# Patient Record
Sex: FEMALE | Race: WHITE | URBAN - METROPOLITAN AREA
[De-identification: names, ages, dates, MRNs, and addresses within clinical notes are randomized per-mention and may not be internally consistent; named-entity substitution may affect disease eponyms.]

---

## 2017-08-29 ENCOUNTER — OFFICE VISIT (OUTPATIENT)
Dept: ORTHOPEDIC SURGERY | Age: 67
End: 2017-08-29

## 2017-08-29 VITALS
HEIGHT: 63 IN | BODY MASS INDEX: 49.61 KG/M2 | SYSTOLIC BLOOD PRESSURE: 145 MMHG | DIASTOLIC BLOOD PRESSURE: 84 MMHG | HEART RATE: 59 BPM | WEIGHT: 279.98 LBS

## 2017-08-29 DIAGNOSIS — M25.511 ACUTE PAIN OF RIGHT SHOULDER: ICD-10-CM

## 2017-08-29 DIAGNOSIS — Z96.611 STATUS POST REVERSE TOTAL ARTHROPLASTY OF RIGHT SHOULDER: Primary | ICD-10-CM

## 2017-08-29 PROCEDURE — 1123F ACP DISCUSS/DSCN MKR DOCD: CPT | Performed by: ORTHOPAEDIC SURGERY

## 2017-08-29 PROCEDURE — G8400 PT W/DXA NO RESULTS DOC: HCPCS | Performed by: ORTHOPAEDIC SURGERY

## 2017-08-29 PROCEDURE — 99214 OFFICE O/P EST MOD 30 MIN: CPT | Performed by: ORTHOPAEDIC SURGERY

## 2017-08-29 PROCEDURE — 4040F PNEUMOC VAC/ADMIN/RCVD: CPT | Performed by: ORTHOPAEDIC SURGERY

## 2017-08-29 PROCEDURE — G8417 CALC BMI ABV UP PARAM F/U: HCPCS | Performed by: ORTHOPAEDIC SURGERY

## 2017-08-29 PROCEDURE — 3014F SCREEN MAMMO DOC REV: CPT | Performed by: ORTHOPAEDIC SURGERY

## 2017-08-29 PROCEDURE — G8427 DOCREV CUR MEDS BY ELIG CLIN: HCPCS | Performed by: ORTHOPAEDIC SURGERY

## 2017-08-29 PROCEDURE — 1090F PRES/ABSN URINE INCON ASSESS: CPT | Performed by: ORTHOPAEDIC SURGERY

## 2017-08-29 PROCEDURE — 3017F COLORECTAL CA SCREEN DOC REV: CPT | Performed by: ORTHOPAEDIC SURGERY

## 2017-08-29 PROCEDURE — 1036F TOBACCO NON-USER: CPT | Performed by: ORTHOPAEDIC SURGERY

## 2017-08-29 RX ORDER — DIETHYLPROPION HYDROCHLORIDE 75 MG/1
75 TABLET ORAL
COMMUNITY
Start: 2017-02-13

## 2017-08-29 RX ORDER — ALENDRONATE SODIUM 70 MG/1
TABLET ORAL
COMMUNITY
Start: 2017-06-04 | End: 2020-03-12 | Stop reason: ALTCHOICE

## 2017-08-29 RX ORDER — TRIAMTERENE AND HYDROCHLOROTHIAZIDE 37.5; 25 MG/1; MG/1
TABLET ORAL
COMMUNITY
Start: 2017-07-10 | End: 2020-03-12 | Stop reason: ALTCHOICE

## 2017-08-29 RX ORDER — FUROSEMIDE 40 MG/1
TABLET ORAL
COMMUNITY
Start: 2016-10-18 | End: 2017-08-29

## 2017-08-29 RX ORDER — SUMATRIPTAN 25 MG/1
TABLET, FILM COATED ORAL
COMMUNITY
Start: 2015-04-21 | End: 2017-08-29

## 2017-08-29 RX ORDER — NYSTATIN 100000 [USP'U]/G
POWDER TOPICAL
COMMUNITY
Start: 2017-04-25

## 2020-03-12 ENCOUNTER — OFFICE VISIT (OUTPATIENT)
Dept: ORTHOPEDIC SURGERY | Age: 70
End: 2020-03-12
Payer: MEDICARE

## 2020-03-12 VITALS — WEIGHT: 280 LBS | BODY MASS INDEX: 51.53 KG/M2 | HEIGHT: 62 IN

## 2020-03-12 PROCEDURE — 1123F ACP DISCUSS/DSCN MKR DOCD: CPT | Performed by: ORTHOPAEDIC SURGERY

## 2020-03-12 PROCEDURE — 3017F COLORECTAL CA SCREEN DOC REV: CPT | Performed by: ORTHOPAEDIC SURGERY

## 2020-03-12 PROCEDURE — 99214 OFFICE O/P EST MOD 30 MIN: CPT | Performed by: ORTHOPAEDIC SURGERY

## 2020-03-12 PROCEDURE — G8484 FLU IMMUNIZE NO ADMIN: HCPCS | Performed by: ORTHOPAEDIC SURGERY

## 2020-03-12 PROCEDURE — G8427 DOCREV CUR MEDS BY ELIG CLIN: HCPCS | Performed by: ORTHOPAEDIC SURGERY

## 2020-03-12 PROCEDURE — 4040F PNEUMOC VAC/ADMIN/RCVD: CPT | Performed by: ORTHOPAEDIC SURGERY

## 2020-03-12 PROCEDURE — G8417 CALC BMI ABV UP PARAM F/U: HCPCS | Performed by: ORTHOPAEDIC SURGERY

## 2020-03-12 PROCEDURE — 1090F PRES/ABSN URINE INCON ASSESS: CPT | Performed by: ORTHOPAEDIC SURGERY

## 2020-03-12 PROCEDURE — 20610 DRAIN/INJ JOINT/BURSA W/O US: CPT | Performed by: ORTHOPAEDIC SURGERY

## 2020-03-12 PROCEDURE — G8400 PT W/DXA NO RESULTS DOC: HCPCS | Performed by: ORTHOPAEDIC SURGERY

## 2020-03-12 PROCEDURE — 1036F TOBACCO NON-USER: CPT | Performed by: ORTHOPAEDIC SURGERY

## 2020-03-12 RX ORDER — PREDNISONE 1 MG/1
TABLET ORAL
COMMUNITY
Start: 2020-02-22 | End: 2020-07-23

## 2020-03-12 RX ORDER — LIDOCAINE HYDROCHLORIDE 10 MG/ML
8 INJECTION, SOLUTION INFILTRATION; PERINEURAL ONCE
Status: COMPLETED | OUTPATIENT
Start: 2020-03-12 | End: 2020-03-12

## 2020-03-12 RX ORDER — METHYLPREDNISOLONE ACETATE 40 MG/ML
80 INJECTION, SUSPENSION INTRA-ARTICULAR; INTRALESIONAL; INTRAMUSCULAR; SOFT TISSUE ONCE
Status: COMPLETED | OUTPATIENT
Start: 2020-03-12 | End: 2020-03-12

## 2020-03-12 RX ADMIN — LIDOCAINE HYDROCHLORIDE 8 ML: 10 INJECTION, SOLUTION INFILTRATION; PERINEURAL at 12:26

## 2020-03-12 RX ADMIN — METHYLPREDNISOLONE ACETATE 80 MG: 40 INJECTION, SUSPENSION INTRA-ARTICULAR; INTRALESIONAL; INTRAMUSCULAR; SOFT TISSUE at 12:27

## 2020-03-12 ASSESSMENT — ENCOUNTER SYMPTOMS: BACK PAIN: 1

## 2020-03-12 NOTE — PROGRESS NOTES
Review of Systems   Cardiovascular: Positive for leg swelling. Musculoskeletal: Positive for back pain and neck pain. Joint pain - shoulder     All other systems reviewed and are negative.

## 2020-03-12 NOTE — PROGRESS NOTES
[facility] and [address] [place] [address]  Office Visit  Left Shoulder Pain  Date:  3/12/2020    Name:  Jaci Major  Address:  [address] [place] [address]    :  1950      Age:   78 y.o.    SSN:  [id number]      Medical Record Number:  <[id number]>    Chief Complaint:    Left Shoulder Pain  S/p right reverse shoulder arthroplasty 3/7/2011    HPI:   Jaci Major is a 78 y.o. right hand dominant female who presents today for evaluation of the left shoulder. She has several years of gradually worsening chronic left shoulder pain, with decreased range of motion and significant limitations in her activities of daily living. She denies any specific discrete trauma. She is never had an injection, physical therapy or anti-inflammatories for the left shoulder. She also had a right reverse total shoulder arthroplasty 3/7/2011, which is doing excellent at this time. She has great ran    She denies any new numbness, tingling, fevers, chills, chest pain, shortness of breath, or any other new significant symptoms. Of note, she does have a sister with some significant medical issues at this time who requires her assistance. She would like to put off surgery until this summer and is interested in injection at this time.     Pain Assessment  Location of Pain: Shoulder  Location Modifiers: Left  Severity of Pain: 6  Quality of Pain: Aching  Duration of Pain: Persistent  Frequency of Pain: Intermittent  Aggravating Factors: (general use)  Limiting Behavior: Yes  Relieving Factors: Rest, Nsaids  Work-Related Injury: No  Are there other pain locations you wish to document?: No    Past History:  Past Medical History:   Diagnosis Date    Arthritis     Hepatitis        Past Surgical History:   Procedure Laterality Date    HYSTERECTOMY, TOTAL ABDOMINAL      SHOULDER SURGERY         Social History     Tobacco Use    Smoking status: Never Smoker    Smokeless tobacco: Never Used   Substance Use 4     Skin:  No skin rashes or lesions, warm, well perfused  Inspection: No deformity,  Palpation: tenderness at anterior and posterior joint lines  Stability: No instability  Sensation: Intact in all distributions  Motor: AIN/PIN/U 5/5  Strong radial pulse  Special Tests: positive napoleon, maintains bellypress 4/5      Right Upper Extremity Exam:      FF Abd ER IR   Active  150 20 L1   Passive ROM       Strength (x/5)  4  4 4     Sensation: Intact in all distributions  Motor: AIN/PIN/U 5/5  Strong radial pulse    Radiographic:  X-rays obtained and reviewed in office, reviewed and interpreted by me today:  Views: 3  Location: Right shoulder  Impression: Well positioned right reverse total shoulder arthroplasty without fracture, evidence of loosening or other hardware complications. X-rays obtained and reviewed in office, reviewed and interpreted by me today:  Views: 3  Location: Left shoulders  Impression: Advanced rotator cuff arthropathy with superior migration and acetabularization of the acromion. There is significant posterior wear and glenoid retroversion with mild subluxation      Assessment:  Claire Haley is a 78 y.o. female with   1. Left rotator cuff arthropathy - advanced   2. S/p right reverse total shoulder arthroplasty - doing well at 9 years follow-up    Impression:  Encounter Diagnoses   Name Primary?     Rotator cuff arthropathy, left Yes    Status post replacement of right shoulder joint        Office Procedures:  Orders Placed This Encounter   Procedures    XR SHOULDER LEFT (MIN 2 VIEWS)     Standing Status:   Future     Number of Occurrences:   1     Standing Expiration Date:   3/12/2021     Order Specific Question:   Reason for exam:     Answer:   pain    XR SHOULDER RIGHT (MIN 2 VIEWS)     Standing Status:   Future     Number of Occurrences:   1     Standing Expiration Date:   3/12/2021     Order Specific Question:   Reason for exam:     Answer:   pain       Plan:   Pertinent imaging was reviewed. The etiology, natural history, and treatment options for the disorder were discussed. The roles of activity modifications, medications, injections, physical therapy, and surgical interventions were all described to the patient and questions were answered. She wants to delay surgery until this summer. He was counseled that injection would delay surgery for 3 months. At this time she is interested in an injection which was performed for her left shoulder    Right shoulder is doing very well about 9 years out from surgery. She can continue to follow-up for this on an as-needed basis. For further counseling regarding the left shoulder we will see her back in 6 weeks. Procedure: left glenohumeral joint injection  After informed consent was provided, the skin was cleansed and prepped. Using a 25 gauge needle an injection with 2 mL of 40 mg/ml Depo-Medrol and 8 mL of 0.5% ropivacaine was injected without difficulty. The needle was withdrawn and the puncture site sealed with a Band-Aid. The patient tolerated the procedure well. Malik Tan MD  [address]     03/12/20  12:24 PM    The encounter with Slime Worrell was supervised by Dr Gerhard Navarro who personally examined the patient and reviewed the plan. This dictation was performed with a verbal recognition program (DRAGON) and it was checked for errors. It is possible that there are still dictated errors within this office note. If so, please bring any errors to my attention for an addendum. All efforts were made to ensure that this office note is accurate.   ________________  I was physically present and personally supervised the Orthopaedic Sports Medicine Fellow in the evaluation and development of a treatment plan for this patient. I personally interviewed the patient and performed a physical examination.  In addition, I discussed the patient's condition and treatment options with them. I have also reviewed and agree with the past medical, family and social history unless otherwise noted. All of the patient's questions were answered. Janel Smith MD, PhD  3/12/2020

## 2020-05-01 ENCOUNTER — TELEPHONE (OUTPATIENT)
Dept: ORTHOPEDIC SURGERY | Age: 70
End: 2020-05-01

## 2020-05-04 NOTE — TELEPHONE ENCOUNTER
Returned patient's call to discuss surgery. She does have a few more questions regarding the surgery and needs to schedule an appointment with Dr. Arben Ott to address her concerns. She has an appointment for 5/28. She will call sooner with any questions/concerns. Patient is in agreement with this plan.

## 2020-05-27 ENCOUNTER — TELEPHONE (OUTPATIENT)
Dept: ORTHOPEDIC SURGERY | Age: 70
End: 2020-05-27

## 2020-05-27 NOTE — TELEPHONE ENCOUNTER
Patient is calling reporting that she has a fever. Pcp wants her to get covid test and she would like if she should come in tomorrow.  Ph [phone]

## 2020-06-04 ENCOUNTER — OFFICE VISIT (OUTPATIENT)
Dept: ORTHOPEDIC SURGERY | Age: 70
End: 2020-06-04
Payer: MEDICARE

## 2020-06-04 VITALS — WEIGHT: 279.98 LBS | HEIGHT: 62 IN | BODY MASS INDEX: 51.52 KG/M2

## 2020-06-04 PROCEDURE — MISCCOLD COLD THERAPY UNIT AND PAD: Performed by: ORTHOPAEDIC SURGERY

## 2020-06-04 PROCEDURE — G8400 PT W/DXA NO RESULTS DOC: HCPCS | Performed by: ORTHOPAEDIC SURGERY

## 2020-06-04 PROCEDURE — 1123F ACP DISCUSS/DSCN MKR DOCD: CPT | Performed by: ORTHOPAEDIC SURGERY

## 2020-06-04 PROCEDURE — 1090F PRES/ABSN URINE INCON ASSESS: CPT | Performed by: ORTHOPAEDIC SURGERY

## 2020-06-04 PROCEDURE — 1036F TOBACCO NON-USER: CPT | Performed by: ORTHOPAEDIC SURGERY

## 2020-06-04 PROCEDURE — 3017F COLORECTAL CA SCREEN DOC REV: CPT | Performed by: ORTHOPAEDIC SURGERY

## 2020-06-04 PROCEDURE — 99214 OFFICE O/P EST MOD 30 MIN: CPT | Performed by: ORTHOPAEDIC SURGERY

## 2020-06-04 PROCEDURE — G8417 CALC BMI ABV UP PARAM F/U: HCPCS | Performed by: ORTHOPAEDIC SURGERY

## 2020-06-04 PROCEDURE — G8427 DOCREV CUR MEDS BY ELIG CLIN: HCPCS | Performed by: ORTHOPAEDIC SURGERY

## 2020-06-04 PROCEDURE — L3670 SO ACRO/CLAV CAN WEB PRE OTS: HCPCS | Performed by: ORTHOPAEDIC SURGERY

## 2020-06-04 PROCEDURE — 4040F PNEUMOC VAC/ADMIN/RCVD: CPT | Performed by: ORTHOPAEDIC SURGERY

## 2020-06-04 NOTE — PROGRESS NOTES
12 [facility]  History and Physical  Shoulder Pain    Date:  2020    Name:  Willian Cueva  Address:  [address] [place] [address]    :  1950      Age:   69 y.o.    SSN:  [id number]      Medical Record Number:  <[id number]>    Reason for Visit:    Follow-up (Left Shoulder)      HPI:   Willian Cueva is a 69 y.o. female who presents to our office today for follow up of the left shoulder pain. The patient has been treated conservatively for many years for her left shoulder. She has advance osteoarthritis with rotator cuff dysfunction of her left shoulder. She is no longer responding to conservative management. She had a corticosteroid injection on 3/12/2020 which gave her relief for one month. She reports her having dull, achy pain now. She has limited function of her left shoulder. Since her last visit she has retired from her job. She denies ay numbness or tingling. She previously underwent a right reverse total shoulder arthroplasty. This was nearly 10 years ago. The right shoulder continue to do well for her. Pain Assessment  Location of Pain: Shoulder  Location Modifiers: Left  Severity of Pain: 3  Quality of Pain: Dull, Aching  Duration of Pain: Persistent  Frequency of Pain: Intermittent  Aggravating Factors: (general use, certain movements or activities)  Limiting Behavior: Yes  Relieving Factors: Rest  Work-Related Injury: No  Are there other pain locations you wish to document?: No    Review of Systems    Patient has had no medical changes since last evaluated           Review of Systems:  A 14 point review of systems available in the scanned medical record as documented by the patient on 3/12/2020. The review is negative with the exception of those things mentioned in the History of Present Illness and Past Medical History.       Past Medical History:  Patient's medications, allergies, past medical, surgical, social and family histories time.    Impression:  Encounter Diagnoses   Name Primary?  Left shoulder pain, unspecified chronicity Yes    Rotator cuff arthropathy, left        Office Procedures:  Orders Placed This Encounter   Procedures    Cold Therapy Unit and Pad $150     Scheduling Instructions:      Patient was supplied a Cold Therapy Unit and Pad. This retail item was supplied to provide functional support and assist in protecting the affected area. Verbal and written instructions for the use of and application of this item were provided. The patient was educated and fit by a healthcare professional with expert knowledge and specialization in brace application. They were instructed to contact the office immediately should the equipment result       in increased pain, decreased sensation, increased swelling or worsening of the condition.  CT SHOULDER LEFT WO CONTRAST     Standing Status:   Future     Standing Expiration Date:   6/4/2021     Order Specific Question:   Reason for exam:     Answer:   Gentry palma    MRI SHOULDER LEFT WO CONTRAST     Standing Status:   Future     Standing Expiration Date:   6/4/2021    DJO ultrasling IV Shoulder Sling     Patient was prescribed a DJO Ultrasling IV Shoulder Brace. The left shoulder will require stabilization / immobilization from this orthosis. The orthosis will assist in protecting the affected area, provide functional support and facilitate healing. The device was ordered and fit on 06/04/20. The patient was educated and fit by a healthcare professional with expert knowledge and specialization in brace application while under the direct supervision of the treating physician. Verbal and written instructions for the use of and application of this item were provided. They were instructed to contact the office immediately should the brace result in increased pain, decreased sensation, increased swelling or worsening of the condition.        Plan: AMY  Orthopaedic Sports Medicine Physician Assistant    During this examination, I, Fabio Stovall PA-C, functioned as a scribe for Dr. Jenne Litten. This dictation was performed with a verbal recognition program (DRAGON) and it was checked for errors. It is possible that there are still dictated errors within this office note. If so, please bring any errors to my attention for an addendum. All efforts were made to ensure that this office note is accurate.  _______________  I, Dr. Jenne Litten, personally performed the services described in this documentation as described by Fabio Stovall PA-C in my presence, and it is both accurate and complete. Janel Tuttle MD, PhD  6/4/2020

## 2020-06-05 ENCOUNTER — TELEPHONE (OUTPATIENT)
Dept: ORTHOPEDIC SURGERY | Age: 70
End: 2020-06-05

## 2020-06-09 ENCOUNTER — TELEPHONE (OUTPATIENT)
Dept: ORTHOPEDIC SURGERY | Age: 70
End: 2020-06-09

## 2020-06-17 ENCOUNTER — TELEPHONE (OUTPATIENT)
Dept: ORTHOPEDIC SURGERY | Age: 70
End: 2020-06-17

## 2020-06-19 ENCOUNTER — TELEPHONE (OUTPATIENT)
Dept: ORTHOPEDIC SURGERY | Age: 70
End: 2020-06-19

## 2020-06-25 ENCOUNTER — OFFICE VISIT (OUTPATIENT)
Dept: ORTHOPEDIC SURGERY | Age: 70
End: 2020-06-25
Payer: MEDICARE

## 2020-06-25 VITALS — WEIGHT: 279 LBS | HEIGHT: 62 IN | BODY MASS INDEX: 51.34 KG/M2

## 2020-06-25 PROBLEM — I10 ESSENTIAL HYPERTENSION: Status: ACTIVE | Noted: 2020-05-08

## 2020-06-25 PROBLEM — L30.4 INTERTRIGO: Status: ACTIVE | Noted: 2020-05-08

## 2020-06-25 PROBLEM — E66.01 OBESITY, MORBID, BMI 50 OR HIGHER (HCC): Status: ACTIVE | Noted: 2018-06-20

## 2020-06-25 PROCEDURE — 1090F PRES/ABSN URINE INCON ASSESS: CPT | Performed by: ORTHOPAEDIC SURGERY

## 2020-06-25 PROCEDURE — 1036F TOBACCO NON-USER: CPT | Performed by: ORTHOPAEDIC SURGERY

## 2020-06-25 PROCEDURE — 4040F PNEUMOC VAC/ADMIN/RCVD: CPT | Performed by: ORTHOPAEDIC SURGERY

## 2020-06-25 PROCEDURE — 1123F ACP DISCUSS/DSCN MKR DOCD: CPT | Performed by: ORTHOPAEDIC SURGERY

## 2020-06-25 PROCEDURE — G8427 DOCREV CUR MEDS BY ELIG CLIN: HCPCS | Performed by: ORTHOPAEDIC SURGERY

## 2020-06-25 PROCEDURE — 3017F COLORECTAL CA SCREEN DOC REV: CPT | Performed by: ORTHOPAEDIC SURGERY

## 2020-06-25 PROCEDURE — G8400 PT W/DXA NO RESULTS DOC: HCPCS | Performed by: ORTHOPAEDIC SURGERY

## 2020-06-25 PROCEDURE — 99213 OFFICE O/P EST LOW 20 MIN: CPT | Performed by: ORTHOPAEDIC SURGERY

## 2020-06-25 PROCEDURE — G8417 CALC BMI ABV UP PARAM F/U: HCPCS | Performed by: ORTHOPAEDIC SURGERY

## 2020-06-25 NOTE — PROGRESS NOTES
12 [facility]  History and Physical  Shoulder Pain    Date:  2020    Name:  Mireille Christianson  Address:  [address] [place] [address]    :  1950      Age:   69 y.o.    SSN:  [id number]      Medical Record Number:  <[id number]>    Reason for Visit:    Shoulder Pain (Preop shoulder)      HPI:   Mireille Christianson is a 69 y.o. female who presents to our office today for follow up of the left shoulder pain. Patient has well known advanced glenohumeral osteoarthritis. We did discuss surgical intervention at her last visit as she has failed conservative management including corticosteroid injections and activity modification along with home physical therapy. Patient reported that the last injection only gave her 4 weeks of relief. She continues to have limited function of the shoulder along with increased pain. She denies any new injury since her last visit. At her last visit she was fitted with an UltraSling brace. She was also able to get her CT scan which she presents today review. Pain Assessment  Location of Pain: Shoulder  Location Modifiers: Left  Severity of Pain: 2  Quality of Pain: Aching  Duration of Pain: Persistent  Frequency of Pain: Intermittent  Aggravating Factors: Stretching, Exercise(Lifting)  Limiting Behavior: Yes  Relieving Factors: Rest  Result of Injury: No  Work-Related Injury: No  Are there other pain locations you wish to document?: No    Patient has had no medical changes since last evaluated      Review of Systems:  A 14 point review of systems available in the scanned medical record as documented by the patient on 2020. The review is negative with the exception of those things mentioned in the History of Present Illness and Past Medical History. Past Medical History:  Patient's medications, allergies, past medical, surgical, social and family histories were reviewed and updated as appropriate. Allergies:   Allergies Allergen Reactions    Codeine Nausea And Vomiting    Morphine      Other reaction(s): Other (See Comments)  Migraines     Acetaminophen Other (See Comments)     Was told should not take due to history of hepatitis    Adhesive Tape      blisters    Ciprofloxacin     Sulfa Antibiotics        Physical Exam:  There were no vitals filed for this visit. General: Yaa Farias is a healthy and well appearing 69 y.o. female who is sitting comfortably in our office in acute distress. Skin:  There are no skin lesions, cellulitis, or extreme edema. The patient has warm and well-perfused Bilateral upper extremities with brisk capillary refill. Eyes: Extra-ocular muscles intact  Mouth: Oral mucosa moist. No perioral lesions  Pulm: Respirations unlabored and regular. Neuro: Alert and oriented x3    left Shoulder Exam:  Inspection:  No gross deformities, no signs of infection. Palpation: Patient does have glenohumeral crepitus. She does have tenderness over the joint line. She has tenderness over the rotator cuff. Active Range of Motion: Forward elevation of 50, abduction of 50, external rotation with elbow at the side 5, internal rotation to the back is 4    Passive Range of Motion: deferred. Strength: External rotation with resistance with elbow at the side 2/5    Special Tests:  No Juan Ramon muscle deformity. Neurovascular: Sensation to light touch is intact, no motor deficits, palpable radial pulses 2+    Additional Examinations:    Examination of the contralateral extremity does not show any tenderness, deformity or injury. Range of motion is unremarkable. There is no gross instability. There are no rashes, ulcerations or lesions.  Strength and tone are normal.      Radiographic:  CT left shoulder 6/11/2020      FINDINGS:  Loss of glenohumeral joint space.  High-riding of the humeral head.  Remodeled    glenohumeral articular surfaces with eburnation and extensive subcortical pseudocysts.

## 2020-07-07 ENCOUNTER — OFFICE VISIT (OUTPATIENT)
Dept: ORTHOPEDIC SURGERY | Age: 70
End: 2020-07-07

## 2020-07-07 ENCOUNTER — EVALUATION (OUTPATIENT)
Dept: PHYSICAL THERAPY | Age: 70
End: 2020-07-07
Payer: MEDICARE

## 2020-07-07 ENCOUNTER — TELEPHONE (OUTPATIENT)
Dept: ORTHOPEDIC SURGERY | Age: 70
End: 2020-07-07

## 2020-07-07 VITALS — HEIGHT: 62 IN | BODY MASS INDEX: 51.36 KG/M2 | WEIGHT: 279.1 LBS

## 2020-07-07 PROBLEM — M25.612 DECREASED ROM OF LEFT SHOULDER: Status: ACTIVE | Noted: 2020-07-07

## 2020-07-07 PROBLEM — M19.012 PRIMARY OSTEOARTHRITIS OF LEFT SHOULDER: Status: ACTIVE | Noted: 2020-07-07

## 2020-07-07 PROBLEM — M25.512 LEFT SHOULDER PAIN: Status: ACTIVE | Noted: 2020-07-07

## 2020-07-07 PROBLEM — R29.898 SHOULDER WEAKNESS: Status: ACTIVE | Noted: 2020-07-07

## 2020-07-07 PROCEDURE — G8539 DOC FUNCT AND CARE PLAN: HCPCS | Performed by: PHYSICAL THERAPIST

## 2020-07-07 PROCEDURE — G8730 PAIN DOC POS AND PLAN: HCPCS | Performed by: PHYSICAL THERAPIST

## 2020-07-07 PROCEDURE — G8427 DOCREV CUR MEDS BY ELIG CLIN: HCPCS | Performed by: PHYSICAL THERAPIST

## 2020-07-07 PROCEDURE — 97110 THERAPEUTIC EXERCISES: CPT | Performed by: PHYSICAL THERAPIST

## 2020-07-07 PROCEDURE — 99024 POSTOP FOLLOW-UP VISIT: CPT | Performed by: ORTHOPAEDIC SURGERY

## 2020-07-07 PROCEDURE — 1101F PT FALLS ASSESS-DOCD LE1/YR: CPT | Performed by: PHYSICAL THERAPIST

## 2020-07-07 PROCEDURE — 97161 PT EVAL LOW COMPLEX 20 MIN: CPT | Performed by: PHYSICAL THERAPIST

## 2020-07-07 PROCEDURE — 97530 THERAPEUTIC ACTIVITIES: CPT | Performed by: PHYSICAL THERAPIST

## 2020-07-07 NOTE — PROGRESS NOTES
placement without any signs of loosening, fractures, subluxations or dislocations. Impression: Stable postop x-ray. Assessment :  Ms. Dayo Douglas is a 69 y.o. patient is s/p a left reverse total shoulder arthroplasty on 6/29/2020. Overall, she is doing very well. Impression:  Encounter Diagnosis   Name Primary?  S/P reverse total shoulder arthroplasty, left Yes       Office Procedures:  Orders Placed This Encounter   Procedures    XR SHOULDER LEFT (MIN 2 VIEWS)     Standing Status:   Future     Number of Occurrences:   1     Standing Expiration Date:   7/7/2021     Order Specific Question:   Reason for exam:     Answer:   pain    [facility] PT [facility] Physical Therapy     Referral Priority:   Routine     Referral Type:   Eval and Treat     Referral Reason:   Specialty Services Required     Requested Specialty:   Physical Therapy     Number of Visits Requested:   1       Treatment Plan:    Overall the patient is doing well. The pain is well-controlled. We recommend that she wear the UltraSling brace at all times with the exception of clothing, bathing of physical therapy. The patient was told that she is restricted from driving for at least 3 weeks postop. We will give a print out of their physical therapy order. All of her questions were fully answered today. We would like to see her back in 2 weeks for follow-up visit. 1:43 PM      Rosa Medrano PA-C  Orthopaedic Sports Medicine Physician Assistant    During this examination, Rosa ORANTES PA-C, functioned as a scribe for Dr. Tommy Beltran. This dictation was performed with a verbal recognition program (DRAGON) and it was checked for errors. It is possible that there are still dictated errors within this office note. If so, please bring any errors to my attention for an addendum.   All efforts were made to ensure that this office note is accurate.  ________________  LAMBERTO, Dr. Tommy Beltran, personally performed the services described in this documentation as described by Khoi العلي PA-C in my presence, and it is both accurate and complete. Janel Baxter MD, PhD  7/7/2020

## 2020-07-07 NOTE — PROGRESS NOTES
[facility]awn Marte [facility]   Phone: [phone]    Fax: [phone]      Physical Therapy Treatment Note/ Progress Report:           Date:  2020    Patient Name:  Salomon Barksdale    :  1950  MRN: <[id number]>  Restrictions/Precautions:  S/P Left Reverse TSA  Medical/Treatment Diagnosis Information:  Diagnosis: S/P Left Reverse TSA (Sx: 20) (M19.012)   Treatment Diagnosis: Left Shoulder Pain (M25.512), Decreased left Shoulder ROM (M25.612), Decreased Left Shoulder Strength (P27.100)  Insurance/Certification information:  PT Insurance Information: Medicare, [address] [facility]  Physician Information:  Referring Practitioner: Dr. Fang Maher  Has the plan of care been signed (Y/N):        []  Yes  [x]  No (POC sent 20)       Date of Patient follow up with Physician: Today; 20      Is this a Progress Report:     [x]  Yes  []  No        If Yes:  Date Range for reporting period:  Beginning 20  Ending 20    Progress report will be due (10 Rx or 30 days whichever is less): 60      Recertification will be due (POC Duration  / 90 days whichever is less): 20         Visit # Insurance Allowable Auth Required   1 Medical Necessity []  Yes [x]  No        Functional Scale:    Date assessed:  20  Functional Assessment Tool Used: Carolyn Mattson  Score:  84.1% functional deficit (20)     (20)   Patient Age: 69years old   Patient Height: 5' 2\"  Patient Weight: 280 lbs  Patient BMI: 51.2      Pain (20)  [x]  Pain diagram was completed, pain was present and a follow up plan to address the pain is in the plan of care. ()   []  Pain diagram was completed and there was no pain present and therefore no follow up plan to address pain in the plan of care.  ()   []  Pain diagram was not completed and the reason for not completing the pain diagram is in the medical chart ()  []  Patient refused to participate   []  Severe mental or physical capacity, patient is in urgent emergent situation and to complete the questionnaire would jeopardize the patient's health status        Functional Questionnaire (7/7/20)  [x]  Patient completed the functional outcome questionnaire and deficiencies were addressed in the plan of care. ()   []  Patient completed the outcome questionnaire and there were no functional deficits identified and therefore no plan of care is required. ()   []  Patient did not complete the functional outcome questionnaire and the reason why is documented in the medical chart. ()  []  Patient refused to participate   []  Patient unable to complete the questionnaire   []   A functional outcome assessment has been reported on within the last 30 days        Falls (7/7/20)  [x]  The patient has had no falls or 1 fall without injury in the past year. (1101F)   []  There is no documentation of fall in the medical chart (1101F,8P)     [] The patient has had 2 or more falls or one fall with injury in the past year that is documented in the medical chart. (1100F)  []  A falls risk assessment was completed and documented in the medical chart. (2776N)   []  Falls were addressed in the plan of care. (9537T)   []  A falls risk assessment was not completed and documented in the medical chart (4240G,8D)   []   Falls were not addressed in the plan of care and reason was documented in the plan of care. (1989F 1P)        Medication (7/7/20)     [x] A list of current medications (including prescription, over the counter, herbal supplements, vitamin supplements, mineral supplements, dietary supplements) was reviewed with the patient and documented in the medical chart. ()     Falls Risk Assessment (30 days):   [x] Falls Risk assessed and no intervention required.   [] Falls Risk assessed and Patient requires intervention due to being higher risk   TUG score (>12s at risk):     [] Falls education provided, including       Latex Allergy:  [x]NO      []YES  Preferred Language for Healthcare:   [x]English       []other:      Pain level:  3/10  Medication: Pt has only taken 1 Tramadol since surgery       SUBJECTIVE:  See initial eval      OBJECTIVE: (Measurements taken 7/7/20)    *Pt is right hand dominant    ROM PROM AROM  Comment    L R L R    Flexion   Not     Abduction   Tested     ER   Due to     IR   Surgery     Other        Other             Strength L R Comment   Flexion Not      Abduction Tested     ER Due to      IR Surgery     Supraspinatus      Upper Trap      Lower Trap      Mid Trap      Rhomboids      Biceps      Triceps      Horizontal Abduction      Horizontal Adduction      Lats          Special Tests Results/Comment   Umu Not      Neers Tested   Speeds Due to   OBriens Surgery   Other    Neurologic Signs Pt reports no numbness or tingling in left UE   Functional Reach          Reflexes/Sensation:    []Dermatomes/Myotomes intact    []Reflexes equal and normal bilaterally   [x]Other: Intact to light touch    Joint mobility: NA due to surgery   []Normal    []Hypo   []Hyper    Palpation: General tenderness around incision site. Functional Mobility/Transfers: Assistance needed with ADL's and self-care activities. Pt is unable to push through her left arm to get up from a chair due to surgical restrictions. She is also unable to drive due to surgical restrictions. Posture: Forward head, rounded shoulders. Pt is wearing Ultrasling. Bandages/Dressings/Incisions: Incision is clean and dry, prineo dressing in place. Bruising present lateral chest and upper arm. Gait: (include devices/WB status): WNL, no AD.     Orthopedic Special Tests: See above                         RESTRICTIONS/PRECAUTIONS: Left Reverse TSA (Sx: 6/29/20)  ~Elbow PROM  ~No shoulder ROM  ~UltraSling at all times      Exercises/Interventions:     Therapeutic Ex (35858) Sets/sec Reps Notes/CUES   AD UE BIKE            STRETCHING/ROM      Pulleys      Table Slides      Wand UE [place]      Pendulum      Doorway      Wall Slides      CBA      TP BB      Sleeper       Elbow PROM 3 sets X 10                ISOMETRICS      Gripping  X 3' Blue ball that came with sling   Retraction 3 sets X 10 Seated with left arm supported   Abduction      Flexion      Internal Rotation      External Rotation            STRENGTHENING-PREs      Flexion      Abduction      Internal Rotation      External Rotation      Shrugs 3 sets X 10 Seated with left arm supported   Biceps      Triceps      Retraction      Extension      Horizontal Abduction in ER      Serratus                        THERABANDS/CABLE COLUMN      Rows      Lats      Extension      Internal Rotation      External Rotation      Biceps      Triceps      PNF                                    Manual Intervention (95510)      Scar Massage      STM      Hawkgrips      GH joint mobilizations      Foamroll                  NMR re-education (63979)   CUES NEEDED   Plyoback      Therabar oscillations      Body Blade       Rhythmic Stabilization      Ball on the wall                              Therapeutic Activity (87309)      Core training      Swiss ball activities                  Patient Education: diagnosis, prognosis, pt goals, rehab timeline, and surgical precautions. Also instructed pt and her friend on proper way to melonie/doff sling (7/7/20)  X 10'                          Therapeutic Exercise and NMR EXR  [x] (29214) Provided verbal/tactile cueing for activities related to strengthening, flexibility, endurance, ROM  for improvements in scapular, scapulothoracic and UE control with self care, reaching, carrying, lifting, house/yardwork, driving/computer work.     [x] (42627) Provided verbal/tactile cueing for activities related to improving balance, coordination, kinesthetic sense, posture, motor skill, proprioception  to assist with  scapular, scapulothoracic and UE control with self care, reaching, carrying, lifting, house/yardwork, driving/computer work. Therapeutic Activities:    [x] (03513 or 54238) Provided verbal/tactile cueing for activities related to improving balance, coordination, kinesthetic sense, posture, motor skill, proprioception and motor activation to allow for proper function of scapular, scapulothoracic and UE control with self care, carrying, lifting, driving/computer work. Home Exercise Program:    [x] (12600) Reviewed/Progressed HEP activities related to strengthening, flexibility, endurance, ROM of scapular, scapulothoracic and UE control with self care, reaching, carrying, lifting, house/yardwork, driving/computer work  [x] (37665) Reviewed/Progressed HEP activities related to improving balance, coordination, kinesthetic sense, posture, motor skill, proprioception of scapular, scapulothoracic and UE control with self care, reaching, carrying, lifting, house/yardwork, driving/computer work      Manual Treatments:  PROM / STM / Oscillations-Mobs:  G-I, II, III, IV (PA's, Inf., Post.)  [] (49714) Provided manual therapy to mobilize soft tissue/joints of cervical/CT, scapular GHJ and UE for the purpose of modulating pain, promoting relaxation,  increasing ROM, reducing/eliminating soft tissue swelling/inflammation/restriction, improving soft tissue extensibility and allowing for proper ROM for normal function with self care, reaching, carrying, lifting, house/yardwork, driving/computer work    Modalities:  Pt declined ice; she will ice at home   [] GAME READY (VASO)- for significant edema, swelling, pain control.     Charges:  Timed Code Treatment Minutes: 30'   Total Treatment Minutes: 54'      [x] EVAL (LOW) 94875 (typically 20 minutes face-to-face)  [] EVAL (MOD) 09939 (typically 30 minutes face-to-face)  [] EVAL (HIGH) 16425 (typically 45 minutes face-to-face)  [] RE-EVAL     [x] MC(21415) x 1    [] IONTO  [] NMR (42340) x     [] VASO  [] Manual (62219) x     [] Other:  [x] TA x  1    [] [facility] (91056)  [] ES(attended) (17109)      [] ES (un) (16427):         ASSESSMENT:  Pt presents s/p left reverse TSA on 6/29/20 with decreased left shoulder ROM, decreased strength, and increased pain limiting her ability to perform ADL's and self-care activities, reach, lift, drive, and sleep. Good rehab potential.  Pt would benefit from skilled PT services to address these deficits and increase function. GOALS:  (Goals made 7/7/20)  Patient stated goal:  \"Increase function; be able to use my left arm for toileting; be self-sufficient\"  [] Progressing: [] Met: [] Not Met: [] Adjusted    Therapist goals for Patient:   Short Term Goals: To be achieved in: 2 weeks  1. Independent in HEP and progression per patient tolerance, in order to prevent re-injury. [] Progressing: [] Met: [] Not Met: [] Adjusted  2. Patient will have a decrease in left shoulder pain to 1-2/10 to facilitate improvement in movement, function, and ADLs as indicated by Functional Deficits. [] Progressing: [] Met: [] Not Met: [] Adjusted  3. Patient will be able to melonie/doff Ultrasling independently. [] Progressing: [] Met: [] Not Met: [] Adjusted      Long Term Goals: To be achieved in: 12 weeks  1. Disability index score of 15% or less for the Quick DASH to assist with reaching prior level of function. [] Progressing: [] Met: [] Not Met: [] Adjusted  2. Patient will demonstrate an increase in left shoulder AROM to at least 90° flexion to allow for proper joint functioning as indicated by patients Functional Deficits. [] Progressing: [] Met: [] Not Met: [] Adjusted  3. Patient will demonstrate an increase in left Deltoid strength to 4+/5 to allow for proper functional mobility as indicated by patients Functional Deficits. [] Progressing: [] Met: [] Not Met: [] Adjusted  4. Patient will have a decrease in left shoulder pain to 0-1/10 with daily activities. [] Progressing: [] Met: [] Not Met: [] Adjusted  5.  Pt will be able to perform all ADL's independently and without compensation. (patient specific functional goal)    [] Progressing: [] Met: [] Not Met: [] Adjusted       Overall Progression Towards Functional goals/ Treatment Progress Update:  [] Patient is progressing as expected towards functional goals listed. [] Progression is slowed due to complexities/Impairments listed. [] Progression has been slowed due to co-morbidities. [x] Plan just implemented, too soon to assess goals progression <30days   [] Goals require adjustment due to lack of progress  [] Patient is not progressing as expected and requires additional follow up with physician  [] Other    Prognosis for POC: [x] Good [] Fair  [] Poor      Patient requires continued skilled intervention: [x] Yes  [] No    Treatment/Activity Tolerance:  [x] Patient able to complete treatment  [] Patient limited by fatigue  [] Patient limited by pain    [] Patient limited by other medical complications  [] Other:           PLAN: 1-2x/week for 12 weeks for ROM, strengthening, scap stability exercises, manual therapy, HEP, pt education, and modalities prn (7/7/20)  [] Continue per plan of care [] Alter current plan   [x] Plan of care initiated [] Hold pending MD visit [] Discharge      Electronically signed by:  Konrad Hannah, PT     Physical Therapist [facility] [address] #[id number]  Physical Therapist [place] License #[id number]    Note: If patient does not return for scheduled/ recommended follow up visits, this note will serve as a discharge from care along with most recent update on progress.

## 2020-07-07 NOTE — LETTER
Physical Therapy Rehabilitation Referral    Patient Name:  Woodrow Yadav      YOB: 1950    Diagnosis:    1. S/P reverse total shoulder arthroplasty, left        Precautions:     [x] Evaluate and Treat    Post Op Instructions:  [] Continuous passive motion (CPM)  [x] passive Elbow ROM  [x] Exercise in plane of scapula  [x]  Strengthening     [] Pulley and instruction   [x] Home exercise program (copy to patient)   [x] Sling when arm at risk  [x] Sling or brace at all times   [] Passive ROM: Forward elevation to 90            [] Passive ROM: External rotation  To 20    [] Isometric external rotator strengthening [] AAROM: internal rotation: up the back  [x] Isometric abductor strengthening  [] AAROM: Internal abduction   [] Isometric internal rotator strengthening [] AAROM: cross-body adduction             Stretching:     Strengthening:  [] Four quadrant (FE, ER, IR, CBA)  [] Rotator cuff (ER, IR, Abd)  [] Forward Elevation    [] External Rotators     [] External Rotation    [] Internal Rotators  [] Internal Rotation: up/back   [] Abductors     [] Internal Rotation: supine in abduction  [] Sleeper Stretch    [] Flexors  [] Cross-body abduction    [] Extensors  [x] Pendulum (FE, Abd/Add, cw/ccw)  [x] Scapular Stabilizers   [] Wall-walking (FE, Abd)        [x] Shoulder shrugs     [] Table slides (FE)                [x] Rhomboid pinch  [] Elbow (flex, ext, pron, sup)        [] Lat.  Pull downs     [] Medial epicondylitis program       [] Forward punch   [] Lateral epicondylitis program       [] Internal rotators     [] Progressive resistive exercises  [] Bench Press        [] Bench press plus  Activities:     [] Lateral pull-downs  [] Rowing     [] Progressive two-hand supine press  [] Stepper/Exercise bike   [] Biceps: curls/supination  [] Swimming  [] Water exercises    Modalities:     Return to Sport:  [x] Of Choice      [] Plyometrics  [] Ultrasound     [] Rhythmic stabilization [] Iontophoresis    [] Core strengthening   [] Moist heat     [] Sports specific program:   [] Massage         [x] Cryotherapy      [] Electrical stimulation     [] Paraffin  [] Whirlpool  [] TENS    [x] Home exercise program (copy to patient). Perform exercises for:   15     minutes    3      times/day  [x] Supervised physical therapy  Frequency: []  1x week  [x] 2x week  [] 3x week  [] Other:   Duration: [] 2 weeks   [] 4 weeks  [x] 6 weeks  [] Other:     Additional Instructions:     Janel Peraza MD, PhD

## 2020-07-07 NOTE — PROGRESS NOTES
[facility]   Phone: [phone]    Fax: [phone]     Physical Therapy Certification    Dear Referring Practitioner: Dr. Rosangela Dorantes,    We had the pleasure of evaluating the following patient for physical therapy services at [address] [place] [address]. A summary of our findings can be found in the initial assessment below. This includes our plan of care. If you have any questions or concerns regarding these findings, please do not hesitate to contact me at the office phone number checked above. Thank you for the referral.       Physician Signature:_______________________________Date:__________________  By signing above (or electronic signature), therapists plan is approved by physician      Patient: Kerstin Burkitt   : 1950   MRN: <[id number]>  Referring Physician: Referring Practitioner: Dr. Rosangela Dorantes      Evaluation Date: 2020      Medical Diagnosis Information:  Diagnosis: S/P Left Reverse TSA (Sx: 20)  (M19.012)    Treatment Diagnosis:  Left Shoulder Pain (M25.512), Decreased left Shoulder ROM (M25.612), Decreased Strength (R29.898)                                        Insurance information: PT Insurance Information: Medicare, [place] [employer]    Precautions/ Contra-indications: S/P Left Reverse TSA (Sx: 20)  Latex Allergy:  [x]NO      []YES    C-SSRS Triggered by Intake questionnaire (Past 2 wk assessment):   [x] No, Questionnaire did not trigger screening.   [] Yes, Patient intake triggered further evaluation      [] C-SSRS Screening completed  [] PCP notified via Plan of Care  [] Emergency services notified     Preferred Language for Healthcare:   [x]English       []other:      SUBJECTIVE: Patient stated complaint:  Decreased functional use of left UE secondary to surgery. Pt reports history of left shoulder pain for several years secondary to arthritis that progressively worsened limiting function.   Pt underwent left reverse TSA on 6/29/20. She had some breathing difficulties following the nerve block and spent 1 night in the hospital.  Her pain has been very manageable (rated 3/10) and she has only taken 1 Tramadol since surgery. She is sleeping in a lift chair with a pillow under both arms. She is wearing Ultrasling as instructed but isn't sure how to correctly take off and put her sling on. She is icing her shoulder. Pt reports no numbness or tingling in left UE but states her chest and upper arm are very bruised from surgery. Pt lives with her sister who is helping with ADL's and self-care act. Pt also has a friend/neighbor helping at home. Pt saw Dr. Jarrett Yost today and he was pleased with her progress. Pt goal:  \"Increase function; be able to use my left arm for toileting; be self-sufficient\"    *Pt is right hand dominant    Relevant Medical History:  Right Reverse TSA ~2011, HTN, OA, Osteoporosis, HA/Migraines    Functional Disability Index:   Functional Assessment Tool Used:  Quick Dash   Score:  84.1% functional deficit (7/7/20)      Pain Scale: 3/10  Easing factors: Ice, being in the sling  Provocative factors: Performing self-care activities    Type: []Constant   [x]Intermittent  []Radiating []Localized []other:     Numbness/Tingling: Pt reports no numbness or tingling in left UE    Occupation/School: Retired    Hobbies/Recreational Activities:  None stated    Living Status:  Pt lives with her sister in a house. Her bathroom and shower are handicap accessible. She also has a lift to the second floor. Prior Level of Function: Pt reports history of left shoulder pain for a few years that has progressively worsened limiting function. Pt reports no prior PT for the left shoulder.           OBJECTIVE:     *Pt is right hand dominant    ROM PROM AROM  Comment    L R L R    Flexion   Not     Abduction   Tested     ER   Due to     IR   Surgery     Other        Other             Strength L R Comment   Flexion Not Abduction Tested     ER Due to      IR Surgery     Supraspinatus      Upper Trap      Lower Trap      Mid Trap      Rhomboids      Biceps      Triceps      Horizontal Abduction      Horizontal Adduction      Lats          Special Tests Results/Comment   Umu Not      Neers Tested   Speeds Due to   OBriens Surgery   Other    Neurologic Signs Pt reports no numbness or tingling in left UE   Functional Reach          Reflexes/Sensation:    []Dermatomes/Myotomes intact    []Reflexes equal and normal bilaterally   [x]Other: Intact to light touch    Joint mobility: NA due to surgery   []Normal    []Hypo   []Hyper    Palpation: General tenderness around incision site. Functional Mobility/Transfers: Assistance needed with ADL's and self-care activities. Pt is unable to push through her left arm to get up from a chair due to surgical restrictions. She is also unable to drive due to surgical restrictions. Posture: Forward head, rounded shoulders. Pt is wearing Ultrasling. Bandages/Dressings/Incisions: Incision is clean and dry, prineo dressing in place. Bruising present lateral chest and upper arm. Gait: (include devices/WB status): WNL, no AD. Orthopedic Special Tests: See above                       [x] Patient history, allergies, meds reviewed. Medical chart reviewed. See intake form. Review Of Systems (ROS):  [x]Performed Review of systems (Integumentary, CardioPulmonary, Neurological) by intake and observation. Intake form has been scanned into medical record. Patient has been instructed to contact their primary care physician regarding ROS issues if not already being addressed at this time.       Co-morbidities/Complexities (which will affect course of rehabilitation):   []None           Arthritic conditions   []Rheumatoid arthritis (M05.9)  [x]Osteoarthritis (M19.91)   Cardiovascular conditions   [x]Hypertension (I10)  []Hyperlipidemia (E78.5)  []Angina pectoris (I20)  []Atherosclerosis (I70)   Musculoskeletal conditions   []Disc pathology   []Congenital spine pathologies   []Prior surgical intervention  [x]Osteoporosis (M81.8)  []Osteopenia (M85.8)   Endocrine conditions   []Hypothyroid (E03.9)  []Hyperthyroid Gastrointestinal conditions   []Constipation (S79.05)   Metabolic conditions   []Morbid obesity (E66.01)  []Diabetes type 1(E10.65) or 2 (E11.65)   []Neuropathy (G60.9)     Pulmonary conditions   []Asthma (J45)  []Coughing   []COPD (J44.9)   Psychological Disorders  []Anxiety (F41.9)  []Depression (F32.9)   []Other:   []Other:          Barriers to/and or personal factors that will affect rehab potential:              []Age  []Sex              [x]Motivation/Lack of Motivation - pt is very motivated to increase function                       [x]Co-Morbidities - HTN, OA, HA/Migraines, Osteoporosis              []Cognitive Function, education/learning barriers              []Environmental, home barriers              []profession/work barriers  [x]past PT/medical experience - pt underwent right reverse TSA in 2011 with good results  []other:  Justification:      Falls Risk Assessment (30 days):   [x] Falls Risk assessed and no intervention required. [] Falls Risk assessed and Patient requires intervention due to being higher risk   TUG score (>12s at risk):     [] Falls education provided, including        ASSESSMENT: Pt presents s/p left reverse TSA on 6/29/20 with decreased left shoulder ROM, decreased strength, and increased pain limiting her ability to perform ADL's and self-care activities, reach, lift, drive, and sleep. Good rehab potential.  Pt would benefit from skilled PT services to address these deficits and increase function. Patient Age: 69years old  Patient Height: 5' 2\"  Patient Weight: 280 lbs  Patient BMI: 51.2      Pain  [x]  Pain diagram was completed, pain was present and a follow up plan to address the pain is in the plan of care.  () []  Pain diagram was completed and there was no pain present and therefore no follow up plan to address pain in the plan of care. ()   []  Pain diagram was not completed and the reason for not completing the pain diagram is in the medical chart ()  []  Patient refused to participate   []  Severe mental or physical capacity, patient is in urgent emergent situation and to complete the questionnaire would jeopardize the patient's health status        Functional Questionnaire  [x]  Patient completed the functional outcome questionnaire and deficiencies were addressed in the plan of care. ()   []  Patient completed the outcome questionnaire and there were no functional deficits identified and therefore no plan of care is required. ()   []  Patient did not complete the functional outcome questionnaire and the reason why is documented in the medical chart. ()  []  Patient refused to participate   []  Patient unable to complete the questionnaire   []   A functional outcome assessment has been reported on within the last 30 days        Falls  [x]  The patient has had no falls or 1 fall without injury in the past year. (1101F)   []  There is no documentation of fall in the medical chart (1101F,8P)     [] The patient has had 2 or more falls or one fall with injury in the past year that is documented in the medical chart. (1100F)  []  A falls risk assessment was completed and documented in the medical chart. (6003O)   []  Falls were addressed in the plan of care. (1519G)   []  A falls risk assessment was not completed and documented in the medical chart (2226V,8I)   []   Falls were not addressed in the plan of care and reason was documented in the plan of care.  (6990S 1P)        Medication     [x] A list of current medications (including prescription, over the counter, herbal supplements, vitamin supplements, mineral supplements, dietary supplements) was reviewed with the patient and documented in the medical chart. ()      Functional Impairments   [x]Noted spinal or UE joint hypomobility   []Noted spinal or UE joint hypermobility   [x]Decreased UE functional ROM   [x]Decreased UE functional strength   []Abnormal reflexes/sensation/myotomal/dermatomal deficits   [x]Decreased RC/scapular/core strength and neuromuscular control   []other:      Functional Activity Limitations (from functional questionnaire and intake)   [x]Reduced ability to tolerate prolonged functional positions   [x]Reduced ability or difficulty with changes of positions or transfers between positions   [x]Reduced ability to maintain good posture and demonstrate good body mechanics with sitting, bending, and lifting   [x] Reduced ability or tolerance with driving and/or computer work   [x]Reduced ability to sleep   [x]Reduced ability to perform lifting, reaching, carrying tasks   [x]Reduced ability to tolerate impact through UE   [x]Reduced ability to reach behind back   [x]Reduced ability to  or hold objects   [x]Reduced ability to throw or toss an object   []other:    Participation Restrictions   [x]Reduced participation in self care activities   [x]Reduced participation in home management activities   []Reduced participation in work activities   [x]Reduced participation in social activities. [x]Reduced participation in sport/recreation activities. Classification:   [x]Signs/symptoms consistent with post-surgical status including decreased ROM, strength and function.   []Signs/symptoms consistent with joint sprain/strain   []Signs/symptoms consistent with shoulder impingement   []Signs/symptoms consistent with shoulder/elbow/wrist tendinopathy   []Signs/symptoms consistent with Rotator cuff tear   []Signs/symptoms consistent with labral tear   []Signs/symptoms consistent with postural dysfunction    []Signs/symptoms consistent with Glenohumeral IR Deficit - <45 degrees   []Signs/symptoms consistent with facet dysfunction of cervical/thoracic spine    []Signs/symptoms consistent with pathology which may benefit from Dry needling     []other:     Prognosis/Rehab Potential:      []Excellent   [x]Good    []Fair   []Poor    Tolerance of evaluation/treatment:    []Excellent   [x]Good    []Fair   []Poor    Physical Therapy Evaluation Complexity Justification  [x] A history of present problem with:  [] no personal factors and/or comorbidities that impact the plan of care;  [x]1-2 personal factors and/or comorbidities that impact the plan of care  []3 personal factors and/or comorbidities that impact the plan of care  [x] An examination of body systems using standardized tests and measures addressing any of the following: body structures and functions (impairments), activity limitations, and/or participation restrictions;:  [] a total of 1-2 or more elements   [] a total of 3 or more elements   [x] a total of 4 or more elements   [x] A clinical presentation with:  [x] stable and/or uncomplicated characteristics   [] evolving clinical presentation with changing characteristics  [] unstable and unpredictable characteristics;   [x] Clinical decision making of [x] low, [] moderate, [] high complexity using standardized patient assessment instrument and/or measurable assessment of functional outcome. [x] EVAL (LOW) 93838 (typically 20 minutes face-to-face)  [] EVAL (MOD) 52936 (typically 30 minutes face-to-face)  [] EVAL (HIGH) 75794 (typically 45 minutes face-to-face)  [] RE-EVAL     PLAN:  Frequency/Duration:  1-2 days per week for 12 Weeks:  INTERVENTIONS:  [x] Therapeutic exercise including: strength training, ROM, for Upper extremity and core   [x]  NMR activation and proprioception for UE, scap and Core   [x] Manual therapy as indicated for shoulder, scapula and spine to include: Dry Needling/IASTM, STM, PROM, Gr I-IV mobilizations, manipulation.    [x] Modalities as needed that may include: thermal agents, E-stim, Biofeedback, US, iontophoresis as indicated  [x] Patient education on joint protection, postural re-education, activity modification, progression of HEP. HEP instruction: Instructed patient in a HEP. Patient demonstrated exercises correctly. Handout with pictures and # of reps/sets was given to pt. Exercises are listed on flowsheet. Patient Education:  Educated patient on Physical Therapy process. Also educated on diagnosis, related anatomy, pathology and prognosis. Reviewed patient goals/expectations and answered all questions. Patient displays understanding and in agreement with plan of care. Educated pt on surgical precautions, importance of wearing sling, and rehab timeline. Also discussed importance of performing HEP and icing as instructed. GOALS:  Patient stated goal: \"Increase function; be able to use my left arm for toileting; be self-sufficient\"  [] Progressing: [] Met: [] Not Met: [] Adjusted    Therapist goals for Patient:   Short Term Goals: To be achieved in: 2 weeks  1. Independent in HEP and progression per patient tolerance, in order to prevent re-injury. [] Progressing: [] Met: [] Not Met: [] Adjusted  2. Patient will have a decrease in left shoulder pain to 1-2/10 to facilitate improvement in movement, function, and ADLs as indicated by Functional Deficits. [] Progressing: [] Met: [] Not Met: [] Adjusted  3. Patient will be able to melonie/doff Ultrasling independently. [] Progressing: [] Met: [] Not Met: [] Adjusted      Long Term Goals: To be achieved in: 12 weeks  1. Disability index score of 15% or less for the Quick DASH to assist with reaching prior level of function. [] Progressing: [] Met: [] Not Met: [] Adjusted  2. Patient will demonstrate an increase in left shoulder AROM to at least 90° flexion to allow for proper joint functioning as indicated by patients Functional Deficits. [] Progressing: [] Met: [] Not Met: [] Adjusted  3.  Patient will demonstrate an increase in left Deltoid strength to 4+/5 to allow for proper functional mobility as indicated by patients Functional Deficits. [] Progressing: [] Met: [] Not Met: [] Adjusted  4. Patient will have a decrease in left shoulder pain to 0-1/10 with daily activities. [] Progressing: [] Met: [] Not Met: [] Adjusted  5.  Pt will be able to perform all ADL's independently and without compensation. (patient specific functional goal)    [] Progressing: [] Met: [] Not Met: [] Adjusted           Electronically signed by:  Tommy Boeck, PT     Physical Therapist [address] [address] #[id number]  Physical Therapist [place] License #[id number]

## 2020-07-23 ENCOUNTER — OFFICE VISIT (OUTPATIENT)
Dept: ORTHOPEDIC SURGERY | Age: 70
End: 2020-07-23

## 2020-07-23 ENCOUNTER — TREATMENT (OUTPATIENT)
Dept: PHYSICAL THERAPY | Age: 70
End: 2020-07-23
Payer: MEDICARE

## 2020-07-23 VITALS — HEIGHT: 62 IN | WEIGHT: 279 LBS | BODY MASS INDEX: 51.34 KG/M2

## 2020-07-23 PROCEDURE — 97140 MANUAL THERAPY 1/> REGIONS: CPT | Performed by: PHYSICAL THERAPIST

## 2020-07-23 PROCEDURE — 97110 THERAPEUTIC EXERCISES: CPT | Performed by: PHYSICAL THERAPIST

## 2020-07-23 PROCEDURE — G8427 DOCREV CUR MEDS BY ELIG CLIN: HCPCS | Performed by: PHYSICAL THERAPIST

## 2020-07-23 PROCEDURE — 97530 THERAPEUTIC ACTIVITIES: CPT | Performed by: PHYSICAL THERAPIST

## 2020-07-23 PROCEDURE — 99024 POSTOP FOLLOW-UP VISIT: CPT | Performed by: ORTHOPAEDIC SURGERY

## 2020-07-23 RX ORDER — PREDNISONE 2.5 MG
TABLET ORAL
COMMUNITY
Start: 2020-05-15 | End: 2021-07-13

## 2020-07-23 RX ORDER — CEPHALEXIN 500 MG/1
CAPSULE ORAL
COMMUNITY
Start: 2020-07-17 | End: 2021-07-13

## 2020-07-23 NOTE — LETTER
Shoulder Elbow Rehabilitation Referral    Patient Name: Ernestine Sin      YOB: 1950    Diagnosis: 3 weeks status post left reverse TSA  Precautions: No rotator cuff strengthening or active range of motion without assistance  Date of Prescription: July 23, 2020    Post Op Instructions:  [] Continuous passive motion (CPM)  [x] Elbow range of motion  [] Exercise in plane of scapula   []  Strengthening     [] Pulley and instruction    [] Home exercise program (copy to patient)   [] Sling when arm at risk  [] Sling or brace at all times   [x] AAROM: Forward elevation to 90          [x] AAROM: External rotation to 0  [] Isometric external rotator strengthening [] AAROM: internal rotation: up the back  [] Isometric abductor strengthening  [] AAROM: Internal abduction     [] Isometric internal rotator strengthening [] AAROM: cross-body adduction             Stretching:     Strengthening:  [] Four quadrant (FE, ER, IR, CBA)  [] Sleeper stretch    [] Rotator cuff (ER, IR, Abd)  [] Forward Elevation    [] External Rotators     [] External Rotation    [] Internal Rotators  [] Internal Rotation: up/back   [] Abductors     [] Internal Rotation: supine in abduction  [] Flexors  [] Cross-body abduction    [] Extensors  [x] Pendulum (FE, Abd/Add, cw/ccw)  [x] Scapular Stabilizers   [] Wall-walking (FE, Abd)     [x] Shoulder shrugs (isometric deltoid strengthening exercises. [] Table slides      [x] Rhomboid pinch  [] Elbow (flex, ext, pron, sup)    [] Lat.  Pull downs     [] Medial epicondylitis program    [] Forward punch   [] Lateral epicondylitis program    [] Internal rotators     [] Progressive resistive exercises  [] Bench Press        [] Bench press plus  Activities:     [] Lateral pull-downs  [] Rowing     [] Progressive two-hand supine press  [] Stepper/Exercise bike   [] Biceps: curls/supination  [] Swimming  [] Water exercises    Modalities:     Return to Sport:  [] Ultrasound     [] Plyometrics [] Iontophoresis     [] Rhythmic stabilization  [] Moist heat     [] Core strengthening   [] Massage     [] Sports specific program:     [] Cryotherapy      [] Electrical stimulation     [] Paraffin  [] Whirlpool  [] TENS    [] Home exercise program (copy to patient).    Perform exercises for:  20-30  minutes   2-3  times/day  [] Supervised physical therapy  Frequency: []  1x week  [x] 2x week  [] 3x week  [] Other:   Duration: [] 2 weeks   [] 4 weeks  [x] 6 weeks  [] Other:     Additional Instructions:         Elana Gabriel MD  Fellow of Orthopaedic Surgery  [facility]

## 2020-07-23 NOTE — PROGRESS NOTES
12 [facility]  History and Physical  Shoulder Pain    Date:  2020    Name:  Woodrow Yadav  Address:  [address] [place] [address]    :  1950      Age:   69 y.o.    SSN:  [id number]      Medical Record Number:  <[id number]>    Reason for Visit:    Shoulder Pain (F/u shoulder)      HPI:   Woodrow Yadav is a 69 y.o. female who presents to our office today 3 weeks status post left reverse total shoulder arthroplasty on 2020. She states that she is doing extremely well and has 0 pain without the use of any analgesics. She has been compliant with using her sling at all times. Pain Assessment  Location of Pain: Shoulder  Location Modifiers: Left  Severity of Pain: 0  Limiting Behavior: Yes  Relieving Factors: Rest, Exercise  Result of Injury: No  Work-Related Injury: No  Are there other pain locations you wish to document?: No    Patient has had no medical changes since last evaluated      Review of Systems:  A 14 point review of systems available in the scanned medical record as documented by the patient on 2020. The review is negative with the exception of those things mentioned in the History of Present Illness and Past Medical History. Past Medical History:  Patient's medications, allergies, past medical, surgical, social and family histories were reviewed and updated as appropriate. Allergies: Allergies   Allergen Reactions    Codeine Nausea And Vomiting    Morphine      Other reaction(s): Other (See Comments)  Migraines     Acetaminophen Other (See Comments)     Was told should not take due to history of hepatitis    Adhesive Tape      blisters    Ciprofloxacin     Sulfa Antibiotics        Physical Exam:  There were no vitals filed for this visit. General: Woodrow Yadav is a healthy and well appearing 69 y.o. female who is sitting comfortably in our office in acute distress.      Skin:  There are no skin lesions,

## 2020-07-23 NOTE — PROGRESS NOTES
[facility]   Phone: [phone]    Fax: [phone]      Physical Therapy Treatment Note/ Progress Report:           Date:  2020    Patient Name:  Dhiraj Isaacs    :  1950  MRN: <[id number]>  Restrictions/Precautions:  S/P Left Reverse TSA  Medical/Treatment Diagnosis Information:  Diagnosis: S/P Left Reverse TSA (Sx: 20) (M19.012)   Treatment Diagnosis: Left Shoulder Pain (M25.512), Decreased left Shoulder ROM (M25.612), Decreased Left Shoulder Strength (O99.604)  Insurance/Certification information:  PT Insurance Information: Medicare, [employer]  Physician Information:  Referring Practitioner: Dr. Gerhard Loza  Has the plan of care been signed (Y/N):        [x]  Yes (Signed 7/10/20)  []  No        Date of Patient follow up with Physician: Today; 20      Is this a Progress Report:     []  Yes  [x]  No        If Yes:  Date Range for reporting period:  Beginning 20  Ending 20    Progress report will be due (10 Rx or 30 days whichever is less): 14      Recertification will be due (POC Duration  / 90 days whichever is less): 20         Visit # Insurance Allowable Auth Required   2 Medical Necessity []  Yes [x]  No        Functional Scale:    Date assessed:  20  Functional Assessment Tool Used: Jamila Jha  Score:  84.1% functional deficit (20)       (20)   Patient Age: 69years old   Patient Height: 5' 2\"  Patient Weight: 280 lbs  Patient BMI: 51.2      Pain (20)  [x]  Pain diagram was completed, pain was present and a follow up plan to address the pain is in the plan of care. ()   []  Pain diagram was completed and there was no pain present and therefore no follow up plan to address pain in the plan of care.  ()   []  Pain diagram was not completed and the reason for not completing the pain diagram is in the medical chart ()  []  Patient refused to participate   []  Severe mental or physical capacity, patient is in urgent emergent situation and to complete the questionnaire would jeopardize the patient's health status        Functional Questionnaire (7/7/20)  [x]  Patient completed the functional outcome questionnaire and deficiencies were addressed in the plan of care. ()   []  Patient completed the outcome questionnaire and there were no functional deficits identified and therefore no plan of care is required. ()   []  Patient did not complete the functional outcome questionnaire and the reason why is documented in the medical chart. ()  []  Patient refused to participate   []  Patient unable to complete the questionnaire   []   A functional outcome assessment has been reported on within the last 30 days        Falls (7/7/20)  [x]  The patient has had no falls or 1 fall without injury in the past year. (1101F)   []  There is no documentation of fall in the medical chart (1101F,8P)     [] The patient has had 2 or more falls or one fall with injury in the past year that is documented in the medical chart. (1100F)  []  A falls risk assessment was completed and documented in the medical chart. (2155B)   []  Falls were addressed in the plan of care. (8637Z)   []  A falls risk assessment was not completed and documented in the medical chart (6231V,4C)   []   Falls were not addressed in the plan of care and reason was documented in the plan of care. (9352O 1P)        Medication (7/7/20)     [x] A list of current medications (including prescription, over the counter, herbal supplements, vitamin supplements, mineral supplements, dietary supplements) was reviewed with the patient and documented in the medical chart. ()     Falls Risk Assessment (30 days):   [x] Falls Risk assessed and no intervention required.   [] Falls Risk assessed and Patient requires intervention due to being higher risk   TUG score (>12s at risk):     [] Falls education provided, including       PQRS:   Reviewed medication list with patient. No changes reported by pt since last PT visit.  (7/23/20)      Latex Allergy:  [x]NO      []YES  Preferred Language for Healthcare:   [x]English       []other:      Pain level:  1/10 (7/23/20)  Medication: Nothing for her shoulder       SUBJECTIVE:  Pt saw Dr. Andrea Harris today prior to therapy and he was pleased with her progress. She can take her sling off at home but still needs to wear it outside the home/when at risk and to sleep. Dr. Andrea Harris removed the pillow from the sling which she is thrilled about. Pt states she really isn't having much pain. She reports compliance with HEP and icing. She is still sleeping in the recliner but may try switching to her bed. OBJECTIVE:     *Pt is right hand dominant    (7/23/20)  ROM PROM AROM  Comment    L R L R    Flexion 75°  Not  *Within MD parameters   Abduction   Tested     ER 0°  Due to     IR   Surgery     Other        Other           (7/7/20)  Strength L R Comment   Flexion Not      Abduction Tested     ER Due to      IR Surgery     Supraspinatus      Upper Trap      Lower Trap      Mid Trap      Rhomboids      Biceps      Triceps      Horizontal Abduction      Horizontal Adduction      Lats        (7/7/20)  Special Tests Results/Comment   Anabel-Scot Not      Neers Tested   Speeds Due to   OBriens Surgery   Other    Neurologic Signs Pt reports no numbness or tingling in left UE   Functional Reach          Reflexes/Sensation: (7/7/20)   []Dermatomes/Myotomes intact    []Reflexes equal and normal bilaterally   [x]Other: Intact to light touch    Joint mobility: NA due to surgery (7/7/20)   []Normal    []Hypo   []Hyper    Palpation: General tenderness around incision site. (7/7/20)    Functional Mobility/Transfers: Assistance needed with ADL's and self-care activities. Pt is unable to push through her left arm to get up from a chair due to surgical restrictions. She is also unable to drive due to surgical restrictions.  (7/7/20)    Posture:  Forward head, rounded shoulders. Ultrasling at risk/outside the home. (7/23/20)    Bandages/Dressings/Incisions: Incision is healing well.   No s/s of infection. (7/23/20)    Gait: (include devices/WB status): WNL, no AD. (7/7/20)    Orthopedic Special Tests: See above                         RESTRICTIONS/PRECAUTIONS: Left Reverse TSA (Sx: 6/29/20)  ~Elbow ROM  ~AAROM FE to 90°, ER to 0°  ~Isometric Deltoid Strengthening  ~UltraSling at risk/outside the home (MD removed pillow today)      Exercises/Interventions:     Therapeutic Ex (98415) Sets/sec Reps Notes/CUES   AD UE BIKE            STRETCHING/ROM      Pulleys      Table Slides: Flexion 10\" hold X 10 To 90°   Wand      UE [place]      Pendulum      Doorway      Wall Slides      CBA      TP BB      Sleeper       Elbow AROM 3 sets X 10                ISOMETRICS      Gripping  X 3' Blue ball    Retraction 3 sets X 10    Abduction      Flexion      Internal Rotation      External Rotation      Deltoid 10\" hold X 10 Submax         STRENGTHENING-PREs      Flexion      Abduction      Internal Rotation      External Rotation      Shrugs 3 sets X 10    Biceps      Triceps      Retraction      Extension      Horizontal Abduction in ER      Serratus                        THERABANDS/CABLE COLUMN      Rows      Lats      Extension      Internal Rotation      External Rotation      Biceps      Triceps      PNF                                    Manual Intervention (66679)      Scar Massage      STM      Hawkgrips      GH joint mobilizations      Foamroll      Manual PROM: Left Shoulder Flexion and ER (Within MD Parameters), Elbow Flexion and Extension  X 8' Pt inclined on table         NMR re-education (57948)   CUES NEEDED   Plyoback      Therabar oscillations      Body Blade       Rhythmic Stabilization      Ball on the wall                              Therapeutic Activity (76197)      Core training      Swiss ball activities                  Patient Education: diagnosis, prognosis, pt goals, rehab timeline, and surgical precautions. Also instructed pt and her friend on proper way to melonie/doff sling (7/7/20)  X 10'                          Therapeutic Exercise and NMR EXR  [x] (25458) Provided verbal/tactile cueing for activities related to strengthening, flexibility, endurance, ROM  for improvements in scapular, scapulothoracic and UE control with self care, reaching, carrying, lifting, house/yardwork, driving/computer work. [x] (92611) Provided verbal/tactile cueing for activities related to improving balance, coordination, kinesthetic sense, posture, motor skill, proprioception  to assist with  scapular, scapulothoracic and UE control with self care, reaching, carrying, lifting, house/yardwork, driving/computer work. Therapeutic Activities:    [x] (09122 or 26857) Provided verbal/tactile cueing for activities related to improving balance, coordination, kinesthetic sense, posture, motor skill, proprioception and motor activation to allow for proper function of scapular, scapulothoracic and UE control with self care, carrying, lifting, driving/computer work. Home Exercise Program:    [x] (46322) Reviewed/Progressed HEP activities related to strengthening, flexibility, endurance, ROM of scapular, scapulothoracic and UE control with self care, reaching, carrying, lifting, house/yardwork, driving/computer work - Reviewed and updated HEP with pt.   Pt was issued new handout (copy scanned into media) (7/23/20)  [x] (42996) Reviewed/Progressed HEP activities related to improving balance, coordination, kinesthetic sense, posture, motor skill, proprioception of scapular, scapulothoracic and UE control with self care, reaching, carrying, lifting, house/yardwork, driving/computer work      Manual Treatments:  PROM / STM / Oscillations-Mobs:  G-I, II, III, IV (PA's, Inf., Post.)  [x] (26693) Provided manual therapy to mobilize soft tissue/joints of cervical/CT, scapular GHJ and UE for the purpose of modulating pain, promoting relaxation,  increasing ROM, reducing/eliminating soft tissue swelling/inflammation/restriction, improving soft tissue extensibility and allowing for proper ROM for normal function with self care, reaching, carrying, lifting, house/yardwork, driving/computer work    Modalities:  ICE x 10' for the left shoulder   [] GAME READY (VASO)- for significant edema, swelling, pain control. Charges:  Timed Code Treatment Minutes: 43'   Total Treatment Minutes: 46'      [] EVAL (LOW) 63375 (typically 20 minutes face-to-face)  [] EVAL (MOD) 22120 (typically 30 minutes face-to-face)  [] EVAL (HIGH) 47194 (typically 45 minutes face-to-face)  [] RE-EVAL     [x] XW(42910) x 1 (22')   [] IONTO  [] NMR (06299) x     [] VASO  [x] Manual (21773) x 1  (8')   [] Other:  [x] TA x  1 (12')  [] Mech Traction (74222)  [] ES(attended) (94235)     [] ES (un) (46674):         ASSESSMENT:  Pt is doing well for 3 weeks post-op. Introduced flexion table slide and Deltoid isometric with good tolerance. Also initiated manual PROM for the left shoulder and elbow (staying within MD parameters). Left elbow was very stiff to begin treatment due to being in the sling the last 3 weeks but motion improved with the exercises and manual therapy. Reviewed HEP with pt and provided updated handout. Also reviewed surgical precautions with pt. GOALS:  (Goals made 7/7/20)  Patient stated goal:  \"Increase function; be able to use my left arm for toileting; be self-sufficient\"  [] Progressing: [] Met: [] Not Met: [] Adjusted    Therapist goals for Patient:   Short Term Goals: To be achieved in: 2 weeks  1. Independent in HEP and progression per patient tolerance, in order to prevent re-injury. [] Progressing: [] Met: [] Not Met: [] Adjusted  2. Patient will have a decrease in left shoulder pain to 1-2/10 to facilitate improvement in movement, function, and ADLs as indicated by Functional Deficits.   [] Progressing: [] Met: [] Not Met: [] Adjusted  3. Patient will be able to melonie/doff Ultrasling independently. [] Progressing: [] Met: [] Not Met: [] Adjusted      Long Term Goals: To be achieved in: 12 weeks  1. Disability index score of 15% or less for the Quick DASH to assist with reaching prior level of function. [] Progressing: [] Met: [] Not Met: [] Adjusted  2. Patient will demonstrate an increase in left shoulder AROM to at least 90° flexion to allow for proper joint functioning as indicated by patients Functional Deficits. [] Progressing: [] Met: [] Not Met: [] Adjusted  3. Patient will demonstrate an increase in left Deltoid strength to 4+/5 to allow for proper functional mobility as indicated by patients Functional Deficits. [] Progressing: [] Met: [] Not Met: [] Adjusted  4. Patient will have a decrease in left shoulder pain to 0-1/10 with daily activities. [] Progressing: [] Met: [] Not Met: [] Adjusted  5. Pt will be able to perform all ADL's independently and without compensation. (patient specific functional goal)    [] Progressing: [] Met: [] Not Met: [] Adjusted       Overall Progression Towards Functional goals/ Treatment Progress Update:  [] Patient is progressing as expected towards functional goals listed. [] Progression is slowed due to complexities/Impairments listed. [] Progression has been slowed due to co-morbidities.   [x] Plan just implemented, too soon to assess goals progression <30days   [] Goals require adjustment due to lack of progress  [] Patient is not progressing as expected and requires additional follow up with physician  [] Other    Prognosis for POC: [x] Good [] Fair  [] Poor      Patient requires continued skilled intervention: [x] Yes  [] No    Treatment/Activity Tolerance:  [x] Patient able to complete treatment  [] Patient limited by fatigue  [] Patient limited by pain    [] Patient limited by other medical complications  [] Other:           PLAN: 1-2x/week for 12 weeks for ROM, strengthening, scap stability exercises, manual therapy, HEP, pt education, and modalities prn (7/7/20)  [x] Continue per plan of care [] Alter current plan   [] Plan of care initiated [] Hold pending MD visit [] Discharge      Electronically signed by:  Carlitos Adorno PT     Physical Therapist [address] [address] #[id number]  Physical Therapist [place] License #[id number]    Note: If patient does not return for scheduled/ recommended follow up visits, this note will serve as a discharge from care along with most recent update on progress.

## 2020-07-30 ENCOUNTER — TREATMENT (OUTPATIENT)
Dept: PHYSICAL THERAPY | Age: 70
End: 2020-07-30
Payer: MEDICARE

## 2020-07-30 PROCEDURE — G8427 DOCREV CUR MEDS BY ELIG CLIN: HCPCS | Performed by: PHYSICAL THERAPIST

## 2020-07-30 PROCEDURE — 97530 THERAPEUTIC ACTIVITIES: CPT | Performed by: PHYSICAL THERAPIST

## 2020-07-30 PROCEDURE — 97110 THERAPEUTIC EXERCISES: CPT | Performed by: PHYSICAL THERAPIST

## 2020-07-30 PROCEDURE — 97140 MANUAL THERAPY 1/> REGIONS: CPT | Performed by: PHYSICAL THERAPIST

## 2020-07-30 NOTE — PROGRESS NOTES
[facility]   Phone: [phone]    Fax: [phone]      Physical Therapy Treatment Note/ Progress Report:           Date:  2020    Patient Name:  Leona Eli    :  1950  MRN: <[id number]>  Restrictions/Precautions:  S/P Left Reverse TSA  Medical/Treatment Diagnosis Information:  Diagnosis: S/P Left Reverse TSA (Sx: 20) (M19.012)   Treatment Diagnosis: Left Shoulder Pain (M25.512), Decreased left Shoulder ROM (M25.612), Decreased Left Shoulder Strength (L81.164)  Insurance/Certification information:  PT Insurance Information: Medicare, [address] [facility]  Physician Information:  Referring Practitioner: Dr. Ryan Juan  Has the plan of care been signed (Y/N):        [x]  Yes (Signed 7/10/20)  []  No        Date of Patient follow up with Physician: 20      Is this a Progress Report:     []  Yes  [x]  No        If Yes:  Date Range for reporting period:  Beginning 20  Ending 20    Progress report will be due (10 Rx or 30 days whichever is less):       Recertification will be due (POC Duration  / 90 days whichever is less): 20         Visit # Insurance Allowable Auth Required   3 Medical Necessity []  Yes [x]  No        Functional Scale:    Date assessed:  20  Functional Assessment Tool Used: Leatha Shown  Score:  84.1% functional deficit (20)       (20)   Patient Age: 69years old   Patient Height: 5' 2\"  Patient Weight: 280 lbs  Patient BMI: 51.2      Pain (20)  [x]  Pain diagram was completed, pain was present and a follow up plan to address the pain is in the plan of care. ()   []  Pain diagram was completed and there was no pain present and therefore no follow up plan to address pain in the plan of care.  ()   []  Pain diagram was not completed and the reason for not completing the pain diagram is in the medical chart ()  []  Patient refused to participate   []  Severe mental or physical capacity, patient is in urgent emergent situation and to complete the questionnaire would jeopardize the patient's health status        Functional Questionnaire (7/7/20)  [x]  Patient completed the functional outcome questionnaire and deficiencies were addressed in the plan of care. ()   []  Patient completed the outcome questionnaire and there were no functional deficits identified and therefore no plan of care is required. ()   []  Patient did not complete the functional outcome questionnaire and the reason why is documented in the medical chart. ()  []  Patient refused to participate   []  Patient unable to complete the questionnaire   []   A functional outcome assessment has been reported on within the last 30 days        Falls (7/7/20)  [x]  The patient has had no falls or 1 fall without injury in the past year. (1101F)   []  There is no documentation of fall in the medical chart (1101F,8P)     [] The patient has had 2 or more falls or one fall with injury in the past year that is documented in the medical chart. (1100F)  []  A falls risk assessment was completed and documented in the medical chart. (3700G)   []  Falls were addressed in the plan of care. (0609M)   []  A falls risk assessment was not completed and documented in the medical chart (8215H,1W)   []   Falls were not addressed in the plan of care and reason was documented in the plan of care. (7259J 1P)        Medication (7/7/20)     [x] A list of current medications (including prescription, over the counter, herbal supplements, vitamin supplements, mineral supplements, dietary supplements) was reviewed with the patient and documented in the medical chart. ()     Falls Risk Assessment (30 days):   [x] Falls Risk assessed and no intervention required. [] Falls Risk assessed and Patient requires intervention due to being higher risk   TUG score (>12s at risk):     [] Falls education provided, including       PQRS:   Reviewed medication list with patient.  No changes reported by pt since last PT visit. (7/30/20)      Latex Allergy:  [x]NO      []YES  Preferred Language for Healthcare:   [x]English       []other:      Pain level:  1/10 (7/30/20)  Medication: Nothing for her shoulder       SUBJECTIVE:  Pt states her left shoulder is doing well. She has some soreness after she does her exercises but it goes away after icing. She is still sleeping in the recliner but she is sleeping well. She is taking her sling off inside the home but is still wearing it outside the home and to sleep. Donning/doffing her sling is easier without the pillow/bump. She is performing HEP 1-2x/day on average. (4+ weeks post-op)        OBJECTIVE:     *Pt is right hand dominant    (7/30/20)  ROM PROM AROM  Comment    L R L R    Flexion 90°  Not  *Within MD parameters   Abduction   Tested     ER 0°  Due to     IR   Surgery     Other        Other           (7/7/20)  Strength L R Comment   Flexion Not      Abduction Tested     ER Due to      IR Surgery     Supraspinatus      Upper Trap      Lower Trap      Mid Trap      Rhomboids      Biceps      Triceps      Horizontal Abduction      Horizontal Adduction      Lats        (7/7/20)  Special Tests Results/Comment   Anabel-Scot Not      Neers Tested   Speeds Due to   OBriens Surgery   Other    Neurologic Signs Pt reports no numbness or tingling in left UE   Functional Reach          Reflexes/Sensation: (7/7/20)   []Dermatomes/Myotomes intact    []Reflexes equal and normal bilaterally   [x]Other: Intact to light touch    Joint mobility: NA due to surgery (7/7/20)   []Normal    []Hypo   []Hyper    Palpation: General tenderness around incision site. (7/7/20)    Functional Mobility/Transfers: Assistance needed with ADL's and self-care activities. Pt is unable to push through her left arm to get up from a chair due to surgical restrictions. She is also unable to drive due to surgical restrictions.  (7/7/20)    Posture:  Forward head, rounded shoulders. Ultrasling at risk/outside the home. (7/23/20)    Bandages/Dressings/Incisions: Incision is healing well.   No s/s of infection. (7/23/20)    Gait: (include devices/WB status): WNL, no AD. (7/7/20)    Orthopedic Special Tests: See above                         RESTRICTIONS/PRECAUTIONS: Left Reverse TSA (Sx: 6/29/20)  ~Elbow ROM  ~AAROM FE to 90°, ER to 0°  ~Isometric Deltoid Strengthening  ~UltraSling at risk/outside the home (MD removed pillow today)      Exercises/Interventions:     Therapeutic Ex (82721) Sets/sec Reps Notes/CUES   AD UE BIKE            STRETCHING/ROM      Pulleys      Table Slides: Flexion 10\" hold X 10 To 90°   Wand      UE [place]      Pendulum      Doorway      Wall Slides      CBA      TP BB      Sleeper       Elbow AROM 3 sets X 10    Scapular Clock  Attempted but pt could not lie on her right side due to right hip pain (7/30/20) Sidelying         ISOMETRICS      Gripping  X 3' Blue ball    Retraction 3 sets X 10    Abduction      Flexion      Internal Rotation      External Rotation      Deltoid 10\" hold X 10 Submax         STRENGTHENING-PREs      Flexion      Abduction      Internal Rotation      External Rotation      Shrugs 3 sets X 10    Biceps      Triceps      Retraction      Extension      Horizontal Abduction in ER      Serratus                        THERABANDS/CABLE COLUMN      Rows      Lats      Extension      Internal Rotation      External Rotation      Biceps      Triceps      PNF                                    Manual Intervention (07502)      Scar Massage      STM      Hawkgrips      GH joint mobilizations      Foamroll      Manual PROM: Left Shoulder Flexion and ER (Within MD Parameters), Elbow Flexion and Extension  X 8' Pt inclined on table         NMR re-education (87899)   CUES NEEDED   Plyoback      Therabar oscillations      Body Blade       Rhythmic Stabilization      Ball on the wall                              Therapeutic Activity (32108) Provided manual therapy to mobilize soft tissue/joints of cervical/CT, scapular GHJ and UE for the purpose of modulating pain, promoting relaxation,  increasing ROM, reducing/eliminating soft tissue swelling/inflammation/restriction, improving soft tissue extensibility and allowing for proper ROM for normal function with self care, reaching, carrying, lifting, house/yardwork, driving/computer work    Modalities:  ICE x 15' for the left shoulder   [] GAME READY (VASO)- for significant edema, swelling, pain control. Charges:  Timed Code Treatment Minutes: 37'   Total Treatment Minutes: 62'      [] EVAL (LOW) 71575 (typically 20 minutes face-to-face)  [] EVAL (MOD) 19462 (typically 30 minutes face-to-face)  [] EVAL (HIGH) 70156 (typically 45 minutes face-to-face)  [] RE-EVAL     [x] LT(10726) x 1 (22')   [] IONTO  [] NMR (09163) x     [] VASO  [x] Manual (82188) x 1  (8')   [] Other:  [x] TA x  1 (13')  [] Mech Traction (30724)  [] ES(attended) (87831)     [] ES (un) (30537):         ASSESSMENT:  Pt tolerated the exercises well today. Cuing needed with elbow flexion and extension to go through full range of motion. Attempted scapular clocks in sidelying but pt was unable to lie on her right side due to right hip pain. PROM at the left shoulder improved today but stiffness and guarding are still present. Reviewed HEP and surgical precautions with pt. Pain levels remain low and pt is sleeping well at night. GOALS:  (Goals made 7/7/20)  Patient stated goal:  \"Increase function; be able to use my left arm for toileting; be self-sufficient\"  [] Progressing: [] Met: [] Not Met: [] Adjusted    Therapist goals for Patient:   Short Term Goals: To be achieved in: 2 weeks  1. Independent in HEP and progression per patient tolerance, in order to prevent re-injury. [] Progressing: [] Met: [] Not Met: [] Adjusted  2.  Patient will have a decrease in left shoulder pain to 1-2/10 to facilitate improvement in movement, function, and ADLs as indicated by Functional Deficits. [] Progressing: [] Met: [] Not Met: [] Adjusted  3. Patient will be able to melonie/doff Ultrasling independently. [] Progressing: [] Met: [] Not Met: [] Adjusted      Long Term Goals: To be achieved in: 12 weeks  1. Disability index score of 15% or less for the Quick DASH to assist with reaching prior level of function. [] Progressing: [] Met: [] Not Met: [] Adjusted  2. Patient will demonstrate an increase in left shoulder AROM to at least 90° flexion to allow for proper joint functioning as indicated by patients Functional Deficits. [] Progressing: [] Met: [] Not Met: [] Adjusted  3. Patient will demonstrate an increase in left Deltoid strength to 4+/5 to allow for proper functional mobility as indicated by patients Functional Deficits. [] Progressing: [] Met: [] Not Met: [] Adjusted  4. Patient will have a decrease in left shoulder pain to 0-1/10 with daily activities. [] Progressing: [] Met: [] Not Met: [] Adjusted  5. Pt will be able to perform all ADL's independently and without compensation. (patient specific functional goal)    [] Progressing: [] Met: [] Not Met: [] Adjusted       Overall Progression Towards Functional goals/ Treatment Progress Update:  [] Patient is progressing as expected towards functional goals listed. [] Progression is slowed due to complexities/Impairments listed. [] Progression has been slowed due to co-morbidities.   [x] Plan just implemented, too soon to assess goals progression <30days   [] Goals require adjustment due to lack of progress  [] Patient is not progressing as expected and requires additional follow up with physician  [] Other    Prognosis for POC: [x] Good [] Fair  [] Poor      Patient requires continued skilled intervention: [x] Yes  [] No    Treatment/Activity Tolerance:  [x] Patient able to complete treatment  [] Patient limited by fatigue  [] Patient limited by pain    [] Patient limited by other medical complications  [] Other:           PLAN: Re-assess next visit. 1-2x/week for 12 weeks for ROM, strengthening, scap stability exercises, manual therapy, HEP, pt education, and modalities prn (7/7/20)  [x] Continue per plan of care [] Alter current plan   [] Plan of care initiated [] Hold pending MD visit [] Discharge      Electronically signed by:  Kel Goodman, PT     Physical Therapist Adore Rangel [id number]  Physical Therapist [place] License #[id number]    Note: If patient does not return for scheduled/ recommended follow up visits, this note will serve as a discharge from care along with most recent update on progress.

## 2020-08-06 ENCOUNTER — TREATMENT (OUTPATIENT)
Dept: PHYSICAL THERAPY | Age: 70
End: 2020-08-06
Payer: MEDICARE

## 2020-08-06 PROCEDURE — G8427 DOCREV CUR MEDS BY ELIG CLIN: HCPCS | Performed by: PHYSICAL THERAPIST

## 2020-08-06 PROCEDURE — G8539 DOC FUNCT AND CARE PLAN: HCPCS | Performed by: PHYSICAL THERAPIST

## 2020-08-06 PROCEDURE — 97110 THERAPEUTIC EXERCISES: CPT | Performed by: PHYSICAL THERAPIST

## 2020-08-06 PROCEDURE — 97530 THERAPEUTIC ACTIVITIES: CPT | Performed by: PHYSICAL THERAPIST

## 2020-08-06 PROCEDURE — 1101F PT FALLS ASSESS-DOCD LE1/YR: CPT | Performed by: PHYSICAL THERAPIST

## 2020-08-06 PROCEDURE — G8730 PAIN DOC POS AND PLAN: HCPCS | Performed by: PHYSICAL THERAPIST

## 2020-08-06 PROCEDURE — 97140 MANUAL THERAPY 1/> REGIONS: CPT | Performed by: PHYSICAL THERAPIST

## 2020-08-06 NOTE — PROGRESS NOTES
[facility]   Phone: [phone]    Fax: [phone]     Physical Therapy Re-Certification Plan of Care    Dear  Dr. Camden Baxter,    We had the pleasure of treating the following patient for physical therapy services at [address] [place] [address]. A summary of our findings can be found in the updated assessment below. This includes our plan of care. If you have any questions or concerns regarding these findings, please do not hesitate to contact me at the office phone number checked above.   Thank you for the referral.     Physician Signature:________________________________Date:__________________  By signing above (or electronic signature), therapists plan is approved by physician      Overall Response to Treatment:   [x]Patient is responding well to treatment and improvement is noted with regards to goals - pt is on delayed rehab program   []Patient should continue to improve in reasonable time if they continue HEP   []Patient has plateaued and is no longer responding to skilled PT intervention    []Patient is getting worse and would benefit from return to referring MD   []Patient unable to adhere to initial POC   []Other:       Physical Therapy Treatment Note/ Progress Report:           Date:  2020    Patient Name:  Shawn Christian    :  1950  MRN: <[id number]>  Restrictions/Precautions:  S/P Left Reverse TSA  Medical/Treatment Diagnosis Information:  Diagnosis: S/P Left Reverse TSA (Sx: 20) (M19.012)   Treatment Diagnosis: Left Shoulder Pain (M25.512), Decreased left Shoulder ROM (M25.612), Decreased Left Shoulder Strength (L41.876)  Insurance/Certification information:  PT Insurance Information: Medicare, [address]  Physician Information:  Referring Practitioner: Dr. Camden Baxter  Has the plan of care been signed (Y/N):        [x]  Yes (Signed 7/10/20)  []  No        Date of Patient follow up with Physician: 20      Is this a Progress Report: dominant    (8/6/20)  ROM PROM AROM  Comment    L R L R    Flexion 90°  Not  *Within MD parameters   Abduction   Tested     ER 0°  Due to     IR   Surgery     Other        Other           (8/6/20)  Strength L R Comment   Flexion Not      Abduction Tested     ER Due to      IR Surgery     Supraspinatus      Upper Trap      Lower Trap      Mid Trap      Rhomboids      Biceps      Triceps      Horizontal Abduction      Horizontal Adduction      Lats        (8/6/20)  Special Tests Results/Comment   Umu Not      Neers Tested   Speeds Due to   OBriens Surgery   Other    Neurologic Signs Pt reports no numbness or tingling in left UE   Functional Reach          Reflexes/Sensation: (7/7/20)   []Dermatomes/Myotomes intact    []Reflexes equal and normal bilaterally   [x]Other: Intact to light touch    Joint mobility: NA due to surgery (8/6/20)   []Normal    []Hypo   []Hyper    Palpation: Mild tenderness around incision site. (8/6/20)    Functional Mobility/Transfers: Less assistance needed with ADL's and self-care activities. She is unable to push through her left arm to get up from a chair due to surgical restrictions. She does not feel comfortable driving yet. (8/6/20)    Posture: Forward head, rounded shoulders. Ultrasling at risk/outside the home. (8/6/20)    Bandages/Dressings/Incisions: Incision is healing well.   No s/s of infection. (8/6/20)    Gait: (include devices/WB status): WNL, no AD. (8/6/20)    Orthopedic Special Tests: See above                         RESTRICTIONS/PRECAUTIONS: Left Reverse TSA (Sx: 6/29/20)  ~Elbow ROM  ~AAROM FE to 90°, ER to 0°  ~Isometric Deltoid Strengthening  ~UltraSling at risk/outside the home (MD removed pillow today)      Exercises/Interventions:     Therapeutic Ex (90613) Sets/sec Reps Notes/CUES   AD UE BIKE            STRETCHING/ROM      Pulleys      Table Slides: Flexion 10\" hold X 10 To 90°   Wand      UE [place]      Pendulum      Doorway      Wall Slides carrying, lifting, house/yardwork, driving/computer work. Therapeutic Activities:    [x] (20931 or 09144) Provided verbal/tactile cueing for activities related to improving balance, coordination, kinesthetic sense, posture, motor skill, proprioception and motor activation to allow for proper function of scapular, scapulothoracic and UE control with self care, carrying, lifting, driving/computer work. Home Exercise Program:    [x] (11392) Reviewed/Progressed HEP activities related to strengthening, flexibility, endurance, ROM of scapular, scapulothoracic and UE control with self care, reaching, carrying, lifting, house/yardwork, driving/computer work - Reviewed and updated HEP with pt. Pt declined new handout  (8/6/20)  [x] (90075) Reviewed/Progressed HEP activities related to improving balance, coordination, kinesthetic sense, posture, motor skill, proprioception of scapular, scapulothoracic and UE control with self care, reaching, carrying, lifting, house/yardwork, driving/computer work      Manual Treatments:  PROM / STM / Oscillations-Mobs:  G-I, II, III, IV (PA's, Inf., Post.)  [x] (25026) Provided manual therapy to mobilize soft tissue/joints of cervical/CT, scapular GHJ and UE for the purpose of modulating pain, promoting relaxation,  increasing ROM, reducing/eliminating soft tissue swelling/inflammation/restriction, improving soft tissue extensibility and allowing for proper ROM for normal function with self care, reaching, carrying, lifting, house/yardwork, driving/computer work    Modalities:  ICE x 15' for the left shoulder   [] GAME READY (VASO)- for significant edema, swelling, pain control.     Charges:  Timed Code Treatment Minutes: 37'   Total Treatment Minutes: 62'      [] EVAL (LOW) 97893 (typically 20 minutes face-to-face)  [] EVAL (MOD) 75484 (typically 30 minutes face-to-face)  [] EVAL (HIGH) 73003 (typically 45 minutes face-to-face)  [] RE-EVAL     [x] SH(42642) x 1 (22')   [] IONTO  [] NMR (61440) x     [] VASO  [x] Manual (69285) x 1  (8')   [] Other:  [x] TA x  1 (13')  [] Mech Traction (38065)  [] ES(attended) (99020)     [] ES (un) (59584):         ASSESSMENT:  Pt did well with the exercises today. Performed scapular clock in sitting due to pt being unable to lie on her right side. She demonstrates good understanding of her exercise program and surgical restrictions. PROM is within MD parameters with less stiffness and guarding present today. Pt is doing well 5 weeks post-op. GOALS:  (Goals updated 8/6/20)  Patient stated goal:  \"Increase function; be able to use my left arm for toileting; be self-sufficient\"  [x] Progressing: [] Met: [] Not Met: [] Adjusted    Therapist goals for Patient:   Short Term Goals: To be achieved in: 2 weeks  1. Independent in HEP and progression per patient tolerance, in order to prevent re-injury. [] Progressing: [x] Met: [] Not Met: [] Adjusted  2. Patient will have a decrease in left shoulder pain to 1-2/10 to facilitate improvement in movement, function, and ADLs as indicated by Functional Deficits. [] Progressing: [x] Met: [] Not Met: [] Adjusted  3. Patient will be able to melonie/doff Ultrasling independently. [] Progressing: [x] Met: [] Not Met: [] Adjusted      Long Term Goals: To be achieved in: 12 weeks  1. Disability index score of 15% or less for the Quick DASH to assist with reaching prior level of function. [x] Progressing: [] Met: [] Not Met: [] Adjusted  2. Patient will demonstrate an increase in left shoulder AROM to at least 90° flexion to allow for proper joint functioning as indicated by patients Functional Deficits. [] Progressing: [] Met: [x] Not Met: Due to surgical restrictions [] Adjusted  3. Patient will demonstrate an increase in left Deltoid strength to 4+/5 to allow for proper functional mobility as indicated by patients Functional Deficits. [x] Progressing: [] Met: [] Not Met: [] Adjusted  4.  Patient will have a decrease in left shoulder pain to 0-1/10 with daily activities. [] Progressing: [x] Met: [] Not Met: [] Adjusted  5. Pt will be able to perform all ADL's independently and without compensation. (patient specific functional goal)    [x] Progressing: [] Met: [] Not Met: [] Adjusted       Overall Progression Towards Functional goals/ Treatment Progress Update:  [x] Patient is progressing as expected towards functional goals listed. [] Progression is slowed due to complexities/Impairments listed. [] Progression has been slowed due to co-morbidities. [] Plan just implemented, too soon to assess goals progression <30days   [] Goals require adjustment due to lack of progress  [] Patient is not progressing as expected and requires additional follow up with physician  [] Other    Prognosis for POC: [x] Good [] Fair  [] Poor      Patient requires continued skilled intervention: [x] Yes  [] No    Treatment/Activity Tolerance:  [x] Patient able to complete treatment  [] Patient limited by fatigue  [] Patient limited by pain    [] Patient limited by other medical complications  [] Other:           PLAN: Cont therapy progressing per protocol (delayed rehab). Pt will follow-up with Dr. Andrea Harris next week. 1-2x/week for 12 weeks for ROM, strengthening, scap stability exercises, manual therapy, HEP, pt education, and modalities prn (7/7/20)  [x] Continue per plan of care [] Alter current plan   [] Plan of care initiated [] Hold pending MD visit [] Discharge      Electronically signed by:  Slava Joe PT     Physical Therapist Adore Rangel [id number]  Physical Therapist [facility] License #[id number]    Note: If patient does not return for scheduled/ recommended follow up visits, this note will serve as a discharge from care along with most recent update on progress.

## 2020-08-13 ENCOUNTER — TREATMENT (OUTPATIENT)
Dept: PHYSICAL THERAPY | Age: 70
End: 2020-08-13
Payer: MEDICARE

## 2020-08-13 ENCOUNTER — OFFICE VISIT (OUTPATIENT)
Dept: ORTHOPEDIC SURGERY | Age: 70
End: 2020-08-13

## 2020-08-13 VITALS — BODY MASS INDEX: 51.34 KG/M2 | HEIGHT: 62 IN | WEIGHT: 279 LBS

## 2020-08-13 PROCEDURE — 97110 THERAPEUTIC EXERCISES: CPT | Performed by: PHYSICAL THERAPIST

## 2020-08-13 PROCEDURE — 97530 THERAPEUTIC ACTIVITIES: CPT | Performed by: PHYSICAL THERAPIST

## 2020-08-13 PROCEDURE — 99024 POSTOP FOLLOW-UP VISIT: CPT | Performed by: ORTHOPAEDIC SURGERY

## 2020-08-13 PROCEDURE — G8427 DOCREV CUR MEDS BY ELIG CLIN: HCPCS | Performed by: PHYSICAL THERAPIST

## 2020-08-13 PROCEDURE — 97140 MANUAL THERAPY 1/> REGIONS: CPT | Performed by: PHYSICAL THERAPIST

## 2020-08-13 PROCEDURE — 97150 GROUP THERAPEUTIC PROCEDURES: CPT | Performed by: PHYSICAL THERAPIST

## 2020-08-13 NOTE — LETTER
Shoulder Elbow Rehabilitation Referral    Patient Name: Gabino Mock      YOB: 1950    Diagnosis: 6 weeks status post left reverse TSA  Precautions: active motion allowed now. No strengthening yet  Date of Prescription: 8/13    Post Op Instructions:  [] Continuous passive motion (CPM)  [x] Elbow range of motion  [] Exercise in plane of scapula   []  Strengthening     [] Pulley and instruction    [] Home exercise program (copy to patient)   [] Sling when arm at risk  [] Sling or brace at all times   [x] AAROM: Forward elevation to 140          [x] AAROM: External rotation to 40  [x] Isometric external rotator strengthening [] AAROM: internal rotation: up the back  [x] Isometric abductor strengthening  [] AAROM: Internal abduction     [] Isometric internal rotator strengthening [] AAROM: cross-body adduction             Stretching:     Strengthening:  [] Four quadrant (FE, ER, IR, CBA)  [] Sleeper stretch    [] Rotator cuff (ER, IR, Abd)  [] Forward Elevation    [] External Rotators     [] External Rotation    [] Internal Rotators  [] Internal Rotation: up/back   [] Abductors     [] Internal Rotation: supine in abduction  [] Flexors  [] Cross-body abduction    [] Extensors  [x] Pendulum (FE, Abd/Add, cw/ccw)  [x] Scapular Stabilizers   [] Wall-walking (FE, Abd)     [x] Shoulder shrugs (isometric deltoid strengthening exercises. [] Table slides      [x] Rhomboid pinch  [] Elbow (flex, ext, pron, sup)    [] Lat.  Pull downs     [] Medial epicondylitis program    [] Forward punch   [] Lateral epicondylitis program    [] Internal rotators     [] Progressive resistive exercises  [] Bench Press        [] Bench press plus  Activities:     [] Lateral pull-downs  [] Rowing     [] Progressive two-hand supine press  [] Stepper/Exercise bike   [] Biceps: curls/supination  [] Swimming  [] Water exercises    Modalities:     Return to Sport:  [] Ultrasound     [] Plyometrics [] Iontophoresis     [] Rhythmic stabilization  [] Moist heat     [] Core strengthening   [] Massage     [] Sports specific program:     [] Cryotherapy      [] Electrical stimulation     [] Paraffin  [] Whirlpool  [] TENS    [x] Home exercise program (copy to patient).    Perform exercises for:  15  minutes   2-3  times/day  [x] Supervised physical therapy  Frequency: []  1x week  [x] 2x week  [] 3x week  [] Other:   Duration: [] 2 weeks   [] 4 weeks  [x] 6 weeks  [] Other:     Additional Instructions:         Juliana Kenyon MD  Orthopaedic Fellow  [address] [place] [address]

## 2020-08-13 NOTE — PROGRESS NOTES
SUMAtriptan (IMITREX) 25 MG tablet, Take 25 mg by mouth once as needed for Migraine. , Disp: , Rfl:     calcium citrate-vitamin D (CITRICAL + D) 315-250 MG-UNIT TABS, Take  by mouth., Disp: , Rfl:     celecoxib (CELEBREX) 200 MG capsule, Take 200 mg by mouth 2 times daily. , Disp: , Rfl:     ibuprofen (ADVIL;MOTRIN) 200 MG tablet, Take 200 mg by mouth every 6 hours as needed for Pain., Disp: , Rfl:       Allergies   Allergen Reactions    Codeine Nausea And Vomiting    Morphine      Other reaction(s): Other (See Comments)  Migraines     Acetaminophen Other (See Comments)     Was told should not take due to history of hepatitis    Adhesive Tape      blisters    Ciprofloxacin     Sulfa Antibiotics          Review of Systems: A 14 point review of systems was completed by the patient on 3/12/20 and is available in the media section of the scanned medical record and was reviewed today  The review is negative with the exception of those things mentioned in the HPI    Patient has had no medical changes since last evaluated      Physical Exam:  Ht 5' 2.09\" (1.577 m)   Wt 279 lb (126.6 kg)   BMI 50.88 kg/m²       General: No acute distress, well nourished  CV: No obvious peripheral edema. Normal peripheral pulses  Neuro: Alert & oriented x 3  Psych: Appropriate mood and affect      Left Shoulder Exam:  Inspection: No gross deformities, no signs of infection. Incision site well healed without surrounding erythema  Palpation: No tenderness to palpation  Active Range of Motion: Forward Elevation 50, Abduction 50, External Rotation <10  Passive Range of Motion: FF 90, abduction 70, ER 10  Special Tests:  No Juan Ramon muscle deformity.   Neurovascular: Sensation to light touch is intact, no motor deficits, palpable radial pulses 2+          Radiographic:  X-rays obtained and reviewed in office, reviewed and interpreted by me today:  AP and axillary lateral of the left shoulder reviewed shows reverse total shoulder arthroplasty in appropriate position without any interval change in alignment. Assessment:  Dayo Douglas is a 69 y.o. female who is 6 weeks status post left reverse total shoulder arthroplasty on 6/29/2020  Impression:  Encounter Diagnosis   Name Primary?  S/P reverse total shoulder arthroplasty, left Yes       Office Procedures:  Orders Placed This Encounter   Procedures    XR SHOULDER LEFT (MIN 2 VIEWS)     Standing Status:   Future     Number of Occurrences:   1     Standing Expiration Date:   8/13/2021     Order Specific Question:   Reason for exam:     Answer:   f/u   Marita Myers PT [facility] Physical Therapy     Referral Priority:   Routine     Referral Type:   Eval and Treat     Referral Reason:   Specialty Services Required     Requested Specialty:   Physical Therapy     Number of Visits Requested:   1       Plan:   -Letter sent to physical therapy, she can discontinue her sling and start active range of motion.  -Return in 4 weeks time for reevaluation        Chapincito Montenegro MD  Orthopaedic Fellow  [facility] and [address] [place] [address]      The encounter with Dayo Douglas was supervised by Dr Hong Canales who personally examined the patient and reviewed the plan. This dictation was performed with a verbal recognition program (DRAGON) and it was checked for errors. It is possible that there are still dictated errors within this office note. If so, please bring any errors to my attention for an addendum. All efforts were made to ensure that this office note is accurate.   ____________________  I was physically present and personally supervised the Orthopaedic Sports Medicine Fellow in the evaluation and development of a treatment plan for this patient. I personally interviewed the patient and performed a physical examination. In addition, I discussed the patient's condition and treatment options with them.  I have also reviewed and agree with the past medical, family and social history unless otherwise noted. All of the patient's questions were answered. Janel Cohn MD, PhD  8/13/2020

## 2020-08-13 NOTE — PROGRESS NOTES
[facility]   Phone: [phone]    Fax: [phone]       Physical Therapy Treatment Note/ Progress Report:           Date:  2020    Patient Name:  Salomon Barksdale    :  1950  MRN: <[id number]>  Restrictions/Precautions:  S/P Left Reverse TSA  Medical/Treatment Diagnosis Information:  Diagnosis: S/P Left Reverse TSA (Sx: 20) (M19.012)   Treatment Diagnosis: Left Shoulder Pain (M25.512), Decreased left Shoulder ROM (M25.612), Decreased Left Shoulder Strength (U74.599)  Insurance/Certification information:  PT Insurance Information: Medicare, [facility]  Physician Information:  Referring Practitioner: Dr. Fang Maher  Has the plan of care been signed (Y/N):        [x]  Yes (Signed 20)  []  No        Date of Patient follow up with Physician: Today; 9/10/20      Is this a Progress Report:     []  Yes  [x]  No        If Yes:  Date Range for reporting period:  Beginning 20  Ending 20    Progress report will be due (10 Rx or 30 days whichever is less):       Recertification will be due (POC Duration  / 90 days whichever is less): 9/3/20         Visit # Insurance Allowable Auth Required   5 Medical Necessity []  Yes [x]  No        Functional Scale:    Date assessed:  20  Functional Assessment Tool Used: Carolyn Mattson  Score:  18.20% functional deficit (20)       (20)   Patient Age: 69years old   Patient Height: 5' 2\"  Patient Weight: 280 lbs  Patient BMI: 51.2      Pain (20)  [x]  Pain diagram was completed, pain was present and a follow up plan to address the pain is in the plan of care. ()   []  Pain diagram was completed and there was no pain present and therefore no follow up plan to address pain in the plan of care.  ()   []  Pain diagram was not completed and the reason for not completing the pain diagram is in the medical chart ()  []  Patient refused to participate   []  Severe mental or physical capacity, patient with patient. No changes reported by pt since last PT visit.  (8/6/20)      Latex Allergy:  [x]NO      []YES  Preferred Language for Healthcare:   [x]English       []other:      Pain level:  1-2/10 (8/13/20)  Medication: Nothing for her shoulder       SUBJECTIVE:  Pt states her left shoulder is sore after she does her exercises but soreness goes away after icing. She is still sleeping in the recliner. She saw Dr Rolly Pierce before therapy today and he was happy with her progress. He told her she can get rid of the sling. He also updated her PT Rx and wants her to ramp up to 2x/week in therapy. (6+ weeks post-op)        OBJECTIVE:     *Pt is right hand dominant    (8/13/20)  ROM PROM AROM  Comment    L R L R    Flexion 100°  Not  *Within MD parameters   Abduction (scap) 90°  Tested     ER 15°  Due to     IR NT  Surgery     Other        Other           (8/6/20)  Strength L R Comment   Flexion Not      Abduction Tested     ER Due to      IR Surgery     Supraspinatus      Upper Trap      Lower Trap      Mid Trap      Rhomboids      Biceps      Triceps      Horizontal Abduction      Horizontal Adduction      Lats        (8/6/20)  Special Tests Results/Comment   Umu Not      Neers Tested   Speeds Due to   OBriens Surgery   Other    Neurologic Signs Pt reports no numbness or tingling in left UE   Functional Reach          Reflexes/Sensation: (7/7/20)   []Dermatomes/Myotomes intact    []Reflexes equal and normal bilaterally   [x]Other: Intact to light touch    Joint mobility: NA due to surgery (8/6/20)   []Normal    []Hypo   []Hyper    Palpation: Mild tenderness around incision site. (8/6/20)    Functional Mobility/Transfers: Less assistance needed with ADL's and self-care activities. She is unable to push through her left arm to get up from a chair due to surgical restrictions. She does not feel comfortable driving yet. (8/6/20)    Posture: Forward head, rounded shoulders.   D/C Ultrasling (8/13/20)    Bandages/Dressings/Incisions: Incision is closed.  (8/13/20)    Gait: (include devices/WB status): WNL, no AD. (8/6/20)    Orthopedic Special Tests: See above                         RESTRICTIONS/PRECAUTIONS: Left Reverse TSA (Sx: 6/29/20)  ~AAROM FE to 140°, ER to 40°  ~Isometric Deltoid Strengthening  ~Okay to start AROM  ~Still no strengthening for the shoulder      Exercises/Interventions:     Therapeutic Ex (07466) Sets/sec Reps Notes/CUES   AD UE BIKE            STRETCHING/ROM      Pulleys      Table Slides: Flexion and scap 10\" hold X 10    Wand: ER 0 - 40° 10\" hold X 10 Seated   UE [place]: Flexion 2 sets X 10 Seated with UE ranger on floor   Pendulum      Doorway      Wall Slides      CBA      TP BB      Sleeper       Elbow AROM  D/C   Scapular Clock 3 sets X 10 Seated         ISOMETRICS      Gripping  X 5' Blue ball    Retraction  D/C   Abduction      Flexion      Internal Rotation      External Rotation      Deltoid 10\" hold X 10 Submax         STRENGTHENING-PREs      Flexion      Abduction      Internal Rotation      External Rotation      Shrugs 3 sets X 10 with 1#    Biceps 3 sets X 10 with 1#    Triceps      Retraction      Extension      Horizontal Abduction in ER      Serratus                        THERABANDS/CABLE COLUMN      Rows 3 sets X 10 with red band    Lats      Extension      Internal Rotation      External Rotation      Biceps      Triceps      PNF                                    Manual Intervention (37481)      Scar Massage      STM      Hawkgrips      GH joint mobilizations      Foamroll      Manual PROM: Left Shoulder Flexion, scap, and ER (Within MD Parameters), Elbow Flexion and Extension  X 8' Pt inclined on table         NMR re-education (84927)   CUES NEEDED   Plyoback      Therabar oscillations      Body Blade       Rhythmic Stabilization      Ball on the wall                              Therapeutic Activity (72133)      Core training      Swiss ball activities Patient Education: diagnosis, prognosis, pt goals, rehab timeline, and surgical precautions. Also instructed pt and her friend on proper way to melonie/doff sling (7/7/20)  X 10'                          Therapeutic Exercise and NMR EXR  [x] (16708) Provided verbal/tactile cueing for activities related to strengthening, flexibility, endurance, ROM  for improvements in scapular, scapulothoracic and UE control with self care, reaching, carrying, lifting, house/yardwork, driving/computer work. [x] (68592) Provided verbal/tactile cueing for activities related to improving balance, coordination, kinesthetic sense, posture, motor skill, proprioception  to assist with  scapular, scapulothoracic and UE control with self care, reaching, carrying, lifting, house/yardwork, driving/computer work. Therapeutic Activities:    [x] (89180 or 47877) Provided verbal/tactile cueing for activities related to improving balance, coordination, kinesthetic sense, posture, motor skill, proprioception and motor activation to allow for proper function of scapular, scapulothoracic and UE control with self care, carrying, lifting, driving/computer work. Home Exercise Program:    [x] (22055) Reviewed/Progressed HEP activities related to strengthening, flexibility, endurance, ROM of scapular, scapulothoracic and UE control with self care, reaching, carrying, lifting, house/yardwork, driving/computer work - Reviewed and updated HEP with pt (see below). Pt was issued new handout  (8/13/20)  [x] (00411) Reviewed/Progressed HEP activities related to improving balance, coordination, kinesthetic sense, posture, motor skill, proprioception of scapular, scapulothoracic and UE control with self care, reaching, carrying, lifting, house/yardwork, driving/computer work      Access Code: [email or web address]   URL: [email or web address]   Date: 08/13/2020   Prepared by: Adalid Gotti     Exercises   Seated Shoulder Scaption Slide at Table Top updated HEP with pt. Also reviewed surgical precautions. GOALS:  (Goals updated 8/6/20)  Patient stated goal:  \"Increase function; be able to use my left arm for toileting; be self-sufficient\"  [x] Progressing: [] Met: [] Not Met: [] Adjusted    Therapist goals for Patient:   Short Term Goals: To be achieved in: 2 weeks  1. Independent in HEP and progression per patient tolerance, in order to prevent re-injury. [] Progressing: [x] Met: [] Not Met: [] Adjusted  2. Patient will have a decrease in left shoulder pain to 1-2/10 to facilitate improvement in movement, function, and ADLs as indicated by Functional Deficits. [] Progressing: [x] Met: [] Not Met: [] Adjusted  3. Patient will be able to melonie/doff Ultrasling independently. [] Progressing: [x] Met: [] Not Met: [] Adjusted      Long Term Goals: To be achieved in: 12 weeks  1. Disability index score of 15% or less for the Quick DASH to assist with reaching prior level of function. [x] Progressing: [] Met: [] Not Met: [] Adjusted  2. Patient will demonstrate an increase in left shoulder AROM to at least 90° flexion to allow for proper joint functioning as indicated by patients Functional Deficits. [] Progressing: [] Met: [x] Not Met: Due to surgical restrictions [] Adjusted  3. Patient will demonstrate an increase in left Deltoid strength to 4+/5 to allow for proper functional mobility as indicated by patients Functional Deficits. [x] Progressing: [] Met: [] Not Met: [] Adjusted  4. Patient will have a decrease in left shoulder pain to 0-1/10 with daily activities. [] Progressing: [x] Met: [] Not Met: [] Adjusted  5. Pt will be able to perform all ADL's independently and without compensation. (patient specific functional goal)    [x] Progressing: [] Met: [] Not Met: [] Adjusted       Overall Progression Towards Functional goals/ Treatment Progress Update:  [x] Patient is progressing as expected towards functional goals listed.     [] Progression is slowed due to complexities/Impairments listed. [] Progression has been slowed due to co-morbidities. [] Plan just implemented, too soon to assess goals progression <30days   [] Goals require adjustment due to lack of progress  [] Patient is not progressing as expected and requires additional follow up with physician  [] Other    Prognosis for POC: [x] Good [] Fair  [] Poor      Patient requires continued skilled intervention: [x] Yes  [] No    Treatment/Activity Tolerance:  [x] Patient able to complete treatment  [] Patient limited by fatigue  [] Patient limited by pain    [] Patient limited by other medical complications  [] Other:           PLAN: Cont therapy progressing per protocol (delayed rehab). Increase frequency of therapy to 2x/week starting next week. 1-2x/week for 12 weeks for ROM, strengthening, scap stability exercises, manual therapy, HEP, pt education, and modalities prn (7/7/20)  [x] Continue per plan of care [] Alter current plan   [] Plan of care initiated [] Hold pending MD visit [] Discharge      Electronically signed by:  Carlitos Adorno PT     Physical Therapist Adore Rangel [address] #[id number]  Physical Therapist [place] License #[id number]    Note: If patient does not return for scheduled/ recommended follow up visits, this note will serve as a discharge from care along with most recent update on progress.

## 2020-08-18 ENCOUNTER — TREATMENT (OUTPATIENT)
Dept: PHYSICAL THERAPY | Age: 70
End: 2020-08-18
Payer: MEDICARE

## 2020-08-18 PROCEDURE — G8427 DOCREV CUR MEDS BY ELIG CLIN: HCPCS | Performed by: PHYSICAL THERAPIST

## 2020-08-18 PROCEDURE — 97530 THERAPEUTIC ACTIVITIES: CPT | Performed by: PHYSICAL THERAPIST

## 2020-08-18 PROCEDURE — 97140 MANUAL THERAPY 1/> REGIONS: CPT | Performed by: PHYSICAL THERAPIST

## 2020-08-18 PROCEDURE — 97110 THERAPEUTIC EXERCISES: CPT | Performed by: PHYSICAL THERAPIST

## 2020-08-18 NOTE — PROGRESS NOTES
[facility]   Phone: [phone]    Fax: [phone]       Physical Therapy Treatment Note/ Progress Report:           Date:  2020    Patient Name:  Naseem Joy    :  1950  MRN: <[id number]>  Restrictions/Precautions:  S/P Left Reverse TSA  Medical/Treatment Diagnosis Information:  Diagnosis: S/P Left Reverse TSA (Sx: 20) (M19.012)   Treatment Diagnosis: Left Shoulder Pain (M25.512), Decreased left Shoulder ROM (M25.612), Decreased Left Shoulder Strength (R09.306)  Insurance/Certification information:  PT Insurance Information: Medicare, [facility] [facility]  Physician Information:  Referring Practitioner: Dr. Monalisa Dailey  Has the plan of care been signed (Y/N):        [x]  Yes (Signed 20)  []  No        Date of Patient follow up with Physician: 9/10/20      Is this a Progress Report:     []  Yes  [x]  No        If Yes:  Date Range for reporting period:  Beginning 20  Ending 20    Progress report will be due (10 Rx or 30 days whichever is less): 13      Recertification will be due (POC Duration  / 90 days whichever is less): 9/3/20         Visit # Insurance Allowable Auth Required   6 Medical Necessity []  Yes [x]  No        Functional Scale:    Date assessed:  20  Functional Assessment Tool Used: Chery Parsons  Score:  18.20% functional deficit (20)       (20)   Patient Age: 69years old   Patient Height: 5' 2\"  Patient Weight: 280 lbs  Patient BMI: 51.2      Pain (20)  [x]  Pain diagram was completed, pain was present and a follow up plan to address the pain is in the plan of care. ()   []  Pain diagram was completed and there was no pain present and therefore no follow up plan to address pain in the plan of care.  ()   []  Pain diagram was not completed and the reason for not completing the pain diagram is in the medical chart ()  []  Patient refused to participate   []  Severe mental or physical capacity, patient is in urgent emergent situation and to complete the questionnaire would jeopardize the patient's health status        Functional Questionnaire (8/6/20)  [x]  Patient completed the functional outcome questionnaire and deficiencies were addressed in the plan of care. ()   []  Patient completed the outcome questionnaire and there were no functional deficits identified and therefore no plan of care is required. ()   []  Patient did not complete the functional outcome questionnaire and the reason why is documented in the medical chart. ()  []  Patient refused to participate   []  Patient unable to complete the questionnaire   []   A functional outcome assessment has been reported on within the last 30 days        Falls (8/6/20)  [x]  The patient has had no falls or 1 fall without injury in the past year. (1101F)   []  There is no documentation of fall in the medical chart (1101F,8P)     [] The patient has had 2 or more falls or one fall with injury in the past year that is documented in the medical chart. (1100F)  []  A falls risk assessment was completed and documented in the medical chart. (0667E)   []  Falls were addressed in the plan of care. (4930R)   []  A falls risk assessment was not completed and documented in the medical chart (0389D,4J)   []   Falls were not addressed in the plan of care and reason was documented in the plan of care. (4800W 1P)        Medication (8/6/20)     [x] A list of current medications (including prescription, over the counter, herbal supplements, vitamin supplements, mineral supplements, dietary supplements) was reviewed with the patient and documented in the medical chart. ()     Falls Risk Assessment (30 days): (8/6/20)  [x] Falls Risk assessed and no intervention required.   [] Falls Risk assessed and Patient requires intervention due to being higher risk   TUG score (>12s at risk):     [] Falls education provided, including       PQRS:   Reviewed medication list with patient. No changes reported by pt since last PT visit. (8/18/20)      Latex Allergy:  [x]NO      []YES  Preferred Language for Healthcare:   [x]English       []other:      Pain level:  1-2/10 (8/18/20)  Medication: Nothing for her shoulder       SUBJECTIVE:  Pt states her left shoulder continues to be sore after she does her exercises but soreness goes away after icing. Other than that, she reports no pain. ADL's/self-care activities are still difficult, especially toileting, and her sister is assisting as needed. She is still sleeping in the recliner. She drove to therapy today for the first time. Pt arrived 30 minutes late to therapy due to being stuck in traffic. (7 weeks post-op)        OBJECTIVE:     *Pt is right hand dominant    (8/18/20)  ROM PROM AROM  Comment    L R L R    Flexion 110°  Not  *Within MD parameters   Abduction (scap) 95°  Tested     ER 25°  Due to     IR NT  Surgery     Other        Other           (8/6/20)  Strength L R Comment   Flexion Not      Abduction Tested     ER Due to      IR Surgery     Supraspinatus      Upper Trap      Lower Trap      Mid Trap      Rhomboids      Biceps      Triceps      Horizontal Abduction      Horizontal Adduction      Lats        (8/6/20)  Special Tests Results/Comment   Little-Scot Not      Neers Tested   Speeds Due to   O[facility]Briens Surgery   Other    Neurologic Signs Pt reports no numbness or tingling in left UE   Functional Reach          Reflexes/Sensation: (7/7/20)   []Dermatomes/Myotomes intact    []Reflexes equal and normal bilaterally   [x]Other: Intact to light touch    Joint mobility: NA due to surgery (8/6/20)   []Normal    []Hypo   []Hyper    Palpation: Mild tenderness around incision site. (8/6/20)    Functional Mobility/Transfers: Less assistance needed with ADL's and self-care activities. She is unable to push through her left arm to get up from a chair due to surgical restrictions. She does not feel comfortable driving yet. (8/6/20)    Posture: Forward head, rounded shoulders. D/C Ultrasling (8/13/20)    Bandages/Dressings/Incisions: Incision is closed.  (8/13/20)    Gait: (include devices/WB status): WNL, no AD. (8/6/20)    Orthopedic Special Tests: See above                         RESTRICTIONS/PRECAUTIONS: Left Reverse TSA (Sx: 6/29/20)  ~AAROM FE to 140°, ER to 40°  ~Isometric Deltoid Strengthening  ~Okay to start AROM  ~Still no strengthening for the shoulder      Exercises/Interventions:     Therapeutic Ex (38118) Sets/sec Reps Notes/CUES   AD UE BIKE            STRETCHING/ROM      Pulleys      Table Slides: Flexion and scap 10\" hold X 10    Wand: ER 0 - 40° 10\" hold X 10 Seated   UE Antimony: Flexion 3 sets X 10 Seated with UE ranger on floor   Pendulum      Doorway      Wall Slides      CBA      TP BB      Sleeper       Elbow AROM  D/C   Scapular Clock 3 sets X 10 Seated         ISOMETRICS      Gripping  X 5' Blue ball    Retraction  D/C   Abduction      Flexion      Internal Rotation      External Rotation      Deltoid 10\" hold X 10 Submax         STRENGTHENING-PREs      Flexion      Abduction      Internal Rotation      External Rotation      Shrugs 3 sets X 10 with 1#    Biceps 3 sets X 10 with 1#    Triceps      Retraction      Extension      Horizontal Abduction in ER      Serratus                        THERABANDS/CABLE COLUMN      Rows 3 sets X 10 with red band    Lats      Extension      Internal Rotation      External Rotation      Biceps      Triceps 3 sets X 10 with red band    PNF                                    Manual Intervention (69746)      Scar Massage      STM      Hawkgrips      GH joint mobilizations      Foamroll      Manual PROM: Left Shoulder Flexion, scap, and ER (Within MD Parameters), Elbow Flexion and Extension  X 8' Pt inclined on table         NMR re-education (19054)   CUES NEEDED   Plyoback      Therabar oscillations      Body Blade       Rhythmic Stabilization      Ball on the wall Therapeutic Activity (13204)      Core training      Swiss ball activities                  Patient Education: diagnosis, prognosis, pt goals, rehab timeline, and surgical precautions. Also instructed pt and her friend on proper way to melonie/doff sling (7/7/20)  X 10'                          Therapeutic Exercise and NMR EXR  [x] (51836) Provided verbal/tactile cueing for activities related to strengthening, flexibility, endurance, ROM  for improvements in scapular, scapulothoracic and UE control with self care, reaching, carrying, lifting, house/yardwork, driving/computer work. [x] (68107) Provided verbal/tactile cueing for activities related to improving balance, coordination, kinesthetic sense, posture, motor skill, proprioception  to assist with  scapular, scapulothoracic and UE control with self care, reaching, carrying, lifting, house/yardwork, driving/computer work. Therapeutic Activities:    [x] (25796 or 13247) Provided verbal/tactile cueing for activities related to improving balance, coordination, kinesthetic sense, posture, motor skill, proprioception and motor activation to allow for proper function of scapular, scapulothoracic and UE control with self care, carrying, lifting, driving/computer work. Home Exercise Program:    [x] (69934) Reviewed/Progressed HEP activities related to strengthening, flexibility, endurance, ROM of scapular, scapulothoracic and UE control with self care, reaching, carrying, lifting, house/yardwork, driving/computer work - Reviewed and updated HEP with pt (see below). Pt was issued new handouts. (8/18/20)  [x] (02066) Reviewed/Progressed HEP activities related to improving balance, coordination, kinesthetic sense, posture, motor skill, proprioception of scapular, scapulothoracic and UE control with self care, reaching, carrying, lifting, house/yardwork, driving/computer work      Access Code: [email or web address]   URL: [email or web address]   Date: 08/13/2020   Prepared by: Javi Urias     Exercises   Seated Shoulder Scaption Slide at Table Top with Forearm in Neutral - 10 reps - 1 sets - 10 sec hold - 1-2x daily - 7x weekly   Seated Shoulder External Rotation AAROM with Dowel - 10 reps - 1 sets - 10 sec hold - 1-2x daily - 7x weekly   Standing Shoulder Shrugs with Dumbbells - 10 reps - 3 sets - 1-2x daily - 7x weekly   Scapular Retraction with Resistance - 10 reps - 3 sets - 1-2x daily - 7x weekly   Standing Bicep Curls Supinated with Dumbbells - 10 reps - 3 sets - 1-2x daily - 7x weekly         Manual Treatments:  PROM / STM / Oscillations-Mobs:  G-I, II, III, IV (PA's, Inf., Post.)  [x] (56885) Provided manual therapy to mobilize soft tissue/joints of cervical/CT, scapular GHJ and UE for the purpose of modulating pain, promoting relaxation,  increasing ROM, reducing/eliminating soft tissue swelling/inflammation/restriction, improving soft tissue extensibility and allowing for proper ROM for normal function with self care, reaching, carrying, lifting, house/yardwork, driving/computer work    Modalities:  ICE x 15' for the left shoulder   [] GAME READY (VASO)- for significant edema, swelling, pain control. Charges:  Timed Code Treatment Minutes: 54'   Total Treatment Minutes: 79'      [] EVAL (LOW) 32495 (typically 20 minutes face-to-face)  [] EVAL (MOD) 99889 (typically 30 minutes face-to-face)  [] EVAL (HIGH) 15328 (typically 45 minutes face-to-face)  [] RE-EVAL     [x] ZW(05344) x 2 (34')   [] IONTO  [] NMR (27776) x     [] VASO  [x] Manual (94362) x 1  (8')   [] Other: Group  [x] TA x  1 (13')  [] [facility] T[facility] (20962)  [] ES(attended) (67360)     [] ES (un) (47605):         ASSESSMENT:  Pt was fatigued with the exercises today. Cuing needed for correct number of reps and sets with several of the exercises. She also needed cuing to stay within MD parameters with cane ER.   PROM at the left shoulder was better today with stiffness present at end ranges of motion. Left shoulder is still very weak which is to be expected following reverse TSA and delayed rehab. Reviewed HEP in detail with pt and provided new, updated handouts. Also reviewed surgical precautions. GOALS:  (Goals updated 8/6/20)  Patient stated goal:  \"Increase function; be able to use my left arm for toileting; be self-sufficient\"  [x] Progressing: [] Met: [] Not Met: [] Adjusted    Therapist goals for Patient:   Short Term Goals: To be achieved in: 2 weeks  1. Independent in HEP and progression per patient tolerance, in order to prevent re-injury. [] Progressing: [x] Met: [] Not Met: [] Adjusted  2. Patient will have a decrease in left shoulder pain to 1-2/10 to facilitate improvement in movement, function, and ADLs as indicated by Functional Deficits. [] Progressing: [x] Met: [] Not Met: [] Adjusted  3. Patient will be able to melonie/doff Ultrasling independently. [] Progressing: [x] Met: [] Not Met: [] Adjusted      Long Term Goals: To be achieved in: 12 weeks  1. Disability index score of 15% or less for the Quick DASH to assist with reaching prior level of function. [x] Progressing: [] Met: [] Not Met: [] Adjusted  2. Patient will demonstrate an increase in left shoulder AROM to at least 90° flexion to allow for proper joint functioning as indicated by patients Functional Deficits. [] Progressing: [] Met: [x] Not Met: Due to surgical restrictions [] Adjusted  3. Patient will demonstrate an increase in left Deltoid strength to 4+/5 to allow for proper functional mobility as indicated by patients Functional Deficits. [x] Progressing: [] Met: [] Not Met: [] Adjusted  4. Patient will have a decrease in left shoulder pain to 0-1/10 with daily activities. [] Progressing: [x] Met: [] Not Met: [] Adjusted  5.  Pt will be able to perform all ADL's independently and without compensation. (patient specific functional goal)    [x] Progressing: [] Met: [] Not Met: []

## 2020-08-20 ENCOUNTER — TREATMENT (OUTPATIENT)
Dept: PHYSICAL THERAPY | Age: 70
End: 2020-08-20

## 2020-09-01 ENCOUNTER — TREATMENT (OUTPATIENT)
Dept: PHYSICAL THERAPY | Age: 70
End: 2020-09-01
Payer: MEDICARE

## 2020-09-01 PROCEDURE — G8427 DOCREV CUR MEDS BY ELIG CLIN: HCPCS | Performed by: PHYSICAL THERAPIST

## 2020-09-01 PROCEDURE — 97530 THERAPEUTIC ACTIVITIES: CPT | Performed by: PHYSICAL THERAPIST

## 2020-09-01 PROCEDURE — 97140 MANUAL THERAPY 1/> REGIONS: CPT | Performed by: PHYSICAL THERAPIST

## 2020-09-01 PROCEDURE — 97150 GROUP THERAPEUTIC PROCEDURES: CPT | Performed by: PHYSICAL THERAPIST

## 2020-09-01 PROCEDURE — 97110 THERAPEUTIC EXERCISES: CPT | Performed by: PHYSICAL THERAPIST

## 2020-09-01 NOTE — PROGRESS NOTES
[facility]   Phone: [phone]    Fax: [phone]       Physical Therapy Treatment Note/ Progress Report:           Date:  2020    Patient Name:  Elena Giels    :  1950  MRN: <[id number]>  Restrictions/Precautions:  S/P Left Reverse TSA  Medical/Treatment Diagnosis Information:  Diagnosis: S/P Left Reverse TSA (Sx: 20) (M19.012)   Treatment Diagnosis: Left Shoulder Pain (M25.512), Decreased left Shoulder ROM (M25.612), Decreased Left Shoulder Strength (U08.611)  Insurance/Certification information:  PT Insurance Information: Medicare, [facility]  Physician Information:  Referring Practitioner: Dr. Kayla Reynolds  Has the plan of care been signed (Y/N):        [x]  Yes (Signed 20)  []  No        Date of Patient follow up with Physician: 9/10/20      Is this a Progress Report:     []  Yes  [x]  No        If Yes:  Date Range for reporting period:  Beginning 20  Ending 20    Progress report will be due (10 Rx or 30 days whichever is less): 93      Recertification will be due (POC Duration  / 90 days whichever is less): 9/3/20         Visit # Insurance Allowable Auth Required   7 Medical Necessity []  Yes [x]  No        Functional Scale:    Date assessed:  20  Functional Assessment Tool Used: Peyton Tuttle  Score:  18.20% functional deficit (20)       (20)   Patient Age: 69years old   Patient Height: 5' 2\"  Patient Weight: 280 lbs  Patient BMI: 51.2      Pain (20)  [x]  Pain diagram was completed, pain was present and a follow up plan to address the pain is in the plan of care. ()   []  Pain diagram was completed and there was no pain present and therefore no follow up plan to address pain in the plan of care.  ()   []  Pain diagram was not completed and the reason for not completing the pain diagram is in the medical chart ()  []  Patient refused to participate   []  Severe mental or physical capacity, patient is in urgent emergent situation and to complete the questionnaire would jeopardize the patient's health status        Functional Questionnaire (8/6/20)  [x]  Patient completed the functional outcome questionnaire and deficiencies were addressed in the plan of care. ()   []  Patient completed the outcome questionnaire and there were no functional deficits identified and therefore no plan of care is required. ()   []  Patient did not complete the functional outcome questionnaire and the reason why is documented in the medical chart. ()  []  Patient refused to participate   []  Patient unable to complete the questionnaire   []   A functional outcome assessment has been reported on within the last 30 days        Falls (8/6/20)  [x]  The patient has had no falls or 1 fall without injury in the past year. (1101F)   []  There is no documentation of fall in the medical chart (1101F,8P)     [] The patient has had 2 or more falls or one fall with injury in the past year that is documented in the medical chart. (1100F)  []  A falls risk assessment was completed and documented in the medical chart. (3827K)   []  Falls were addressed in the plan of care. (8351R)   []  A falls risk assessment was not completed and documented in the medical chart (0633A,1Z)   []   Falls were not addressed in the plan of care and reason was documented in the plan of care. (1198D 1P)        Medication (8/6/20)     [x] A list of current medications (including prescription, over the counter, herbal supplements, vitamin supplements, mineral supplements, dietary supplements) was reviewed with the patient and documented in the medical chart. ()     Falls Risk Assessment (30 days): (8/6/20)  [x] Falls Risk assessed and no intervention required.   [] Falls Risk assessed and Patient requires intervention due to being higher risk   TUG score (>12s at risk):     [] Falls education provided, including       PQRS:   Reviewed medication list with patient. No changes reported by pt since last PT visit.  (9/1/20)      Latex Allergy:  [x]NO      []YES  Preferred Language for Healthcare:   [x]English       []other:      Pain level:  1-2/10 (9/1/20)  Medication: Nothing for her shoulder       SUBJECTIVE:    Pt has missed the last 2 weeks of therapy due to flare up of cellulitis and lymphedema (pt was last seen in therapy on 8/18/20). She was on an antibiotic for 10 days which helped. She did have a COVID test last week which was (-). Pt states her left shoulder was sore yesterday with no known cause. She has been doing her exercises fairly consistently at home the last 2 weeks but she has skipped a day here and there. She has not been icing consistently. Her sister is still helping her with bathing but she can dress on her own. Toileting is still very difficult. She is sleeping well but is still in the recliner.            (9 weeks post-op)        OBJECTIVE:     *Pt is right hand dominant    (9/1/20)  ROM PROM AROM  Comment    L R L R    Flexion 110°  Not  *Within MD parameters   Abduction (scap) 100°  Tested     ER 35°  Due to     IR NT  Surgery     Other        Other           (8/6/20)  Strength L R Comment   Flexion Not      Abduction Tested     ER Due to      IR Surgery     Supraspinatus      Upper Trap      Lower Trap      Mid Trap      Rhomboids      Biceps      Triceps      Horizontal Abduction      Horizontal Adduction      Lats        (8/6/20)  Special Tests Results/Comment   Umu Not      Neers Tested   Speeds Due to   [facility]Briens Surgery   Other    Neurologic Signs Pt reports no numbness or tingling in left UE   Functional Reach          Reflexes/Sensation: (7/7/20)   []Dermatomes/Myotomes intact    []Reflexes equal and normal bilaterally   [x]Other: Intact to light touch    Joint mobility: NA due to surgery (8/6/20)   []Normal    []Hypo   []Hyper    Palpation: Mild tenderness around incision site. (8/6/20)    Functional Mobility/Transfers: Less assistance needed with ADL's and self-care activities. She is unable to push through her left arm to get up from a chair due to surgical restrictions. She does not feel comfortable driving yet. (8/6/20)    Posture: Forward head, rounded shoulders. D/C Ultrasling (8/13/20)    Bandages/Dressings/Incisions: Incision is closed.  (8/13/20)    Gait: (include devices/WB status): WNL, no AD. (8/6/20)    Orthopedic Special Tests: See above                         RESTRICTIONS/PRECAUTIONS: Left Reverse TSA (Sx: 6/29/20)  ~AAROM FE to 140°, ER to 40°  ~Isometric Deltoid Strengthening  ~Okay to start AROM  ~Still no strengthening for the shoulder      Exercises/Interventions:     Therapeutic Ex (77003) Sets/sec Reps Notes/CUES   AD UE BIKE            STRETCHING/ROM      Pulleys: Flexion 10\" hold X 10    Table Slides: Flexion and scap 10\" hold X 10    Wand: ER 0 - 40° 10\" hold X 10 Seated   UE [place]: Flexion 3 sets X 10 Seated with UE ranger on floor   Pendulum      Doorway      Wall Slides      CBA      TP BB 10\"  X 10 Pulling across  Very gentle   Sleeper       Elbow AROM  D/C   Scapular Clock 3 sets X 10 Seated         ISOMETRICS      Gripping  X 5' Blue ball    Retraction  D/C   Abduction      Flexion      Internal Rotation      External Rotation      Deltoid 10\" hold X 10 Submax         STRENGTHENING-PREs      Flexion      Abduction      Internal Rotation      External Rotation      Shrugs 3 sets X 10 with 2#    Biceps 3 sets X 10 with 2#    Triceps      Retraction      Extension      Horizontal Abduction in ER      Serratus                        THERABANDS/CABLE COLUMN      Rows 3 sets X 10 with red band    Lats      Extension      Internal Rotation      External Rotation      Biceps      Triceps 3 sets X 10 with red band    PNF                                    Manual Intervention (73496)      Scar Massage      STM      Hawkgrips      GH joint mobilizations      Foamroll      Manual PROM: Left Shoulder Flexion, scap, and ER (Within MD Parameters), Elbow Flexion and Extension  X 8' Pt inclined on table         NMR re-education (28474)   CUES NEEDED   Plyoback      Therabar oscillations      Body Blade       Rhythmic Stabilization      Ball on the wall                              Therapeutic Activity (51044)      Core training      Swiss ball activities                  Patient Education: diagnosis, prognosis, pt goals, rehab timeline, and surgical precautions. Also instructed pt and her friend on proper way to melonie/doff sling (7/7/20)  X 10'                          Therapeutic Exercise and NMR EXR  [x] (24382) Provided verbal/tactile cueing for activities related to strengthening, flexibility, endurance, ROM  for improvements in scapular, scapulothoracic and UE control with self care, reaching, carrying, lifting, house/yardwork, driving/computer work. [x] (94818) Provided verbal/tactile cueing for activities related to improving balance, coordination, kinesthetic sense, posture, motor skill, proprioception  to assist with  scapular, scapulothoracic and UE control with self care, reaching, carrying, lifting, house/yardwork, driving/computer work. Therapeutic Activities:    [x] (91847 or 62146) Provided verbal/tactile cueing for activities related to improving balance, coordination, kinesthetic sense, posture, motor skill, proprioception and motor activation to allow for proper function of scapular, scapulothoracic and UE control with self care, carrying, lifting, driving/computer work. Home Exercise Program:    [x] (67035) Reviewed/Progressed HEP activities related to strengthening, flexibility, endurance, ROM of scapular, scapulothoracic and UE control with self care, reaching, carrying, lifting, house/yardwork, driving/computer work - Reviewed and updated HEP with pt (see below).   Pt was issued new handouts.  (9/1/20)  [x] (04194) Reviewed/Progressed HEP activities related to improving balance, coordination, kinesthetic sense, posture, motor skill, proprioception of scapular, scapulothoracic and UE control with self care, reaching, carrying, lifting, house/yardwork, driving/computer work      Access Code: [email or web address]   URL: [email or web address]   Date: 09/01/2020   Prepared by: Ingrid Hood   Seated Shoulder Flexion Towel Slide at Table Top - 10 reps - 1 sets - 10 sec hold - 1-2x daily - 7x weekly   Seated Shoulder Scaption Slide at Table Top with Forearm in Neutral - 10 reps - 1 sets - 10 sec hold - 1-2x daily - 7x weekly   Seated Shoulder External Rotation AAROM with Dowel - 10 reps - 1 sets - 10 sec hold - 1-2x daily - 7x weekly   Standing Shoulder Shrugs with Dumbbells - 10 reps - 3 sets - 1-2x daily - 7x weekly   Scapular Retraction with Resistance - 10 reps - 3 sets - 1-2x daily - 7x weekly   Standing Bicep Curls Supinated with Dumbbells - 10 reps - 3 sets - 1-2x daily - 7x weekly   Isometric Shoulder Abduction at Wall - 10 reps - 1 sets - 10 sec hold - 1-2x daily - 7x weekly   Standing Shoulder Internal Rotation Stretch with Towel - 10 reps - 1 sets - 10 sec hold - 1x daily - 7x weekly             Manual Treatments:  PROM / STM / Oscillations-Mobs:  G-I, II, III, IV (PA's, Inf., Post.)  [x] (74989) Provided manual therapy to mobilize soft tissue/joints of cervical/CT, scapular GHJ and UE for the purpose of modulating pain, promoting relaxation,  increasing ROM, reducing/eliminating soft tissue swelling/inflammation/restriction, improving soft tissue extensibility and allowing for proper ROM for normal function with self care, reaching, carrying, lifting, house/yardwork, driving/computer work    Modalities:  ICE x 15' for the left shoulder   [] GAME READY (VASO)- for significant edema, swelling, pain control.     Charges:  Timed Code Treatment Minutes: 39'   Total Treatment Minutes: 68'      [] EVAL (LOW) [phone] (typically 20 minutes face-to-face)  [] EVAL (MOD) 84831 (typically 30 minutes face-to-face)  [] EVAL (HIGH) 84509 (typically 45 minutes face-to-face)  [] RE-EVAL     [x] XR(96584) x 1 (20')   [] IONTO  [] NMR (39944) x     [] VASO  [x] Manual (62279) x 1  (8')   [x] Other: Group  [x] TA x  1 (13')  [] Mech Traction (42597)  [] ES(attended) (03105)     [] ES (un) (89932):         ASSESSMENT:  Pt did well with the exercises today with some cuing needed for correct form. Her control was a little better today with UE ranger but some visible shaking was present during the last set. Introduced IR towel stretch (gently pulling toward the center of her back) due to pt having a lot of difficulty with toileting. Also introduced pulley today which she tolerated well. PROM at the left shoulder continues to show improvement but stiffness still present at end ranges of motion. Left shoulder is still very weak limiting function/ADL's. Reviewed and updated HEP with pt and continued to stress compliance. GOALS:  (Goals updated 8/6/20)  Patient stated goal:  \"Increase function; be able to use my left arm for toileting; be self-sufficient\"  [x] Progressing: [] Met: [] Not Met: [] Adjusted    Therapist goals for Patient:   Short Term Goals: To be achieved in: 2 weeks  1. Independent in HEP and progression per patient tolerance, in order to prevent re-injury. [] Progressing: [x] Met: [] Not Met: [] Adjusted  2. Patient will have a decrease in left shoulder pain to 1-2/10 to facilitate improvement in movement, function, and ADLs as indicated by Functional Deficits. [] Progressing: [x] Met: [] Not Met: [] Adjusted  3. Patient will be able to melonie/doff Ultrasling independently. [] Progressing: [x] Met: [] Not Met: [] Adjusted      Long Term Goals: To be achieved in: 12 weeks  1. Disability index score of 15% or less for the Quick DASH to assist with reaching prior level of function. [x] Progressing: [] Met: [] Not Met: [] Adjusted  2.  Patient will demonstrate an increase in left shoulder AROM to at least 90° flexion to allow for proper joint functioning as indicated by patients Functional Deficits. [] Progressing: [] Met: [x] Not Met: Due to surgical restrictions [] Adjusted  3. Patient will demonstrate an increase in left Deltoid strength to 4+/5 to allow for proper functional mobility as indicated by patients Functional Deficits. [x] Progressing: [] Met: [] Not Met: [] Adjusted  4. Patient will have a decrease in left shoulder pain to 0-1/10 with daily activities. [] Progressing: [x] Met: [] Not Met: [] Adjusted  5. Pt will be able to perform all ADL's independently and without compensation. (patient specific functional goal)    [x] Progressing: [] Met: [] Not Met: [] Adjusted       Overall Progression Towards Functional goals/ Treatment Progress Update:  [x] Patient is progressing as expected towards functional goals listed. [] Progression is slowed due to complexities/Impairments listed. [] Progression has been slowed due to co-morbidities. [] Plan just implemented, too soon to assess goals progression <30days   [] Goals require adjustment due to lack of progress  [] Patient is not progressing as expected and requires additional follow up with physician  [] Other    Prognosis for POC: [x] Good [] Fair  [] Poor      Patient requires continued skilled intervention: [x] Yes  [] No    Treatment/Activity Tolerance:  [x] Patient able to complete treatment  [] Patient limited by fatigue  [] Patient limited by pain    [] Patient limited by other medical complications  [] Other:           PLAN: Cont therapy 2x/week progressing per protocol (delayed rehab). Re-assess next visit. Pt will follow-up with Dr. Tahir Shanks next week.       1-2x/week for 12 weeks for ROM, strengthening, scap stability exercises, manual therapy, HEP, pt education, and modalities prn (7/7/20)  [x] Continue per plan of care [] Alter current plan   [] Plan of care initiated [] Hold pending MD visit []

## 2020-09-03 ENCOUNTER — TREATMENT (OUTPATIENT)
Dept: PHYSICAL THERAPY | Age: 70
End: 2020-09-03
Payer: MEDICARE

## 2020-09-03 PROCEDURE — G8539 DOC FUNCT AND CARE PLAN: HCPCS | Performed by: PHYSICAL THERAPIST

## 2020-09-03 PROCEDURE — 1101F PT FALLS ASSESS-DOCD LE1/YR: CPT | Performed by: PHYSICAL THERAPIST

## 2020-09-03 PROCEDURE — 97150 GROUP THERAPEUTIC PROCEDURES: CPT | Performed by: PHYSICAL THERAPIST

## 2020-09-03 PROCEDURE — G8730 PAIN DOC POS AND PLAN: HCPCS | Performed by: PHYSICAL THERAPIST

## 2020-09-03 PROCEDURE — 97110 THERAPEUTIC EXERCISES: CPT | Performed by: PHYSICAL THERAPIST

## 2020-09-03 PROCEDURE — G8427 DOCREV CUR MEDS BY ELIG CLIN: HCPCS | Performed by: PHYSICAL THERAPIST

## 2020-09-03 PROCEDURE — 97140 MANUAL THERAPY 1/> REGIONS: CPT | Performed by: PHYSICAL THERAPIST

## 2020-09-03 PROCEDURE — 97530 THERAPEUTIC ACTIVITIES: CPT | Performed by: PHYSICAL THERAPIST

## 2020-09-03 NOTE — PROGRESS NOTES
[facility]   Phone: [phone]    Fax: [phone]     Physical Therapy Re-Certification Plan of Care    Dear  Dr. Cam Mccormick,    We had the pleasure of treating the following patient for physical therapy services at [address] [place] [address]. A summary of our findings can be found in the updated assessment below. This includes our plan of care. If you have any questions or concerns regarding these findings, please do not hesitate to contact me at the office phone number checked above. Thank you for the referral.     Physician Signature:________________________________Date:__________________  By signing above (or electronic signature), therapists plan is approved by physician      Overall Response to Treatment:   [x]Patient is responding well to treatment and improvement is noted with regards to goals - pt missed the last 2 weeks of therapy due to cellulitis and lymphedema in both legs.    []Patient should continue to improve in reasonable time if they continue HEP   []Patient has plateaued and is no longer responding to skilled PT intervention    []Patient is getting worse and would benefit from return to referring MD   []Patient unable to adhere to initial POC   []Other:        Physical Therapy Treatment Note/ Progress Report:           Date:  9/3/2020    Patient Name:  Roe Bence    :  1950  MRN: <[id number]>  Restrictions/Precautions:  S/P Left Reverse TSA  Medical/Treatment Diagnosis Information:  Diagnosis: S/P Left Reverse TSA (Sx: 20) (M19.012)   Treatment Diagnosis: Left Shoulder Pain (M25.512), Decreased left Shoulder ROM (M25.612), Decreased Left Shoulder Strength (Q97.479)  Insurance/Certification information:  PT Insurance Information: Medicare, [address]  Physician Information:  Referring Practitioner: Dr. Cam Mccormick  Has the plan of care been signed (Y/N):        [x]  Yes (Signed 20)  []  No        Date of Patient follow up with Physician: 9/8/20      Is this a Progress Report:     [x]  Yes  []  No        If Yes:  Date Range for reporting period:  Beginning 7/7/20  Ending 9/3/20    Progress report will be due (10 Rx or 30 days whichever is less): 09/7/13      Recertification will be due (POC Duration  / 90 days whichever is less): 10/1/20         Visit # Insurance Allowable Auth Required   8 Medical Necessity []  Yes [x]  No        Functional Scale:    Date assessed:  9/3/20  Functional Assessment Tool Used: Delio Mcdonald  Score:  18.20% functional deficit (9/3/20)       (7/7/20)   Patient Age: 69years old   Patient Height: 5' 2\"  Patient Weight: 280 lbs  Patient BMI: 51.2      Pain (9/3/20)  [x]  Pain diagram was completed, pain was present and a follow up plan to address the pain is in the plan of care. ()   []  Pain diagram was completed and there was no pain present and therefore no follow up plan to address pain in the plan of care. ()   []  Pain diagram was not completed and the reason for not completing the pain diagram is in the medical chart ()  []  Patient refused to participate   []  Severe mental or physical capacity, patient is in urgent emergent situation and to complete the questionnaire would jeopardize the patient's health status        Functional Questionnaire (9/3/20)  [x]  Patient completed the functional outcome questionnaire and deficiencies were addressed in the plan of care. ()   []  Patient completed the outcome questionnaire and there were no functional deficits identified and therefore no plan of care is required. ()   []  Patient did not complete the functional outcome questionnaire and the reason why is documented in the medical chart. ()  []  Patient refused to participate   []  Patient unable to complete the questionnaire   []   A functional outcome assessment has been reported on within the last 30 days        Falls (9/3/20)  [x]  The patient has had no falls or 1 fall without injury in the past year. (1101F)   []  There is no documentation of fall in the medical chart (1101F,8P)     [] The patient has had 2 or more falls or one fall with injury in the past year that is documented in the medical chart. (1100F)  []  A falls risk assessment was completed and documented in the medical chart. (1903C)   []  Falls were addressed in the plan of care. (9898N)   []  A falls risk assessment was not completed and documented in the medical chart (5288K,7W)   []   Falls were not addressed in the plan of care and reason was documented in the plan of care. (7796K 1P)        Medication (9/3/20)     [x] A list of current medications (including prescription, over the counter, herbal supplements, vitamin supplements, mineral supplements, dietary supplements) was reviewed with the patient and documented in the medical chart. ()     Falls Risk Assessment (30 days): (9/3/20)  [x] Falls Risk assessed and no intervention required. [] Falls Risk assessed and Patient requires intervention due to being higher risk   TUG score (>12s at risk):     [] Falls education provided, including       PQRS:   Reviewed medication list with patient. No changes reported by pt since last PT visit.  (9/3/20)      Latex Allergy:  [x]NO      []YES  Preferred Language for Healthcare:   [x]English       []other:      Pain level:  1-2/10 (9/3/20)  Medication: Nothing for her shoulder       SUBJECTIVE:   Pt states both her legs are still swollen and painful. Her PCP wants her to wrap both legs from her feet to her knees but when she does this she has more pain. Her left shoulder is doing well. She has soreness after she does her exercises but it goes away after icing. She still needs assistance with bathing and toileting but she is able to dress by herself. She continues to sleep in the recliner but plans to try sleeping in her bed this weekend.          (9+ weeks post-op)        OBJECTIVE:     *Pt is right hand dominant    (9/3/20)  ROM PROM AROM  Comment    L R L R    Flexion 110°  Not  *Within MD parameters   Abduction (scap) 105°  Tested     ER 35°  Due to     IR NT  Surgery     Other        Other           (9/3/20)  Strength L R Comment   Flexion Not      Abduction Tested     ER Due to      IR Surgery     Deltoid 4-/5     Upper Trap      Lower Trap      Mid Trap      Rhomboids      Biceps      Triceps      Horizontal Abduction      Horizontal Adduction      Lats        (9/3/20)  Special Tests Results/Comment   Umu Not      Neers Tested   Speeds Due to   O[facility]Briens Surgery   Other    Neurologic Signs Pt reports no numbness or tingling in left UE   Functional Reach          Reflexes/Sensation: (7/7/20)   []Dermatomes/Myotomes intact    []Reflexes equal and normal bilaterally   [x]Other: Intact to light touch    Joint mobility: NA due to surgery (9/3/20)   []Normal    []Hypo   []Hyper    Palpation: No tenderness. (9/3/20)    Functional Mobility/Transfers: Less assistance needed with ADL's and self-care activities. She is still unable to push through her left arm to get up from a chair due to surgical restrictions. She has been driving.  (8/9/[birth year])    Posture:  Forward head, rounded shoulders.  (9/3/20)    Bandages/Dressings/Incisions: Incision is closed. (9/3/20)    Gait: (include devices/WB status): WNL, no AD. (9/3/20)    Orthopedic Special Tests: See above                         RESTRICTIONS/PRECAUTIONS: Left Reverse TSA (Sx: 6/29/20)  ~AAROM FE to 140°, ER to 40°  ~Isometric Deltoid Strengthening  ~Okay to start AROM  ~Still no strengthening for the shoulder      Exercises/Interventions:     Therapeutic Ex (53958) Sets/sec Reps Notes/CUES   AD UE BIKE            STRETCHING/ROM      Pulleys: Flexion 10\" hold X 10    Table Slides: Flexion and scap 10\" hold X 10    Wand: ER 0 - 40° 10\" hold X 10 Seated   UE [place]: Flexion 3 sets X 10 Seated with UE ranger on floor   Supine Cane Flexion 5\" hold X 10 Semi-inclined on table (pt cannot lie flat)   Doorway      Wall Slides      CBA      TP BB 10\"  X 10 Pulling across  Very gentle   Sleeper       Elbow AROM  D/C   Scapular Clock 3 sets X 10 Seated         ISOMETRICS      Gripping  X 5' Blue ball    Retraction  D/C   Abduction      Flexion      Internal Rotation      External Rotation Next     Deltoid 10\" hold X 10 Submax         STRENGTHENING-PREs      Flexion      Abduction      Internal Rotation      External Rotation      Shrugs 3 sets X 10 with 2#    Biceps 3 sets X 10 with 2#    Triceps      Retraction      Extension      Horizontal Abduction in ER      Serratus                        THERABANDS/CABLE COLUMN      Rows 3 sets X 10 with green band    Lats      Extension      Internal Rotation      External Rotation      Biceps      Triceps 3 sets X 10 with green band    PNF                                    Manual Intervention (42544)      Scar Massage      STM      Hawkgrips      GH joint mobilizations      Foamroll      Manual PROM: Left Shoulder Flexion, scap, and ER (Within MD Parameters), Elbow Flexion and Extension  X 8' Pt inclined on table         NMR re-education (95138)   CUES NEEDED   Plyoback      Therabar oscillations      Body Blade       Rhythmic Stabilization      Ball on the wall                              Therapeutic Activity (01951)      Core training      Swiss ball activities                  Patient Education: diagnosis, prognosis, pt goals, rehab timeline, and surgical precautions. Also instructed pt and her friend on proper way to melonie/doff sling (7/7/20)  X 10'                          Therapeutic Exercise and NMR EXR  [x] (75234) Provided verbal/tactile cueing for activities related to strengthening, flexibility, endurance, ROM  for improvements in scapular, scapulothoracic and UE control with self care, reaching, carrying, lifting, house/yardwork, driving/computer work.     [x] (21164) Provided verbal/tactile cueing for activities related to improving balance, coordination, kinesthetic sense, posture, motor skill, proprioception  to assist with  scapular, scapulothoracic and UE control with self care, reaching, carrying, lifting, house/yardwork, driving/computer work. Therapeutic Activities:    [x] (20763 or 25788) Provided verbal/tactile cueing for activities related to improving balance, coordination, kinesthetic sense, posture, motor skill, proprioception and motor activation to allow for proper function of scapular, scapulothoracic and UE control with self care, carrying, lifting, driving/computer work. Home Exercise Program:    [x] (10985) Reviewed/Progressed HEP activities related to strengthening, flexibility, endurance, ROM of scapular, scapulothoracic and UE control with self care, reaching, carrying, lifting, house/yardwork, driving/computer work - Reviewed and updated HEP with pt (see below). Pt was issued new handouts.  (9/3/20)  [x] (56455) Reviewed/Progressed HEP activities related to improving balance, coordination, kinesthetic sense, posture, motor skill, proprioception of scapular, scapulothoracic and UE control with self care, reaching, carrying, lifting, house/yardwork, driving/computer work        Access Code: [email or web address]   URL: [email or web address]   Date: 09/03/2020   Prepared by: Abril Gama     Exercises   Seated Shoulder Flexion Towel Slide at Table Top - 10 reps - 1 sets - 10 sec hold - 1-2x daily - 7x weekly   Seated Shoulder Scaption Slide at Table Top with Forearm in Neutral - 10 reps - 1 sets - 10 sec hold - 1-2x daily - 7x weekly   Seated Shoulder External Rotation AAROM with Dowel - 10 reps - 1 sets - 10 sec hold - 1-2x daily - 7x weekly   Standing Shoulder Shrugs with Dumbbells - 10 reps - 3 sets - 1-2x daily - 7x weekly   Scapular Retraction with Resistance - 10 reps - 3 sets - 1-2x daily - 7x weekly   Standing Bicep Curls Supinated with Dumbbells - 10 reps - 3 sets - 1-2x daily - 7x weekly Isometric Shoulder Abduction at Wall - 10 reps - 1 sets - 10 sec hold - 1-2x daily - 7x weekly   Standing Shoulder Internal Rotation Stretch with Towel - 10 reps - 1 sets - 10 sec hold - 1x daily - 7x weekly   Supine Shoulder Flexion Extension AAROM with Dowel - 10 reps - 1 sets - 5 sec hold - 1x daily - 7x weekly   Standing Elbow Extension with Anchored Resistance at Wall - 10 reps - 3 sets - 1x daily - 7x weekly               Manual Treatments:  PROM / STM / Oscillations-Mobs:  G-I, II, III, IV (PA's, Inf., Post.)  [x] (40934) Provided manual therapy to mobilize soft tissue/joints of cervical/CT, scapular GHJ and UE for the purpose of modulating pain, promoting relaxation,  increasing ROM, reducing/eliminating soft tissue swelling/inflammation/restriction, improving soft tissue extensibility and allowing for proper ROM for normal function with self care, reaching, carrying, lifting, house/yardwork, driving/computer work    Modalities:  ICE x 15' for the left shoulder   [] GAME READY (VASO)- for significant edema, swelling, pain control. Charges:  Timed Code Treatment Minutes: 39'   Total Treatment Minutes: 68'      [] EVAL (LOW) 78341 (typically 20 minutes face-to-face)  [] EVAL (MOD) 18852 (typically 30 minutes face-to-face)  [] EVAL (HIGH) 17731 (typically 45 minutes face-to-face)  [] RE-EVAL     [x] HT(09446) x 1 (20')   [] IONTO  [] NMR (32275) x     [] VASO  [x] Manual (52313) x 1  (8')   [x] Other: Group  [x] TA x  1 (13')  [] [facility] (23330)  [] ES(attended) (85854)     [] ES (un) (86753):         ASSESSMENT:  Pt is making good progress but she missed the last 2 weeks of therapy due to lymphedema and cellulitis in both legs which has slowed her down. Cuing needed with some of the exercises today for correct form. She continues to have difficulty with IR/reaching behind her back due to tightness.   Added supine cane flexion on inclined table (pt cannot lie flat) which was very challenging but she listed but progress has been slowed recently due to pt missing the last 2 weeks of therapy secondary to lymphedema and cellulitis in both legs. [] Progression is slowed due to complexities/Impairments listed. [] Progression has been slowed due to co-morbidities. [] Plan just implemented, too soon to assess goals progression <30days   [] Goals require adjustment due to lack of progress  [] Patient is not progressing as expected and requires additional follow up with physician  [] Other    Prognosis for POC: [x] Good [] Fair  [] Poor      Patient requires continued skilled intervention: [x] Yes  [] No    Treatment/Activity Tolerance:  [x] Patient able to complete treatment  [] Patient limited by fatigue  [] Patient limited by pain    [] Patient limited by other medical complications  [] Other:           PLAN: Cont therapy 2x/week progressing per protocol (delayed rehab). Pt will follow-up with Dr. Cam Mccormick next Tuesday. 2x/week for 6 weeks for ROM, strengthening, scap stability exercises, manual therapy, HEP, pt education, and modalities prn (9/3/20)  [x] Continue per plan of care [] Alter current plan   [] Plan of care initiated [] Hold pending MD visit [] Discharge      Electronically signed by:  Errol Contreras, PT     Physical Therapist [address] #[id number]  Physical Therapist [place] License #[id number]    Note: If patient does not return for scheduled/ recommended follow up visits, this note will serve as a discharge from care along with most recent update on progress.

## 2020-09-08 ENCOUNTER — OFFICE VISIT (OUTPATIENT)
Dept: ORTHOPEDIC SURGERY | Age: 70
End: 2020-09-08

## 2020-09-08 ENCOUNTER — TREATMENT (OUTPATIENT)
Dept: PHYSICAL THERAPY | Age: 70
End: 2020-09-08
Payer: MEDICARE

## 2020-09-08 VITALS — BODY MASS INDEX: 51.34 KG/M2 | WEIGHT: 279 LBS | HEIGHT: 62 IN

## 2020-09-08 PROCEDURE — 97140 MANUAL THERAPY 1/> REGIONS: CPT | Performed by: PHYSICAL THERAPIST

## 2020-09-08 PROCEDURE — G8427 DOCREV CUR MEDS BY ELIG CLIN: HCPCS | Performed by: PHYSICAL THERAPIST

## 2020-09-08 PROCEDURE — 97150 GROUP THERAPEUTIC PROCEDURES: CPT | Performed by: PHYSICAL THERAPIST

## 2020-09-08 PROCEDURE — 97530 THERAPEUTIC ACTIVITIES: CPT | Performed by: PHYSICAL THERAPIST

## 2020-09-08 PROCEDURE — 97110 THERAPEUTIC EXERCISES: CPT | Performed by: PHYSICAL THERAPIST

## 2020-09-08 PROCEDURE — 99024 POSTOP FOLLOW-UP VISIT: CPT | Performed by: ORTHOPAEDIC SURGERY

## 2020-09-08 NOTE — PROGRESS NOTES
[facility]   Phone: [phone]    Fax: [phone]        Physical Therapy Treatment Note/ Progress Report:           Date:  2020    Patient Name:  Wellington Maldonado    :  1950  MRN: <[id number]>  Restrictions/Precautions:  S/P Left Reverse TSA  Medical/Treatment Diagnosis Information:  Diagnosis: S/P Left Reverse TSA (Sx: 20) (M19.012)   Treatment Diagnosis: Left Shoulder Pain (M25.512), Decreased left Shoulder ROM (M25.612), Decreased Left Shoulder Strength (Q96.754)  Insurance/Certification information:  PT Insurance Information: Medicare, [address] [facility]  Physician Information:  Referring Practitioner: Dr. Rolly Pierce  Has the plan of care been signed (Y/N):        [x]  Yes (Signed 9/3/20)  []  No        Date of Patient follow up with Physician: Today; 10/27/20      Is this a Progress Report:     []  Yes  [x]  No        If Yes:  Date Range for reporting period:  Beginning 20  Ending 9/3/20    Progress report will be due (10 Rx or 30 days whichever is less):       Recertification will be due (POC Duration  / 90 days whichever is less): 10/1/20         Visit # Insurance Allowable Auth Required   9 Medical Necessity []  Yes [x]  No        Functional Scale:    Date assessed:  9/3/20  Functional Assessment Tool Used: Faith Jacques  Score:  18.20% functional deficit (9/3/20)       (20)   Patient Age: 69years old   Patient Height: 5' 2\"  Patient Weight: 280 lbs  Patient BMI: 51.2      Pain (9/3/20)  [x]  Pain diagram was completed, pain was present and a follow up plan to address the pain is in the plan of care. ()   []  Pain diagram was completed and there was no pain present and therefore no follow up plan to address pain in the plan of care.  ()   []  Pain diagram was not completed and the reason for not completing the pain diagram is in the medical chart ()  []  Patient refused to participate   []  Severe mental or physical capacity, patient is in urgent emergent situation and to complete the questionnaire would jeopardize the patient's health status        Functional Questionnaire (9/3/20)  [x]  Patient completed the functional outcome questionnaire and deficiencies were addressed in the plan of care. ()   []  Patient completed the outcome questionnaire and there were no functional deficits identified and therefore no plan of care is required. ()   []  Patient did not complete the functional outcome questionnaire and the reason why is documented in the medical chart. ()  []  Patient refused to participate   []  Patient unable to complete the questionnaire   []   A functional outcome assessment has been reported on within the last 30 days        Falls (9/3/20)  [x]  The patient has had no falls or 1 fall without injury in the past year. (1101F)   []  There is no documentation of fall in the medical chart (1101F,8P)     [] The patient has had 2 or more falls or one fall with injury in the past year that is documented in the medical chart. (1100F)  []  A falls risk assessment was completed and documented in the medical chart. (8784X)   []  Falls were addressed in the plan of care. (2873D)   []  A falls risk assessment was not completed and documented in the medical chart (7296X,7H)   []   Falls were not addressed in the plan of care and reason was documented in the plan of care. (8181Y 1P)        Medication (9/3/20)     [x] A list of current medications (including prescription, over the counter, herbal supplements, vitamin supplements, mineral supplements, dietary supplements) was reviewed with the patient and documented in the medical chart. ()     Falls Risk Assessment (30 days): (9/3/20)  [x] Falls Risk assessed and no intervention required.   [] Falls Risk assessed and Patient requires intervention due to being higher risk   TUG score (>12s at risk):     [] Falls education provided, including       PQRS:   Reviewed medication list with patient. Her PCP is putting her on another antibiotic for lymphedema/cellulitis in both legs  (9/8/20)      Latex Allergy:  [x]NO      []YES  Preferred Language for Healthcare:   [x]English       []other:      Pain level:  1-2/10 (9/8/20)  Medication: Nothing for her shoulder       SUBJECTIVE:   Pt saw her PCP, Dr. Darius Aguilar, earlier today for lymphedema/cellulitis in both legs. He wrapped both her legs and is putting her on another antibiotic. Pt saw Dr. Holly Escobar before therapy today and he was pleased with her progress but does feel the 2 weeks she lost of therapy due to lymphedema/cellulitis did set her back some. Pt states her shoulder still gets sore after she does her exercises but soreness goes away after she ices. She slept in bed last night for the first time since surgery. (10 weeks post-op)        OBJECTIVE:     *Pt is right hand dominant    (9/8/20)  ROM PROM AROM  Comment    L R L R    Flexion 110°  Not  *Within MD parameters   Abduction (scap) 110°  Tested     ER 35°  Due to     IR NT  Surgery     Other        Other           (9/3/20)  Strength L R Comment   Flexion Not      Abduction Tested     ER Due to      IR Surgery     Deltoid 4-/5     Upper Trap      Lower Trap      Mid Trap      Rhomboids      Biceps      Triceps      Horizontal Abduction      Horizontal Adduction      Lats        (9/3/20)  Special Tests Results/Comment   Umu Not      Neers Tested   Speeds Due to   O[facility]Briens Surgery   Other    Neurologic Signs Pt reports no numbness or tingling in left UE   Functional Reach          Reflexes/Sensation: (7/7/20)   []Dermatomes/Myotomes intact    []Reflexes equal and normal bilaterally   [x]Other: Intact to light touch    Joint mobility: NA due to surgery (9/3/20)   []Normal    []Hypo   []Hyper    Palpation: No tenderness. (9/3/20)    Functional Mobility/Transfers: Less assistance needed with ADL's and self-care activities.   She is still unable to push through her left arm to get up from a chair due to surgical restrictions. She has been driving.  (4/8/02)    Posture:  Forward head, rounded shoulders.  (9/3/20)    Bandages/Dressings/Incisions: Incision is closed. (9/3/20)    Gait: (include devices/WB status): WNL, no AD. (9/3/20)    Orthopedic Special Tests: See above                         RESTRICTIONS/PRECAUTIONS: Left Reverse TSA (Sx: 6/29/20)  ~AAROM FE to 140°, ER to 40°  ~Isometric Deltoid Strengthening  ~Okay to start AROM  ~Still no strengthening for the shoulder      Exercises/Interventions:     Therapeutic Ex (42585) Sets/sec Reps Notes/CUES   AD UE BIKE            STRETCHING/ROM      Pulleys: Flexion 10\" hold X 10    Table Slides: Flexion and scap 10\" hold X 10    Wand: ER 0 - 40° 10\" hold X 10 Seated   UE [place]: Flexion 3 sets X 10 Seated with UE ranger on floor   Supine Cane Flexion 2 sets X 10 Semi-inclined on table (pt cannot lie flat)   Doorway      Wall Slides 2 sets X 5 Crawling fingers up wall   CBA      TP BB 10\"  X 10 Pulling across     Sleeper       Elbow AROM  D/C   Scapular Clock 3 sets X 10 Seated         ISOMETRICS      Gripping  X 5' Blue ball    Retraction  D/C   Abduction      Flexion      Internal Rotation      External Rotation 10\" hold X 10    Deltoid 10\" hold X 10 Submax         STRENGTHENING-PREs      Flexion      Abduction      Internal Rotation      External Rotation      Shrugs 3 sets X 10 with 2#    Biceps 3 sets X 10 with 2#    Triceps      Retraction      Extension      Horizontal Abduction in ER      Serratus                        THERABANDS/CABLE COLUMN      Rows 3 sets X 10 with green band    Lats      Extension 3 sets X 10 with green band    Internal Rotation      External Rotation      Biceps      Triceps 3 sets X 10 with green band    PNF                                    Manual Intervention (56533)      Scar Massage      STM      Hawkgrips      GH joint mobilizations      Foamroll      Manual PROM: Left Shoulder Flexion, scap, and ER (Within MD Parameters), Elbow Flexion and Extension  X 8' Pt inclined on table         NMR re-education (46083)   CUES NEEDED   Plyoback      Therabar oscillations      Body Blade       Rhythmic Stabilization      Ball on the wall                              Therapeutic Activity (84756)      Core training      Swiss [id number] activities                  Patient Education: diagnosis, prognosis, pt goals, rehab timeline, and surgical precautions. Also instructed pt and her friend on proper way to melonie/doff sling (7/7/20)  X 10'                          Therapeutic Exercise and NMR EXR  [x] (01963) Provided verbal/tactile cueing for activities related to strengthening, flexibility, endurance, ROM  for improvements in scapular, scapulothoracic and UE control with self care, reaching, carrying, lifting, house/yardwork, driving/computer work. [x] (19379) Provided verbal/tactile cueing for activities related to improving balance, coordination, kinesthetic sense, posture, motor skill, proprioception  to assist with  scapular, scapulothoracic and UE control with self care, reaching, carrying, lifting, house/yardwork, driving/computer work. Therapeutic Activities:    [x] (61305 or 12538) Provided verbal/tactile cueing for activities related to improving balance, coordination, kinesthetic sense, posture, motor skill, proprioception and motor activation to allow for proper function of scapular, scapulothoracic and UE control with self care, carrying, lifting, driving/computer work. Home Exercise Program:    [x] (90160) Reviewed/Progressed HEP activities related to strengthening, flexibility, endurance, ROM of scapular, scapulothoracic and UE control with self care, reaching, carrying, lifting, house/yardwork, driving/computer work - Reviewed and updated HEP with pt (see below).   Pt was issued new handouts.  (9/8/20)  [x] (12536) Reviewed/Progressed HEP activities related to improving balance, coordination, kinesthetic sense, posture, motor skill, proprioception of scapular, scapulothoracic and UE control with self care, reaching, carrying, lifting, house/yardwork, driving/computer work        Access Code: [email or web address]   URL: [email or web address]   Date: 09/08/2020   Prepared by: Fide Ramirez     Exercises   Seated Shoulder Flexion Towel Slide at Table Top - 10 reps - 1 sets - 10 sec hold - 1-2x daily - 7x weekly   Seated Shoulder Scaption Slide at Table Top with Forearm in Neutral - 10 reps - 1 sets - 10 sec hold - 1-2x daily - 7x weekly   Seated Shoulder External Rotation AAROM with Dowel - 10 reps - 1 sets - 10 sec hold - 1-2x daily - 7x weekly   Standing Shoulder Shrugs with Dumbbells - 10 reps - 3 sets - 1-2x daily - 7x weekly   Scapular Retraction with Resistance - 10 reps - 3 sets - 1-2x daily - 7x weekly   Standing Bicep Curls Supinated with Dumbbells - 10 reps - 3 sets - 1-2x daily - 7x weekly   Isometric Shoulder Abduction at Wall - 10 reps - 1 sets - 10 sec hold - 1-2x daily - 7x weekly   Standing Shoulder Internal Rotation Stretch with Towel - 10 reps - 1 sets - 10 sec hold - 1x daily - 7x weekly   Supine Shoulder Flexion Extension AAROM with Dowel - 10 reps - 1 sets - 5 sec hold - 1x daily - 7x weekly   Isometric Shoulder External Rotation at Wall - 10 reps - 1 sets - 10 sec hold - 1x daily - 7x weekly   Standing Elbow Extension with Anchored Resistance at Wall - 10 reps - 3 sets - 1x daily - 7x weekly   Standing Shoulder Flexion Wall Walk - 5 reps - 2 sets - 1x daily - 7x weekly             Manual Treatments:  PROM / STM / Oscillations-Mobs:  G-I, II, III, IV (PA's, Inf., Post.)  [x] (34130) Provided manual therapy to mobilize soft tissue/joints of cervical/CT, scapular GHJ and UE for the purpose of modulating pain, promoting relaxation,  increasing ROM, reducing/eliminating soft tissue swelling/inflammation/restriction, improving soft tissue extensibility and allowing for proper ROM for normal function with self care, reaching, carrying, lifting, house/yardwork, driving/computer work    Modalities:  ICE x 15' for the left shoulder   [] GAME READY (VASO)- for significant edema, swelling, pain control. Charges:  Timed Code Treatment Minutes: 39'   Total Treatment Minutes: 68'      [] EVAL (LOW) 76605 (typically 20 minutes face-to-face)  [] EVAL (MOD) 04183 (typically 30 minutes face-to-face)  [] EVAL (HIGH) 92789 (typically 45 minutes face-to-face)  [] RE-EVAL     [x] BR(17384) x 1 (20')   [] IONTO  [] NMR (80747) x     [] VASO  [x] Manual (66477) x 1  (8')   [x] Other: Group  [x] TA x  1 (13')  [] Mech Traction (91456)  [] ES(attended) (45053)     [] ES (un) (59492):         ASSESSMENT:  Pt tolerated the exercises well today. She was able to perform 2 sets of 10 with supine cane flexion with better control than last visit. Introduced wall walking which was challenging but pt was able to complete 2 sets of 10. She fatigued quickly with isometric ER which was also added today. IR towel stretch continues to be her most challenging exercise but her motion is slowly improving. Reviewed/updated HEP with pt and continued to stress compliance. GOALS:  (Goals updated 9/3/20)  Patient stated goal:  \"Increase function; be able to use my left arm for toileting; be self-sufficient\"  [x] Progressing: [] Met: [] Not Met: [] Adjusted    Therapist goals for Patient:   Short Term Goals: To be achieved in: 2 weeks  1. Independent in HEP and progression per patient tolerance, in order to prevent re-injury. [] Progressing: [x] Met: [] Not Met: [] Adjusted  2. Patient will have a decrease in left shoulder pain to 1-2/10 to facilitate improvement in movement, function, and ADLs as indicated by Functional Deficits. [] Progressing: [x] Met: [] Not Met: [] Adjusted  3. Patient will be able to melonie/doff Ultrasling independently. [] Progressing: [x] Met: [] Not Met: [] Adjusted      Long Term Goals:  To be achieved in: 12 weeks  1. Disability index score of 15% or less for the Quick DASH to assist with reaching prior level of function. [x] Progressing: [] Met: [] Not Met: [] Adjusted  2. Patient will demonstrate an increase in left shoulder AROM to at least 90° flexion to allow for proper joint functioning as indicated by patients Functional Deficits. [x] Progressing: [] Met: [] Not Met:  [] Adjusted  3. Patient will demonstrate an increase in left Deltoid strength to 4+/5 to allow for proper functional mobility as indicated by patients Functional Deficits. [x] Progressing: [] Met: [] Not Met: [] Adjusted  4. Patient will have a decrease in left shoulder pain to 0-1/10 with daily activities. [] Progressing: [x] Met: [] Not Met: [] Adjusted  5. Pt will be able to perform all ADL's independently and without compensation. (patient specific functional goal)    [x] Progressing: [] Met: [] Not Met: [] Adjusted       Overall Progression Towards Functional goals/ Treatment Progress Update:  [x] Patient is progressing as expected towards functional goals listed but progress has been slowed recently due to pt missing the last 2 weeks of therapy secondary to lymphedema and cellulitis in both legs. [] Progression is slowed due to complexities/Impairments listed. [] Progression has been slowed due to co-morbidities. [] Plan just implemented, too soon to assess goals progression <30days   [] Goals require adjustment due to lack of progress  [] Patient is not progressing as expected and requires additional follow up with physician  [] Other    Prognosis for POC: [x] Good [] Fair  [] Poor      Patient requires continued skilled intervention: [x] Yes  [] No    Treatment/Activity Tolerance:  [x] Patient able to complete treatment  [] Patient limited by fatigue  [] Patient limited by pain    [] Patient limited by other medical complications  [] Other:           PLAN: Cont therapy 2x/week progressing per protocol (delayed rehab).

## 2020-09-08 NOTE — PROGRESS NOTES
Chief Complaint    Follow-up (Left Shoulder)      History of Present Illness: Alexandra Hoang is a pleasant, 69 y.o., female, here today for follow up of her left shoulder. She is now 10 weeks out following a left reverse total shoulder on 6/29/20. She has continued in formal physical therapy at the [facility] office working with Maciej Gillespie. She did have to take some time off from therapy due to a fever. She was relying on her exercises at home on her own for that time. She feels she is doing well. Her pain is minimal. She reports no new injuries or setbacks. Pain Assessment  Location of Pain: Shoulder  Location Modifiers: Left  Severity of Pain: 0  Quality of Pain: Aching  Duration of Pain: Persistent  Frequency of Pain: Rarely  Aggravating Factors: (therapy)  Limiting Behavior: Some  Relieving Factors: Rest, Ice  Work-Related Injury: No  Are there other pain locations you wish to document?: No      Medical History:  Patient's medications, allergies, past medical, surgical, social and family histories were reviewed and updated as appropriate. Review of Systems    Patient has had no medical changes since last evaluated         Review of Systems  A 14 point review of systems was completed by the patient on 3/12/20 and is available in the media section of the scanned medical record and was reviewed on 9/8/2020. The review is negative with the exception of those things mentioned in the HPI and Past Medical History    Vital Signs:  Vitals:       General/Appearance: Alert and oriented and in no apparent distress. Skin:  There are no skin lesions, cellulitis, or extreme edema. The patient has warm and well-perfused Bilateral upper extremities with brisk capillary refill. Left Shoulder Exam:  Inspection: Incision is healing well. No gross deformities, no signs of infection. Palpation: Non-tender    Active Range of Motion:   Forward Elevation 75, External Rotation 0, Internal Rotation back pocket    Passive Range of Motion: Forward Elevation 110, External Rotation 15    Strength: Deferred    Special Tests:  Positive drop arm, 20 degree external rotation lag, No Juan Ramon muscle deformity. Neurovascular: Sensation to light touch is intact, no motor deficits, palpable radial pulses 2+      Radiology:     No new XR obtained at this time. Assessment :  Ms. Flor Meyer is a pleasant, 69 y.o. patient who is 10 weeks out following a left reverse total shoulder on 6/29/20. Impression:  Encounter Diagnosis   Name Primary?  S/P reverse total shoulder arthroplasty, left Yes       Office Procedures:  Orders Placed This Encounter   Procedures    [facility] PT [facility] Physical Therapy     Referral Priority:   Routine     Referral Type:   Eval and Treat     Referral Reason:   Specialty Services Required     Requested Specialty:   Physical Therapy     Number of Visits Requested:   1       Treatment Plan: Flor Meyer is doing well - her time off from therapy may have slightly set her back with respect to motion. We recommend She continue in physical therapy at the [facility] office. We will continue to progress her therapy incrementally. A new physical therapy letter was documented in EPIC today. We will see [facility] back in 4-6 weeks and/or as needed. All questions were answered to patient's satisfaction and She was encouraged to call with any further questions or concerns. Abdulkadir Vidal is in agreement with this plan. 9/8/2020  1:25 PM    Marisela Dunn ATC  [employer] [address]    During this examination, I, Florin Armstrong, functioned as a scribe for Dr. Diaz Toledo. The history taking and physical examination were performed by Dr. Negro Campo. All counseling during the appointment was performed between the patient and Dr. Negro Campo.  9/8/20   ____________  I, Dr. Diaz Toledo, personally performed the services described in this

## 2020-09-08 NOTE — LETTER
Shoulder Elbow Rehabilitation Referral    Patient Name: Indiana Kirby      YOB: 1950    Diagnosis: left reverse TSA  DOS: 6/29/20    Precautions:       Post Op Instructions:  [] Continuous passive motion (CPM)  [x] Elbow range of motion  [] Exercise in plane of scapula   []  Strengthening     [] Pulley and instruction    [] Home exercise program (copy to patient)   [] Sling when arm at risk  [] Sling or brace at all times   [x] AAROM: Forward elevation to 140          [x] AAROM: External rotation to 40  [x] Isometric external rotator strengthening [x] AAROM: internal rotation: up the back  [x] Isometric abductor strengthening  [x] AAROM: Internal abduction     [] Isometric internal rotator strengthening [x] AAROM: cross-body adduction             Stretching:     Strengthening:  [x] Four quadrant (FE, ER, IR, CBA)  [] Sleeper stretch    [] Rotator cuff (ER, IR, Abd)  [] Forward Elevation    [] External Rotators     [] External Rotation    [] Internal Rotators  [] Internal Rotation: up/back   [] Abductors     [] Internal Rotation: supine in abduction  [] Flexors  [] Cross-body abduction    [] Extensors  [x] Pendulum (FE, Abd/Add, cw/ccw)  [x] Scapular Stabilizers   [] Wall-walking (FE, Abd)     [x] Shoulder shrugs (isometric deltoid strengthening exercises. [] Table slides      [x] Rhomboid pinch  [] Elbow (flex, ext, pron, sup)    [] Lat.  Pull downs     [] Medial epicondylitis program    [] Forward punch   [] Lateral epicondylitis program    [] Internal rotators     [] Progressive resistive exercises  [] Bench Press        [] Bench press plus  Activities:     [] Lateral pull-downs  [] Rowing     [x] Progressive two-hand supine press  [] Stepper/Exercise bike   [x] Biceps: curls/supination  [] Swimming  [] Water exercises    Modalities:     Return to Sport:  [] Ultrasound     [] Plyometrics  [] Iontophoresis     [] Rhythmic stabilization  [] Moist heat     [] Core strengthening [] Massage     [] Sports specific program:     [] Cryotherapy      [] Electrical stimulation     [] Paraffin  [] Whirlpool  [] TENS    [x] Home exercise program (copy to patient). Perform exercises for:  15  minutes   2-3  times/day  [x] Supervised physical therapy  Frequency: []  1x week  [x] 2x week  [] 3x week  [] Other:   Duration: [] 2 weeks   [] 4 weeks  [x] 6 weeks  [] Other:     Additional Instructions:       Janel Baxter MD, PhD

## 2020-09-10 ENCOUNTER — TREATMENT (OUTPATIENT)
Dept: PHYSICAL THERAPY | Age: 70
End: 2020-09-10
Payer: MEDICARE

## 2020-09-10 PROCEDURE — 97110 THERAPEUTIC EXERCISES: CPT | Performed by: PHYSICAL THERAPIST

## 2020-09-10 PROCEDURE — G8427 DOCREV CUR MEDS BY ELIG CLIN: HCPCS | Performed by: PHYSICAL THERAPIST

## 2020-09-10 PROCEDURE — 97140 MANUAL THERAPY 1/> REGIONS: CPT | Performed by: PHYSICAL THERAPIST

## 2020-09-10 PROCEDURE — 97530 THERAPEUTIC ACTIVITIES: CPT | Performed by: PHYSICAL THERAPIST

## 2020-09-10 PROCEDURE — 97150 GROUP THERAPEUTIC PROCEDURES: CPT | Performed by: PHYSICAL THERAPIST

## 2020-09-10 NOTE — PROGRESS NOTES
She has been driving.  (5/6/15)    Posture:  Forward head, rounded shoulders.  (9/3/20)    Bandages/Dressings/Incisions: Incision is closed. (9/3/20)    Gait: (include devices/WB status): WNL, no AD. (9/3/20)    Orthopedic Special Tests: See above                         RESTRICTIONS/PRECAUTIONS: Left Reverse TSA (Sx: 6/29/20)  ~AAROM FE to 140°, ER to 40°  ~Isometric Deltoid Strengthening  ~Okay to start AROM  ~Still no strengthening for the shoulder      Exercises/Interventions:     Therapeutic Ex (15530) Sets/sec Reps Notes/CUES   AD UE BIKE            STRETCHING/ROM      Pulleys: Flexion 10\" hold X 10    Table Slides: Flexion and scap 10\" hold X 10    Wand: ER 0 - 40° 10\" hold X 10 Seated   UE [place]: Flexion 3 sets X 10 Standing with UE ranger on table   Supine Cane Flexion 2 sets X 10 Semi-inclined on table (pt cannot lie flat)   Doorway      Wall Slides 2 sets X 5 Crawling fingers up wall   CBA      TP BB 10\"  X 10 Pulling across     Sleeper       Elbow AROM  D/C   Scapular Clock 3 sets X 10 Seated         ISOMETRICS      Gripping  X 5' Blue ball    Retraction  D/C   Abduction      Flexion      Internal Rotation      External Rotation 10\" hold X 10    Deltoid 10\" hold X 10 Submax         STRENGTHENING-PREs      Flexion      Abduction      Internal Rotation      External Rotation      Shrugs 3 sets X 10 with 2#    Biceps 3 sets X 10 with 2#    Triceps      Retraction      Extension      Horizontal Abduction in ER      Serratus                        THERABANDS/CABLE COLUMN      Rows 3 sets X 10 with green band    Lats      Extension 3 sets X 10 with green band    Internal Rotation      External Rotation      Biceps      Triceps 3 sets X 10 with green band    PNF                                    Manual Intervention (16970)      Scar Massage      STM      Hawkgrips      GH joint mobilizations      Foamroll      Manual PROM: Left Shoulder Flexion, scap, and ER (Within MD Parameters), Elbow Flexion and Extension proprioception of scapular, scapulothoracic and UE control with self care, reaching, carrying, lifting, house/yardwork, driving/computer work        Access Code: [email or web address]   URL: [email or web address]   Date: 09/08/2020   Prepared by: Edda Solares     Exercises   Seated Shoulder Flexion Towel Slide at Table Top - 10 reps - 1 sets - 10 sec hold - 1-2x daily - 7x weekly   Seated Shoulder Scaption Slide at Table Top with Forearm in Neutral - 10 reps - 1 sets - 10 sec hold - 1-2x daily - 7x weekly   Seated Shoulder External Rotation AAROM with Dowel - 10 reps - 1 sets - 10 sec hold - 1-2x daily - 7x weekly   Standing Shoulder Shrugs with Dumbbells - 10 reps - 3 sets - 1-2x daily - 7x weekly   Scapular Retraction with Resistance - 10 reps - 3 sets - 1-2x daily - 7x weekly   Standing Bicep Curls Supinated with Dumbbells - 10 reps - 3 sets - 1-2x daily - 7x weekly   Isometric Shoulder Abduction at Wall - 10 reps - 1 sets - 10 sec hold - 1-2x daily - 7x weekly   Standing Shoulder Internal Rotation Stretch with Towel - 10 reps - 1 sets - 10 sec hold - 1x daily - 7x weekly   Supine Shoulder Flexion Extension AAROM with Dowel - 10 reps - 1 sets - 5 sec hold - 1x daily - 7x weekly   Isometric Shoulder External Rotation at Wall - 10 reps - 1 sets - 10 sec hold - 1x daily - 7x weekly   Standing Elbow Extension with Anchored Resistance at Wall - 10 reps - 3 sets - 1x daily - 7x weekly   Standing Shoulder Flexion Wall Walk - 5 reps - 2 sets - 1x daily - 7x weekly             Manual Treatments:  PROM / STM / Oscillations-Mobs:  G-I, II, III, IV (PA's, Inf., Post.)  [x] (33747) Provided manual therapy to mobilize soft tissue/joints of cervical/CT, scapular GHJ and UE for the purpose of modulating pain, promoting relaxation,  increasing ROM, reducing/eliminating soft tissue swelling/inflammation/restriction, improving soft tissue extensibility and allowing for proper ROM for normal function with self care, reaching, carrying, lifting, house/yardwork, driving/computer work    Modalities:  ICE x 15' for the left shoulder   [] GAME READY (VASO)- for significant edema, swelling, pain control. Charges:  Timed Code Treatment Minutes: 39'   Total Treatment Minutes: 68'      [] EVAL (LOW) 69582 (typically 20 minutes face-to-face)  [] EVAL (MOD) 47500 (typically 30 minutes face-to-face)  [] EVAL (HIGH) 28178 (typically 45 minutes face-to-face)  [] RE-EVAL     [x] YT(08290) x 1 (20')   [] IONTO  [] NMR (43271) x     [] VASO  [x] Manual (08270) x 1  (8')   [x] Other: Group  [x] TA x  1 (13')  [] Mech Traction (77379)  [] ES(attended) (94172)     [] ES (un) (13377):         ASSESSMENT:  Pt was able to achieve 75 degrees of shoulder flexion today but continues to be limited by weakness. Wall walking continues to be very challenging. Pt was able to perform flexion with UE ranger on the table today (instead of the floor) which was more difficult with visible shaking present during the last set. Motion is improving with IR towel stretch but pt continues to have difficulty with toileting and still needs assistance from her sister. Pt started having numbness and tingling in her left forearm and hand around the same time she started sleeping in her bed again. Discussed different sleeping positions including propping herself up in bed more and making sure her left arm is supported. GOALS:  (Goals updated 9/3/20)  Patient stated goal:  \"Increase function; be able to use my left arm for toileting; be self-sufficient\"  [x] Progressing: [] Met: [] Not Met: [] Adjusted    Therapist goals for Patient:   Short Term Goals: To be achieved in: 2 weeks  1. Independent in HEP and progression per patient tolerance, in order to prevent re-injury. [] Progressing: [x] Met: [] Not Met: [] Adjusted  2.  Patient will have a decrease in left shoulder pain to 1-2/10 to facilitate improvement in movement, function, and ADLs as indicated by Functional Deficits. [] Progressing: [x] Met: [] Not Met: [] Adjusted  3. Patient will be able to melonie/doff Ultrasling independently. [] Progressing: [x] Met: [] Not Met: [] Adjusted      Long Term Goals: To be achieved in: 12 weeks  1. Disability index score of 15% or less for the Quick DASH to assist with reaching prior level of function. [x] Progressing: [] Met: [] Not Met: [] Adjusted  2. Patient will demonstrate an increase in left shoulder AROM to at least 90° flexion to allow for proper joint functioning as indicated by patients Functional Deficits. [x] Progressing: [] Met: [] Not Met:  [] Adjusted  3. Patient will demonstrate an increase in left Deltoid strength to 4+/5 to allow for proper functional mobility as indicated by patients Functional Deficits. [x] Progressing: [] Met: [] Not Met: [] Adjusted  4. Patient will have a decrease in left shoulder pain to 0-1/10 with daily activities. [] Progressing: [x] Met: [] Not Met: [] Adjusted  5. Pt will be able to perform all ADL's independently and without compensation. (patient specific functional goal)    [x] Progressing: [] Met: [] Not Met: [] Adjusted       Overall Progression Towards Functional goals/ Treatment Progress Update:  [x] Patient is progressing as expected towards functional goals listed but progress has been slowed recently due to pt missing the last 2 weeks of therapy secondary to lymphedema and cellulitis in both legs. [] Progression is slowed due to complexities/Impairments listed. [] Progression has been slowed due to co-morbidities.   [] Plan just implemented, too soon to assess goals progression <30days   [] Goals require adjustment due to lack of progress  [] Patient is not progressing as expected and requires additional follow up with physician  [] Other    Prognosis for POC: [x] Good [] Fair  [] Poor      Patient requires continued skilled intervention: [x] Yes  [] No    Treatment/Activity Tolerance:  [x] Patient able to complete treatment  [] Patient limited by fatigue  [] Patient limited by pain    [] Patient limited by other medical complications  [] Other:           PLAN: Cont therapy 2x/week progressing per protocol (delayed rehab). 2x/week for 6 weeks for ROM, strengthening, scap stability exercises, manual therapy, HEP, pt education, and modalities prn (9/3/20)  [x] Continue per plan of care [] Alter current plan   [] Plan of care initiated [] Hold pending MD visit [] Discharge      Electronically signed by:  Adalid Gotti PT     Physical Therapist [facility] [address] [phone] #[id number]  Physical Therapist [place] License #[id number]    Note: If patient does not return for scheduled/ recommended follow up visits, this note will serve as a discharge from care along with most recent update on progress.

## 2020-09-15 ENCOUNTER — TREATMENT (OUTPATIENT)
Dept: PHYSICAL THERAPY | Age: 70
End: 2020-09-15
Payer: MEDICARE

## 2020-09-15 PROCEDURE — 97140 MANUAL THERAPY 1/> REGIONS: CPT | Performed by: PHYSICAL THERAPIST

## 2020-09-15 PROCEDURE — G8427 DOCREV CUR MEDS BY ELIG CLIN: HCPCS | Performed by: PHYSICAL THERAPIST

## 2020-09-15 PROCEDURE — 97150 GROUP THERAPEUTIC PROCEDURES: CPT | Performed by: PHYSICAL THERAPIST

## 2020-09-15 PROCEDURE — 97530 THERAPEUTIC ACTIVITIES: CPT | Performed by: PHYSICAL THERAPIST

## 2020-09-15 PROCEDURE — 97110 THERAPEUTIC EXERCISES: CPT | Performed by: PHYSICAL THERAPIST

## 2020-09-15 NOTE — PROGRESS NOTES
[facility]   Phone: [phone]    Fax: [phone]        Physical Therapy Treatment Note/ Progress Report:           Date:  9/15/2020    Patient Name:  Willian Dinh    :  1950  MRN: <[id number]>  Restrictions/Precautions:  S/P Left Reverse TSA  Medical/Treatment Diagnosis Information:  Diagnosis: S/P Left Reverse TSA (Sx: 20) (M19.012)   Treatment Diagnosis: Left Shoulder Pain (M25.512), Decreased left Shoulder ROM (M25.612), Decreased Left Shoulder Strength (B16.789)  Insurance/Certification information:  PT Insurance Information: Medicare, [facility]  Physician Information:  Referring Practitioner: Dr. Yonatan Miller  Has the plan of care been signed (Y/N):        [x]  Yes (Signed 9/3/20)  []  No        Date of Patient follow up with Physician: 10/27/20      Is this a Progress Report:     []  Yes  [x]  No        If Yes:  Date Range for reporting period:  Beginning 20  Ending 9/3/20    Progress report will be due (10 Rx or 30 days whichever is less):       Recertification will be due (POC Duration  / 90 days whichever is less): 10/1/20         Visit # Insurance Allowable Auth Required   11 Medical Necessity []  Yes [x]  No        Functional Scale:    Date assessed:  9/3/20  Functional Assessment Tool Used: Parker Ready  Score:  18.20% functional deficit (9/3/20)       (20)   Patient Age: 69years old   Patient Height: 5' 2\"  Patient Weight: 280 lbs  Patient BMI: 51.2      Pain (9/3/20)  [x]  Pain diagram was completed, pain was present and a follow up plan to address the pain is in the plan of care. ()   []  Pain diagram was completed and there was no pain present and therefore no follow up plan to address pain in the plan of care.  ()   []  Pain diagram was not completed and the reason for not completing the pain diagram is in the medical chart ()  []  Patient refused to participate   []  Severe mental or physical capacity, patient is in urgent emergent situation and to complete the questionnaire would jeopardize the patient's health status        Functional Questionnaire (9/3/20)  [x]  Patient completed the functional outcome questionnaire and deficiencies were addressed in the plan of care. ()   []  Patient completed the outcome questionnaire and there were no functional deficits identified and therefore no plan of care is required. ()   []  Patient did not complete the functional outcome questionnaire and the reason why is documented in the medical chart. ()  []  Patient refused to participate   []  Patient unable to complete the questionnaire   []   A functional outcome assessment has been reported on within the last 30 days        Falls (9/3/20)  [x]  The patient has had no falls or 1 fall without injury in the past year. (1101F)   []  There is no documentation of fall in the medical chart (1101F,8P)     [] The patient has had 2 or more falls or one fall with injury in the past year that is documented in the medical chart. (1100F)  []  A falls risk assessment was completed and documented in the medical chart. (9267K)   []  Falls were addressed in the plan of care. (5666P)   []  A falls risk assessment was not completed and documented in the medical chart (6705X,2P)   []   Falls were not addressed in the plan of care and reason was documented in the plan of care. (3370J 1P)        Medication (9/3/20)     [x] A list of current medications (including prescription, over the counter, herbal supplements, vitamin supplements, mineral supplements, dietary supplements) was reviewed with the patient and documented in the medical chart. ()     Falls Risk Assessment (30 days): (9/3/20)  [x] Falls Risk assessed and no intervention required.   [] Falls Risk assessed and Patient requires intervention due to being higher risk   TUG score (>12s at risk):     [] Falls education provided, including       PQRS:   Reviewed medication list with patient. She is still on the antibiotic for lymphedema/cellulitis in both legs. (9/15/20)      Latex Allergy:  [x]NO      []YES  Preferred Language for Healthcare:   [x]English       []other:      Pain level:  1-2/10 (9/15/20)  Medication: Nothing for her shoulder       SUBJECTIVE:   Pt states her left shoulder is doing well. Pain remains 1-2/10 at most.  Costella Plana is still difficult but she is noticing improved mobility with reaching behind her back. Wall walking is her most difficult exercise. She continues to have pain and swelling in both legs due to cellulitis/lymphedema and is on an antibiotic. (11 weeks post-op)        OBJECTIVE:     *Pt is right hand dominant    (9/15/20)  ROM PROM AROM  Comment    L R L R    Flexion 115°  80 with compensatory shrug  *Within MD parameters   Abduction (scap) 110°       ER 40°       IR NT       Other        Other           (9/3/20)  Strength L R Comment   Flexion Not      Abduction Tested     ER Due to      IR Surgery     Deltoid 4-/5     Upper Trap      Lower Trap      Mid Trap      Rhomboids      Biceps      Triceps      Horizontal Abduction      Horizontal Adduction      Lats        (9/3/20)  Special Tests Results/Comment   Umu Not      Neers Tested   Speeds Due to   [facility]Briens Surgery   Other    Neurologic Signs Pt reports no numbness or tingling in left UE   Functional Reach          Reflexes/Sensation: (7/7/20)   []Dermatomes/Myotomes intact    []Reflexes equal and normal bilaterally   [x]Other: Intact to light touch    Joint mobility: NA due to surgery (9/3/20)   []Normal    []Hypo   []Hyper    Palpation: No tenderness. (9/3/20)    Functional Mobility/Transfers: Less assistance needed with ADL's and self-care activities. She is still unable to push through her left arm to get up from a chair due to surgical restrictions. She has been driving.  (2/3/94)    Posture:  Forward head, rounded shoulders.  (9/3/20)    Bandages/Dressings/Incisions: oscillations      Body Blade       Rhythmic Stabilization      Ball on the wall      Supine Codman's:  CW/CCW, Flex/Ext    X 30 each   Small, controlled range  Semi-inclined on table (pt cannot lie flat)                     Therapeutic Activity (17272)      Core training      Swiss ball activities                  Patient Education: diagnosis, prognosis, pt goals, rehab timeline, and surgical precautions. Also instructed pt and her friend on proper way to melonie/doff sling (7/7/20)  X 10'                          Therapeutic Exercise and NMR EXR  [x] (77650) Provided verbal/tactile cueing for activities related to strengthening, flexibility, endurance, ROM  for improvements in scapular, scapulothoracic and UE control with self care, reaching, carrying, lifting, house/yardwork, driving/computer work. [x] (47508) Provided verbal/tactile cueing for activities related to improving balance, coordination, kinesthetic sense, posture, motor skill, proprioception  to assist with  scapular, scapulothoracic and UE control with self care, reaching, carrying, lifting, house/yardwork, driving/computer work. Therapeutic Activities:    [x] (48986 or 92851) Provided verbal/tactile cueing for activities related to improving balance, coordination, kinesthetic sense, posture, motor skill, proprioception and motor activation to allow for proper function of scapular, scapulothoracic and UE control with self care, carrying, lifting, driving/computer work. Home Exercise Program:    [x] (20989) Reviewed/Progressed HEP activities related to strengthening, flexibility, endurance, ROM of scapular, scapulothoracic and UE control with self care, reaching, carrying, lifting, house/yardwork, driving/computer work - Reviewed and updated HEP with pt (see below). Pt was issued new handouts.   (9/15/20)  [x] (31457) Reviewed/Progressed HEP activities related to improving balance, coordination, kinesthetic sense, posture, motor skill, proprioception of scapular, scapulothoracic and UE control with self care, reaching, carrying, lifting, house/yardwork, driving/computer work        Access Code: [email or web address]   URL: [email or web address]   Date: 09/15/2020   Prepared by: Solo Hood   Seated Shoulder Flexion Towel Slide at Table Top - 10 reps - 1 sets - 10 sec hold - 1-2x daily - 7x weekly   Seated Shoulder Scaption Slide at Table Top with Forearm in Neutral - 10 reps - 1 sets - 10 sec hold - 1-2x daily - 7x weekly   Seated Shoulder External Rotation AAROM with Dowel - 10 reps - 1 sets - 10 sec hold - 1-2x daily - 7x weekly   Standing Shoulder Shrugs with Dumbbells - 10 reps - 3 sets - 1-2x daily - 7x weekly   Scapular Retraction with Resistance - 10 reps - 3 sets - 1-2x daily - 7x weekly   Standing Bicep Curls Supinated with Dumbbells - 10 reps - 3 sets - 1-2x daily - 7x weekly   Isometric Shoulder Abduction at Wall - 10 reps - 1 sets - 10 sec hold - 1-2x daily - 7x weekly   Standing Shoulder Internal Rotation Stretch with Towel - 10 reps - 1 sets - 10 sec hold - 1x daily - 7x weekly   Supine Shoulder Flexion Extension AAROM with Dowel - 10 reps - 1 sets - 5 sec hold - 1x daily - 7x weekly   Isometric Shoulder External Rotation at Wall - 10 reps - 1 sets - 10 sec hold - 1x daily - 7x weekly   Standing Elbow Extension with Anchored Resistance at Wall - 10 reps - 3 sets - 1x daily - 7x weekly   Standing Shoulder Flexion Wall Walk - 5 reps - 2 sets - 1x daily - 7x weekly   Supine Shoulder Circles - 10 reps - 3 sets - 1x daily - 7x weekly   Supine Shoulder Flexion Extension Full Range AROM - 10 reps - 1 sets - 1x daily - 7x weekly           Manual Treatments:  PROM / STM / Oscillations-Mobs:  G-I, II, III, IV (PA's, Inf., Post.)  [x] (59432) Provided manual therapy to mobilize soft tissue/joints of cervical/CT, scapular GHJ and UE for the purpose of modulating pain, promoting relaxation,  increasing ROM, reducing/eliminating soft tissue swelling/inflammation/restriction, improving soft tissue extensibility and allowing for proper ROM for normal function with self care, reaching, carrying, lifting, house/yardwork, driving/computer work    Modalities:  ICE x 15' for the left shoulder   [] GAME READY (VASO)- for significant edema, swelling, pain control. Charges:  Timed Code Treatment Minutes: 37'   Total Treatment Minutes: 76'      [] EVAL (LOW) 53040 (typically 20 minutes face-to-face)  [] EVAL (MOD) 26629 (typically 30 minutes face-to-face)  [] EVAL (HIGH) 55171 (typically 45 minutes face-to-face)  [] RE-EVAL     [x] ZB(19037) x 1 (20')   [] IONTO  [] NMR (43325) x     [] VASO  [x] Manual (63996) x 1  (8')   [x] Other: Group  [x] TA x  1 (15')  [] Mech Traction (81123)  [] ES(attended) (74877)     [] ES (un) (50581):         ASSESSMENT:  Pt tolerated the exercises well today. She was able to increase wts/resistance with several exercises with good form and control. Wall walking continues to be her most difficult exercise and she fatigues quickly. Added supine Codman's with pt needing frequent cuing to make sure the movement came from her shoulder. She also needed cuing with ER isometric to push with the back of her hand and not her elbow. Active shoulder flexion improved to 80 degrees today with compensatory shrug present. GOALS:  (Goals updated 9/3/20)  Patient stated goal:  \"Increase function; be able to use my left arm for toileting; be self-sufficient\"  [x] Progressing: [] Met: [] Not Met: [] Adjusted    Therapist goals for Patient:   Short Term Goals: To be achieved in: 2 weeks  1. Independent in HEP and progression per patient tolerance, in order to prevent re-injury. [] Progressing: [x] Met: [] Not Met: [] Adjusted  2. Patient will have a decrease in left shoulder pain to 1-2/10 to facilitate improvement in movement, function, and ADLs as indicated by Functional Deficits.   [] Progressing: [x] Met: [] Not Met: [] Adjusted  3. Patient will be able to melonie/doff Ultrasling independently. [] Progressing: [x] Met: [] Not Met: [] Adjusted      Long Term Goals: To be achieved in: 12 weeks  1. Disability index score of 15% or less for the Quick DASH to assist with reaching prior level of function. [x] Progressing: [] Met: [] Not Met: [] Adjusted  2. Patient will demonstrate an increase in left shoulder AROM to at least 90° flexion to allow for proper joint functioning as indicated by patients Functional Deficits. [x] Progressing: [] Met: [] Not Met:  [] Adjusted  3. Patient will demonstrate an increase in left Deltoid strength to 4+/5 to allow for proper functional mobility as indicated by patients Functional Deficits. [x] Progressing: [] Met: [] Not Met: [] Adjusted  4. Patient will have a decrease in left shoulder pain to 0-1/10 with daily activities. [] Progressing: [x] Met: [] Not Met: [] Adjusted  5. Pt will be able to perform all ADL's independently and without compensation. (patient specific functional goal)    [x] Progressing: [] Met: [] Not Met: [] Adjusted       Overall Progression Towards Functional goals/ Treatment Progress Update:  [x] Patient is progressing as expected towards functional goals listed but progress has been slowed recently due to pt missing the last 2 weeks of therapy secondary to lymphedema and cellulitis in both legs. [] Progression is slowed due to complexities/Impairments listed. [] Progression has been slowed due to co-morbidities.   [] Plan just implemented, too soon to assess goals progression <30days   [] Goals require adjustment due to lack of progress  [] Patient is not progressing as expected and requires additional follow up with physician  [] Other    Prognosis for POC: [x] Good [] Fair  [] Poor      Patient requires continued skilled intervention: [x] Yes  [] No    Treatment/Activity Tolerance:  [x] Patient able to complete treatment  [] Patient limited by fatigue  [] Patient limited by pain    [] Patient limited by other medical complications  [] Other:           PLAN: Cont therapy 2x/week progressing per protocol (delayed rehab). 2x/week for 6 weeks for ROM, strengthening, scap stability exercises, manual therapy, HEP, pt education, and modalities prn (9/3/20)  [x] Continue per plan of care [] Alter current plan   [] Plan of care initiated [] Hold pending MD visit [] Discharge      Electronically signed by:  Kel Goodman, PT     Physical Therapist [facility] [phone] #[id number]  Physical Therapist [place] License #[id number]    Note: If patient does not return for scheduled/ recommended follow up visits, this note will serve as a discharge from care along with most recent update on progress.

## 2020-09-17 ENCOUNTER — TREATMENT (OUTPATIENT)
Dept: PHYSICAL THERAPY | Age: 70
End: 2020-09-17
Payer: MEDICARE

## 2020-09-17 PROCEDURE — 97530 THERAPEUTIC ACTIVITIES: CPT | Performed by: PHYSICAL THERAPIST

## 2020-09-17 PROCEDURE — 97110 THERAPEUTIC EXERCISES: CPT | Performed by: PHYSICAL THERAPIST

## 2020-09-17 PROCEDURE — 97140 MANUAL THERAPY 1/> REGIONS: CPT | Performed by: PHYSICAL THERAPIST

## 2020-09-17 PROCEDURE — 97150 GROUP THERAPEUTIC PROCEDURES: CPT | Performed by: PHYSICAL THERAPIST

## 2020-09-17 PROCEDURE — G8427 DOCREV CUR MEDS BY ELIG CLIN: HCPCS | Performed by: PHYSICAL THERAPIST

## 2020-09-17 NOTE — PROGRESS NOTES
[facility]   Phone: [phone]    Fax: [phone]        Physical Therapy Treatment Note/ Progress Report:           Date:  2020    Patient Name:  Lyle Hawley    :  1950  MRN: <[id number]>  Restrictions/Precautions:  S/P Left Reverse TSA  Medical/Treatment Diagnosis Information:  Diagnosis: S/P Left Reverse TSA (Sx: 20) (M19.012)   Treatment Diagnosis: Left Shoulder Pain (M25.512), Decreased left Shoulder ROM (M25.612), Decreased Left Shoulder Strength (H87.475)  Insurance/Certification information:  PT Insurance Information: Medicare, [facility]  Physician Information:  Referring Practitioner: Dr. Bolivar Zamudio  Has the plan of care been signed (Y/N):        [x]  Yes (Signed 9/3/20)  []  No        Date of Patient follow up with Physician: 10/27/20      Is this a Progress Report:     []  Yes  [x]  No        If Yes:  Date Range for reporting period:  Beginning 20  Ending 9/3/20    Progress report will be due (10 Rx or 30 days whichever is less):       Recertification will be due (POC Duration  / 90 days whichever is less): 10/1/20         Visit # Insurance Allowable Auth Required   12 Medical Necessity []  Yes [x]  No        Functional Scale:    Date assessed:  9/3/20  Functional Assessment Tool Used: Kazakh Effie  Score:  18.20% functional deficit (9/3/20)       (20)   Patient Age: 69years old   Patient Height: 5' 2\"  Patient Weight: 280 lbs  Patient BMI: 51.2      Pain (9/3/20)  [x]  Pain diagram was completed, pain was present and a follow up plan to address the pain is in the plan of care. ()   []  Pain diagram was completed and there was no pain present and therefore no follow up plan to address pain in the plan of care.  ()   []  Pain diagram was not completed and the reason for not completing the pain diagram is in the medical chart ()  []  Patient refused to participate   []  Severe mental or physical capacity, patient surgical restrictions. She has been driving.  (0/6/72)    Posture:  Forward head, rounded shoulders.  (9/3/20)    Bandages/Dressings/Incisions: Incision is closed. (9/3/20)    Gait: (include devices/WB status): WNL, no AD. (9/3/20)    Orthopedic Special Tests: See above                         RESTRICTIONS/PRECAUTIONS: Left Reverse TSA (Sx: 6/29/20)  ~AAROM FE to 140°, ER to 40°  ~Isometric Deltoid Strengthening  ~Okay to start AROM  ~Still no strengthening for the shoulder      Exercises/Interventions:     Therapeutic Ex (85742) Sets/sec Reps Notes/CUES   AD UE BIKE            STRETCHING/ROM      Pulleys: Flexion 10\" hold X 10    Table Slides: Flexion and scap 10\" hold X 10    Wand: ER 0 - 40° 10\" hold X 10 Seated   UE [place]: Flexion 3 sets X 10 Standing with UE ranger on table   Supine Cane Flexion 2 sets X 10 Semi-inclined on table (pt cannot lie flat)   Doorway      Wall Slides 2 sets X 5 Crawling fingers up wall   CBA      TP BB 10\"  X 10 Pulling across     Sleeper       Elbow AROM  D/C   Scapular Clock 3 sets X 10 Seated         ISOMETRICS      Gripping  X 5' Blue ball    Retraction  D/C   Abduction      Flexion      Internal Rotation      External Rotation 10\" hold X 10    Deltoid 10\" hold X 10 Submax         STRENGTHENING-PREs      Flexion      Abduction      Internal Rotation      External Rotation      Shrugs 3 sets X 10 with 3#    Biceps 3 sets X 10 with 3#    Triceps      Retraction      Extension      Horizontal Abduction in ER      Serratus                        THERABANDS/CABLE COLUMN      Rows 3 sets X 10 with blue band    Lats      Extension 3 sets X 10 with blue band    Internal Rotation      External Rotation      Biceps      Triceps 3 sets X 10 with blue band    PNF                                    Manual Intervention (55821)      Scar Massage      STM      Hawkgrips      GH joint mobilizations      Foamroll      Manual PROM: Left Shoulder Flexion, scap, and ER (Within MD Parameters), Elbow (9/15/20)  [x] (25909) Reviewed/Progressed HEP activities related to improving balance, coordination, kinesthetic sense, posture, motor skill, proprioception of scapular, scapulothoracic and UE control with self care, reaching, carrying, lifting, house/yardwork, driving/computer work        Access Code: [email or web address]   URL: [email or web address]   Date: 09/15/2020   Prepared by: Tommy Boeck     Exercises   Seated Shoulder Flexion Towel Slide at Table Top - 10 reps - 1 sets - 10 sec hold - 1-2x daily - 7x weekly   Seated Shoulder Scaption Slide at Table Top with Forearm in Neutral - 10 reps - 1 sets - 10 sec hold - 1-2x daily - 7x weekly   Seated Shoulder External Rotation AAROM with Dowel - 10 reps - 1 sets - 10 sec hold - 1-2x daily - 7x weekly   Standing Shoulder Shrugs with Dumbbells - 10 reps - 3 sets - 1-2x daily - 7x weekly   Scapular Retraction with Resistance - 10 reps - 3 sets - 1-2x daily - 7x weekly   Standing Bicep Curls Supinated with Dumbbells - 10 reps - 3 sets - 1-2x daily - 7x weekly   Isometric Shoulder Abduction at Wall - 10 reps - 1 sets - 10 sec hold - 1-2x daily - 7x weekly   Standing Shoulder Internal Rotation Stretch with Towel - 10 reps - 1 sets - 10 sec hold - 1x daily - 7x weekly   Supine Shoulder Flexion Extension AAROM with Dowel - 10 reps - 1 sets - 5 sec hold - 1x daily - 7x weekly   Isometric Shoulder External Rotation at Wall - 10 reps - 1 sets - 10 sec hold - 1x daily - 7x weekly   Standing Elbow Extension with Anchored Resistance at Wall - 10 reps - 3 sets - 1x daily - 7x weekly   Standing Shoulder Flexion Wall Walk - 5 reps - 2 sets - 1x daily - 7x weekly   Supine Shoulder Circles - 10 reps - 3 sets - 1x daily - 7x weekly   Supine Shoulder Flexion Extension Full Range AROM - 10 reps - 1 sets - 1x daily - 7x weekly           Manual Treatments:  PROM / STM / Oscillations-Mobs:  G-I, II, III, IV (PA's, Inf., Post.)  [x] (08653) Provided manual therapy to mobilize soft tissue/joints of cervical/CT, scapular GHJ and UE for the purpose of modulating pain, promoting relaxation,  increasing ROM, reducing/eliminating soft tissue swelling/inflammation/restriction, improving soft tissue extensibility and allowing for proper ROM for normal function with self care, reaching, carrying, lifting, house/yardwork, driving/computer work    Modalities:  ICE x 15' for the left shoulder   [] GAME READY (VASO)- for significant edema, swelling, pain control. Charges:  Timed Code Treatment Minutes: 37'   Total Treatment Minutes: 76'      [] EVAL (LOW) 05528 (typically 20 minutes face-to-face)  [] EVAL (MOD) 91479 (typically 30 minutes face-to-face)  [] EVAL (HIGH) 45389 (typically 45 minutes face-to-face)  [] RE-EVAL     [x] VF(40171) x 1 (20')   [] IONTO  [] NMR (69377) x     [] VASO  [x] Manual (60133) x 1  (8')   [x] Other: Group  [x] TA x  1 (15')  [] Mech Traction (15299)  [] ES(attended) (17245)     [] ES (un) (79829):         ASSESSMENT:  Pt was very fatigued today and fell asleep while icing. She also had difficulty getting up from the chair due to fatigue and pain in both legs due to cellulitis/lymphedema. She was able to complete her full program but the exercises were a little more difficult to get through today. She still needed cuing with supine Codman's to move her shoulder and not just her elbow and wrist.  She also needed cuing with isometric ER to push with the back of her hand. No change in ROM today. Active shoulder flexion continues to be limited by weakness with compensatory shrug present. GOALS:  (Goals updated 9/3/20)  Patient stated goal:  \"Increase function; be able to use my left arm for toileting; be self-sufficient\"  [x] Progressing: [] Met: [] Not Met: [] Adjusted    Therapist goals for Patient:   Short Term Goals: To be achieved in: 2 weeks  1. Independent in HEP and progression per patient tolerance, in order to prevent re-injury.    [] Progressing: [x] Met: [] Not Met: [] Adjusted  2. Patient will have a decrease in left shoulder pain to 1-2/10 to facilitate improvement in movement, function, and ADLs as indicated by Functional Deficits. [] Progressing: [x] Met: [] Not Met: [] Adjusted  3. Patient will be able to melonie/doff Ultrasling independently. [] Progressing: [x] Met: [] Not Met: [] Adjusted      Long Term Goals: To be achieved in: 12 weeks  1. Disability index score of 15% or less for the Quick DASH to assist with reaching prior level of function. [x] Progressing: [] Met: [] Not Met: [] Adjusted  2. Patient will demonstrate an increase in left shoulder AROM to at least 90° flexion to allow for proper joint functioning as indicated by patients Functional Deficits. [x] Progressing: [] Met: [] Not Met:  [] Adjusted  3. Patient will demonstrate an increase in left Deltoid strength to 4+/5 to allow for proper functional mobility as indicated by patients Functional Deficits. [x] Progressing: [] Met: [] Not Met: [] Adjusted  4. Patient will have a decrease in left shoulder pain to 0-1/10 with daily activities. [] Progressing: [x] Met: [] Not Met: [] Adjusted  5. Pt will be able to perform all ADL's independently and without compensation. (patient specific functional goal)    [x] Progressing: [] Met: [] Not Met: [] Adjusted       Overall Progression Towards Functional goals/ Treatment Progress Update:  [x] Patient is progressing as expected towards functional goals listed but progress has been slowed recently due to pt missing the last 2 weeks of therapy secondary to lymphedema and cellulitis in both legs. [] Progression is slowed due to complexities/Impairments listed. [] Progression has been slowed due to co-morbidities.   [] Plan just implemented, too soon to assess goals progression <30days   [] Goals require adjustment due to lack of progress  [] Patient is not progressing as expected and requires additional follow up with physician  [] Other    Prognosis for POC: [x] Good [] Fair  [] Poor      Patient requires continued skilled intervention: [x] Yes  [] No    Treatment/Activity Tolerance:  [x] Patient able to complete treatment  [] Patient limited by fatigue  [] Patient limited by pain    [] Patient limited by other medical complications  [] Other:           PLAN: Cont therapy 2x/week progressing per protocol (delayed rehab). 2x/week for 6 weeks for ROM, strengthening, scap stability exercises, manual therapy, HEP, pt education, and modalities prn (9/3/20)  [x] Continue per plan of care [] Alter current plan   [] Plan of care initiated [] Hold pending MD visit [] Discharge      Electronically signed by:  Adalid Gotti PT     Physical Therapist [id number]  Physical Therapist [place] License #[id number]    Note: If patient does not return for scheduled/ recommended follow up visits, this note will serve as a discharge from care along with most recent update on progress.

## 2020-09-24 ENCOUNTER — TREATMENT (OUTPATIENT)
Dept: PHYSICAL THERAPY | Age: 70
End: 2020-09-24

## 2020-09-29 ENCOUNTER — TREATMENT (OUTPATIENT)
Dept: PHYSICAL THERAPY | Age: 70
End: 2020-09-29

## 2020-10-01 ENCOUNTER — TREATMENT (OUTPATIENT)
Dept: PHYSICAL THERAPY | Age: 70
End: 2020-10-01

## 2020-10-01 NOTE — PROGRESS NOTES
[facility]   Phone: [phone]    Fax: [phone]            Physical Therapy  Cancellation/No-show Note  Patient Name:  Micah Diamond  :  1950   Date:  10/1/2020  Cancelled visits to date: 4  No-shows to date: 0    For today's appointment patient:  [x]  Cancelled  []  Rescheduled appointment  []  No-show     Reason given by patient:  []  Patient ill  []  Conflicting appointment  []  No transportation    []  Conflict with work  []  No reason given  [x]  Other:     Comments:  Pt is still having a lot of pain in her legs due to cellulitis/lymphedema. Phone call information:   []  Phone call made today to patient at _ time at number provided:      []  Patient answered, conversation as follows:    []  Patient did not answer, message left as follows:  []  Phone call not made today  [x]  Phone call not needed - pt contacted us to cancel and provided reason for cancellation.      Electronically signed by:  Karl Carballo PT    Physical Therapist [facility] [id number]  Physical Therapist [place] License #[id number]

## 2020-10-14 ENCOUNTER — TELEPHONE (OUTPATIENT)
Dept: ORTHOPEDIC SURGERY | Age: 70
End: 2020-10-14

## 2020-10-14 NOTE — TELEPHONE ENCOUNTER
Her sister called and said that she has missed 2 calls. So she is just returning Grace Verdugo call.   She can be reached at [phone]

## 2020-10-20 ENCOUNTER — TREATMENT (OUTPATIENT)
Dept: PHYSICAL THERAPY | Age: 70
End: 2020-10-20

## 2020-10-20 NOTE — PROGRESS NOTES
[facility]   Phone: [phone]    Fax: [phone]            Physical Therapy  Cancellation/No-show Note  Patient Name:  Scott Ndiaye  :  1950   Date:  10/20/2020  Cancelled visits to date: 5  No-shows to date: 0    For today's appointment patient:  [x]  Cancelled  []  Rescheduled appointment  []  No-show     Reason given by patient:  []  Patient ill  []  Conflicting appointment  []  No transportation    []  Conflict with work  []  No reason given  [x]  Other:     Comments:  Pt is in the hospital with cellulitis. Phone call information:   []  Phone call made today to patient at _ time at number provided:      []  Patient answered, conversation as follows:    []  Patient did not answer, message left as follows:  []  Phone call not made today  [x]  Phone call not needed - pt contacted us to cancel and provided reason for cancellation.      Electronically signed by:  Mehran Bateman, PT    Physical Therapist [facility] [phone]  Physical Therapist [place] License #[id number]

## 2020-11-24 ENCOUNTER — OFFICE VISIT (OUTPATIENT)
Dept: ORTHOPEDIC SURGERY | Age: 70
End: 2020-11-24
Payer: MEDICARE

## 2020-11-24 VITALS — BODY MASS INDEX: 51.34 KG/M2 | HEIGHT: 62 IN | WEIGHT: 279 LBS

## 2020-11-24 PROCEDURE — G8484 FLU IMMUNIZE NO ADMIN: HCPCS | Performed by: ORTHOPAEDIC SURGERY

## 2020-11-24 PROCEDURE — 1036F TOBACCO NON-USER: CPT | Performed by: ORTHOPAEDIC SURGERY

## 2020-11-24 PROCEDURE — G8417 CALC BMI ABV UP PARAM F/U: HCPCS | Performed by: ORTHOPAEDIC SURGERY

## 2020-11-24 PROCEDURE — 3017F COLORECTAL CA SCREEN DOC REV: CPT | Performed by: ORTHOPAEDIC SURGERY

## 2020-11-24 PROCEDURE — G8400 PT W/DXA NO RESULTS DOC: HCPCS | Performed by: ORTHOPAEDIC SURGERY

## 2020-11-24 PROCEDURE — 1123F ACP DISCUSS/DSCN MKR DOCD: CPT | Performed by: ORTHOPAEDIC SURGERY

## 2020-11-24 PROCEDURE — 99213 OFFICE O/P EST LOW 20 MIN: CPT | Performed by: ORTHOPAEDIC SURGERY

## 2020-11-24 PROCEDURE — G8427 DOCREV CUR MEDS BY ELIG CLIN: HCPCS | Performed by: ORTHOPAEDIC SURGERY

## 2020-11-24 PROCEDURE — 1090F PRES/ABSN URINE INCON ASSESS: CPT | Performed by: ORTHOPAEDIC SURGERY

## 2020-11-24 PROCEDURE — 4040F PNEUMOC VAC/ADMIN/RCVD: CPT | Performed by: ORTHOPAEDIC SURGERY

## 2020-11-24 NOTE — PROGRESS NOTES
History of Present Illness: Yannick Oropeza is a 69 y.o. female who presents for a post operative visit. The patient underwent a underwent a left reverse total shoulder arthroplasty on 6/29/2020 by Dr. Ron Kovacs. Been able to go to physical therapy since mid September. She had bilateral lower extremity bug bites which became severe cellulitis for which she required hospital admission and IV antibiotics. She has not returned back to physical therapy since then. The patient denies any fevers, chills, numbness, tingling, and shortness of breath. Medical History:  Patient's medications, allergies, past medical, surgical, social and family histories were reviewed and updated as appropriate. Review of Systems  A 14 point review of systems was completed by the patient is available in the media section of the scanned medical record and was reviewed on 11/24/2020. Vital Signs:  Vitals:       General/Appearance: Alert and oriented and in no apparent distress. Skin:  There are no skin lesions, cellulitis, or extreme edema. The patient has warm and well-perfused Bilateral upper extremities with brisk capillary refill. left Shoulder Exam:    Inspection: left shoulder incision that is clean, dry and intact and well approximated. There is no erythema, drainage or other signs of infection    Palpation:  No crepitus to gentle motion    Active Range of Motion: Elevation of 90 degrees, IR of back pocket and lower sacrum,    Passive Range of Motion:  FE of 115, ER of 20    Strength:  She has a slight external lag of 10 degrees    Special Tests: No Juan Ramon muscle deformity    Neurovascular: Sensation to light touch is intact, no motor deficits, palpable radial pulses 2+    Radiology:     Plain radiographs not obtained         Assessment :  Ms. Yannick Oropeza is a 69 y.o. patient underwent a left reverse shoulder arthroplasty on 6/29/2020      Impression:  Encounter Diagnoses   Name Primary?     S/P reverse total shoulder arthroplasty, left Yes    Left shoulder pain, unspecified chronicity        Office Procedures:  Orders Placed This Encounter   Procedures    Ambulatory referral to Physical Therapy     Referral Priority:   Routine     Referral Type:   Eval and Treat     Referral Reason:   Specialty Services Required     Requested Specialty:   Physical Therapy     Number of Visits Requested:   1       Treatment Plan:    Even with a big gap in physical therapy the patient continues to do well. We recommend that she return back to physical therapy and really start working on her motion and strength. Updated physical therapy order will be placed in her chart today. We recommend that she go twice a week for the next 4 weeks. We will see her back at that point for reevaluation. She was agreeable to the plan. 3:34 PM      Dayana Dela Cruz PA-C  Orthopaedic Sports Medicine Physician Assistant    During this examination, Dayana ORANTES PA-C, functioned as a scribe for Dr. Antoine Chavez. This dictation was performed with a verbal recognition program (DRAGON) and it was checked for errors. It is possible that there are still dictated errors within this office note. If so, please bring any errors to my attention for an addendum. All efforts were made to ensure that this office note is accurate.  _______________  I, Dr. Antoine Chavez, personally performed the services described in this documentation as described by Dayana Dela Cruz PA-C in my presence, and it is both accurate and complete. Janel Perry MD, PhD  11/24/2020

## 2020-12-01 ENCOUNTER — TREATMENT (OUTPATIENT)
Dept: PHYSICAL THERAPY | Age: 70
End: 2020-12-01
Payer: MEDICARE

## 2020-12-01 PROCEDURE — 97140 MANUAL THERAPY 1/> REGIONS: CPT | Performed by: PHYSICAL THERAPIST

## 2020-12-01 PROCEDURE — 97530 THERAPEUTIC ACTIVITIES: CPT | Performed by: PHYSICAL THERAPIST

## 2020-12-01 PROCEDURE — G8539 DOC FUNCT AND CARE PLAN: HCPCS | Performed by: PHYSICAL THERAPIST

## 2020-12-01 PROCEDURE — G8730 PAIN DOC POS AND PLAN: HCPCS | Performed by: PHYSICAL THERAPIST

## 2020-12-01 PROCEDURE — 97110 THERAPEUTIC EXERCISES: CPT | Performed by: PHYSICAL THERAPIST

## 2020-12-01 PROCEDURE — G8427 DOCREV CUR MEDS BY ELIG CLIN: HCPCS | Performed by: PHYSICAL THERAPIST

## 2020-12-01 PROCEDURE — 1101F PT FALLS ASSESS-DOCD LE1/YR: CPT | Performed by: PHYSICAL THERAPIST

## 2020-12-01 PROCEDURE — 97164 PT RE-EVAL EST PLAN CARE: CPT | Performed by: PHYSICAL THERAPIST

## 2020-12-01 NOTE — PROGRESS NOTES
[facility]   Phone: [phone]    Fax: [phone]     Physical Therapy Re-Certification Plan of Care    Dear  Dr. Alex Salter,    We had the pleasure of treating the following patient for physical therapy services at [address] [place] [address]. A summary of our findings can be found in the updated assessment below. This includes our plan of care. If you have any questions or concerns regarding these findings, please do not hesitate to contact me at the office phone number checked above. Thank you for the referral.     Physician Signature:________________________________Date:__________________  By signing above (or electronic signature), therapists plan is approved by physician      Overall Response to Treatment:   []Patient is responding well to treatment and improvement is noted with regards to goals   []Patient should continue to improve in reasonable time if they continue HEP   []Patient has plateaued and is no longer responding to skilled PT intervention    []Patient is getting worse and would benefit from return to referring MD   []Patient unable to adhere to initial POC   [x]Other: Pt has not been seen in therapy since 20 due to severe cellulitis which required hospital admission and IV antibiotics for 2 weeks.             Physical Therapy Treatment Note/ Progress Report:           Date:  2020    Patient Name:  Izabella Hope    :  1950  MRN: <[id number]>  Restrictions/Precautions:  S/P Left Reverse TSA  Medical/Treatment Diagnosis Information:  Diagnosis: S/P Left Reverse TSA (Sx: 20) (M19.012)   Treatment Diagnosis: Left Shoulder Pain (M25.512), Decreased left Shoulder ROM (M25.612), Decreased Left Shoulder Strength (C93.815)  Insurance/Certification information:  PT Insurance Information: Medicare, [address] [facility]  Physician Information:  Referring Practitioner: Dr. Alex Salter  Has the plan of care been signed (Y/N):        [x]  Yes (Signed 9/3/20)  []  No        Date of Patient follow up with Physician: 1/5/2021      Is this a Progress Report:     [x]  Yes  []  No        If Yes:  Date Range for reporting period:  Beginning 7/7/20  Ending 12/1/20    Progress report will be due (10 Rx or 30 days whichever is less): 46/80/86      Recertification will be due (POC Duration  / 90 days whichever is less): 12/29/20         Visit # Insurance Allowable Auth Required   13 Medical Necessity []  Yes [x]  No        Functional Scale:    Date assessed:  12/1/20  Functional Assessment Tool Used: Ronny Cesia  Score:  4.5% functional deficit (12/1/20)       (7/7/20)   Patient Age: 69years old    Patient Height: 5' 2\"  Patient Weight: 280 lbs  Patient BMI: 51.2      Pain (12/1/20)  [x]  Pain diagram was completed, pain was present and a follow up plan to address the pain is in the plan of care. ()   []  Pain diagram was completed and there was no pain present and therefore no follow up plan to address pain in the plan of care. ()   []  Pain diagram was not completed and the reason for not completing the pain diagram is in the medical chart ()  []  Patient refused to participate   []  Severe mental or physical capacity, patient is in urgent emergent situation and to complete the questionnaire would jeopardize the patient's health status        Functional Questionnaire (12/1/20)  [x]  Patient completed the functional outcome questionnaire and deficiencies were addressed in the plan of care. ()   []  Patient completed the outcome questionnaire and there were no functional deficits identified and therefore no plan of care is required. ()   []  Patient did not complete the functional outcome questionnaire and the reason why is documented in the medical chart. ()  []  Patient refused to participate   []  Patient unable to complete the questionnaire   []   A functional outcome assessment has been reported on within the last 30 days Falls (12/1/20)  [x]  The patient has had no falls or 1 fall without injury in the past year. (1101F)   []  There is no documentation of fall in the medical chart (1101F,8P)     [] The patient has had 2 or more falls or one fall with injury in the past year that is documented in the medical chart. (1100F)  []  A falls risk assessment was completed and documented in the medical chart. (4775G)   []  Falls were addressed in the plan of care. (4902L)   []  A falls risk assessment was not completed and documented in the medical chart (9283U,9U)   []   Falls were not addressed in the plan of care and reason was documented in the plan of care. (2769Z 1P)        Medication (12/1/20)     [x] A list of current medications (including prescription, over the counter, herbal supplements, vitamin supplements, mineral supplements, dietary supplements) was reviewed with the patient and documented in the medical chart. ()     Falls Risk Assessment (30 days): (12/1/20)  [x] Falls Risk assessed and no intervention required. [] Falls Risk assessed and Patient requires intervention due to being higher risk   TUG score (>12s at risk):     [] Falls education provided, including       PQRS:   Reviewed medication list with patient. Pt is on Gabapentin for nerve pain. She is also on PTSD medication due to recent hospitalization and wound care treatment for her legs. (12/1/20)      Latex Allergy:  [x]NO      []YES  Preferred Language for Healthcare:   [x]English       []other:      Pain level:  0-1/10 (12/1/20)  Medication: Nothing for her shoulder       SUBJECTIVE:  Pt has not been seen in therapy since 9/17/20 due to severe cellulitis which required hospital admission and IV antibiotics for 2 weeks. She was released from wound care 3 weeks ago. She saw Dr. Elif Jarrell last week and he felt she was doing well despite missing 2+ months of therapy. He recommended she return back to physical therapy to work on her ROM and strength. Pt states her left shoulder bothers her every now and then but overall is doing well. Her bra strap starts to bother her shoulder by the end of the day. She is sleeping well at night and is sleeping in her bed. She is able to dress and bath independently but still needs help with her hair and doing anything [place]. She is able to use her left arm now for toileting. She no longer has numbness or tingling in her left arm but she is experiencing pins and needles in both hands which she thinks is from the Gabapentin. (22 weeks post-op)        OBJECTIVE:     *Pt is right hand dominant    (12/1/20)  ROM PROM AROM  Comment    L R L R    Flexion 120°  90 with mild compensatory shrug  *Within MD parameters   Abduction (scap) 110°  70°     ER 20°       IR NT  Lower sacrum     Other        Other           (12/1/20)  Strength L R Comment   Flexion Not      Abduction Tested     ER Due to      IR Surgery     Deltoid 4-/5     Upper Trap      Lower Trap      Mid Trap      Rhomboids      Biceps      Triceps      Horizontal Abduction      Horizontal Adduction      Lats        (12/1/20)  Special Tests Results/Comment   Umu Not      Neers Tested   Speeds Due to   [facility]   Other    Neurologic Signs Pt reports no numbness or tingling in left UE but she c/o pins and needles in both hands which she thinks is from the Gabapentin. Functional Reach          Reflexes/Sensation: (7/7/20)   []Dermatomes/Myotomes intact    []Reflexes equal and normal bilaterally   [x]Other: Intact to light touch    Joint mobility: NA due to surgery (12/1/20)   []Normal    []Hypo   []Hyper    Palpation: No tenderness. (12/1/20)    Functional Mobility/Transfers: Less assistance needed with ADL's and self-care activities. She sometimes has to use her left arm to get up from a chair. She has been driving.  (87/5/41)    Posture: Forward head, rounded shoulders. (12/1/20)    Bandages/Dressings/Incisions: Incision is closed. (12/1/20)    Gait: (include devices/WB status): WNL, no AD.  (12/1/20)    Orthopedic Special Tests: See above                         RESTRICTIONS/PRECAUTIONS: Left Reverse TSA (Sx: 6/29/20)  ~AAROM FE to 140°, ER to 40°  ~Isometric Deltoid Strengthening          Exercises/Interventions:     Therapeutic Ex (30243) Sets/sec Reps Notes/CUES   AD UE BIKE            STRETCHING/ROM      Pulleys: Flexion 10\" hold X 10    Table Slides: Flexion and scap 10\" hold X 10    Wand: ER 0 - 40° 10\" hold X 10 Seated   UE Robinson: Flexion 3 sets X 10 Standing with UE ranger on table   Supine Cane Flexion 2 sets X 10 Semi-inclined on table (pt cannot lie flat)   Doorway      Wall Slides 2 sets X 5 Crawling fingers up wall   CBA      TP BB 10\"  X 10 Pulling across     Sleeper       Elbow AROM  D/C   Scapular Clock 3 sets X 10 Seated         ISOMETRICS      Gripping   D/CRetraction  D/C   Abduction      Flexion      Internal Rotation      External Rotation 10\" hold X 10    Deltoid 10\" hold X 10 Submax         STRENGTHENING-PREs      Flexion      Abduction      Internal Rotation      External Rotation      Shrugs 3 sets X 10 with 3#    Biceps 3 sets X 10 with 3#    Triceps      Retraction      Extension      Horizontal Abduction in ER      Serratus                        THERABANDS/CABLE COLUMN      Rows 3 sets X 10 with blue band    Lats      Extension 3 sets X 10 with blue band    Internal Rotation      External Rotation      Biceps      Triceps 3 sets X 10 with blue band    PNF                                    Manual Intervention (04219)      Scar Massage      STM      Hawkgrips      GH joint mobilizations      Foamroll      Manual PROM: Left Shoulder Flexion, scap, and ER (Within MD Parameters), Elbow Flexion and Extension  X 8' Pt inclined on table (pt cannot lie flat)         NMR re-education (07464)   CUES NEEDED   Plyoback      Therabar oscillations      Body Blade       Rhythmic Stabilization      Ball on the wall      Supine work        Access Code: [email or web address]   URL: [email or web address]   Date: 09/15/2020   Prepared by: Dimitris Bridges     Exercises   Seated Shoulder Flexion Towel Slide at Table Top - 10 reps - 1 sets - 10 sec hold - 1-2x daily - 7x weekly   Seated Shoulder Scaption Slide at Table Top with Forearm in Neutral - 10 reps - 1 sets - 10 sec hold - 1-2x daily - 7x weekly   Seated Shoulder External Rotation AAROM with Dowel - 10 reps - 1 sets - 10 sec hold - 1-2x daily - 7x weekly   Standing Shoulder Shrugs with Dumbbells - 10 reps - 3 sets - 1-2x daily - 7x weekly   Scapular Retraction with Resistance - 10 reps - 3 sets - 1-2x daily - 7x weekly   Standing Bicep Curls Supinated with Dumbbells - 10 reps - 3 sets - 1-2x daily - 7x weekly   Isometric Shoulder Abduction at Wall - 10 reps - 1 sets - 10 sec hold - 1-2x daily - 7x weekly   Standing Shoulder Internal Rotation Stretch with Towel - 10 reps - 1 sets - 10 sec hold - 1x daily - 7x weekly   Supine Shoulder Flexion Extension AAROM with Dowel - 10 reps - 1 sets - 5 sec hold - 1x daily - 7x weekly   Isometric Shoulder External Rotation at Wall - 10 reps - 1 sets - 10 sec hold - 1x daily - 7x weekly   Standing Elbow Extension with Anchored Resistance at Wall - 10 reps - 3 sets - 1x daily - 7x weekly   Standing Shoulder Flexion Wall Walk - 5 reps - 2 sets - 1x daily - 7x weekly   Supine Shoulder Circles - 10 reps - 3 sets - 1x daily - 7x weekly   Supine Shoulder Flexion Extension Full Range AROM - 10 reps - 1 sets - 1x daily - 7x weekly           Manual Treatments:  PROM / STM / Oscillations-Mobs:  G-I, II, III, IV (PA's, Inf., Post.)  [x] (33135) Provided manual therapy to mobilize soft tissue/joints of cervical/CT, scapular GHJ and UE for the purpose of modulating pain, promoting relaxation,  increasing ROM, reducing/eliminating soft tissue swelling/inflammation/restriction, improving soft tissue extensibility and allowing for proper ROM for normal function with self care, reaching, carrying, lifting, house/yardwork, driving/computer work    Modalities:  ICE x 15' for the left shoulder   [] GAME READY (VASO)- for significant edema, swelling, pain control. Charges:  Timed Code Treatment Minutes: 45'   Total Treatment Minutes: 76'      [] EVAL (LOW) 455 1011 (typically 20 minutes face-to-face)  [] EVAL (MOD) 30078 (typically 30 minutes face-to-face)  [] EVAL (HIGH) 13621 (typically 45 minutes face-to-face)  [x] RE-EVAL x 15'    [x] CHINO(41783) x 1 (22')   [] IONTO  [] NMR (04398) x     [] VASO  [x] Manual (22668) x 1  (8')   [] Other: Group  [x] TA x  1 (15')  [] Mech Traction (28796)  [] ES(attended) (70041)     [] ES (un) (07563):         ASSESSMENT:  Pt returned to therapy today after a 2+ month absence due to another medical issue. Pt was able to complete her full program but her left arm was very fatigued at the end of therapy. Cuing needed to help her recall the exercises and perform them correctly (pt was unable to continue the exercises during her absence from therapy). Stiffness present with manual PROM. Active shoulder flexion increased to 90° today with mild compensatory shrug. Pt can now reach her lower sacrum with IR and is able to use her left arm for toileting which was one of her goals. Pt would continue to benefit from skilled therapy to increase ROM and strength for improved function. GOALS:  (Goals updated 12/1/20)  Patient stated goal:  \"Increase function; be able to use my left arm for toileting; be self-sufficient\"  [x] Progressing: [] Met: [] Not Met: [] Adjusted    Therapist goals for Patient:   Short Term Goals: To be achieved in: 2 weeks  1. Independent in HEP and progression per patient tolerance, in order to prevent re-injury. [x] Progressing: [] Met: [] Not Met: [] Adjusted  2. Patient will have a decrease in left shoulder pain to 1-2/10 to facilitate improvement in movement, function, and ADLs as indicated by Functional Deficits.   [] Progressing: [x] Met: [] Not Met: [] Adjusted  3. Patient will be able to melonie/doff Ultrasling independently. [] Progressing: [x] Met: [] Not Met: [] Adjusted      Long Term Goals: To be achieved in: 12 weeks  1. Disability index score of 15% or less for the Quick DASH to assist with reaching prior level of function. [] Progressing: [x] Met: [] Not Met: [] Adjusted  2. Patient will demonstrate an increase in left shoulder AROM to at least 90° flexion to allow for proper joint functioning as indicated by patients Functional Deficits. [] Progressing: [x] Met: [] Not Met:  [] Adjusted  3. Patient will demonstrate an increase in left Deltoid strength to 4+/5 to allow for proper functional mobility as indicated by patients Functional Deficits. [x] Progressing: [] Met: [] Not Met: [] Adjusted  4. Patient will have a decrease in left shoulder pain to 0-1/10 with daily activities. [] Progressing: [x] Met: [] Not Met: [] Adjusted  5. Pt will be able to perform all ADL's independently and without compensation. (patient specific functional goal)    [x] Progressing: [] Met: [] Not Met: [] Adjusted       Overall Progression Towards Functional goals/ Treatment Progress Update:  [] Patient is progressing as expected towards functional goals listed       [] Progression is slowed due to complexities/Impairments listed. [] Progression has been slowed due to co-morbidities. [] Plan just implemented, too soon to assess goals progression <30days   [] Goals require adjustment due to lack of progress  [] Patient is not progressing as expected and requires additional follow up with physician  [x] Other - pt has missed the last 2+ months of therapy due to severe cellulitis which required hospital admission and IV antibiotics for 2 weeks.         Prognosis for POC: [x] Good [] Fair  [] Poor      Patient requires continued skilled intervention: [x] Yes  [] No    Treatment/Activity Tolerance:  [x] Patient able to complete treatment [] Patient limited by fatigue  [] Patient limited by pain    [] Patient limited by other medical complications  [] Other:           PLAN: Cont therapy for ROM and strengthening. 2x/week for 6 weeks for ROM, strengthening, scap stability exercises, manual therapy, HEP, pt education, and modalities prn (12/1/20)  [x] Continue per plan of care [] Alter current plan   [] Plan of care initiated [] Hold pending MD visit [] Discharge      Electronically signed by:  Sandrine Dewey PT     Physical Therapist Adore Rangel 421 #[id number]  Physical Therapist [place] License #[id number]    Note: If patient does not return for scheduled/ recommended follow up visits, this note will serve as a discharge from care along with most recent update on progress.

## 2020-12-03 ENCOUNTER — TREATMENT (OUTPATIENT)
Dept: PHYSICAL THERAPY | Age: 70
End: 2020-12-03
Payer: MEDICARE

## 2020-12-03 PROCEDURE — 97530 THERAPEUTIC ACTIVITIES: CPT | Performed by: PHYSICAL THERAPIST

## 2020-12-03 PROCEDURE — 97110 THERAPEUTIC EXERCISES: CPT | Performed by: PHYSICAL THERAPIST

## 2020-12-03 PROCEDURE — G8427 DOCREV CUR MEDS BY ELIG CLIN: HCPCS | Performed by: PHYSICAL THERAPIST

## 2020-12-03 PROCEDURE — 97150 GROUP THERAPEUTIC PROCEDURES: CPT | Performed by: PHYSICAL THERAPIST

## 2020-12-03 PROCEDURE — 97140 MANUAL THERAPY 1/> REGIONS: CPT | Performed by: PHYSICAL THERAPIST

## 2020-12-08 ENCOUNTER — TREATMENT (OUTPATIENT)
Dept: PHYSICAL THERAPY | Age: 70
End: 2020-12-08
Payer: MEDICARE

## 2020-12-08 PROCEDURE — G8427 DOCREV CUR MEDS BY ELIG CLIN: HCPCS | Performed by: PHYSICAL THERAPIST

## 2020-12-08 PROCEDURE — 97110 THERAPEUTIC EXERCISES: CPT | Performed by: PHYSICAL THERAPIST

## 2020-12-08 PROCEDURE — 97530 THERAPEUTIC ACTIVITIES: CPT | Performed by: PHYSICAL THERAPIST

## 2020-12-08 PROCEDURE — 97150 GROUP THERAPEUTIC PROCEDURES: CPT | Performed by: PHYSICAL THERAPIST

## 2020-12-08 PROCEDURE — 97140 MANUAL THERAPY 1/> REGIONS: CPT | Performed by: PHYSICAL THERAPIST

## 2020-12-08 NOTE — PROGRESS NOTES
[facility]   Phone: [phone]    Fax: [phone]           Physical Therapy Treatment Note/ Progress Report:           Date:  2020    Patient Name:  Micah Diamond    :  1950  MRN: <[id number]>  Restrictions/Precautions:  S/P Left Reverse TSA  Medical/Treatment Diagnosis Information:  Diagnosis: S/P Left Reverse TSA (Sx: 20) (M19.012)   Treatment Diagnosis: Left Shoulder Pain (M25.512), Decreased left Shoulder ROM (M25.612), Decreased Left Shoulder Strength (X54.480)  Insurance/Certification information:  PT Insurance Information: Medicare, [facility]  Physician Information:  Referring Practitioner: Dr. Anali Diamond  Has the plan of care been signed (Y/N):        [x]  Yes (Signed 12/3/20)  []  No        Date of Patient follow up with Physician: 2021      Is this a Progress Report:     []  Yes  [x]  No        If Yes:  Date Range for reporting period:  Beginning 20  Ending 20    Progress report will be due (10 Rx or 30 days whichever is less):       Recertification will be due (POC Duration  / 90 days whichever is less): 20         Visit # Insurance Allowable Auth Required   15 Medical Necessity []  Yes [x]  No        Functional Scale:    Date assessed:  20  Functional Assessment Tool Used: Thelma Armenta  Score:  4.5% functional deficit (20)       (20)   Patient Age: 69years old    Patient Height: 5' 2\"  Patient Weight: 280 lbs  Patient BMI: 51.2      Pain (20)  [x]  Pain diagram was completed, pain was present and a follow up plan to address the pain is in the plan of care. ()   []  Pain diagram was completed and there was no pain present and therefore no follow up plan to address pain in the plan of care.  ()   []  Pain diagram was not completed and the reason for not completing the pain diagram is in the medical chart ()  []  Patient refused to participate   []  Severe mental or physical capacity, patient is in urgent emergent situation and to complete the questionnaire would jeopardize the patient's health status        Functional Questionnaire (12/1/20)  [x]  Patient completed the functional outcome questionnaire and deficiencies were addressed in the plan of care. ()   []  Patient completed the outcome questionnaire and there were no functional deficits identified and therefore no plan of care is required. ()   []  Patient did not complete the functional outcome questionnaire and the reason why is documented in the medical chart. ()  []  Patient refused to participate   []  Patient unable to complete the questionnaire   []   A functional outcome assessment has been reported on within the last 30 days        Falls (12/1/20)  [x]  The patient has had no falls or 1 fall without injury in the past year. (1101F)   []  There is no documentation of fall in the medical chart (1101F,8P)     [] The patient has had 2 or more falls or one fall with injury in the past year that is documented in the medical chart. (1100F)  []  A falls risk assessment was completed and documented in the medical chart. (2010N)   []  Falls were addressed in the plan of care. (5711W)   []  A falls risk assessment was not completed and documented in the medical chart (8269M,4V)   []   Falls were not addressed in the plan of care and reason was documented in the plan of care. (2436K 1P)        Medication (12/1/20)     [x] A list of current medications (including prescription, over the counter, herbal supplements, vitamin supplements, mineral supplements, dietary supplements) was reviewed with the patient and documented in the medical chart. ()     Falls Risk Assessment (30 days): (12/1/20)  [x] Falls Risk assessed and no intervention required.   [] Falls Risk assessed and Patient requires intervention due to being higher risk   TUG score (>12s at risk):     [] Falls education provided, including       PQRS:   Reviewed medication list with patient. Pt is still on Gabapentin for nerve pain. She is also still on PTSD medication due to recent hospitalization and wound care treatment for her legs. (12/8/20)      Latex Allergy:  [x]NO      []YES  Preferred Language for Healthcare:   [x]English       []other:      Pain level:  0-1/10 (12/8/20)  Medication: Nothing for her shoulder       SUBJECTIVE:  Pt states her left shoulder is doing really well. She gets occasional soreness but it doesn't last .  She is sleeping well with her left arm supported on a pillow. She is able to dress, bathe, and perform toileting independently. She is still limited with reaching above shoulder level. Pt feels she is starting to get some of her endurance back that she lost while in the hospital.            (23 weeks post-op)        OBJECTIVE:     *Pt is right hand dominant    (12/8/20)  ROM PROM AROM  Comment    L R L R    Flexion 120°  90 with mild compensatory shrug  *Within MD parameters   Abduction (scap) 110°  70°     ER 25°       IR NT  Lower sacrum     Other        Other           (12/1/20)  Strength L R Comment   Flexion Not      Abduction Tested     ER Due to      IR Surgery     Deltoid 4-/5     Upper Trap      Lower Trap      Mid Trap      Rhomboids      Biceps      Triceps      Horizontal Abduction      Horizontal Adduction      Lats        (12/1/20)  Special Tests Results/Comment   Umu Not      Neers Tested   Speeds Due to   [facility]   Other    Neurologic Signs Pt reports no numbness or tingling in left UE but she c/o pins and needles in both hands which she thinks is from the Gabapentin. Functional Reach          Reflexes/Sensation: (7/7/20)   []Dermatomes/Myotomes intact    []Reflexes equal and normal bilaterally   [x]Other: Intact to light touch    Joint mobility: NA due to surgery (12/1/20)   []Normal    []Hypo   []Hyper    Palpation: No tenderness.  (12/1/20)    Functional Mobility/Transfers: Less assistance needed with ADL's and self-care activities. She sometimes has to use her left arm to get up from a chair. She has been driving.  (50/5/65)    Posture: Forward head, rounded shoulders. (12/1/20)    Bandages/Dressings/Incisions: Incision is closed. (12/1/20)    Gait: (include devices/WB status): WNL, no AD.  (12/1/20)    Orthopedic Special Tests: See above                         RESTRICTIONS/PRECAUTIONS: Left Reverse TSA (Sx: 6/29/20)  ~AAROM FE to 140°, ER to 40°  ~Isometric Deltoid Strengthening          Exercises/Interventions:     Therapeutic Ex (83390) Sets/sec Reps Notes/CUES   AD UE BIKE            STRETCHING/ROM      Pulleys: Flexion 10\" hold X 10    Table Slides: Flexion and scap 10\" hold X 10    Wand: ER 0 - 40° 10\" hold X 10 Seated   UE Bayside: Flexion 3 sets X 10 Standing with UE ranger on table   Supine Cane Flexion 2 sets X 10 Semi-inclined on table (pt cannot lie flat)   Doorway      Wall Slides 2 sets X 5 Sliding up wall   CBA      TP BB 10\"  X 10 Pulling across     Sleeper       Elbow AROM  D/C   Scapular Clock 3 sets X 10 Seated         ISOMETRICS      Gripping   D/CRetraction  D/C   Abduction      Flexion      Internal Rotation      External Rotation  D/C   Deltoid 10\" hold X 10 Submax         STRENGTHENING-PREs      Flexion - Eccentric  (Punching up then slowly lowering left arm down to the table) 1 set X 10 with 1# wt Semi-inclined on table   Abduction      Internal Rotation      External Rotation      Shrugs 3 sets X 10 with 4#    Biceps 3 sets X 10 with 4#    Triceps      Retraction      Extension      Horizontal Abduction in ER      Serratus                        THERABANDS/CABLE COLUMN      Rows 3 sets X 10 with blue band    Lats      Extension 3 sets X 10 with blue band    Internal Rotation 3 sets X 10 with orange band    External Rotation 3 sets  X 10 with orange band    Biceps      Triceps 3 sets X 10 with blue band    PNF                                    Manual Intervention (35271) Scar Massage      STM      Hawkgrips      GH joint mobilizations      Foamroll      Manual PROM: Left Shoulder Flexion, scap, and ER (Within MD Parameters), Elbow Flexion and Extension  X 8' Pt inclined on table (pt cannot lie flat)         NMR re-education (27724)   CUES NEEDED   Plyoback      Therabar oscillations      Body Blade       Rhythmic Stabilization      Ball on the wall      Supine Codman's:  CW/CCW, Flex/Ext    X 30 each with 1#   Small, controlled range  Semi-inclined on table (pt cannot lie flat)                     Therapeutic Activity (07746)      Core training      Swiss ball activities                  Patient Education: diagnosis, prognosis, pt goals, rehab timeline, and surgical precautions. Also instructed pt and her friend on proper way to melonie/doff sling (7/7/20)  X 10'                          Therapeutic Exercise and NMR EXR  [x] (27492) Provided verbal/tactile cueing for activities related to strengthening, flexibility, endurance, ROM  for improvements in scapular, scapulothoracic and UE control with self care, reaching, carrying, lifting, house/yardwork, driving/computer work. [x] (24481) Provided verbal/tactile cueing for activities related to improving balance, coordination, kinesthetic sense, posture, motor skill, proprioception  to assist with  scapular, scapulothoracic and UE control with self care, reaching, carrying, lifting, house/yardwork, driving/computer work. Therapeutic Activities:    [x] (02038 or 25373) Provided verbal/tactile cueing for activities related to improving balance, coordination, kinesthetic sense, posture, motor skill, proprioception and motor activation to allow for proper function of scapular, scapulothoracic and UE control with self care, carrying, lifting, driving/computer work.      Home Exercise Program:    [x] (01011) Reviewed/Progressed HEP activities related to strengthening, flexibility, endurance, ROM of scapular, scapulothoracic and UE control with self care, reaching, carrying, lifting, house/yardwork, driving/computer work - Reviewed and updated HEP with pt (see below). (12/8/20)  [x] (98672) Reviewed/Progressed HEP activities related to improving balance, coordination, kinesthetic sense, posture, motor skill, proprioception of scapular, scapulothoracic and UE control with self care, reaching, carrying, lifting, house/yardwork, driving/computer work        Access Code: [email or web address]   URL: [facility]   Date: 12/08/2020   Prepared by: Jan Duffy     Exercises   Seated Shoulder Flexion Towel Slide at Table Top - 10 reps - 1 sets - 10 sec hold - 1x daily - 7x weekly   Seated Shoulder Scaption Slide at Table Top with Forearm in Neutral - 10 reps - 1 sets - 10 sec hold - 1x daily - 7x weekly   Seated Shoulder External Rotation AAROM with Dowel - 10 reps - 1 sets - 10 sec hold - 1x daily - 7x weekly   Standing Shoulder Shrugs with Dumbbells - 10 reps - 3 sets - 1x daily - 7x weekly   Scapular Retraction with Resistance - 10 reps - 3 sets - 1x daily - 7x weekly   Standing Bicep Curls Supinated with Dumbbells - 10 reps - 3 sets - 1x daily - 7x weekly   Isometric Shoulder Abduction at Wall - 10 reps - 1 sets - 10 sec hold - 1x daily - 7x weekly   Standing Shoulder Internal Rotation Stretch with Towel - 10 reps - 1 sets - 10 sec hold - 1x daily - 7x weekly   Supine Shoulder Flexion Extension AAROM with Dowel - 10 reps - 1 sets - 5 sec hold - 1x daily - 7x weekly   Standing Elbow Extension with Anchored Resistance at Wall - 10 reps - 3 sets - 1x daily - 7x weekly   Standing Shoulder Flexion Wall Walk - 5 reps - 2 sets - 1x daily - 7x weekly   Supine Shoulder Circles - 10 reps - 3 sets - 1x daily - 7x weekly   Shoulder Internal Rotation with Resistance - 10 reps - 3 sets - 1x daily - 7x weekly   Supine Shoulder Flexion Extension Full Range AROM - 10 reps - 1 sets - 1x daily - 7x weekly   Shoulder External Rotation with Anchored Resistance - 10 reps - 3 sets - 1x daily - 7x weekly           Manual Treatments:  PROM / STM / Oscillations-Mobs:  G-I, II, III, IV (PA's, Inf., Post.)  [x] (19938) Provided manual therapy to mobilize soft tissue/joints of cervical/CT, scapular GHJ and UE for the purpose of modulating pain, promoting relaxation,  increasing ROM, reducing/eliminating soft tissue swelling/inflammation/restriction, improving soft tissue extensibility and allowing for proper ROM for normal function with self care, reaching, carrying, lifting, house/yardwork, driving/computer work    Modalities:  ICE x 15' for the left shoulder   [] GAME READY (VASO)- for significant edema, swelling, pain control. Charges:  Timed Code Treatment Minutes: 45'   Total Treatment Minutes: 76'      [] EVAL (LOW) 77523 (typically 20 minutes face-to-face)  [] EVAL (MOD) 70996 (typically 30 minutes face-to-face)  [] EVAL (HIGH) 64914 (typically 45 minutes face-to-face)  [] RE-EVAL     [x] OA(96809) x 1 (22')   [] IONTO  [] NMR (68080) x     [] VASO  [x] Manual (86266) x 1  (8')   [x] Other: Group  [x] TA x  1 (15')  [] Mech Traction (72183)  [] ES(attended) (84347)     [] ES (un) (39373):         ASSESSMENT:  Pt was very fatigued at the end of therapy today but tolerated the exercises well. Progressed to IR and ER with orange tband which was challenging with rest breaks needed between sets. Eccentric flexion with 1# wt was also very fatiguing and she could only complete 10 reps with good form. No change in left shoulder AROM today with mild compensatory shrug present. Pt is doing well but needs to continue increasing ROM and strength for improved function. GOALS:  (Goals updated 12/1/20)  Patient stated goal:  \"Increase function; be able to use my left arm for toileting; be self-sufficient\"  [x] Progressing: [] Met: [] Not Met: [] Adjusted    Therapist goals for Patient:   Short Term Goals: To be achieved in: 2 weeks  1.  Independent in HEP and progression per patient tolerance, in order to prevent re-injury. [x] Progressing: [] Met: [] Not Met: [] Adjusted  2. Patient will have a decrease in left shoulder pain to 1-2/10 to facilitate improvement in movement, function, and ADLs as indicated by Functional Deficits. [] Progressing: [x] Met: [] Not Met: [] Adjusted  3. Patient will be able to melonie/doff Ultrasling independently. [] Progressing: [x] Met: [] Not Met: [] Adjusted      Long Term Goals: To be achieved in: 12 weeks  1. Disability index score of 15% or less for the Quick DASH to assist with reaching prior level of function. [] Progressing: [x] Met: [] Not Met: [] Adjusted  2. Patient will demonstrate an increase in left shoulder AROM to at least 90° flexion to allow for proper joint functioning as indicated by patients Functional Deficits. [] Progressing: [x] Met: [] Not Met:  [] Adjusted  3. Patient will demonstrate an increase in left Deltoid strength to 4+/5 to allow for proper functional mobility as indicated by patients Functional Deficits. [x] Progressing: [] Met: [] Not Met: [] Adjusted  4. Patient will have a decrease in left shoulder pain to 0-1/10 with daily activities. [] Progressing: [x] Met: [] Not Met: [] Adjusted  5. Pt will be able to perform all ADL's independently and without compensation. (patient specific functional goal)    [x] Progressing: [] Met: [] Not Met: [] Adjusted       Overall Progression Towards Functional goals/ Treatment Progress Update:  [] Patient is progressing as expected towards functional goals listed       [] Progression is slowed due to complexities/Impairments listed. [] Progression has been slowed due to co-morbidities.   [] Plan just implemented, too soon to assess goals progression <30days   [] Goals require adjustment due to lack of progress  [] Patient is not progressing as expected and requires additional follow up with physician  [x] Other - pt has missed the last 2+ months of therapy due to severe cellulitis which required hospital admission and IV antibiotics for 2 weeks. Prognosis for POC: [x] Good [] Fair  [] Poor      Patient requires continued skilled intervention: [x] Yes  [] No    Treatment/Activity Tolerance:  [x] Patient able to complete treatment  [] Patient limited by fatigue  [] Patient limited by pain    [] Patient limited by other medical complications  [] Other:           PLAN: Cont therapy for ROM and strengthening. 2x/week for 6 weeks for ROM, strengthening, scap stability exercises, manual therapy, HEP, pt education, and modalities prn (12/1/20)  [x] Continue per plan of care [] Alter current plan   [] Plan of care initiated [] Hold pending MD visit [] Discharge      Electronically signed by:  Vickie Silver PT     Physical Therapist Adore Rangel 421 #[id number]  Physical Therapist [place] License #[id number]    Note: If patient does not return for scheduled/ recommended follow up visits, this note will serve as a discharge from care along with most recent update on progress.

## 2020-12-10 ENCOUNTER — TREATMENT (OUTPATIENT)
Dept: PHYSICAL THERAPY | Age: 70
End: 2020-12-10
Payer: MEDICARE

## 2020-12-10 PROCEDURE — G8427 DOCREV CUR MEDS BY ELIG CLIN: HCPCS | Performed by: PHYSICAL THERAPIST

## 2020-12-10 PROCEDURE — 97110 THERAPEUTIC EXERCISES: CPT | Performed by: PHYSICAL THERAPIST

## 2020-12-10 PROCEDURE — 97150 GROUP THERAPEUTIC PROCEDURES: CPT | Performed by: PHYSICAL THERAPIST

## 2020-12-10 PROCEDURE — 97530 THERAPEUTIC ACTIVITIES: CPT | Performed by: PHYSICAL THERAPIST

## 2020-12-10 PROCEDURE — 97140 MANUAL THERAPY 1/> REGIONS: CPT | Performed by: PHYSICAL THERAPIST

## 2020-12-10 NOTE — PROGRESS NOTES
[facility]   Phone: [phone]    Fax: [phone]           Physical Therapy Treatment Note/ Progress Report:           Date:  12/10/2020    Patient Name:  Regla Walsh    :  1950  MRN: <[id number]>  Restrictions/Precautions:  S/P Left Reverse TSA  Medical/Treatment Diagnosis Information:  Diagnosis: S/P Left Reverse TSA (Sx: 20) (M19.012)   Treatment Diagnosis: Left Shoulder Pain (M25.512), Decreased left Shoulder ROM (M25.612), Decreased Left Shoulder Strength (J18.523)  Insurance/Certification information:  PT Insurance Information: Medicare, [facility]  Physician Information:  Referring Practitioner: Dr. Nikita Perry  Has the plan of care been signed (Y/N):        [x]  Yes (Signed 12/3/20)  []  No        Date of Patient follow up with Physician: 2021      Is this a Progress Report:     []  Yes  [x]  No        If Yes:  Date Range for reporting period:  Beginning 20  Ending 20    Progress report will be due (10 Rx or 30 days whichever is less):       Recertification will be due (POC Duration  / 90 days whichever is less): 20         Visit # Insurance Allowable Auth Required   16 Medical Necessity []  Yes [x]  No        Functional Scale:    Date assessed:  20  Functional Assessment Tool Used: Leland Durhamer  Score:  4.5% functional deficit (20)       (20)   Patient Age: 69years old    Patient Height: 5' 2\"  Patient Weight: 280 lbs  Patient BMI: 51.2      Pain (20)  [x]  Pain diagram was completed, pain was present and a follow up plan to address the pain is in the plan of care. ()   []  Pain diagram was completed and there was no pain present and therefore no follow up plan to address pain in the plan of care.  ()   []  Pain diagram was not completed and the reason for not completing the pain diagram is in the medical chart ()  []  Patient refused to participate   []  Severe mental or physical capacity, patient is in urgent emergent situation and to complete the questionnaire would jeopardize the patient's health status        Functional Questionnaire (12/1/20)  [x]  Patient completed the functional outcome questionnaire and deficiencies were addressed in the plan of care. ()   []  Patient completed the outcome questionnaire and there were no functional deficits identified and therefore no plan of care is required. ()   []  Patient did not complete the functional outcome questionnaire and the reason why is documented in the medical chart. ()  []  Patient refused to participate   []  Patient unable to complete the questionnaire   []   A functional outcome assessment has been reported on within the last 30 days        Falls (12/1/20)  [x]  The patient has had no falls or 1 fall without injury in the past year. (1101F)   []  There is no documentation of fall in the medical chart (1101F,8P)     [] The patient has had 2 or more falls or one fall with injury in the past year that is documented in the medical chart. (1100F)  []  A falls risk assessment was completed and documented in the medical chart. (7696M)   []  Falls were addressed in the plan of care. (6303M)   []  A falls risk assessment was not completed and documented in the medical chart (4064O,7D)   []   Falls were not addressed in the plan of care and reason was documented in the plan of care. (4232E 1P)        Medication (12/1/20)     [x] A list of current medications (including prescription, over the counter, herbal supplements, vitamin supplements, mineral supplements, dietary supplements) was reviewed with the patient and documented in the medical chart. ()     Falls Risk Assessment (30 days): (12/1/20)  [x] Falls Risk assessed and no intervention required.   [] Falls Risk assessed and Patient requires intervention due to being higher risk   TUG score (>12s at risk):     [] Falls education provided, including       PQRS:   Reviewed medication list with patient. No changes reported by pt since last PT visit. (12/10/20)      Latex Allergy:  [x]NO      []YES  Preferred Language for Healthcare:   [x]English       []other:      Pain level:  0-1/10 (12/10/20)  Medication: Nothing for her shoulder       SUBJECTIVE:  Pt states her left shoulder is doing well. She was tired after last visit but had no soreness or pain. She is sleeping with her left arm supported on a pillow. She is using her left arm more with daily act. She is also trying to do more at home to help build up her endurance. (23+ weeks post-op)        OBJECTIVE:     *Pt is right hand dominant    (12/8/20)  ROM PROM AROM  Comment    L R L R    Flexion 120°  90 with mild compensatory shrug  *Within MD parameters   Abduction (scap) 110°  70°     ER 25°       IR NT  Lower sacrum     Other        Other           (12/1/20)  Strength L R Comment   Flexion Not      Abduction Tested     ER Due to      IR Surgery     Deltoid 4-/5     Upper Trap      Lower Trap      Mid Trap      Rhomboids      Biceps      Triceps      Horizontal Abduction      Horizontal Adduction      Lats        (12/1/20)  Special Tests Results/Comment   Umu Not      Neers Tested   Speeds Due to   O[facility]Briens Surgery   Other    Neurologic Signs Pt reports no numbness or tingling in left UE but she c/o pins and needles in both hands which she thinks is from the Gabapentin. Functional Reach          Reflexes/Sensation: (7/7/20)   []Dermatomes/Myotomes intact    []Reflexes equal and normal bilaterally   [x]Other: Intact to light touch    Joint mobility: NA due to surgery (12/1/20)   []Normal    []Hypo   []Hyper    Palpation: No tenderness. (12/1/20)    Functional Mobility/Transfers: Less assistance needed with ADL's and self-care activities. She sometimes has to use her left arm to get up from a chair. She has been driving.  (90/7/48)    Posture: Forward head, rounded shoulders. (12/1/20)    Bandages/Dressings/Incisions: Incision is closed. (12/1/20)    Gait: (include devices/WB status): WNL, no AD.  (12/1/20)    Orthopedic Special Tests: See above                         RESTRICTIONS/PRECAUTIONS: Left Reverse TSA (Sx: 6/29/20)  ~AAROM FE to 140°, ER to 40°  ~Isometric Deltoid Strengthening          Exercises/Interventions:     Therapeutic Ex (72753) Sets/sec Reps Notes/CUES   AD UE BIKE            STRETCHING/ROM      Pulleys: Flexion 10\" hold X 10    Table Slides: Flexion and scap 10\" hold X 10 On inclined table   Wand: ER 0 - 40° 10\" hold X 10 Seated   UE Forest: Flexion 3 sets X 10 Standing with UE ranger on table   Supine Cane Flexion 2 sets X 10 Semi-inclined on table (pt cannot lie flat)   Doorway      Wall Slides  X 15 Sliding up wall   CBA      TP BB 10\"  X 10 Pulling across     Sleeper       Elbow AROM  D/C   Scapular Clock 3 sets X 10 Seated         ISOMETRICS      Gripping   D/CRetraction  D/C   Abduction      Flexion      Internal Rotation      External Rotation  D/C   Deltoid 10\" hold X 10 Submax         STRENGTHENING-PREs      Flexion - Eccentric  (Punching up then slowly lowering left arm down to the table) 1 set X 10 with 1# wt Semi-inclined on table   Abduction      Internal Rotation      External Rotation      Shrugs 3 sets X 10 with 4#    Biceps 3 sets X 10 with 4#    Triceps      Retraction      Extension      Horizontal Abduction in ER      Serratus                        THERABANDS/CABLE COLUMN      Rows 3 sets X 10 with blue band    Lats      Extension 3 sets X 10 with blue band    Internal Rotation 3 sets X 10 with orange band    External Rotation 3 sets  X 10 with orange band    Biceps      Triceps 3 sets X 10 with blue band    PNF                                    Manual Intervention (34940)      Scar Massage      STM      Hawkgrips      GH joint mobilizations      Foamroll      Manual PROM: Left Shoulder Flexion, scap, and ER (Within MD Parameters), Elbow Flexion Reviewed/Progressed HEP activities related to improving balance, coordination, kinesthetic sense, posture, motor skill, proprioception of scapular, scapulothoracic and UE control with self care, reaching, carrying, lifting, house/yardwork, driving/computer work        Access Code: [email or web address]   URL: [email or web address]   Date: 12/08/2020   Prepared by: Payton Caldera     Exercises   Seated Shoulder Flexion Towel Slide at Table Top - 10 reps - 1 sets - 10 sec hold - 1x daily - 7x weekly   Seated Shoulder Scaption Slide at Table Top with Forearm in Neutral - 10 reps - 1 sets - 10 sec hold - 1x daily - 7x weekly   Seated Shoulder External Rotation AAROM with Dowel - 10 reps - 1 sets - 10 sec hold - 1x daily - 7x weekly   Standing Shoulder Shrugs with Dumbbells - 10 reps - 3 sets - 1x daily - 7x weekly   Scapular Retraction with Resistance - 10 reps - 3 sets - 1x daily - 7x weekly   Standing Bicep Curls Supinated with Dumbbells - 10 reps - 3 sets - 1x daily - 7x weekly   Isometric Shoulder Abduction at Wall - 10 reps - 1 sets - 10 sec hold - 1x daily - 7x weekly   Standing Shoulder Internal Rotation Stretch with Towel - 10 reps - 1 sets - 10 sec hold - 1x daily - 7x weekly   Supine Shoulder Flexion Extension AAROM with Dowel - 10 reps - 1 sets - 5 sec hold - 1x daily - 7x weekly   Standing Elbow Extension with Anchored Resistance at Wall - 10 reps - 3 sets - 1x daily - 7x weekly   Standing Shoulder Flexion Wall Walk - 5 reps - 2 sets - 1x daily - 7x weekly   Supine Shoulder Circles - 10 reps - 3 sets - 1x daily - 7x weekly   Shoulder Internal Rotation with Resistance - 10 reps - 3 sets - 1x daily - 7x weekly   Supine Shoulder Flexion Extension Full Range AROM - 10 reps - 1 sets - 1x daily - 7x weekly   Shoulder External Rotation with Anchored Resistance - 10 reps - 3 sets - 1x daily - 7x weekly           Manual Treatments:  PROM / STM / Oscillations-Mobs:  G-I, II, III, IV (PA's, Inf., Post.)  [x] (32993) Provided manual therapy to mobilize soft tissue/joints of cervical/CT, scapular GHJ and UE for the purpose of modulating pain, promoting relaxation,  increasing ROM, reducing/eliminating soft tissue swelling/inflammation/restriction, improving soft tissue extensibility and allowing for proper ROM for normal function with self care, reaching, carrying, lifting, house/yardwork, driving/computer work    Modalities:  ICE x 15' for the left shoulder   [] GAME READY (VASO)- for significant edema, swelling, pain control. Charges:  Timed Code Treatment Minutes: 45'   Total Treatment Minutes: 76'      [] EVAL (LOW) 88031 (typically 20 minutes face-to-face)  [] EVAL (MOD) 69456 (typically 30 minutes face-to-face)  [] EVAL (HIGH) 23443 (typically 45 minutes face-to-face)  [] RE-EVAL     [x] NG(22506) x 1 (22')   [] IONTO  [] NMR (09765) x     [] VASO  [x] Manual (04513) x 1  (8')   [x] Other: Group  [x] TA x  1 (15')  [] Mech Traction (93492)  [] ES(attended) (87142)     [] ES (un) (76647):         ASSESSMENT:  Able to perform 15 reps with wall walking today but it was challenging and she was very fatigued afterward. Also progressed table slides to inclined table which was challenging. She fatigued quickly with supine codman's and eccentric shoulder flexion today with rest breaks needed. Pt needs to continue increasing ROM, strength, and endurance for increased function. GOALS:  (Goals updated 12/1/20)  Patient stated goal:  \"Increase function; be able to use my left arm for toileting; be self-sufficient\"  [x] Progressing: [] Met: [] Not Met: [] Adjusted    Therapist goals for Patient:   Short Term Goals: To be achieved in: 2 weeks  1. Independent in HEP and progression per patient tolerance, in order to prevent re-injury. [x] Progressing: [] Met: [] Not Met: [] Adjusted  2.  Patient will have a decrease in left shoulder pain to 1-2/10 to facilitate improvement in movement, function, and ADLs as indicated by Functional Deficits. [] Progressing: [x] Met: [] Not Met: [] Adjusted  3. Patient will be able to melonie/doff Ultrasling independently. [] Progressing: [x] Met: [] Not Met: [] Adjusted      Long Term Goals: To be achieved in: 12 weeks  1. Disability index score of 15% or less for the Quick DASH to assist with reaching prior level of function. [] Progressing: [x] Met: [] Not Met: [] Adjusted  2. Patient will demonstrate an increase in left shoulder AROM to at least 90° flexion to allow for proper joint functioning as indicated by patients Functional Deficits. [] Progressing: [x] Met: [] Not Met:  [] Adjusted  3. Patient will demonstrate an increase in left Deltoid strength to 4+/5 to allow for proper functional mobility as indicated by patients Functional Deficits. [x] Progressing: [] Met: [] Not Met: [] Adjusted  4. Patient will have a decrease in left shoulder pain to 0-1/10 with daily activities. [] Progressing: [x] Met: [] Not Met: [] Adjusted  5. Pt will be able to perform all ADL's independently and without compensation. (patient specific functional goal)    [x] Progressing: [] Met: [] Not Met: [] Adjusted       Overall Progression Towards Functional goals/ Treatment Progress Update:  [] Patient is progressing as expected towards functional goals listed       [] Progression is slowed due to complexities/Impairments listed. [] Progression has been slowed due to co-morbidities. [] Plan just implemented, too soon to assess goals progression <30days   [] Goals require adjustment due to lack of progress  [] Patient is not progressing as expected and requires additional follow up with physician  [x] Other - pt has missed the last 2+ months of therapy due to severe cellulitis which required hospital admission and IV antibiotics for 2 weeks.         Prognosis for POC: [x] Good [] Fair  [] Poor      Patient requires continued skilled intervention: [x] Yes  [] No    Treatment/Activity Tolerance:  [x] Patient able to complete treatment  [] Patient limited by fatigue  [] Patient limited by pain    [] Patient limited by other medical complications  [] Other:           PLAN: Cont therapy for ROM and strengthening. 2x/week for 6 weeks for ROM, strengthening, scap stability exercises, manual therapy, HEP, pt education, and modalities prn (12/1/20)  [x] Continue per plan of care [] Alter current plan   [] Plan of care initiated [] Hold pending MD visit [] Discharge      Electronically signed by:  Sabiha Freire PT     Physical Therapist Adore Rangel [address] #[id number]  Physical Therapist [place] License #[id number]    Note: If patient does not return for scheduled/ recommended follow up visits, this note will serve as a discharge from care along with most recent update on progress.

## 2020-12-15 ENCOUNTER — TREATMENT (OUTPATIENT)
Dept: PHYSICAL THERAPY | Age: 70
End: 2020-12-15
Payer: MEDICARE

## 2020-12-15 PROCEDURE — 97140 MANUAL THERAPY 1/> REGIONS: CPT | Performed by: PHYSICAL THERAPIST

## 2020-12-15 PROCEDURE — 97530 THERAPEUTIC ACTIVITIES: CPT | Performed by: PHYSICAL THERAPIST

## 2020-12-15 PROCEDURE — 97112 NEUROMUSCULAR REEDUCATION: CPT | Performed by: PHYSICAL THERAPIST

## 2020-12-15 PROCEDURE — 97110 THERAPEUTIC EXERCISES: CPT | Performed by: PHYSICAL THERAPIST

## 2020-12-15 PROCEDURE — G8427 DOCREV CUR MEDS BY ELIG CLIN: HCPCS | Performed by: PHYSICAL THERAPIST

## 2020-12-15 NOTE — PROGRESS NOTES
[facility]   Phone: [phone]    Fax: [phone]           Physical Therapy Treatment Note/ Progress Report:           Date:  12/15/2020    Patient Name:  Rocio Perez    :  1950  MRN: <[id number]>  Restrictions/Precautions:  S/P Left Reverse TSA  Medical/Treatment Diagnosis Information:  Diagnosis: S/P Left Reverse TSA (Sx: 20) (M19.012)   Treatment Diagnosis: Left Shoulder Pain (M25.512), Decreased left Shoulder ROM (M25.612), Decreased Left Shoulder Strength (E31.261)  Insurance/Certification information:  PT Insurance Information: Medicare, [facility]  Physician Information:  Referring Practitioner: Dr. Leroy Don  Has the plan of care been signed (Y/N):        [x]  Yes (Signed 12/3/20)  []  No        Date of Patient follow up with Physician: 2021      Is this a Progress Report:     []  Yes  [x]  No        If Yes:  Date Range for reporting period:  Beginning 20  Ending 20    Progress report will be due (10 Rx or 30 days whichever is less): 83/49/42      Recertification will be due (POC Duration  / 90 days whichever is less): 20         Visit # Insurance Allowable Auth Required   17 Medical Necessity []  Yes [x]  No        Functional Scale:    Date assessed:  20  Functional Assessment Tool Used: Kylie Lo  Score:  4.5% functional deficit (20)       (20)   Patient Age: 69years old    Patient Height: 5' 2\"  Patient Weight: 280 lbs   Patient BMI: 51.2      Pain (20)  [x]  Pain diagram was completed, pain was present and a follow up plan to address the pain is in the plan of care. ()   []  Pain diagram was completed and there was no pain present and therefore no follow up plan to address pain in the plan of care.  ()   []  Pain diagram was not completed and the reason for not completing the pain diagram is in the medical chart ()  []  Patient refused to participate []  Severe mental or physical capacity, patient is in urgent emergent situation and to complete the questionnaire would jeopardize the patient's health status        Functional Questionnaire (12/1/20)  [x]  Patient completed the functional outcome questionnaire and deficiencies were addressed in the plan of care. ()   []  Patient completed the outcome questionnaire and there were no functional deficits identified and therefore no plan of care is required. ()   []  Patient did not complete the functional outcome questionnaire and the reason why is documented in the medical chart. ()  []  Patient refused to participate   []  Patient unable to complete the questionnaire   []   A functional outcome assessment has been reported on within the last 30 days        Falls (12/1/20)  [x]  The patient has had no falls or 1 fall without injury in the past year. (1101F)   []  There is no documentation of fall in the medical chart (1101F,8P)     [] The patient has had 2 or more falls or one fall with injury in the past year that is documented in the medical chart. (1100F)  []  A falls risk assessment was completed and documented in the medical chart. (7234N)   []  Falls were addressed in the plan of care. (9871A)   []  A falls risk assessment was not completed and documented in the medical chart (0633S,3Z)   []   Falls were not addressed in the plan of care and reason was documented in the plan of care. (1254M 1P)        Medication (12/1/20)     [x] A list of current medications (including prescription, over the counter, herbal supplements, vitamin supplements, mineral supplements, dietary supplements) was reviewed with the patient and documented in the medical chart. ()     Falls Risk Assessment (30 days): (12/1/20)  [x] Falls Risk assessed and no intervention required.   [] Falls Risk assessed and Patient requires intervention due to being higher risk   TUG score (>12s at risk): [] Falls education provided, including       PQRS:   Reviewed medication list with patient. No changes reported by pt since last PT visit. (12/15/20)      Latex Allergy:  [x]NO      []YES  Preferred Language for Healthcare:   [x]English       []other:      Pain level:  0-1/10 (12/15/20)  Medication: Nothing for her shoulder       SUBJECTIVE:  Pt states her left shoulder is doing well. She still has difficulty reaching above shoulder level and if she tries to reach too high she gets a twinge of pain in her shoulder. Her shoulder is tired after she does therapy but she feels like she is getting stronger. She also feels like she has more energy during the day. (24 weeks post-op)        OBJECTIVE:     *Pt is right hand dominant    (12/15/20)  ROM PROM AROM  Comment    L R L R    Flexion 120°  95 with mild compensatory shrug  *Within MD parameters   Abduction (scap) 115°  80°     ER 25°       IR NT  Lower sacrum     Other        Other           (12/1/20)  Strength L R Comment   Flexion Not      Abduction Tested     ER Due to      IR Surgery     Deltoid 4-/5     Upper Trap      Lower Trap      Mid Trap      Rhomboids      Biceps      Triceps      Horizontal Abduction      Horizontal Adduction      Lats        (12/1/20)  Special Tests Results/Comment   Umu Not      Neers Tested   Speeds Due to   O[facility]Briens Surgery   Other    Neurologic Signs Pt reports no numbness or tingling in left UE but she c/o pins and needles in both hands which she thinks is from the Gabapentin. Functional Reach          Reflexes/Sensation: (7/7/20)   []Dermatomes/Myotomes intact    []Reflexes equal and normal bilaterally   [x]Other: Intact to light touch    Joint mobility: NA due to surgery (12/1/20)   []Normal    []Hypo   []Hyper    Palpation: No tenderness.  (12/1/20) Manual Intervention (94423)      Scar Massage      STM      Hawkgrips      GH joint mobilizations      Foamroll      Manual PROM: Left Shoulder Flexion, scap, and ER (Within MD Parameters), Elbow Flexion and Extension  X 8' Pt inclined on table (pt cannot lie flat)         NMR re-education (25537)   CUES NEEDED   Plyoback      Therabar oscillations      Body Blade       Rhythmic Stabilization      Ball on the wall      Supine Codman's:  CW/CCW, Flex/Ext    X 30 each with 1#   Small, controlled range  Semi-inclined on table (pt cannot lie flat)                     Therapeutic Activity (62195)      Core training      Swiss ball activities                  Patient Education: diagnosis, prognosis, pt goals, rehab timeline, and surgical precautions. Also instructed pt and her friend on proper way to melonie/doff sling (7/7/20)  X 10'                          Therapeutic Exercise and NMR EXR  [x] (48903) Provided verbal/tactile cueing for activities related to strengthening, flexibility, endurance, ROM  for improvements in scapular, scapulothoracic and UE control with self care, reaching, carrying, lifting, house/yardwork, driving/computer work. [x] (61058) Provided verbal/tactile cueing for activities related to improving balance, coordination, kinesthetic sense, posture, motor skill, proprioception  to assist with  scapular, scapulothoracic and UE control with self care, reaching, carrying, lifting, house/yardwork, driving/computer work. Therapeutic Activities:    [x] (85659 or 36159) Provided verbal/tactile cueing for activities related to improving balance, coordination, kinesthetic sense, posture, motor skill, proprioception and motor activation to allow for proper function of scapular, scapulothoracic and UE control with self care, carrying, lifting, driving/computer work.      Home Exercise Program: Supine Shoulder Flexion Extension Full Range AROM - 10 reps - 1 sets - 1x daily - 7x weekly   Shoulder External Rotation with Anchored Resistance - 10 reps - 3 sets - 1x daily - 7x weekly           Manual Treatments:  PROM / STM / Oscillations-Mobs:  G-I, II, III, IV (PA's, Inf., Post.)  [x] (71015) Provided manual therapy to mobilize soft tissue/joints of cervical/CT, scapular GHJ and UE for the purpose of modulating pain, promoting relaxation,  increasing ROM, reducing/eliminating soft tissue swelling/inflammation/restriction, improving soft tissue extensibility and allowing for proper ROM for normal function with self care, reaching, carrying, lifting, house/yardwork, driving/computer work    Modalities:  ICE x 15' for the left shoulder   [] GAME READY (VASO)- for significant edema, swelling, pain control. Charges:  Timed Code Treatment Minutes: 62'   Total Treatment Minutes: 68'      [] EVAL (LOW) 15297 (typically 20 minutes face-to-face)  [] EVAL (MOD) 09447 (typically 30 minutes face-to-face)  [] EVAL (HIGH) 08941 (typically 45 minutes face-to-face)  [] RE-EVAL     [x] VD(03466) x 1 (20')   [] IONTO  [x] NMR (36894) x 1 (15')   [] VASO  [x] Manual (45950) x 1  (8')   [] Other:   [x] TA x  1 (15')  [] Mech Traction (41264)  [] ES(attended) (09467)     [] ES (un) (49863):         ASSESSMENT:  Pt is doing much better with the exercises. She is able to complete more reps before needing to rest due to fatigue. She was able to complete 15 reps with eccentric shoulder flexion with 1# wt today but the last 5 reps were very challenging with visible shaking present. Her control has gotten much better with UE ranger. Active shoulder flexion and abduction improved today with less compensation (trunk lean, shoulder hiking).              GOALS:  (Goals updated 12/1/20)  Patient stated goal:  \"Increase function; be able to use my left arm for toileting; be self-sufficient\"

## 2020-12-17 ENCOUNTER — TREATMENT (OUTPATIENT)
Dept: PHYSICAL THERAPY | Age: 70
End: 2020-12-17
Payer: MEDICARE

## 2020-12-17 PROCEDURE — 97112 NEUROMUSCULAR REEDUCATION: CPT | Performed by: PHYSICAL THERAPIST

## 2020-12-17 PROCEDURE — 97140 MANUAL THERAPY 1/> REGIONS: CPT | Performed by: PHYSICAL THERAPIST

## 2020-12-17 PROCEDURE — G8427 DOCREV CUR MEDS BY ELIG CLIN: HCPCS | Performed by: PHYSICAL THERAPIST

## 2020-12-17 PROCEDURE — 97110 THERAPEUTIC EXERCISES: CPT | Performed by: PHYSICAL THERAPIST

## 2020-12-17 PROCEDURE — 97530 THERAPEUTIC ACTIVITIES: CPT | Performed by: PHYSICAL THERAPIST

## 2020-12-17 NOTE — PROGRESS NOTES
[facility]   Phone: [phone]    Fax: [phone]           Physical Therapy Treatment Note/ Progress Report:           Date:  2020    Patient Name:  Tello Morris    :  1950  MRN: <[id number]>  Restrictions/Precautions:  S/P Left Reverse TSA  Medical/Treatment Diagnosis Information:  Diagnosis: S/P Left Reverse TSA (Sx: 20) (M19.012)   Treatment Diagnosis: Left Shoulder Pain (M25.512), Decreased left Shoulder ROM (M25.612), Decreased Left Shoulder Strength (W48.543)  Insurance/Certification information:  PT Insurance Information: Medicare, [facility] [address] [facility]  Physician Information:  Referring Practitioner: Dr. Elif Jarrell  Has the plan of care been signed (Y/N):        [x]  Yes (Signed 12/3/20)  []  No        Date of Patient follow up with Physician: 2021      Is this a Progress Report:     []  Yes  [x]  No        If Yes:  Date Range for reporting period:  Beginning 20  Ending 20    Progress report will be due (10 Rx or 30 days whichever is less):       Recertification will be due (POC Duration  / 90 days whichever is less): 20         Visit # Insurance Allowable Auth Required   18 Medical Necessity []  Yes [x]  No        Functional Scale:    Date assessed:  20  Functional Assessment Tool Used: Jamal Mercado  Score:  4.5% functional deficit (20)       (20)   Patient Age: 69years old    Patient Height: 5' 2\"  Patient Weight: 280 lbs   Patient BMI: 51.2      Pain (20)  [x]  Pain diagram was completed, pain was present and a follow up plan to address the pain is in the plan of care. ()   []  Pain diagram was completed and there was no pain present and therefore no follow up plan to address pain in the plan of care.  ()   []  Pain diagram was not completed and the reason for not completing the pain diagram is in the medical chart ()  []  Patient refused to participate []  Severe mental or physical capacity, patient is in urgent emergent situation and to complete the questionnaire would jeopardize the patient's health status        Functional Questionnaire (12/1/20)  [x]  Patient completed the functional outcome questionnaire and deficiencies were addressed in the plan of care. ()   []  Patient completed the outcome questionnaire and there were no functional deficits identified and therefore no plan of care is required. ()   []  Patient did not complete the functional outcome questionnaire and the reason why is documented in the medical chart. ()  []  Patient refused to participate   []  Patient unable to complete the questionnaire   []   A functional outcome assessment has been reported on within the last 30 days        Falls (12/1/20)  [x]  The patient has had no falls or 1 fall without injury in the past year. (1101F)   []  There is no documentation of fall in the medical chart (1101F,8P)     [] The patient has had 2 or more falls or one fall with injury in the past year that is documented in the medical chart. (1100F)  []  A falls risk assessment was completed and documented in the medical chart. (0159B)   []  Falls were addressed in the plan of care. (9235Y)   []  A falls risk assessment was not completed and documented in the medical chart (6756L,1S)   []   Falls were not addressed in the plan of care and reason was documented in the plan of care. (8119Z 1P)        Medication (12/1/20)     [x] A list of current medications (including prescription, over the counter, herbal supplements, vitamin supplements, mineral supplements, dietary supplements) was reviewed with the patient and documented in the medical chart. ()     Falls Risk Assessment (30 days): (12/1/20)  [x] Falls Risk assessed and no intervention required.   [] Falls Risk assessed and Patient requires intervention due to being higher risk   TUG score (>12s at risk): [] Falls education provided, including       PQRS:   Reviewed medication list with patient. No changes reported by pt since last PT visit. (12/17/20)      Latex Allergy:  [x]NO      []YES  Preferred Language for Healthcare:   [x]English       []other:      Pain level:  0-1/10 (12/17/20)  Medication: Nothing for her shoulder       SUBJECTIVE:  Pt did a lot of work around the house yesterday and her left shoulder is a little more sore today. She slept well last night. Her left shoulder was really tired after therapy last visit. (24+ weeks post-op)        OBJECTIVE:     *Pt is right hand dominant    (12/15/20)  ROM PROM AROM  Comment    L R L R    Flexion 120°  95 with mild compensatory shrug  *Within MD parameters   Abduction (scap) 115°  80°     ER 25°       IR NT  Lower sacrum     Other        Other           (12/1/20)  Strength L R Comment   Flexion Not      Abduction Tested     ER Due to      IR Surgery     Deltoid 4-/5     Upper Trap      Lower Trap      Mid Trap      Rhomboids      Biceps      Triceps      Horizontal Abduction      Horizontal Adduction      Lats        (12/1/20)  Special Tests Results/Comment   Umu Not      Neers Tested   Speeds Due to   [facility] Surgery   Other    Neurologic Signs Pt reports no numbness or tingling in left UE but she c/o pins and needles in both hands which she thinks is from the Gabapentin. Functional Reach          Reflexes/Sensation: (7/7/20)   []Dermatomes/Myotomes intact    []Reflexes equal and normal bilaterally   [x]Other: Intact to light touch    Joint mobility: NA due to surgery (12/1/20)   []Normal    []Hypo   []Hyper    Palpation: No tenderness. (12/1/20)    Functional Mobility/Transfers: Less assistance needed with ADL's and self-care activities. She sometimes has to use her left arm to get up from a chair. She has been driving.  (89/4/12)    Posture: Forward head, rounded shoulders.   (12/1/20) Bandages/Dressings/Incisions: Incision is closed. (12/1/20)    Gait: (include devices/WB status): WNL, no AD.  (12/1/20)    Orthopedic Special Tests: See above                         RESTRICTIONS/PRECAUTIONS: Left Reverse TSA (Sx: 6/29/20)  ~AAROM FE to 140°, ER to 40°  ~Isometric Deltoid Strengthening          Exercises/Interventions:     Therapeutic Ex (41995) Sets/sec Reps Notes/CUES   AD UE BIKE            STRETCHING/ROM      Pulleys: Flexion 10\" hold X 10    Table Slides: Flexion and scap 10\" hold X 10 On inclined table   Wand: ER 0 - 40° 10\" hold X 10 Seated   UE [place]: Flexion 3 sets X 10 Standing with UE ranger on table   Supine Cane Flexion 2 sets X 10 Semi-inclined on table (pt cannot lie flat)   Doorway      Wall Slides  X 15 Sliding up wall   CBA      TP BB 10\"  X 10 Pulling across     Sleeper       Elbow AROM  D/C   Scapular Clock 3 sets X 10 Seated         ISOMETRICS      Gripping   D/CRetraction  D/C   Abduction      Flexion      Internal Rotation      External Rotation  D/C   Deltoid 10\" hold X 10 Submax         STRENGTHENING-PREs      Flexion - Eccentric  (Punching up then slowly lowering left arm down to the table) 1 set X 15 with 1# wt Semi-inclined on table   Abduction      Internal Rotation      External Rotation      Shrugs 3 sets X 10 with 4#    Biceps 3 sets X 10 with 4#    Triceps      Retraction      Extension      Horizontal Abduction in ER      Serratus                        THERABANDS/CABLE COLUMN      Rows 3 sets X 10 with blue band    Lats      Extension 3 sets X 10 with blue band    Internal Rotation 3 sets X 10 with orange band    External Rotation 3 sets  X 10 with orange band    Biceps      Triceps 3 sets X 10 with blue band    PNF                                    Manual Intervention (79983)      Scar Massage      STM      Hawkgrips      GH joint mobilizations      Foamroll Manual PROM: Left Shoulder Flexion, scap, and ER (Within MD Parameters), Elbow Flexion and Extension  X 8' Pt inclined on table (pt cannot lie flat)         NMR re-education (90359)   CUES NEEDED   Plyoback      Therabar oscillations      Body Blade       Rhythmic Stabilization      Ball on the wall      Supine Codman's:  CW/CCW, Flex/Ext    X 30 each with 1#   Small, controlled range  Semi-inclined on table (pt cannot lie flat)                     Therapeutic Activity (01857)      Core training      Swiss ball activities                  Patient Education: diagnosis, prognosis, pt goals, rehab timeline, and surgical precautions. Also instructed pt and her friend on proper way to melonie/doff sling (7/7/20)  X 10'                          Therapeutic Exercise and NMR EXR  [x] (68714) Provided verbal/tactile cueing for activities related to strengthening, flexibility, endurance, ROM  for improvements in scapular, scapulothoracic and UE control with self care, reaching, carrying, lifting, house/yardwork, driving/computer work. [x] (31839) Provided verbal/tactile cueing for activities related to improving balance, coordination, kinesthetic sense, posture, motor skill, proprioception  to assist with  scapular, scapulothoracic and UE control with self care, reaching, carrying, lifting, house/yardwork, driving/computer work. Therapeutic Activities:    [x] (60838 or 98386) Provided verbal/tactile cueing for activities related to improving balance, coordination, kinesthetic sense, posture, motor skill, proprioception and motor activation to allow for proper function of scapular, scapulothoracic and UE control with self care, carrying, lifting, driving/computer work.      Home Exercise Program: [x] (49595) Reviewed/Progressed HEP activities related to strengthening, flexibility, endurance, ROM of scapular, scapulothoracic and UE control with self care, reaching, carrying, lifting, house/yardwork, driving/computer work - Reviewed and updated HEP with pt (see below). (12/8/20)  [x] (93147) Reviewed/Progressed HEP activities related to improving balance, coordination, kinesthetic sense, posture, motor skill, proprioception of scapular, scapulothoracic and UE control with self care, reaching, carrying, lifting, house/yardwork, driving/computer work        Access Code: [email or web address]   URL: [facility] [employer]/   Date: 12/08/2020   Prepared by: Williams Specking     Exercises   Seated Shoulder Flexion Towel Slide at Table Top - 10 reps - 1 sets - 10 sec hold - 1x daily - 7x weekly   Seated Shoulder Scaption Slide at Table Top with Forearm in Neutral - 10 reps - 1 sets - 10 sec hold - 1x daily - 7x weekly   Seated Shoulder External Rotation AAROM with Dowel - 10 reps - 1 sets - 10 sec hold - 1x daily - 7x weekly   Standing Shoulder Shrugs with Dumbbells - 10 reps - 3 sets - 1x daily - 7x weekly   Scapular Retraction with Resistance - 10 reps - 3 sets - 1x daily - 7x weekly   Standing Bicep Curls Supinated with Dumbbells - 10 reps - 3 sets - 1x daily - 7x weekly   Isometric Shoulder Abduction at Wall - 10 reps - 1 sets - 10 sec hold - 1x daily - 7x weekly   Standing Shoulder Internal Rotation Stretch with Towel - 10 reps - 1 sets - 10 sec hold - 1x daily - 7x weekly   Supine Shoulder Flexion Extension AAROM with Dowel - 10 reps - 1 sets - 5 sec hold - 1x daily - 7x weekly   Standing Elbow Extension with Anchored Resistance at Wall - 10 reps - 3 sets - 1x daily - 7x weekly   Standing Shoulder Flexion Wall Walk - 5 reps - 2 sets - 1x daily - 7x weekly   Supine Shoulder Circles - 10 reps - 3 sets - 1x daily - 7x weekly   Shoulder Internal Rotation with Resistance - 10 reps - 3 sets - 1x daily - 7x weekly Supine Shoulder Flexion Extension Full Range AROM - 10 reps - 1 sets - 1x daily - 7x weekly   Shoulder External Rotation with Anchored Resistance - 10 reps - 3 sets - 1x daily - 7x weekly           Manual Treatments:  PROM / STM / Oscillations-Mobs:  G-I, II, III, IV (PA's, Inf., Post.)  [x] (47083) Provided manual therapy to mobilize soft tissue/joints of cervical/CT, scapular GHJ and UE for the purpose of modulating pain, promoting relaxation,  increasing ROM, reducing/eliminating soft tissue swelling/inflammation/restriction, improving soft tissue extensibility and allowing for proper ROM for normal function with self care, reaching, carrying, lifting, house/yardwork, driving/computer work    Modalities:  ICE x 15' for the left shoulder   [] GAME READY (VASO)- for significant edema, swelling, pain control. Charges:  Timed Code Treatment Minutes: 62'   Total Treatment Minutes: 68'      [] EVAL (LOW) 13520 (typically 20 minutes face-to-face)  [] EVAL (MOD) 64512 (typically 30 minutes face-to-face)  [] EVAL (HIGH) 32094 (typically 45 minutes face-to-face)  [] RE-EVAL     [x] EG(23506) x 1 (20')   [] IONTO  [x] NMR (22572) x 1 (15')   [] VASO  [x] Manual (05765) x 1  (8')   [] Other:   [x] TA x  1 (15')  [] Mech Traction (90790)  [] ES(attended) (83300)     [] ES (un) (12162):         ASSESSMENT:  Pt tolerated therapy well today. Strength and endurance are improving and pt needed fewer rest breaks with the exercises today. She was not as shaky with eccentric shoulder flexion with 1# wt but she was very fatigued after 15 reps. Her control continues to improve with wall walking and UE ranger. No change in AROM today but she is not compensating as much (trunk lean, shoulder hiking).         GOALS:  (Goals updated 12/1/20)  Patient stated goal:  \"Increase function; be able to use my left arm for toileting; be self-sufficient\"  [x] Progressing: [] Met: [] Not Met: [] Adjusted    Therapist goals for Patient: Short Term Goals: To be achieved in: 2 weeks  1. Independent in HEP and progression per patient tolerance, in order to prevent re-injury. [x] Progressing: [] Met: [] Not Met: [] Adjusted  2. Patient will have a decrease in left shoulder pain to 1-2/10 to facilitate improvement in movement, function, and ADLs as indicated by Functional Deficits. [] Progressing: [x] Met: [] Not Met: [] Adjusted  3. Patient will be able to melonie/doff Ultrasling independently. [] Progressing: [x] Met: [] Not Met: [] Adjusted      Long Term Goals: To be achieved in: 12 weeks  1. Disability index score of 15% or less for the Quick DASH to assist with reaching prior level of function. [] Progressing: [x] Met: [] Not Met: [] Adjusted  2. Patient will demonstrate an increase in left shoulder AROM to at least 90° flexion to allow for proper joint functioning as indicated by patients Functional Deficits. [] Progressing: [x] Met: [] Not Met:  [] Adjusted  3. Patient will demonstrate an increase in left Deltoid strength to 4+/5 to allow for proper functional mobility as indicated by patients Functional Deficits. [x] Progressing: [] Met: [] Not Met: [] Adjusted  4. Patient will have a decrease in left shoulder pain to 0-1/10 with daily activities. [] Progressing: [x] Met: [] Not Met: [] Adjusted  5. Pt will be able to perform all ADL's independently and without compensation. (patient specific functional goal)    [x] Progressing: [] Met: [] Not Met: [] Adjusted       Overall Progression Towards Functional goals/ Treatment Progress Update:  [] Patient is progressing as expected towards functional goals listed       [] Progression is slowed due to complexities/Impairments listed. [] Progression has been slowed due to co-morbidities.   [] Plan just implemented, too soon to assess goals progression <30days   [] Goals require adjustment due to lack of progress [] Patient is not progressing as expected and requires additional follow up with physician  [x] Other - pt has missed the last 2+ months of therapy due to severe cellulitis which required hospital admission and IV antibiotics for 2 weeks. Prognosis for POC: [x] Good [] Fair  [] Poor      Patient requires continued skilled intervention: [x] Yes  [] No    Treatment/Activity Tolerance:  [x] Patient able to complete treatment  [] Patient limited by fatigue  [] Patient limited by pain    [] Patient limited by other medical complications  [] Other:           PLAN: Cont therapy for ROM and strengthening. 2x/week for 6 weeks for ROM, strengthening, scap stability exercises, manual therapy, HEP, pt education, and modalities prn (12/1/20)  [x] Continue per plan of care [] Alter current plan   [] Plan of care initiated [] Hold pending MD visit [] Discharge      Electronically signed by:  Elvis Sanchez PT     Physical Therapist Adore Rangel 421 #[id number]  Physical Therapist [place] License #[id number]    Note: If patient does not return for scheduled/ recommended follow up visits, this note will serve as a discharge from care along with most recent update on progress.

## 2020-12-22 ENCOUNTER — TREATMENT (OUTPATIENT)
Dept: PHYSICAL THERAPY | Age: 70
End: 2020-12-22
Payer: MEDICARE

## 2020-12-22 PROCEDURE — 97530 THERAPEUTIC ACTIVITIES: CPT | Performed by: PHYSICAL THERAPIST

## 2020-12-22 PROCEDURE — 97150 GROUP THERAPEUTIC PROCEDURES: CPT | Performed by: PHYSICAL THERAPIST

## 2020-12-22 PROCEDURE — 97110 THERAPEUTIC EXERCISES: CPT | Performed by: PHYSICAL THERAPIST

## 2020-12-22 PROCEDURE — 97140 MANUAL THERAPY 1/> REGIONS: CPT | Performed by: PHYSICAL THERAPIST

## 2020-12-22 PROCEDURE — G8427 DOCREV CUR MEDS BY ELIG CLIN: HCPCS | Performed by: PHYSICAL THERAPIST

## 2020-12-22 NOTE — PROGRESS NOTES
[facility]   Phone: [phone]    Fax: [phone]           Physical Therapy Treatment Note/ Progress Report:           Date:  2020    Patient Name:  Ailyn Rasmussen    :  1950  MRN: <[id number]>  Restrictions/Precautions:  S/P Left Reverse TSA  Medical/Treatment Diagnosis Information:  Diagnosis: S/P Left Reverse TSA (Sx: 20) (M19.012)   Treatment Diagnosis: Left Shoulder Pain (M25.512), Decreased left Shoulder ROM (M25.612), Decreased Left Shoulder Strength (J66.642)  Insurance/Certification information:  PT Insurance Information: Medicare, [facility] [facility]  Physician Information:  Referring Practitioner: Dr. Nakia Trammell  Has the plan of care been signed (Y/N):        [x]  Yes (Signed 12/3/20)  []  No        Date of Patient follow up with Physician: 2021      Is this a Progress Report:     []  Yes  [x]  No        If Yes:  Date Range for reporting period:  Beginning 20  Ending 20    Progress report will be due (10 Rx or 30 days whichever is less):       Recertification will be due (POC Duration  / 90 days whichever is less): 20         Visit # Insurance Allowable Auth Required   19 Medical Necessity []  Yes [x]  No        Functional Scale:    Date assessed:  20  Functional Assessment Tool Used: Zayra Waverly  Score:  4.5% functional deficit (20)       (20)   Patient Age: 69years old    Patient Height: 5' 2\"  Patient Weight: 280 lbs   Patient BMI: 51.2      Pain (20)  [x]  Pain diagram was completed, pain was present and a follow up plan to address the pain is in the plan of care. ()   []  Pain diagram was completed and there was no pain present and therefore no follow up plan to address pain in the plan of care.  ()   []  Pain diagram was not completed and the reason for not completing the pain diagram is in the medical chart ()  []  Patient refused to participate []  Severe mental or physical capacity, patient is in urgent emergent situation and to complete the questionnaire would jeopardize the patient's health status        Functional Questionnaire (12/1/20)  [x]  Patient completed the functional outcome questionnaire and deficiencies were addressed in the plan of care. ()   []  Patient completed the outcome questionnaire and there were no functional deficits identified and therefore no plan of care is required. ()   []  Patient did not complete the functional outcome questionnaire and the reason why is documented in the medical chart. ()  []  Patient refused to participate   []  Patient unable to complete the questionnaire   []   A functional outcome assessment has been reported on within the last 30 days        Falls (12/1/20)  [x]  The patient has had no falls or 1 fall without injury in the past year. (1101F)   []  There is no documentation of fall in the medical chart (1101F,8P)     [] The patient has had 2 or more falls or one fall with injury in the past year that is documented in the medical chart. (1100F)  []  A falls risk assessment was completed and documented in the medical chart. (5954Z)   []  Falls were addressed in the plan of care. (2978V)   []  A falls risk assessment was not completed and documented in the medical chart (6434A,9N)   []   Falls were not addressed in the plan of care and reason was documented in the plan of care. (8883P 1P)        Medication (12/1/20)     [x] A list of current medications (including prescription, over the counter, herbal supplements, vitamin supplements, mineral supplements, dietary supplements) was reviewed with the patient and documented in the medical chart. ()     Falls Risk Assessment (30 days): (12/1/20)  [x] Falls Risk assessed and no intervention required.   [] Falls Risk assessed and Patient requires intervention due to being higher risk   TUG score (>12s at risk): [] Falls education provided, including       PQRS:   Reviewed medication list with patient. No changes reported by pt since last PT visit. (12/22/20)      Latex Allergy:  [x]NO      []YES  Preferred Language for Healthcare:   [x]English       []other:      Pain level:  0-1/10 (12/22/20)  Medication: Nothing for her shoulder       SUBJECTIVE:  Pt states she just doesn't have the energy she used to have and she gets tired easily. Her shoulder is doing well but gets sore at times. She has been using her left arm more around the house getting ready for [employer]. She is concerned because she is having pain in her back that radiates into both legs stopping just above her knees. She has been using a TENS unit on her back and that seems to help. Pt isn't sure who she should see about her back. (25 weeks post-op)        OBJECTIVE:     *Pt is right hand dominant    (12/22/20)  ROM PROM AROM  Comment    L R L R    Flexion 120°  95 with mild compensatory shrug  *Within MD parameters   Abduction (scap) 115°  85°     ER 25°       IR NT  L4     Other        Other           (12/1/20)  Strength L R Comment   Flexion Not      Abduction Tested     ER Due to      IR Surgery     Deltoid 4-/5     Upper Trap      Lower Trap      Mid Trap      Rhomboids      Biceps      Triceps      Horizontal Abduction      Horizontal Adduction      Lats        (12/1/20)  Special Tests Results/Comment   Umu Not      Neers Tested   Speeds Due to   [facility] Surgery   Other    Neurologic Signs Pt reports no numbness or tingling in left UE but she c/o pins and needles in both hands which she thinks is from the Gabapentin. Functional Reach          Reflexes/Sensation: (7/7/20)   []Dermatomes/Myotomes intact    []Reflexes equal and normal bilaterally   [x]Other: Intact to light touch    Joint mobility: NA due to surgery (12/1/20)   []Normal    []Hypo   []Hyper    Palpation: No tenderness.  (12/1/20) Manual Intervention (11133)      Scar Massage      STM      Hawkgrips      GH joint mobilizations      Foamroll      Manual PROM: Left Shoulder Flexion, scap, and ER (Within MD Parameters), Elbow Flexion and Extension  X 8' Pt inclined on table (pt cannot lie flat)         NMR re-education (25355)   CUES NEEDED   Plyoback      Therabar oscillations      Body Blade       Rhythmic Stabilization      Ball on the wall      Supine Codman's:  CW/CCW, Flex/Ext    X 30 each with 1#   Small, controlled range  Semi-inclined on table (pt cannot lie flat)                     Therapeutic Activity (94771)      Core training      Swiss ball activities                  Patient Education: diagnosis, prognosis, pt goals, rehab timeline, and surgical precautions. Also instructed pt and her friend on proper way to melonie/doff sling (7/7/20)  X 10'                          Therapeutic Exercise and NMR EXR  [x] (35121) Provided verbal/tactile cueing for activities related to strengthening, flexibility, endurance, ROM  for improvements in scapular, scapulothoracic and UE control with self care, reaching, carrying, lifting, house/yardwork, driving/computer work. [x] (09884) Provided verbal/tactile cueing for activities related to improving balance, coordination, kinesthetic sense, posture, motor skill, proprioception  to assist with  scapular, scapulothoracic and UE control with self care, reaching, carrying, lifting, house/yardwork, driving/computer work. Therapeutic Activities:    [x] (80923 or 06018) Provided verbal/tactile cueing for activities related to improving balance, coordination, kinesthetic sense, posture, motor skill, proprioception and motor activation to allow for proper function of scapular, scapulothoracic and UE control with self care, carrying, lifting, driving/computer work.      Home Exercise Program: [x] (87703) Reviewed/Progressed HEP activities related to strengthening, flexibility, endurance, ROM of scapular, scapulothoracic and UE control with self care, reaching, carrying, lifting, house/yardwork, driving/computer work - Reviewed and updated HEP with pt (see below). (12/8/20)  [x] (28653) Reviewed/Progressed HEP activities related to improving balance, coordination, kinesthetic sense, posture, motor skill, proprioception of scapular, scapulothoracic and UE control with self care, reaching, carrying, lifting, house/yardwork, driving/computer work        Access Code: [email or web address]   URL: [email or web address]   Date: 12/08/2020   Prepared by: Joseph Weaverutch     Exercises   Seated Shoulder Flexion Towel Slide at Table Top - 10 reps - 1 sets - 10 sec hold - 1x daily - 7x weekly   Seated Shoulder Scaption Slide at Table Top with Forearm in Neutral - 10 reps - 1 sets - 10 sec hold - 1x daily - 7x weekly   Seated Shoulder External Rotation AAROM with Dowel - 10 reps - 1 sets - 10 sec hold - 1x daily - 7x weekly   Standing Shoulder Shrugs with Dumbbells - 10 reps - 3 sets - 1x daily - 7x weekly   Scapular Retraction with Resistance - 10 reps - 3 sets - 1x daily - 7x weekly   Standing Bicep Curls Supinated with Dumbbells - 10 reps - 3 sets - 1x daily - 7x weekly   Isometric Shoulder Abduction at Wall - 10 reps - 1 sets - 10 sec hold - 1x daily - 7x weekly   Standing Shoulder Internal Rotation Stretch with Towel - 10 reps - 1 sets - 10 sec hold - 1x daily - 7x weekly   Supine Shoulder Flexion Extension AAROM with Dowel - 10 reps - 1 sets - 5 sec hold - 1x daily - 7x weekly   Standing Elbow Extension with Anchored Resistance at Wall - 10 reps - 3 sets - 1x daily - 7x weekly   Standing Shoulder Flexion Wall Walk - 5 reps - 2 sets - 1x daily - 7x weekly   Supine Shoulder Circles - 10 reps - 3 sets - 1x daily - 7x weekly   Shoulder Internal Rotation with Resistance - 10 reps - 3 sets - 1x daily - 7x weekly [x] Progressing: [] Met: [] Not Met: [] Adjusted    Therapist goals for Patient:   Short Term Goals: To be achieved in: 2 weeks  1. Independent in HEP and progression per patient tolerance, in order to prevent re-injury. [x] Progressing: [] Met: [] Not Met: [] Adjusted  2. Patient will have a decrease in left shoulder pain to 1-2/10 to facilitate improvement in movement, function, and ADLs as indicated by Functional Deficits. [] Progressing: [x] Met: [] Not Met: [] Adjusted  3. Patient will be able to melonie/doff Ultrasling independently. [] Progressing: [x] Met: [] Not Met: [] Adjusted      Long Term Goals: To be achieved in: 12 weeks  1. Disability index score of 15% or less for the Quick DASH to assist with reaching prior level of function. [] Progressing: [x] Met: [] Not Met: [] Adjusted  2. Patient will demonstrate an increase in left shoulder AROM to at least 90° flexion to allow for proper joint functioning as indicated by patients Functional Deficits. [] Progressing: [x] Met: [] Not Met:  [] Adjusted  3. Patient will demonstrate an increase in left Deltoid strength to 4+/5 to allow for proper functional mobility as indicated by patients Functional Deficits. [x] Progressing: [] Met: [] Not Met: [] Adjusted  4. Patient will have a decrease in left shoulder pain to 0-1/10 with daily activities. [] Progressing: [x] Met: [] Not Met: [] Adjusted  5. Pt will be able to perform all ADL's independently and without compensation. (patient specific functional goal)    [x] Progressing: [] Met: [] Not Met: [] Adjusted       Overall Progression Towards Functional goals/ Treatment Progress Update:  [] Patient is progressing as expected towards functional goals listed       [] Progression is slowed due to complexities/Impairments listed. [] Progression has been slowed due to co-morbidities.   [] Plan just implemented, too soon to assess goals progression <30days [] Goals require adjustment due to lack of progress  [] Patient is not progressing as expected and requires additional follow up with physician  [x] Other - pt has missed the last 2+ months of therapy due to severe cellulitis which required hospital admission and IV antibiotics for 2 weeks. Prognosis for POC: [x] Good [] Fair  [] Poor      Patient requires continued skilled intervention: [x] Yes  [] No    Treatment/Activity Tolerance:  [x] Patient able to complete treatment  [] Patient limited by fatigue  [] Patient limited by pain    [] Patient limited by other medical complications  [] Other:           PLAN: Cont therapy for ROM, strengthening, and endurance. 2x/week for 6 weeks for ROM, strengthening, scap stability exercises, manual therapy, HEP, pt education, and modalities prn (12/1/20)  [x] Continue per plan of care [] Alter current plan   [] Plan of care initiated [] Hold pending MD visit [] Discharge      Electronically signed by:  Pattie Farias PT     Physical Therapist Adore Rangel 421 #[id number]  Physical Therapist [place] License #[id number]    Note: If patient does not return for scheduled/ recommended follow up visits, this note will serve as a discharge from care along with most recent update on progress.

## 2020-12-24 ENCOUNTER — TREATMENT (OUTPATIENT)
Dept: PHYSICAL THERAPY | Age: 70
End: 2020-12-24
Payer: MEDICARE

## 2020-12-24 PROCEDURE — 97112 NEUROMUSCULAR REEDUCATION: CPT | Performed by: PHYSICAL THERAPIST

## 2020-12-24 PROCEDURE — 97530 THERAPEUTIC ACTIVITIES: CPT | Performed by: PHYSICAL THERAPIST

## 2020-12-24 PROCEDURE — G8427 DOCREV CUR MEDS BY ELIG CLIN: HCPCS | Performed by: PHYSICAL THERAPIST

## 2020-12-24 PROCEDURE — 97140 MANUAL THERAPY 1/> REGIONS: CPT | Performed by: PHYSICAL THERAPIST

## 2020-12-24 PROCEDURE — 97110 THERAPEUTIC EXERCISES: CPT | Performed by: PHYSICAL THERAPIST

## 2020-12-24 NOTE — PROGRESS NOTES
[facility]   Phone: [phone]    Fax: [phone]           Physical Therapy Treatment Note/ Progress Report:           Date:  2020    Patient Name:  Lacho Downey    :  1950  MRN: <[id number]>  Restrictions/Precautions:  S/P Left Reverse TSA  Medical/Treatment Diagnosis Information:  Diagnosis: S/P Left Reverse TSA (Sx: 20) (M19.012)   Treatment Diagnosis: Left Shoulder Pain (M25.512), Decreased left Shoulder ROM (M25.612), Decreased Left Shoulder Strength (F20.892)  Insurance/Certification information:  PT Insurance Information: Medicare, [employer]  Physician Information:  Referring Practitioner: Dr. Adore Arana  Has the plan of care been signed (Y/N):        [x]  Yes (Signed 12/3/20)  []  No        Date of Patient follow up with Physician: 2021      Is this a Progress Report:     []  Yes  [x]  No        If Yes:  Date Range for reporting period:  Beginning 20  Ending 20    Progress report will be due (10 Rx or 30 days whichever is less):       Recertification will be due (POC Duration  / 90 days whichever is less): 20         Visit # Insurance Allowable Auth Required   20 Medical Necessity []  Yes [x]  No        Functional Scale:    Date assessed:  20  Functional Assessment Tool Used: Agusto Boyleers  Score:  4.5% functional deficit (20)       (20)   Patient Age: 69years old    Patient Height: 5' 2\"  Patient Weight: 280 lbs   Patient BMI: 51.2      Pain (20)  [x]  Pain diagram was completed, pain was present and a follow up plan to address the pain is in the plan of care. ()   []  Pain diagram was completed and there was no pain present and therefore no follow up plan to address pain in the plan of care.  ()   []  Pain diagram was not completed and the reason for not completing the pain diagram is in the medical chart ()  []  Patient refused to participate []  Severe mental or physical capacity, patient is in urgent emergent situation and to complete the questionnaire would jeopardize the patient's health status        Functional Questionnaire (12/1/20)  [x]  Patient completed the functional outcome questionnaire and deficiencies were addressed in the plan of care. ()   []  Patient completed the outcome questionnaire and there were no functional deficits identified and therefore no plan of care is required. ()   []  Patient did not complete the functional outcome questionnaire and the reason why is documented in the medical chart. ()  []  Patient refused to participate   []  Patient unable to complete the questionnaire   []   A functional outcome assessment has been reported on within the last 30 days        Falls (12/1/20)  [x]  The patient has had no falls or 1 fall without injury in the past year. (1101F)   []  There is no documentation of fall in the medical chart (1101F,8P)     [] The patient has had 2 or more falls or one fall with injury in the past year that is documented in the medical chart. (1100F)  []  A falls risk assessment was completed and documented in the medical chart. (7963Q)   []  Falls were addressed in the plan of care. (3675Y)   []  A falls risk assessment was not completed and documented in the medical chart (8198T,3Q)   []   Falls were not addressed in the plan of care and reason was documented in the plan of care. (3255K 1P)        Medication (12/1/20)     [x] A list of current medications (including prescription, over the counter, herbal supplements, vitamin supplements, mineral supplements, dietary supplements) was reviewed with the patient and documented in the medical chart. ()     Falls Risk Assessment (30 days): (12/1/20)  [x] Falls Risk assessed and no intervention required.   [] Falls Risk assessed and Patient requires intervention due to being higher risk   TUG score (>12s at risk): [] Falls education provided, including       PQRS:   Reviewed medication list with patient. No changes reported by pt since last PT visit. (12/24/20)      Latex Allergy:  [x]NO      []YES  Preferred Language for Healthcare:   [x]English       []other:      Pain level:  0-1/10 (12/24/20)  Medication: Nothing for her shoulder       SUBJECTIVE:  Pt states she was up late last night and is very tired today. She still gets some soreness in her shoulder at times but overall her shoulder feels good. She is sleeping well at night. She is using her left arm more with daily act and around the house but she is still limited with reaching above shoulder level. (25+ weeks post-op)        OBJECTIVE:     *Pt is right hand dominant    (12/22/20)  ROM PROM AROM  Comment    L R L R    Flexion 120°  95 with mild compensatory shrug  *Within MD parameters   Abduction (scap) 115°  85°     ER 25°       IR NT  L4     Other        Other           (12/1/20)  Strength L R Comment   Flexion Not      Abduction Tested     ER Due to      IR Surgery     Deltoid 4-/5     Upper Trap      Lower Trap      Mid Trap      Rhomboids      Biceps      Triceps      Horizontal Abduction      Horizontal Adduction      Lats        (12/1/20)  Special Tests Results/Comment   Umu Not      Neers Tested   Speeds Due to   [facility] Surgery   Other    Neurologic Signs Pt reports no numbness or tingling in left UE but she c/o pins and needles in both hands which she thinks is from the Gabapentin. Functional Reach          Reflexes/Sensation: (7/7/20)   []Dermatomes/Myotomes intact    []Reflexes equal and normal bilaterally   [x]Other: Intact to light touch    Joint mobility: NA due to surgery (12/1/20)   []Normal    []Hypo   []Hyper    Palpation: No tenderness.  (12/1/20) Functional Mobility/Transfers: Less assistance needed with ADL's and self-care activities. She sometimes has to use her left arm to get up from a chair. She has been driving.  ([birth year])    Posture: Forward head, rounded shoulders. (12/1/20)    Bandages/Dressings/Incisions: Incision is closed. (12/1/20)    Gait: (include devices/WB status): WNL, no AD.  (12/1/20)    Orthopedic Special Tests: See above                         RESTRICTIONS/PRECAUTIONS: Left Reverse TSA (Sx: 6/29/20)  ~AAROM FE to 140°, ER to 40°  ~Isometric Deltoid Strengthening          Exercises/Interventions:     Therapeutic Ex (36196) Sets/sec Reps Notes/CUES   AD UE BIKE            STRETCHING/ROM      Pulleys: Flexion 5-10\" hold X 30    Table Slides: Flexion and scap  X 30 On inclined table   Wand: ER 0 - 40° 10\" hold X 10 Seated   UE [place]: Flexion  X 30 Standing with UE ranger on table   Supine Cane Flexion  X 20 Semi-inclined on table (pt cannot lie flat)   Doorway      Wall Slides  X 20 Sliding up wall   CBA      TP BB 10\"  X 10 Pulling across     Sleeper       Elbow AROM  D/C   Scapular Clock  D/C      ISOMETRICS      Gripping   D/CRetraction  D/C   Abduction      Flexion      Internal Rotation      External Rotation  D/C   Deltoid 10\" hold X 10 Submax         STRENGTHENING-PREs      Flexion - Eccentric  (Punching up then slowly lowering left arm down to the table) 1 set X 20 with 1# wt Semi-inclined on table   Abduction      Internal Rotation      External Rotation      Shrugs 3 sets X 10 with 4#    Biceps 3 sets X 10 with 4#    Triceps      Retraction      Extension      Horizontal Abduction in ER      Serratus                        THERABANDS/CABLE COLUMN      Rows 3 sets X 10 with blue band    Lats      Extension 3 sets X 10 with blue band    Internal Rotation 3 sets X 10 with orange band    External Rotation 3 sets  X 10 with orange band    Biceps      Triceps 3 sets X 10 with blue band    PNF Manual Intervention (43708)      Scar Massage      STM      Hawkgrips      GH joint mobilizations      Foamroll      Manual PROM: Left Shoulder Flexion, scap, and ER (Within MD Parameters), Elbow Flexion and Extension  X 8' Pt inclined on table (pt cannot lie flat)         NMR re-education (73194)   CUES NEEDED   Plyoback      Therabar oscillations      Body Blade       Rhythmic Stabilization      Ball on the wall      Supine Codman's:  CW/CCW, Flex/Ext    X 30 each with 1#   Small, controlled range  Semi-inclined on table (pt cannot lie flat)                     Therapeutic Activity (63216)      Core training      Swiss ball activities                  Patient Education: diagnosis, prognosis, pt goals, rehab timeline, and surgical precautions. Also instructed pt and her friend on proper way to melonie/doff sling (7/7/20)  X 10'                          Therapeutic Exercise and NMR EXR  [x] (99298) Provided verbal/tactile cueing for activities related to strengthening, flexibility, endurance, ROM  for improvements in scapular, scapulothoracic and UE control with self care, reaching, carrying, lifting, house/yardwork, driving/computer work. [x] (93053) Provided verbal/tactile cueing for activities related to improving balance, coordination, kinesthetic sense, posture, motor skill, proprioception  to assist with  scapular, scapulothoracic and UE control with self care, reaching, carrying, lifting, house/yardwork, driving/computer work. Therapeutic Activities:    [x] (84224 or 92726) Provided verbal/tactile cueing for activities related to improving balance, coordination, kinesthetic sense, posture, motor skill, proprioception and motor activation to allow for proper function of scapular, scapulothoracic and UE control with self care, carrying, lifting, driving/computer work.      Home Exercise Program: [x] (04327) Reviewed/Progressed HEP activities related to strengthening, flexibility, endurance, ROM of scapular, scapulothoracic and UE control with self care, reaching, carrying, lifting, house/yardwork, driving/computer work - Reviewed and updated HEP with pt (see below). (12/8/20)  [x] (25868) Reviewed/Progressed HEP activities related to improving balance, coordination, kinesthetic sense, posture, motor skill, proprioception of scapular, scapulothoracic and UE control with self care, reaching, carrying, lifting, house/yardwork, driving/computer work        Access Code: [email or web address]   URL: [email or web address]   Date: 12/08/2020   Prepared by: Sabiha Baeza     Exercises   Seated Shoulder Flexion Towel Slide at Table Top - 10 reps - 1 sets - 10 sec hold - 1x daily - 7x weekly   Seated Shoulder Scaption Slide at Table Top with Forearm in Neutral - 10 reps - 1 sets - 10 sec hold - 1x daily - 7x weekly   Seated Shoulder External Rotation AAROM with Dowel - 10 reps - 1 sets - 10 sec hold - 1x daily - 7x weekly   Standing Shoulder Shrugs with Dumbbells - 10 reps - 3 sets - 1x daily - 7x weekly   Scapular Retraction with Resistance - 10 reps - 3 sets - 1x daily - 7x weekly   Standing Bicep Curls Supinated with Dumbbells - 10 reps - 3 sets - 1x daily - 7x weekly   Isometric Shoulder Abduction at Wall - 10 reps - 1 sets - 10 sec hold - 1x daily - 7x weekly   Standing Shoulder Internal Rotation Stretch with Towel - 10 reps - 1 sets - 10 sec hold - 1x daily - 7x weekly   Supine Shoulder Flexion Extension AAROM with Dowel - 10 reps - 1 sets - 5 sec hold - 1x daily - 7x weekly   Standing Elbow Extension with Anchored Resistance at Wall - 10 reps - 3 sets - 1x daily - 7x weekly   Standing Shoulder Flexion Wall Walk - 5 reps - 2 sets - 1x daily - 7x weekly   Supine Shoulder Circles - 10 reps - 3 sets - 1x daily - 7x weekly   Shoulder Internal Rotation with Resistance - 10 reps - 3 sets - 1x daily - 7x weekly 1. Independent in HEP and progression per patient tolerance, in order to prevent re-injury. [x] Progressing: [] Met: [] Not Met: [] Adjusted  2. Patient will have a decrease in left shoulder pain to 1-2/10 to facilitate improvement in movement, function, and ADLs as indicated by Functional Deficits. [] Progressing: [x] Met: [] Not Met: [] Adjusted  3. Patient will be able to melonie/doff Ultrasling independently. [] Progressing: [x] Met: [] Not Met: [] Adjusted      Long Term Goals: To be achieved in: 12 weeks  1. Disability index score of 15% or less for the Quick DASH to assist with reaching prior level of function. [] Progressing: [x] Met: [] Not Met: [] Adjusted  2. Patient will demonstrate an increase in left shoulder AROM to at least 90° flexion to allow for proper joint functioning as indicated by patients Functional Deficits. [] Progressing: [x] Met: [] Not Met:  [] Adjusted  3. Patient will demonstrate an increase in left Deltoid strength to 4+/5 to allow for proper functional mobility as indicated by patients Functional Deficits. [x] Progressing: [] Met: [] Not Met: [] Adjusted  4. Patient will have a decrease in left shoulder pain to 0-1/10 with daily activities. [] Progressing: [x] Met: [] Not Met: [] Adjusted  5. Pt will be able to perform all ADL's independently and without compensation. (patient specific functional goal)    [x] Progressing: [] Met: [] Not Met: [] Adjusted       Overall Progression Towards Functional goals/ Treatment Progress Update:  [] Patient is progressing as expected towards functional goals listed       [] Progression is slowed due to complexities/Impairments listed. [] Progression has been slowed due to co-morbidities.   [] Plan just implemented, too soon to assess goals progression <30days   [] Goals require adjustment due to lack of progress  [] Patient is not progressing as expected and requires additional follow up with physician

## 2021-01-05 ENCOUNTER — OFFICE VISIT (OUTPATIENT)
Dept: ORTHOPEDIC SURGERY | Age: 71
End: 2021-01-05
Payer: MEDICARE

## 2021-01-05 ENCOUNTER — TREATMENT (OUTPATIENT)
Dept: PHYSICAL THERAPY | Age: 71
End: 2021-01-05
Payer: MEDICARE

## 2021-01-05 VITALS — HEIGHT: 62 IN | BODY MASS INDEX: 51.34 KG/M2 | WEIGHT: 279 LBS

## 2021-01-05 DIAGNOSIS — Z96.612 S/P REVERSE TOTAL SHOULDER ARTHROPLASTY, LEFT: Primary | ICD-10-CM

## 2021-01-05 DIAGNOSIS — M19.012 PRIMARY OSTEOARTHRITIS OF LEFT SHOULDER: Primary | ICD-10-CM

## 2021-01-05 DIAGNOSIS — M25.612 DECREASED ROM OF LEFT SHOULDER: ICD-10-CM

## 2021-01-05 DIAGNOSIS — R29.898 SHOULDER WEAKNESS: ICD-10-CM

## 2021-01-05 DIAGNOSIS — M25.512 LEFT SHOULDER PAIN, UNSPECIFIED CHRONICITY: ICD-10-CM

## 2021-01-05 PROCEDURE — G8417 CALC BMI ABV UP PARAM F/U: HCPCS | Performed by: ORTHOPAEDIC SURGERY

## 2021-01-05 PROCEDURE — 4040F PNEUMOC VAC/ADMIN/RCVD: CPT | Performed by: ORTHOPAEDIC SURGERY

## 2021-01-05 PROCEDURE — 97110 THERAPEUTIC EXERCISES: CPT | Performed by: PHYSICAL THERAPIST

## 2021-01-05 PROCEDURE — 97112 NEUROMUSCULAR REEDUCATION: CPT | Performed by: PHYSICAL THERAPIST

## 2021-01-05 PROCEDURE — G8539 DOC FUNCT AND CARE PLAN: HCPCS | Performed by: PHYSICAL THERAPIST

## 2021-01-05 PROCEDURE — 3017F COLORECTAL CA SCREEN DOC REV: CPT | Performed by: ORTHOPAEDIC SURGERY

## 2021-01-05 PROCEDURE — 1101F PT FALLS ASSESS-DOCD LE1/YR: CPT | Performed by: PHYSICAL THERAPIST

## 2021-01-05 PROCEDURE — 1036F TOBACCO NON-USER: CPT | Performed by: ORTHOPAEDIC SURGERY

## 2021-01-05 PROCEDURE — 97530 THERAPEUTIC ACTIVITIES: CPT | Performed by: PHYSICAL THERAPIST

## 2021-01-05 PROCEDURE — 99213 OFFICE O/P EST LOW 20 MIN: CPT | Performed by: ORTHOPAEDIC SURGERY

## 2021-01-05 PROCEDURE — G8427 DOCREV CUR MEDS BY ELIG CLIN: HCPCS | Performed by: PHYSICAL THERAPIST

## 2021-01-05 PROCEDURE — G8400 PT W/DXA NO RESULTS DOC: HCPCS | Performed by: ORTHOPAEDIC SURGERY

## 2021-01-05 PROCEDURE — 1090F PRES/ABSN URINE INCON ASSESS: CPT | Performed by: ORTHOPAEDIC SURGERY

## 2021-01-05 PROCEDURE — G8484 FLU IMMUNIZE NO ADMIN: HCPCS | Performed by: ORTHOPAEDIC SURGERY

## 2021-01-05 PROCEDURE — 1123F ACP DISCUSS/DSCN MKR DOCD: CPT | Performed by: ORTHOPAEDIC SURGERY

## 2021-01-05 PROCEDURE — G8427 DOCREV CUR MEDS BY ELIG CLIN: HCPCS | Performed by: ORTHOPAEDIC SURGERY

## 2021-01-05 PROCEDURE — 97140 MANUAL THERAPY 1/> REGIONS: CPT | Performed by: PHYSICAL THERAPIST

## 2021-01-05 NOTE — PROGRESS NOTES
[facility]   Phone: [phone]    Fax: [phone]     Physical Therapy Re-Certification Plan of Care    Dear  Dr. Stacy Estrada,    We had the pleasure of treating the following patient for physical therapy services at [address] [place] [address]. A summary of our findings can be found in the updated assessment below. This includes our plan of care. If you have any questions or concerns regarding these findings, please do not hesitate to contact me at the office phone number checked above.   Thank you for the referral.     Physician Signature:________________________________Date:__________________  By signing above (or electronic signature), therapists plan is approved by physician      Overall Response to Treatment:   [x]Patient is responding well to treatment and improvement is noted with regards to goals   []Patient should continue to improve in reasonable time if they continue HEP   []Patient has plateaued and is no longer responding to skilled PT intervention    []Patient is getting worse and would benefit from return to referring MD   []Patient unable to adhere to initial POC   []Other:          Physical Therapy Treatment Note/ Progress Report:           Date:  2021    Patient Name:  Timbo Camarillo    :  1950  MRN: <[id number]>  Restrictions/Precautions:  S/P Left Reverse TSA  Medical/Treatment Diagnosis Information:  Diagnosis: S/P Left Reverse TSA (Sx: 20) (M19.012)   Treatment Diagnosis: Left Shoulder Pain (M25.512), Decreased left Shoulder ROM (M25.612), Decreased Left Shoulder Strength (V22.709)  Insurance/Certification information:  PT Insurance Information: Medicare, [address] [facility]  Physician Information:  Referring Practitioner: Dr. Stacy Estrada  Has the plan of care been signed (Y/N):        [x]  Yes (Signed 12/3/20)  []  No        Date of Patient follow up with Physician: Today; 21      Is this a Progress Report:     [x]  Yes  []  No If Yes:  Date Range for reporting period:  Beginning 7/7/20  Ending 1/5/21    Progress report will be due (10 Rx or 30 days whichever is less): 9/0/25      Recertification will be due (POC Duration  / 90 days whichever is less): 2/2/21         Visit # Insurance Allowable Auth Required   1 for 2021  (21 total) Medical Necessity []  Yes [x]  No        Functional Scale:    Date assessed:  1/5/21  Functional Assessment Tool Used: Tevin Postal  Score:  4.5% functional deficit (1/5/21)       (7/7/20)   Patient Age: 69years old    Patient Height: 5' 2\"  Patient Weight: 280 lbs   Patient BMI: 51.2      Pain (1/5/21)  [x]  Pain diagram was completed, pain was present and a follow up plan to address the pain is in the plan of care. ()   []  Pain diagram was completed and there was no pain present and therefore no follow up plan to address pain in the plan of care. ()   []  Pain diagram was not completed and the reason for not completing the pain diagram is in the medical chart ()  []  Patient refused to participate   []  Severe mental or physical capacity, patient is in urgent emergent situation and to complete the questionnaire would jeopardize the patient's health status        Functional Questionnaire (1/5/21)  [x]  Patient completed the functional outcome questionnaire and deficiencies were addressed in the plan of care. ()   []  Patient completed the outcome questionnaire and there were no functional deficits identified and therefore no plan of care is required. ()   []  Patient did not complete the functional outcome questionnaire and the reason why is documented in the medical chart. ()  []  Patient refused to participate   []  Patient unable to complete the questionnaire   []   A functional outcome assessment has been reported on within the last 30 days        Falls (1/5/21)  [x]  The patient has had no falls or 1 fall without injury in the past year.  (1101F) []  There is no documentation of fall in the medical chart (1101F,8P)     [] The patient has had 2 or more falls or one fall with injury in the past year that is documented in the medical chart. (1100F)  []  A falls risk assessment was completed and documented in the medical chart. (6431J)   []  Falls were addressed in the plan of care. (5031T)   []  A falls risk assessment was not completed and documented in the medical chart (3509V,1Q)   []   Falls were not addressed in the plan of care and reason was documented in the plan of care. (9340W 1P)        Medication (1/5/21)     [x] A list of current medications (including prescription, over the counter, herbal supplements, vitamin supplements, mineral supplements, dietary supplements) was reviewed with the patient and documented in the medical chart. ()     Falls Risk Assessment (30 days): (1/5/21)  [x] Falls Risk assessed and no intervention required. [] Falls Risk assessed and Patient requires intervention due to being higher risk   TUG score (>12s at risk):     [] Falls education provided, including       PQRS:   Reviewed medication list with patient. No changes reported by pt since last PT visit.    (1/5/21)      Latex Allergy:  [x]NO      []YES  Preferred Language for Healthcare:   [x]English       []other:      Pain level:  0-1/10 (1/5/21)  Medication: Nothing for her shoulder       SUBJECTIVE:  Pt saw Dr. Codie Roa today prior to therapy. Dr. Codie Roa was pleased with her progress. He wants her to continue therapy focusing on strengthening. Pt states her shoulder gets sore if she uses it a lot around the house and after she does her exercises but ice helps. She is still limited with reaching above shoulder level. She can use her left hand for toileting now which she is very happy about (this was one of her biggest goals).              (27 weeks post-op)        OBJECTIVE:     *Pt is right hand dominant    (1/5/21)  ROM PROM AROM  Comment    L R L R Doorway      Wall Slides  X 20 Sliding up wall   CBA      TP BB 10\"  X 10 Pulling across     Sleeper       Elbow AROM  D/C   Scapular Clock  D/C      ISOMETRICS      Gripping   D/CRetraction  D/C   Abduction      Flexion      Internal Rotation      External Rotation  D/C   Deltoid 10\" hold X 10 Submax         STRENGTHENING-PREs      Flexion - Eccentric  (Punching up then slowly lowering left arm down to the table) 1 set X 20 with 1# wt Semi-inclined on table   Abduction      Internal Rotation      External Rotation      Shrugs 3 sets X 10 with 4#    Biceps 3 sets X 10 with 4#    Triceps      Retraction      Extension      Horizontal Abduction in ER      Serratus                        THERABANDS/CABLE COLUMN      Rows 3 sets X 10 with blue band    Lats      Extension 3 sets X 10 with blue band    Internal Rotation 3 sets X 10 with red band    External Rotation 3 sets  X 10 with orange band    Biceps      Triceps 3 sets X 10 with blue band    PNF                                    Manual Intervention (73105)      Scar Massage      STM      Hawkgrips      GH joint mobilizations      Foamroll      Manual PROM: Left Shoulder Flexion, scap, and ER (Within MD Parameters), Elbow Flexion and Extension  X 8' Pt inclined on table (pt cannot lie flat)         NMR re-education (67899)   CUES NEEDED   Plyoback      Therabar oscillations      Body Blade       Rhythmic Stabilization      Ball on the wall      Supine Codman's:  CW/CCW, Flex/Ext    X 30 each with 1#   Small, controlled range  Semi-inclined on table (pt cannot lie flat)                     Therapeutic Activity (68864)      Core training      Swiss ball activities                  Patient Education: diagnosis, prognosis, pt goals, rehab timeline, and surgical precautions.   Also instructed pt and her friend on proper way to melonie/doff sling (7/7/20)  X 10'                          Therapeutic Exercise and NMR EXR Seated Shoulder External Rotation AAROM with Dowel - 10 reps - 1 sets - 10 sec hold - 1x daily - 7x weekly   Standing Shoulder Shrugs with Dumbbells - 10 reps - 3 sets - 1x daily - 7x weekly   Scapular Retraction with Resistance - 10 reps - 3 sets - 1x daily - 7x weekly   Standing Bicep Curls Supinated with Dumbbells - 10 reps - 3 sets - 1x daily - 7x weekly   Isometric Shoulder Abduction at Wall - 10 reps - 1 sets - 10 sec hold - 1x daily - 7x weekly   Standing Shoulder Internal Rotation Stretch with Towel - 10 reps - 1 sets - 10 sec hold - 1x daily - 7x weekly   Supine Shoulder Flexion Extension AAROM with Dowel - 10 reps - 1 sets - 5 sec hold - 1x daily - 7x weekly   Standing Elbow Extension with Anchored Resistance at Wall - 10 reps - 3 sets - 1x daily - 7x weekly   Standing Shoulder Flexion Wall Walk - 5 reps - 2 sets - 1x daily - 7x weekly   Supine Shoulder Circles - 10 reps - 3 sets - 1x daily - 7x weekly   Shoulder Internal Rotation with Resistance - 10 reps - 3 sets - 1x daily - 7x weekly   Supine Shoulder Flexion Extension Full Range AROM - 10 reps - 1 sets - 1x daily - 7x weekly   Shoulder External Rotation with Anchored Resistance - 10 reps - 3 sets - 1x daily - 7x weekly           Manual Treatments:  PROM / STM / Oscillations-Mobs:  G-I, II, III, IV (PA's, Inf., Post.)  [x] (33470) Provided manual therapy to mobilize soft tissue/joints of cervical/CT, scapular GHJ and UE for the purpose of modulating pain, promoting relaxation,  increasing ROM, reducing/eliminating soft tissue swelling/inflammation/restriction, improving soft tissue extensibility and allowing for proper ROM for normal function with self care, reaching, carrying, lifting, house/yardwork, driving/computer work    Modalities:  ICE x 15' for the left shoulder   [] GAME READY (VASO)- for significant edema, swelling, pain control.     Charges:  Timed Code Treatment Minutes: 61'   Total Treatment Minutes: 76' [] EVAL (LOW) 70437 (typically 20 minutes face-to-face)  [] EVAL (MOD) 30335 (typically 30 minutes face-to-face)  [] EVAL (HIGH) 90842 (typically 45 minutes face-to-face)  [] RE-EVAL     [x] CY(73457) x 1 (22')   [] IONTO  [x] NMR (93497) x 1 (15')  [] VASO  [x] Manual (76701) x 1  (8')   [] Other:   [x] TA x  1 (15')  [] Mech Traction (08056)  [] ES(attended) (00060)     [] ES (un) (40654):         ASSESSMENT:  Pt is doing well with the exercises and she continues to make good progress in therapy. She still gets fatigued with her program but she is able to perform more reps with fewer rest breaks. Improvement noted in left shoulder ROM and strength upon re-assessment today. Strength deficits are still present limiting her ability to reach above shoulder level. Pt would continue to benefit from skilled therapy to increase strength and endurance for [place] act. GOALS:  (Goals updated 1/5/21)  Patient stated goal:  \"Increase function; be able to use my left arm for toileting; be self-sufficient\"  [x] Progressing: [] Met: [] Not Met: [] Adjusted    Therapist goals for Patient:   Short Term Goals: To be achieved in: 2 weeks  1. Independent in HEP and progression per patient tolerance, in order to prevent re-injury. [] Progressing: [x] Met: [] Not Met: [] Adjusted  2. Patient will have a decrease in left shoulder pain to 1-2/10 to facilitate improvement in movement, function, and ADLs as indicated by Functional Deficits. [] Progressing: [x] Met: [] Not Met: [] Adjusted  3. Patient will be able to melonie/doff Ultrasling independently. [] Progressing: [x] Met: [] Not Met: [] Adjusted      Long Term Goals: To be achieved in: 12 weeks  1. Disability index score of 15% or less for the Quick DASH to assist with reaching prior level of function.    [] Progressing: [x] Met: [] Not Met: [] Adjusted 2. Patient will demonstrate an increase in left shoulder AROM to at least 90° flexion to allow for proper joint functioning as indicated by patients Functional Deficits. [] Progressing: [x] Met: [] Not Met:  [] Adjusted  3. Patient will demonstrate an increase in left Deltoid strength to 4+/5 to allow for proper functional mobility as indicated by patients Functional Deficits. [x] Progressing: [] Met: [] Not Met: [] Adjusted  4. Patient will have a decrease in left shoulder pain to 0-1/10 with daily activities. [] Progressing: [x] Met: [] Not Met: [] Adjusted  5. Pt will be able to perform all ADL's independently and without compensation. (patient specific functional goal)    [x] Progressing: [] Met: [] Not Met: [] Adjusted       Overall Progression Towards Functional goals/ Treatment Progress Update:  [x] Patient is progressing as expected towards functional goals listed       [] Progression is slowed due to complexities/Impairments listed. [] Progression has been slowed due to co-morbidities. [] Plan just implemented, too soon to assess goals progression <30days   [] Goals require adjustment due to lack of progress  [] Patient is not progressing as expected and requires additional follow up with physician  [] Other         Prognosis for POC: [x] Good [] Fair  [] Poor      Patient requires continued skilled intervention: [x] Yes  [] No    Treatment/Activity Tolerance:  [x] Patient able to complete treatment  [] Patient limited by fatigue  [] Patient limited by pain    [] Patient limited by other medical complications  [] Other:           PLAN: Cont therapy for ROM, strengthening, and endurance.            1-2x/week for 4 weeks for ROM, strengthening, scap stability exercises, manual therapy, HEP, pt education, and modalities prn (1/5/21)  [x] Continue per plan of care [] Alter current plan   [] Plan of care initiated [] Hold pending MD visit [] Discharge      Electronically signed by:  Rafa Grimm, PT Physical Therapist Adore Rangel 421 [id number]  Physical Therapist [facility] License #[id number]    Note: If patient does not return for scheduled/ recommended follow up visits, this note will serve as a discharge from care along with most recent update on progress.

## 2021-01-05 NOTE — LETTER
Shoulder Elbow Rehabilitation Referral    Patient Name: Angie Higuera      YOB: 1950    Diagnosis: Left reverse total shoulder arthroplasty on 6/29/2020  Precautions: None  Date of Prescription: 1/5/2021    [x] Evaluate and Treat    Post Op Instructions:  [] Continuous passive motion (CPM)  [] Elbow range of motion  [] Exercise in plane of scapula   []  Strengthening     [] Pulley and instruction    [] Home exercise program (copy to patient)   [] Sling when arm at risk  [] Sling or brace at all times   [x] AAROM: Forward elevation            [x] AAROM: External rotation    [] Isometric external rotator strengthening [x] AAROM: internal rotation: up the back  [] Isometric abductor strengthening  [x] AAROM: Internal abduction     [] Isometric internal rotator strengthening [] AAROM: cross-body adduction             Stretching:     Strengthening:  [x] Four quadrant (FE, ER, IR, CBA)  [] Sleeper stretch    [x] Rotator cuff (ER, IR, Abd)  [] Forward Elevation    [] External Rotators     [] External Rotation    [x] Internal Rotators  [] Internal Rotation: up/back   [] Abductors     [] Internal Rotation: supine in abduction  [] Flexors  [] Cross-body abduction    [] Extensors  [] Pendulum (FE, Abd/Add, cw/ccw)  [] Scapular Stabilizers   [] Wall-walking (FE, Abd)    [] Shoulder shrugs     [] Table slides      [] Rhomboid pinch  [] Elbow (flex, ext, pron, sup)    [] Lat.  Pull downs     [] Medial epicondylitis program    [] Forward punch   [] Lateral epicondylitis program    [] Internal rotators     [] Progressive resistive exercises  [] Bench Press        [] Bench press plus  Activities:     [] Lateral pull-downs  [] Rowing     [] Progressive two-hand supine press  [] Stepper/Exercise bike   [] Biceps: curls/supination  [] Swimming  [] Water exercises    Modalities:     Return to Sport:  [] Ultrasound     [] Plyometrics  [] Iontophoresis     [] Rhythmic stabilization [] Moist heat     [] Core strengthening   [] Massage     [] Sports specific program:      [] Cryotherapy      [] Electrical stimulation     [] Paraffin  [] Whirlpool  [] TENS    [] Home exercise program (copy to patient).    Perform exercises for:        minutes          times/day  [] Supervised physical therapy  Frequency: []  1x week  [x] 2x week  [] 3x week  [] Other:   Duration: [] 2 weeks   [] 4 weeks  [x] 6 weeks  [] Other:     Additional Instructions:

## 2021-01-05 NOTE — PROGRESS NOTES
History of Present Illness: Juan Daniel Gagnon is a 69 y.o. female here for follow-up of her left shoulder. Patient is now 6 months status post left reverse total shoulder arthroplasty which was done on 6/29/2020 by Dr. Ran Diego. She has been attending formal supervised physical therapy for the last 1 month and has been going 2 times per week. She has made some progress with her range of motion and strength and is currently happy with her progress. Medical History:  Patient's medications, allergies, past medical, surgical, social and family histories were reviewed and updated as appropriate. Pertinent items are noted in HPI  Review of systems reviewed from Patient History Form dated on 1/5/2021 and available in the patient's chart under the Media tab. Vital Signs: There were no vitals filed for this visit. Neuro: Alert & oriented x 3,  normal,  no focal deficits noted. Normal affect. Eyes: sclera clear  Ears: Normal external ear  Mouth:  No perioral lesions  Pulm: Respirations unlabored and regular  Pulse: Regular rate and rhythm   Skin: Warm, well perfused      Constitutional: The physical examination finds the patient to be well-developed and well-nourished. The patient is alert and oriented x3 and was cooperative throughout the visit. Left shoulder exam    Inspection: Incision is well-healed without erythema, drainage, swelling or warmth    Palpation: No tenderness to palpation about the left shoulder    Range of Motion: 110 degrees of active forward flexion, 90 degrees of active abduction, 35 degrees of active external rotation and internal rotation to her belt loops    Strength: 4+/5    Stability: No anterior instability. No posterior instability. Special Tests: Negative Taylor    Other findings: The skin is warm dry and well perfused. 2+ radial pulse. Sensation is intact to light touch over the deltoid. Self assessment questionnaires were completed today.     Radiology: No new imaging today    Assessment :  69 y.o. female status post left reverse total shoulder arthroplasty on 6/29/2020, doing well    Impression:  Encounter Diagnosis   Name Primary?  S/P reverse total shoulder arthroplasty, left Yes       Office Procedures:  No orders of the defined types were placed in this encounter. Plan: We are very pleased with Kae's success so far. She is on track for a good recovery and has gained a lot of range of motion since we saw her last.  She is still seeing good gains from supervised physical therapy and we encouraged her to continue to go 2 times a week until she no longer sees the same progress. We no longer need to see her as often we will plan on seeing her back at her 1 year adrienne in June 2021. We would like to hear from her sooner if any problems arise. Dariana Ingrid is in agreement with this plan. All questions were answered to patient's satisfaction and was encouraged to call with any further questions. Greater than 20 minutes were expended during today's encounter. Shereen Jeffmings, [address] [facility]     01/05/21  3:15 PM    The encounter with Josue Mancera was supervised by Dr Anisa Hernandez who personally examined the patient and reviewed the plan. This dictation was performed with a verbal recognition program (DRAGON) and it was checked for errors. It is possible that there are still dictated errors within this office note. If so, please bring any errors to my attention for an addendum.   All efforts were made to ensure that this office note is accurate.   _____________ I was physically present and personally supervised the Orthopaedic Sports Medicine Fellow in the evaluation and development of a treatment plan for this patient. I personally interviewed the patient and performed a physical examination. In addition, I discussed the patient's condition and treatment options with them. I have also reviewed and agree with the past medical, family and social history unless otherwise noted. All of the patient's questions were answered.          Fouzia Fernandes MD, PhD  1/5/2021

## 2021-01-07 ENCOUNTER — TREATMENT (OUTPATIENT)
Dept: PHYSICAL THERAPY | Age: 71
End: 2021-01-07
Payer: MEDICARE

## 2021-01-07 DIAGNOSIS — M25.512 LEFT SHOULDER PAIN, UNSPECIFIED CHRONICITY: ICD-10-CM

## 2021-01-07 DIAGNOSIS — R29.898 SHOULDER WEAKNESS: ICD-10-CM

## 2021-01-07 DIAGNOSIS — M25.612 DECREASED ROM OF LEFT SHOULDER: ICD-10-CM

## 2021-01-07 DIAGNOSIS — M19.012 PRIMARY OSTEOARTHRITIS OF LEFT SHOULDER: Primary | ICD-10-CM

## 2021-01-07 PROCEDURE — G8427 DOCREV CUR MEDS BY ELIG CLIN: HCPCS | Performed by: PHYSICAL THERAPIST

## 2021-01-07 PROCEDURE — 97140 MANUAL THERAPY 1/> REGIONS: CPT | Performed by: PHYSICAL THERAPIST

## 2021-01-07 PROCEDURE — 97110 THERAPEUTIC EXERCISES: CPT | Performed by: PHYSICAL THERAPIST

## 2021-01-07 PROCEDURE — 97530 THERAPEUTIC ACTIVITIES: CPT | Performed by: PHYSICAL THERAPIST

## 2021-01-07 PROCEDURE — 97112 NEUROMUSCULAR REEDUCATION: CPT | Performed by: PHYSICAL THERAPIST

## 2021-01-07 NOTE — PROGRESS NOTES
[facility]   Phone: [phone]    Fax: [phone]         Physical Therapy Treatment Note/ Progress Report:           Date:  2021    Patient Name:  Timbo Camarillo    :  1950  MRN: <[id number]>  Restrictions/Precautions:  S/P Left Reverse TSA  Medical/Treatment Diagnosis Information:  Diagnosis: S/P Left Reverse TSA (Sx: 20) (M19.012)   Treatment Diagnosis: Left Shoulder Pain (M25.512), Decreased left Shoulder ROM (M25.612), Decreased Left Shoulder Strength (E75.973)  Insurance/Certification information:  PT Insurance Information: Medicare, [facility] [facility]  Physician Information:  Referring Practitioner: Dr. Stacy Estrada  Has the plan of care been signed (Y/N):        [x]  Yes (POC signed 21)  []  No        Date of Patient follow up with Physician: 21      Is this a Progress Report:     []  Yes  [x]  No        If Yes:  Date Range for reporting period:  Beginning 20  Ending 21    Progress report will be due (10 Rx or 30 days whichever is less): 95      Recertification will be due (POC Duration  / 90 days whichever is less): 21         Visit # Insurance Allowable Auth Required   2 for   (22 total) Medical Necessity []  Yes [x]  No        Functional Scale:    Date assessed:  21  Functional Assessment Tool Used: Rodney Akersley  Score:  4.5% functional deficit (21)       (20)   Patient Age: 69years old    Patient Height: 5' 2\"  Patient Weight: 280 lbs   Patient BMI: 51.2      Pain (21)  [x]  Pain diagram was completed, pain was present and a follow up plan to address the pain is in the plan of care. ()   []  Pain diagram was completed and there was no pain present and therefore no follow up plan to address pain in the plan of care.  ()   []  Pain diagram was not completed and the reason for not completing the pain diagram is in the medical chart ()  []  Patient refused to participate []  Severe mental or physical capacity, patient is in urgent emergent situation and to complete the questionnaire would jeopardize the patient's health status        Functional Questionnaire (1/5/21)  [x]  Patient completed the functional outcome questionnaire and deficiencies were addressed in the plan of care. ()   []  Patient completed the outcome questionnaire and there were no functional deficits identified and therefore no plan of care is required. ()   []  Patient did not complete the functional outcome questionnaire and the reason why is documented in the medical chart. ()  []  Patient refused to participate   []  Patient unable to complete the questionnaire   []   A functional outcome assessment has been reported on within the last 30 days        Falls (1/5/21)  [x]  The patient has had no falls or 1 fall without injury in the past year. (1101F)   []  There is no documentation of fall in the medical chart (1101F,8P)     [] The patient has had 2 or more falls or one fall with injury in the past year that is documented in the medical chart. (1100F)  []  A falls risk assessment was completed and documented in the medical chart. (2197Q)   []  Falls were addressed in the plan of care. (4080J)   []  A falls risk assessment was not completed and documented in the medical chart (9290H,0E)   []   Falls were not addressed in the plan of care and reason was documented in the plan of care. (5156M 1P)        Medication (1/5/21)     [x] A list of current medications (including prescription, over the counter, herbal supplements, vitamin supplements, mineral supplements, dietary supplements) was reviewed with the patient and documented in the medical chart. ()     Falls Risk Assessment (30 days): (1/5/21)  [x] Falls Risk assessed and no intervention required.   [] Falls Risk assessed and Patient requires intervention due to being higher risk   TUG score (>12s at risk): [] Falls education provided, including       PQRS:   Reviewed medication list with patient. No changes reported by pt since last PT visit.    (1/7/21)      Latex Allergy:  [x]NO      []YES  Preferred Language for Healthcare:   [x]English       []other:      Pain level:  0-2/10 (1/7/21)  Medication: Nothing for her shoulder       SUBJECTIVE:  Pt c/o soreness in her upper arm/Deltoid region which she attributes to using her left arm more around the house. She is still limited with reaching above shoulder level. Both her hands are swollen in the morning when she wakes up and she has trouble closing her hands to make a fist.  Swelling gets better once she gets up and starts moving. She also has numbness and tingling in both hands and has trouble putting her earrings in. She was told these symptoms are related to the antibiotic she was given while in the hospital for her legs. (27+ weeks post-op)        OBJECTIVE:     *Pt is right hand dominant    (1/5/21)  ROM PROM AROM  Comment    L R L R    Flexion 125°  100° with mild compensatory shrug  *Within MD parameters   Abduction (scap) 120°  90°     ER 30°       IR NT  Belt line     Other        Other           (1/5/21)  Strength L R Comment   Flexion      Abduction      ER 3+/5     IR 4/5     Deltoid 4/5     Upper Trap      Lower Trap      Mid Trap      Rhomboids      Biceps 4/5     Triceps 4/5     Horizontal Abduction      Horizontal Adduction      Lats        (1/5/21)  Special Tests Results/Comment   [employer] Lang    Speeds    OBriens    Other    Neurologic Signs Pt reports no numbness or tingling in left UE but she c/o pins and needles in both hands which she thinks is from the Gabapentin.    Functional Reach          Reflexes/Sensation: (7/7/20)   []Dermatomes/Myotomes intact    []Reflexes equal and normal bilaterally   [x]Other: Intact to light touch    Joint mobility:    []Normal    []Hypo   []Hyper    Palpation: No tenderness. (1/5/21) Functional Mobility/Transfers: ADL's and self-care activities have gotten much easier and pt is able to use her left hand for toileting. She is still limited with reaching above shoulder level. She has to use both arms to get up from a chair.   (1/5/21)    Posture:  Forward head, rounded shoulders.  (1/5/21)    Bandages/Dressings/Incisions: Incision is healed. (1/5/21)    Gait: (include devices/WB status): WNL, no AD. (1/5/21)    Orthopedic Special Tests: See above                         RESTRICTIONS/PRECAUTIONS: Left Reverse TSA (Sx: 6/29/20)  ~AAROM FE to 140°, ER to 40°  ~Isometric Deltoid Strengthening          Exercises/Interventions:     Therapeutic Ex (87927) Sets/sec Reps Notes/CUES   AD UE BIKE            STRETCHING/ROM      Pulleys: Flexion 5-10\" hold X 30    Table Slides: Flexion and scap  X 30 On inclined table   Wand: ER 0 - 40° 10\" hold X 10 Seated   UE [place]: Flexion  X 30 Standing with UE ranger on table   Supine Cane Flexion  X 20 Semi-inclined on table (pt cannot lie flat)   Doorway      Wall Slides  X 20 Sliding up wall   CBA      TP BB 10\"  X 10 Pulling across     Sleeper       Elbow AROM  D/C   Scapular Clock  D/C      ISOMETRICS      Gripping   D/CRetraction  D/C   Abduction      Flexion      Internal Rotation      External Rotation  D/C   Deltoid 10\" hold X 10 Submax         STRENGTHENING-PREs      Flexion - Eccentric  (Punching up then slowly lowering left arm down to the table) 1 set X 20 with 1# wt Semi-inclined on table   Abduction      Internal Rotation      External Rotation      Shrugs 3 sets X 10 with 4#    Biceps 3 sets X 10 with 4#    Triceps      Retraction      Extension      Horizontal Abduction in ER      Serratus                        THERABANDS/CABLE COLUMN      Rows 3 sets X 10 with blue band    Lats      Extension 3 sets X 10 with blue band    Internal Rotation 3 sets X 10 with red band    External Rotation 3 sets  X 10 with red band    Biceps [x] (80000) Reviewed/Progressed HEP activities related to strengthening, flexibility, endurance, ROM of scapular, scapulothoracic and UE control with self care, reaching, carrying, lifting, house/yardwork, driving/computer work - Reviewed and updated HEP with pt (see below). (12/8/20)  [x] (29418) Reviewed/Progressed HEP activities related to improving balance, coordination, kinesthetic sense, posture, motor skill, proprioception of scapular, scapulothoracic and UE control with self care, reaching, carrying, lifting, house/yardwork, driving/computer work        Access Code: [email or web address]   URL: [email or web address]   Date: 12/08/2020   Prepared by: Christina Valera     Exercises   Seated Shoulder Flexion Towel Slide at Table Top - 10 reps - 1 sets - 10 sec hold - 1x daily - 7x weekly   Seated Shoulder Scaption Slide at Table Top with Forearm in Neutral - 10 reps - 1 sets - 10 sec hold - 1x daily - 7x weekly   Seated Shoulder External Rotation AAROM with Dowel - 10 reps - 1 sets - 10 sec hold - 1x daily - 7x weekly   Standing Shoulder Shrugs with Dumbbells - 10 reps - 3 sets - 1x daily - 7x weekly   Scapular Retraction with Resistance - 10 reps - 3 sets - 1x daily - 7x weekly   Standing Bicep Curls Supinated with Dumbbells - 10 reps - 3 sets - 1x daily - 7x weekly   Isometric Shoulder Abduction at Wall - 10 reps - 1 sets - 10 sec hold - 1x daily - 7x weekly   Standing Shoulder Internal Rotation Stretch with Towel - 10 reps - 1 sets - 10 sec hold - 1x daily - 7x weekly   Supine Shoulder Flexion Extension AAROM with Dowel - 10 reps - 1 sets - 5 sec hold - 1x daily - 7x weekly   Standing Elbow Extension with Anchored Resistance at Wall - 10 reps - 3 sets - 1x daily - 7x weekly   Standing Shoulder Flexion Wall Walk - 5 reps - 2 sets - 1x daily - 7x weekly   Supine Shoulder Circles - 10 reps - 3 sets - 1x daily - 7x weekly   Shoulder Internal Rotation with Resistance - 10 reps - 3 sets - 1x daily - 7x weekly Short Term Goals: To be achieved in: 2 weeks  1. Independent in HEP and progression per patient tolerance, in order to prevent re-injury. [] Progressing: [x] Met: [] Not Met: [] Adjusted  2. Patient will have a decrease in left shoulder pain to 1-2/10 to facilitate improvement in movement, function, and ADLs as indicated by Functional Deficits. [] Progressing: [x] Met: [] Not Met: [] Adjusted  3. Patient will be able to melonie/doff Ultrasling independently. [] Progressing: [x] Met: [] Not Met: [] Adjusted      Long Term Goals: To be achieved in: 12 weeks  1. Disability index score of 15% or less for the Quick DASH to assist with reaching prior level of function. [] Progressing: [x] Met: [] Not Met: [] Adjusted  2. Patient will demonstrate an increase in left shoulder AROM to at least 90° flexion to allow for proper joint functioning as indicated by patients Functional Deficits. [] Progressing: [x] Met: [] Not Met:  [] Adjusted  3. Patient will demonstrate an increase in left Deltoid strength to 4+/5 to allow for proper functional mobility as indicated by patients Functional Deficits. [x] Progressing: [] Met: [] Not Met: [] Adjusted  4. Patient will have a decrease in left shoulder pain to 0-1/10 with daily activities. [] Progressing: [x] Met: [] Not Met: [] Adjusted  5. Pt will be able to perform all ADL's independently and without compensation. (patient specific functional goal)    [x] Progressing: [] Met: [] Not Met: [] Adjusted       Overall Progression Towards Functional goals/ Treatment Progress Update:  [x] Patient is progressing as expected towards functional goals listed       [] Progression is slowed due to complexities/Impairments listed. [] Progression has been slowed due to co-morbidities.   [] Plan just implemented, too soon to assess goals progression <30days   [] Goals require adjustment due to lack of progress [] Patient is not progressing as expected and requires additional follow up with physician  [] Other         Prognosis for POC: [x] Good [] Fair  [] Poor      Patient requires continued skilled intervention: [x] Yes  [] No    Treatment/Activity Tolerance:  [x] Patient able to complete treatment  [] Patient limited by fatigue  [] Patient limited by pain    [] Patient limited by other medical complications  [] Other:           PLAN: Cont therapy for ROM, strengthening, and endurance. 1-2x/week for 4 weeks for ROM, strengthening, scap stability exercises, manual therapy, HEP, pt education, and modalities prn (1/5/21)  [x] Continue per plan of care [] Alter current plan   [] Plan of care initiated [] Hold pending MD visit [] Discharge      Electronically signed by:  Amado Lindsay PT     Physical Therapist Adore Rangel 421 #[id number]  Physical Therapist [place] License #[id number]    Note: If patient does not return for scheduled/ recommended follow up visits, this note will serve as a discharge from care along with most recent update on progress.

## 2021-01-12 ENCOUNTER — TREATMENT (OUTPATIENT)
Dept: PHYSICAL THERAPY | Age: 71
End: 2021-01-12
Payer: MEDICARE

## 2021-01-12 DIAGNOSIS — M25.612 DECREASED ROM OF LEFT SHOULDER: ICD-10-CM

## 2021-01-12 DIAGNOSIS — M25.512 LEFT SHOULDER PAIN, UNSPECIFIED CHRONICITY: ICD-10-CM

## 2021-01-12 DIAGNOSIS — M19.012 PRIMARY OSTEOARTHRITIS OF LEFT SHOULDER: Primary | ICD-10-CM

## 2021-01-12 DIAGNOSIS — R29.898 SHOULDER WEAKNESS: ICD-10-CM

## 2021-01-12 PROCEDURE — 97110 THERAPEUTIC EXERCISES: CPT | Performed by: PHYSICAL THERAPIST

## 2021-01-12 PROCEDURE — 97112 NEUROMUSCULAR REEDUCATION: CPT | Performed by: PHYSICAL THERAPIST

## 2021-01-12 PROCEDURE — 97140 MANUAL THERAPY 1/> REGIONS: CPT | Performed by: PHYSICAL THERAPIST

## 2021-01-12 PROCEDURE — 97530 THERAPEUTIC ACTIVITIES: CPT | Performed by: PHYSICAL THERAPIST

## 2021-01-12 PROCEDURE — G8427 DOCREV CUR MEDS BY ELIG CLIN: HCPCS | Performed by: PHYSICAL THERAPIST

## 2021-01-12 NOTE — PROGRESS NOTES
[facility]   Phone: [phone]    Fax: [phone]         Physical Therapy Treatment Note/ Progress Report:           Date:  2021    Patient Name:  Sharon Ladd    :  1950  MRN: <[id number]>  Restrictions/Precautions:  S/P Left Reverse TSA  Medical/Treatment Diagnosis Information:  Diagnosis: S/P Left Reverse TSA (Sx: 20) (M19.012)   Treatment Diagnosis: Left Shoulder Pain (M25.512), Decreased left Shoulder ROM (M25.612), Decreased Left Shoulder Strength (A03.157)  Insurance/Certification information:  PT Insurance Information: Medicare, [facility]  Physician Information:  Referring Practitioner: Dr. Kerrie Johnson  Has the plan of care been signed (Y/N):        [x]  Yes (POC signed 21)  []  No        Date of Patient follow up with Physician: 21      Is this a Progress Report:     []  Yes  [x]  No        If Yes:  Date Range for reporting period:  Beginning 20  Ending 21    Progress report will be due (10 Rx or 30 days whichever is less): 3/0/67      Recertification will be due (POC Duration  / 90 days whichever is less): 21         Visit # Insurance Allowable Auth Required   3 for   (23 total) Medical Necessity []  Yes [x]  No        Functional Scale:    Date assessed:  21  Functional Assessment Tool Used: Winnie Found  Score:  4.5% functional deficit (21)       (20)   Patient Age: 69years old    Patient Height: 5' 2\"  Patient Weight: 280 lbs   Patient BMI: 51.2      Pain (21)  [x]  Pain diagram was completed, pain was present and a follow up plan to address the pain is in the plan of care. ()   []  Pain diagram was completed and there was no pain present and therefore no follow up plan to address pain in the plan of care.  ()   []  Pain diagram was not completed and the reason for not completing the pain diagram is in the medical chart ()  []  Patient refused to participate []  Severe mental or physical capacity, patient is in urgent emergent situation and to complete the questionnaire would jeopardize the patient's health status        Functional Questionnaire (1/5/21)  [x]  Patient completed the functional outcome questionnaire and deficiencies were addressed in the plan of care. ()   []  Patient completed the outcome questionnaire and there were no functional deficits identified and therefore no plan of care is required. ()   []  Patient did not complete the functional outcome questionnaire and the reason why is documented in the medical chart. ()  []  Patient refused to participate   []  Patient unable to complete the questionnaire   []   A functional outcome assessment has been reported on within the last 30 days        Falls (1/5/21)  [x]  The patient has had no falls or 1 fall without injury in the past year. (1101F)   []  There is no documentation of fall in the medical chart (1101F,8P)     [] The patient has had 2 or more falls or one fall with injury in the past year that is documented in the medical chart. (1100F)  []  A falls risk assessment was completed and documented in the medical chart. (6219F)   []  Falls were addressed in the plan of care. (4892R)   []  A falls risk assessment was not completed and documented in the medical chart (1998C,4S)   []   Falls were not addressed in the plan of care and reason was documented in the plan of care. (3064E 1P)        Medication (1/5/21)     [x] A list of current medications (including prescription, over the counter, herbal supplements, vitamin supplements, mineral supplements, dietary supplements) was reviewed with the patient and documented in the medical chart. ()     Falls Risk Assessment (30 days): (1/5/21)  [x] Falls Risk assessed and no intervention required.   [] Falls Risk assessed and Patient requires intervention due to being higher risk   TUG score (>12s at risk): Gait: (include devices/WB status): WNL, no AD. (1/5/21)    Orthopedic Special Tests: See above                         RESTRICTIONS/PRECAUTIONS: Left Reverse TSA (Sx: 6/29/20)  ~AAROM FE to 140°, ER to 40°  ~Isometric Deltoid Strengthening          Exercises/Interventions:     Therapeutic Ex (97141) Sets/sec Reps Notes/CUES   AD UE BIKE            STRETCHING/ROM      Pulleys: Flexion 5-10\" hold X 30    Table Slides: Flexion and scap  X 30 On inclined table   Wand: ER 0 - 40° 10\" hold X 10 Seated   UE [place]: Flexion  X 30 Standing with UE ranger on table   Supine Cane Flexion  X 20 Semi-inclined on table (pt cannot lie flat)   Doorway      Wall Slides  X 20 Sliding up wall   CBA      TP BB 10\"  X 10 Pulling across     Sleeper       Elbow AROM  D/C   Scapular Clock  D/C      ISOMETRICS      Gripping   D/CRetraction  D/C   Abduction      Flexion      Internal Rotation      External Rotation  D/C   Deltoid 10\" hold X 10 Submax         STRENGTHENING-PREs      Flexion - Eccentric  (Punching up then slowly lowering left arm down to the table) 1 set X 20 with 1# wt Semi-inclined on table   Abduction      Internal Rotation      External Rotation      Shrugs 3 sets X 10 with 4#    Biceps 3 sets X 10 with 4#    Triceps      Retraction      Extension      Horizontal Abduction in ER      Serratus                        THERABANDS/CABLE COLUMN      Rows 3 sets X 10 with blue band    Lats      Extension 3 sets X 10 with blue band    Internal Rotation 3 sets X 10 with red band    External Rotation 3 sets  X 10 with red band    Biceps      Triceps 3 sets X 10 with blue band    PNF                                    Manual Intervention (49828)      Scar Massage      STM      Hawkgrips      GH joint mobilizations      Foamroll      Manual PROM: Left Shoulder Flexion, scap, and ER (Within MD Parameters), Elbow Flexion and Extension  X 8' Pt inclined on table (pt cannot lie flat)         NMR re-education (17198)   CUES NEEDED Plyoback      Therabar oscillations      Body Blade       Rhythmic Stabilization      Ball on the wall      Supine Codman's:  CW/CCW, Flex/Ext    X 30 each with 1#   Small, controlled range  Semi-inclined on table (pt cannot lie flat)                     Therapeutic Activity (56333)      Core training      Swiss ball activities                  Patient Education: diagnosis, prognosis, pt goals, rehab timeline, and surgical precautions. Also instructed pt and her friend on proper way to melonie/doff sling (7/7/20)  X 10'                          Therapeutic Exercise and NMR EXR  [x] (61640) Provided verbal/tactile cueing for activities related to strengthening, flexibility, endurance, ROM  for improvements in scapular, scapulothoracic and UE control with self care, reaching, carrying, lifting, house/yardwork, driving/computer work. [x] (76862) Provided verbal/tactile cueing for activities related to improving balance, coordination, kinesthetic sense, posture, motor skill, proprioception  to assist with  scapular, scapulothoracic and UE control with self care, reaching, carrying, lifting, house/yardwork, driving/computer work. Therapeutic Activities:    [x] (25218 or 15898) Provided verbal/tactile cueing for activities related to improving balance, coordination, kinesthetic sense, posture, motor skill, proprioception and motor activation to allow for proper function of scapular, scapulothoracic and UE control with self care, carrying, lifting, driving/computer work. Home Exercise Program:    [x] (98504) Reviewed/Progressed HEP activities related to strengthening, flexibility, endurance, ROM of scapular, scapulothoracic and UE control with self care, reaching, carrying, lifting, house/yardwork, driving/computer work - Reviewed and updated HEP with pt (see below).    (12/8/20) [x] (13138) Provided manual therapy to mobilize soft tissue/joints of cervical/CT, scapular GHJ and UE for the purpose of modulating pain, promoting relaxation,  increasing ROM, reducing/eliminating soft tissue swelling/inflammation/restriction, improving soft tissue extensibility and allowing for proper ROM for normal function with self care, reaching, carrying, lifting, house/yardwork, driving/computer work    Modalities:  ICE x 15' for the left shoulder   [] GAME READY (VASO)- for significant edema, swelling, pain control. Charges:  Timed Code Treatment Minutes: 61'   Total Treatment Minutes: 76'      [] EVAL (LOW) 61796 (typically 20 minutes face-to-face)  [] EVAL (MOD) 31941 (typically 30 minutes face-to-face)  [] EVAL (HIGH) 27302 (typically 45 minutes face-to-face)  [] RE-EVAL     [x] PJ(12768) x 1 (22')   [] IONTO  [x] NMR (36829) x 1 (15')  [] VASO  [x] Manual (83525) x 1  (8')   [] Other:   [x] TA x  1 (15')  [] Mech Traction (39402)  [] ES(attended) (45730)     [] ES (un) (46986):         ASSESSMENT:  Pt did well with the exercises today. Left shoulder was fatigued at the end of therapy. Cuing needed with ER with tband to maintain correct form. Pt was able to reach 100° of active shoulder flexion in standing with no compensatory shrug present today. Strength is improving but pt continues to be limited with reaching above shoulder level due to weakness. Pt reported mild soreness in her left shoulder at the end of therapy today that resolved after icing. GOALS:  (Goals updated 1/5/21)  Patient stated goal:  \"Increase function; be able to use my left arm for toileting; be self-sufficient\"  [x] Progressing: [] Met: [] Not Met: [] Adjusted    Therapist goals for Patient:   Short Term Goals: To be achieved in: 2 weeks  1. Independent in HEP and progression per patient tolerance, in order to prevent re-injury.    [] Progressing: [x] Met: [] Not Met: [] Adjusted 2. Patient will have a decrease in left shoulder pain to 1-2/10 to facilitate improvement in movement, function, and ADLs as indicated by Functional Deficits. [] Progressing: [x] Met: [] Not Met: [] Adjusted  3. Patient will be able to melonie/doff Ultrasling independently. [] Progressing: [x] Met: [] Not Met: [] Adjusted      Long Term Goals: To be achieved in: 12 weeks  1. Disability index score of 15% or less for the Quick DASH to assist with reaching prior level of function. [] Progressing: [x] Met: [] Not Met: [] Adjusted  2. Patient will demonstrate an increase in left shoulder AROM to at least 90° flexion to allow for proper joint functioning as indicated by patients Functional Deficits. [] Progressing: [x] Met: [] Not Met:  [] Adjusted  3. Patient will demonstrate an increase in left Deltoid strength to 4+/5 to allow for proper functional mobility as indicated by patients Functional Deficits. [x] Progressing: [] Met: [] Not Met: [] Adjusted  4. Patient will have a decrease in left shoulder pain to 0-1/10 with daily activities. [] Progressing: [x] Met: [] Not Met: [] Adjusted  5. Pt will be able to perform all ADL's independently and without compensation. (patient specific functional goal)    [x] Progressing: [] Met: [] Not Met: [] Adjusted       Overall Progression Towards Functional goals/ Treatment Progress Update:  [x] Patient is progressing as expected towards functional goals listed       [] Progression is slowed due to complexities/Impairments listed. [] Progression has been slowed due to co-morbidities.   [] Plan just implemented, too soon to assess goals progression <30days   [] Goals require adjustment due to lack of progress  [] Patient is not progressing as expected and requires additional follow up with physician  [] Other         Prognosis for POC: [x] Good [] Fair  [] Poor      Patient requires continued skilled intervention: [x] Yes  [] No    Treatment/Activity Tolerance: [x] Patient able to complete treatment  [] Patient limited by fatigue  [] Patient limited by pain    [] Patient limited by other medical complications  [] Other:           PLAN: Cont therapy for ROM, strengthening, and endurance. 1-2x/week for 4 weeks for ROM, strengthening, scap stability exercises, manual therapy, HEP, pt education, and modalities prn (1/5/21)  [x] Continue per plan of care [] Alter current plan   [] Plan of care initiated [] Hold pending MD visit [] Discharge      Electronically signed by:  Bridgett Elizabeth PT     Physical Therapist Adore Rangel 421 #[id number]  Physical Therapist [place] License #[id number]    Note: If patient does not return for scheduled/ recommended follow up visits, this note will serve as a discharge from care along with most recent update on progress.

## 2021-01-14 ENCOUNTER — TREATMENT (OUTPATIENT)
Dept: PHYSICAL THERAPY | Age: 71
End: 2021-01-14
Payer: MEDICARE

## 2021-01-14 DIAGNOSIS — M25.512 LEFT SHOULDER PAIN, UNSPECIFIED CHRONICITY: ICD-10-CM

## 2021-01-14 DIAGNOSIS — M25.612 DECREASED ROM OF LEFT SHOULDER: ICD-10-CM

## 2021-01-14 DIAGNOSIS — M19.012 PRIMARY OSTEOARTHRITIS OF LEFT SHOULDER: Primary | ICD-10-CM

## 2021-01-14 DIAGNOSIS — R29.898 SHOULDER WEAKNESS: ICD-10-CM

## 2021-01-14 PROCEDURE — 97112 NEUROMUSCULAR REEDUCATION: CPT | Performed by: PHYSICAL THERAPIST

## 2021-01-14 PROCEDURE — G8427 DOCREV CUR MEDS BY ELIG CLIN: HCPCS | Performed by: PHYSICAL THERAPIST

## 2021-01-14 PROCEDURE — 97110 THERAPEUTIC EXERCISES: CPT | Performed by: PHYSICAL THERAPIST

## 2021-01-14 PROCEDURE — 97140 MANUAL THERAPY 1/> REGIONS: CPT | Performed by: PHYSICAL THERAPIST

## 2021-01-14 PROCEDURE — 97530 THERAPEUTIC ACTIVITIES: CPT | Performed by: PHYSICAL THERAPIST

## 2021-01-14 NOTE — PROGRESS NOTES
[]  Severe mental or physical capacity, patient is in urgent emergent situation and to complete the questionnaire would jeopardize the patient's health status        Functional Questionnaire (1/5/21)  [x]  Patient completed the functional outcome questionnaire and deficiencies were addressed in the plan of care. ()   []  Patient completed the outcome questionnaire and there were no functional deficits identified and therefore no plan of care is required. ()   []  Patient did not complete the functional outcome questionnaire and the reason why is documented in the medical chart. ()  []  Patient refused to participate   []  Patient unable to complete the questionnaire   []   A functional outcome assessment has been reported on within the last 30 days        Falls (1/5/21)  [x]  The patient has had no falls or 1 fall without injury in the past year. (1101F)   []  There is no documentation of fall in the medical chart (1101F,8P)     [] The patient has had 2 or more falls or one fall with injury in the past year that is documented in the medical chart. (1100F)  []  A falls risk assessment was completed and documented in the medical chart. (3339A)   []  Falls were addressed in the plan of care. (5737A)   []  A falls risk assessment was not completed and documented in the medical chart (8733C,4H)   []   Falls were not addressed in the plan of care and reason was documented in the plan of care. (5146O 1P)        Medication (1/5/21)     [x] A list of current medications (including prescription, over the counter, herbal supplements, vitamin supplements, mineral supplements, dietary supplements) was reviewed with the patient and documented in the medical chart. ()     Falls Risk Assessment (30 days): (1/5/21)  [x] Falls Risk assessed and no intervention required.   [] Falls Risk assessed and Patient requires intervention due to being higher risk   TUG score (>12s at risk): [] Falls education provided, including       PQRS:   Reviewed medication list with patient. No changes reported by pt since last PT visit. (1/14/21)      Latex Allergy:  [x]NO      []YES  Preferred Language for Healthcare:   [x]English       []other:      Pain level:  0-1/10 (1/14/21)  Medication: Nothing for her shoulder       SUBJECTIVE:  Pt states she was very tired after therapy on Tuesday but had no soreness after icing. Her left arm continues to fatigue quickly if she uses it a lot. She is still limited with reaching above shoulder level. (28+ weeks post-op)        OBJECTIVE:     *Pt is right hand dominant    (1/12/21)  ROM PROM AROM  Comment    L R L R    Flexion 125°  100°      Abduction (scap) 120°  90°     ER 30°       IR NT  Belt line     Other        Other           (1/5/21)  Strength L R Comment   Flexion      Abduction      ER 3+/5     IR 4/5     Deltoid 4/5     Upper Trap      Lower Trap      Mid Trap      Rhomboids      Biceps 4/5     Triceps 4/5     Horizontal Abduction      Horizontal Adduction      Lats        (1/5/21)  Special Tests Results/Comment   [employer] Lang    Speeds    OBriens    Other    Neurologic Signs Pt reports no numbness or tingling in left UE but she c/o pins and needles in both hands which she thinks is from the Gabapentin. Functional Reach          Reflexes/Sensation: (7/7/20)   []Dermatomes/Myotomes intact    []Reflexes equal and normal bilaterally   [x]Other: Intact to light touch    Joint mobility:    []Normal    []Hypo   []Hyper    Palpation: No tenderness. (1/5/21)    Functional Mobility/Transfers: ADL's and self-care activities have gotten much easier and pt is able to use her left hand for toileting. She is still limited with reaching above shoulder level. She has to use both arms to get up from a chair.   (1/5/21)    Posture:  Forward head, rounded shoulders.  (1/5/21)    Bandages/Dressings/Incisions: Incision is healed. (1/5/21) Gait: (include devices/WB status): WNL, no AD. (1/5/21)    Orthopedic Special Tests: See above                         RESTRICTIONS/PRECAUTIONS: Left Reverse TSA (Sx: 6/29/20)  ~AAROM FE to 140°, ER to 40°  ~Isometric Deltoid Strengthening          Exercises/Interventions:     Therapeutic Ex (33045) Sets/sec Reps Notes/CUES   AD UE BIKE            STRETCHING/ROM      Pulleys: Flexion 5-10\" hold X 30    Table Slides: Flexion and scap  X 30 On inclined table   Wand: ER 0 - 40° 10\" hold X 10 Seated   UE [place]: Flexion  X 30 Standing with UE ranger on table   Supine Cane Flexion  X 20 Semi-inclined on table (pt cannot lie flat)   Doorway      Wall Slides  X 20 Sliding up wall   CBA      TP BB 10\"  X 10 Pulling across     Sleeper       Elbow AROM  D/C   Scapular Clock  D/C      ISOMETRICS      Gripping   D/CRetraction  D/C   Abduction      Flexion      Internal Rotation      External Rotation  D/C   Deltoid 10\" hold X 10 Submax         STRENGTHENING-PREs      Flexion - Eccentric  (Punching up then slowly lowering left arm down to the table) 1 set X 20 with 1# wt Semi-inclined on table   Abduction      Internal Rotation      External Rotation      Shrugs 3 sets X 10 with 4#    Biceps 3 sets X 10 with 4#    Triceps      Retraction      Extension      Horizontal Abduction in ER      Serratus                        THERABANDS/CABLE COLUMN      Rows 3 sets X 10 with blue band    Lats      Extension 3 sets X 10 with blue band    Internal Rotation 3 sets X 10 with red band    External Rotation 3 sets  X 10 with red band    Biceps      Triceps 3 sets X 10 with blue band    PNF                                    Manual Intervention (58611)      Scar Massage      STM      Hawkgrips      GH joint mobilizations      Foamroll      Manual PROM: Left Shoulder Flexion, scap, and ER (Within MD Parameters), Elbow Flexion and Extension  X 8' Pt inclined on table (pt cannot lie flat)         NMR re-education (27325)   CUES NEEDED Plyoback      Therabar oscillations      Body Blade       Rhythmic Stabilization      Ball on the wall      Supine Codman's:  CW/CCW, Flex/Ext    X 30 each with 1#   Small, controlled range  Semi-inclined on table (pt cannot lie flat)                     Therapeutic Activity (14449)      Core training      Swiss ball activities                  Patient Education: diagnosis, prognosis, pt goals, rehab timeline, and surgical precautions. Also instructed pt and her friend on proper way to melonie/doff sling (7/7/20)  X 10'                          Therapeutic Exercise and NMR EXR  [x] (96229) Provided verbal/tactile cueing for activities related to strengthening, flexibility, endurance, ROM  for improvements in scapular, scapulothoracic and UE control with self care, reaching, carrying, lifting, house/yardwork, driving/computer work. [x] (49886) Provided verbal/tactile cueing for activities related to improving balance, coordination, kinesthetic sense, posture, motor skill, proprioception  to assist with  scapular, scapulothoracic and UE control with self care, reaching, carrying, lifting, house/yardwork, driving/computer work. Therapeutic Activities:    [x] (32458 or 99130) Provided verbal/tactile cueing for activities related to improving balance, coordination, kinesthetic sense, posture, motor skill, proprioception and motor activation to allow for proper function of scapular, scapulothoracic and UE control with self care, carrying, lifting, driving/computer work. Home Exercise Program:    [x] (36403) Reviewed/Progressed HEP activities related to strengthening, flexibility, endurance, ROM of scapular, scapulothoracic and UE control with self care, reaching, carrying, lifting, house/yardwork, driving/computer work - Reviewed and updated HEP with pt (see below).    (12/8/20) [x] (87754) Reviewed/Progressed HEP activities related to improving balance, coordination, kinesthetic sense, posture, motor skill, proprioception of scapular, scapulothoracic and UE control with self care, reaching, carrying, lifting, house/yardwork, driving/computer work        Access Code: [email or web address]   URL: [email or web address]/   Date: 12/08/2020   Prepared by: Luz Elena Hood   Seated Shoulder Flexion Towel Slide at Table Top - 10 reps - 1 sets - 10 sec hold - 1x daily - 7x weekly   Seated Shoulder Scaption Slide at Table Top with Forearm in Neutral - 10 reps - 1 sets - 10 sec hold - 1x daily - 7x weekly   Seated Shoulder External Rotation AAROM with Dowel - 10 reps - 1 sets - 10 sec hold - 1x daily - 7x weekly   Standing Shoulder Shrugs with Dumbbells - 10 reps - 3 sets - 1x daily - 7x weekly   Scapular Retraction with Resistance - 10 reps - 3 sets - 1x daily - 7x weekly   Standing Bicep Curls Supinated with Dumbbells - 10 reps - 3 sets - 1x daily - 7x weekly   Isometric Shoulder Abduction at Wall - 10 reps - 1 sets - 10 sec hold - 1x daily - 7x weekly   Standing Shoulder Internal Rotation Stretch with Towel - 10 reps - 1 sets - 10 sec hold - 1x daily - 7x weekly   Supine Shoulder Flexion Extension AAROM with Dowel - 10 reps - 1 sets - 5 sec hold - 1x daily - 7x weekly   Standing Elbow Extension with Anchored Resistance at Wall - 10 reps - 3 sets - 1x daily - 7x weekly   Standing Shoulder Flexion Wall Walk - 5 reps - 2 sets - 1x daily - 7x weekly   Supine Shoulder Circles - 10 reps - 3 sets - 1x daily - 7x weekly   Shoulder Internal Rotation with Resistance - 10 reps - 3 sets - 1x daily - 7x weekly   Supine Shoulder Flexion Extension Full Range AROM - 10 reps - 1 sets - 1x daily - 7x weekly   Shoulder External Rotation with Anchored Resistance - 10 reps - 3 sets - 1x daily - 7x weekly           Manual Treatments:  PROM / STM / Oscillations-Mobs:  G-I, II, III, IV (PA's, Inf., Post.) [x] (57976) Provided manual therapy to mobilize soft tissue/joints of cervical/CT, scapular GHJ and UE for the purpose of modulating pain, promoting relaxation,  increasing ROM, reducing/eliminating soft tissue swelling/inflammation/restriction, improving soft tissue extensibility and allowing for proper ROM for normal function with self care, reaching, carrying, lifting, house/yardwork, driving/computer work    Modalities:  ICE x 15' for the left shoulder   [] GAME READY (VASO)- for significant edema, swelling, pain control. Charges:  Timed Code Treatment Minutes: 61'   Total Treatment Minutes: 76'      [] EVAL (LOW) 58326 (typically 20 minutes face-to-face)  [] EVAL (MOD) 74082 (typically 30 minutes face-to-face)  [] EVAL (HIGH) 61063 (typically 45 minutes face-to-face)  [] RE-EVAL     [x] GZ(71055) x 1 (22')   [] IONTO  [x] NMR (47541) x 1 (15')  [] VASO  [x] Manual (12727) x 1  (8')   [] Other:   [x] TA x  1 (15')  [] Mech Traction (66388)  [] ES(attended) (88452)     [] ES (un) (23409):         ASSESSMENT:  Pt tolerated the exercises well today but she was very fatigued at the end of therapy. Her form was better with ER with tband but some cuing still needed to maintain correct angle and not extend her elbow. She continues to have difficulty reaching above shoulder level due to weakness. GOALS:  (Goals updated 1/5/21)  Patient stated goal:  \"Increase function; be able to use my left arm for toileting; be self-sufficient\"  [x] Progressing: [] Met: [] Not Met: [] Adjusted    Therapist goals for Patient:   Short Term Goals: To be achieved in: 2 weeks  1. Independent in HEP and progression per patient tolerance, in order to prevent re-injury. [] Progressing: [x] Met: [] Not Met: [] Adjusted  2. Patient will have a decrease in left shoulder pain to 1-2/10 to facilitate improvement in movement, function, and ADLs as indicated by Functional Deficits. [] Progressing: [x] Met: [] Not Met: [] Adjusted  3. Patient will be able to melonie/doff Ultrasling independently. [] Progressing: [x] Met: [] Not Met: [] Adjusted      Long Term Goals: To be achieved in: 12 weeks  1. Disability index score of 15% or less for the Quick DASH to assist with reaching prior level of function. [] Progressing: [x] Met: [] Not Met: [] Adjusted  2. Patient will demonstrate an increase in left shoulder AROM to at least 90° flexion to allow for proper joint functioning as indicated by patients Functional Deficits. [] Progressing: [x] Met: [] Not Met:  [] Adjusted  3. Patient will demonstrate an increase in left Deltoid strength to 4+/5 to allow for proper functional mobility as indicated by patients Functional Deficits. [x] Progressing: [] Met: [] Not Met: [] Adjusted  4. Patient will have a decrease in left shoulder pain to 0-1/10 with daily activities. [] Progressing: [x] Met: [] Not Met: [] Adjusted  5. Pt will be able to perform all ADL's independently and without compensation. (patient specific functional goal)    [x] Progressing: [] Met: [] Not Met: [] Adjusted       Overall Progression Towards Functional goals/ Treatment Progress Update:  [x] Patient is progressing as expected towards functional goals listed       [] Progression is slowed due to complexities/Impairments listed. [] Progression has been slowed due to co-morbidities.   [] Plan just implemented, too soon to assess goals progression <30days   [] Goals require adjustment due to lack of progress  [] Patient is not progressing as expected and requires additional follow up with physician  [] Other         Prognosis for POC: [x] Good [] Fair  [] Poor      Patient requires continued skilled intervention: [x] Yes  [] No    Treatment/Activity Tolerance:  [x] Patient able to complete treatment  [] Patient limited by fatigue  [] Patient limited by pain    [] Patient limited by other medical complications  [] Other: PLAN: Cont therapy for ROM, strengthening, and endurance. 1-2x/week for 4 weeks for ROM, strengthening, scap stability exercises, manual therapy, HEP, pt education, and modalities prn (1/5/21)  [x] Continue per plan of care [] Alter current plan   [] Plan of care initiated [] Hold pending MD visit [] Discharge      Electronically signed by:  Maribel Rosales, PT     Physical Therapist [address] #[id number]  Physical Therapist [place] License #[id number]    Note: If patient does not return for scheduled/ recommended follow up visits, this note will serve as a discharge from care along with most recent update on progress.

## 2021-01-19 ENCOUNTER — TREATMENT (OUTPATIENT)
Dept: PHYSICAL THERAPY | Age: 71
End: 2021-01-19
Payer: MEDICARE

## 2021-01-19 DIAGNOSIS — M19.012 PRIMARY OSTEOARTHRITIS OF LEFT SHOULDER: Primary | ICD-10-CM

## 2021-01-19 DIAGNOSIS — M25.512 LEFT SHOULDER PAIN, UNSPECIFIED CHRONICITY: ICD-10-CM

## 2021-01-19 DIAGNOSIS — M25.612 DECREASED ROM OF LEFT SHOULDER: ICD-10-CM

## 2021-01-19 DIAGNOSIS — R29.898 SHOULDER WEAKNESS: ICD-10-CM

## 2021-01-19 PROCEDURE — 97140 MANUAL THERAPY 1/> REGIONS: CPT | Performed by: PHYSICAL THERAPIST

## 2021-01-19 PROCEDURE — 97112 NEUROMUSCULAR REEDUCATION: CPT | Performed by: PHYSICAL THERAPIST

## 2021-01-19 PROCEDURE — G8427 DOCREV CUR MEDS BY ELIG CLIN: HCPCS | Performed by: PHYSICAL THERAPIST

## 2021-01-19 PROCEDURE — 97110 THERAPEUTIC EXERCISES: CPT | Performed by: PHYSICAL THERAPIST

## 2021-01-19 PROCEDURE — 97530 THERAPEUTIC ACTIVITIES: CPT | Performed by: PHYSICAL THERAPIST

## 2021-01-19 NOTE — PROGRESS NOTES
[facility]   Phone: [phone]    Fax: [phone]         Physical Therapy Treatment Note/ Progress Report:           Date:  2021    Patient Name:  Geno Arellano    :  1950  MRN: <[id number]>  Restrictions/Precautions:  S/P Left Reverse TSA  Medical/Treatment Diagnosis Information:  Diagnosis: S/P Left Reverse TSA (Sx: 20) (M19.012)   Treatment Diagnosis: Left Shoulder Pain (M25.512), Decreased left Shoulder ROM (M25.612), Decreased Left Shoulder Strength (P86.594)  Insurance/Certification information:  PT Insurance Information: Medicare, [facility]  Physician Information:  Referring Practitioner: Dr. Selvin Hess  Has the plan of care been signed (Y/N):        [x]  Yes (POC signed 21)  []  No        Date of Patient follow up with Physician: 21      Is this a Progress Report:     []  Yes  [x]  No        If Yes:  Date Range for reporting period:  Beginning 20  Ending 21    Progress report will be due (10 Rx or 30 days whichever is less): 3/8/69      Recertification will be due (POC Duration  / 90 days whichever is less): 21         Visit # Insurance Allowable Auth Required   5 for   (25 total) Medical Necessity []  Yes [x]  No        Functional Scale:    Date assessed:  21  Functional Assessment Tool Used: Josph Sharper  Score:  4.5% functional deficit (21)       (20)   Patient Age: 69years old    Patient Height: 5' 2\"  Patient Weight: 280 lbs   Patient BMI: 51.2      Pain (21)  [x]  Pain diagram was completed, pain was present and a follow up plan to address the pain is in the plan of care. ()   []  Pain diagram was completed and there was no pain present and therefore no follow up plan to address pain in the plan of care.  ()   []  Pain diagram was not completed and the reason for not completing the pain diagram is in the medical chart ()  []  Patient refused to participate []  Severe mental or physical capacity, patient is in urgent emergent situation and to complete the questionnaire would jeopardize the patient's health status        Functional Questionnaire (1/5/21)  [x]  Patient completed the functional outcome questionnaire and deficiencies were addressed in the plan of care. ()   []  Patient completed the outcome questionnaire and there were no functional deficits identified and therefore no plan of care is required. ()   []  Patient did not complete the functional outcome questionnaire and the reason why is documented in the medical chart. ()  []  Patient refused to participate   []  Patient unable to complete the questionnaire   []   A functional outcome assessment has been reported on within the last 30 days        Falls (1/5/21)  [x]  The patient has had no falls or 1 fall without injury in the past year. (1101F)   []  There is no documentation of fall in the medical chart (1101F,8P)     [] The patient has had 2 or more falls or one fall with injury in the past year that is documented in the medical chart. (1100F)  []  A falls risk assessment was completed and documented in the medical chart. (7328P)   []  Falls were addressed in the plan of care. (8315T)   []  A falls risk assessment was not completed and documented in the medical chart (3512Z,7U)   []   Falls were not addressed in the plan of care and reason was documented in the plan of care. (1526N 1P)        Medication (1/5/21)     [x] A list of current medications (including prescription, over the counter, herbal supplements, vitamin supplements, mineral supplements, dietary supplements) was reviewed with the patient and documented in the medical chart. ()     Falls Risk Assessment (30 days): (1/5/21)  [x] Falls Risk assessed and no intervention required.   [] Falls Risk assessed and Patient requires intervention due to being higher risk   TUG score (>12s at risk): [] Falls education provided, including       PQRS:   Reviewed medication list with patient. No changes reported by pt since last PT visit. (1/19/21)      Latex Allergy:  [x]NO      []YES  Preferred Language for Healthcare:   [x]English       []other:      Pain level:  0-1/10 (1/14/21)  Medication: Nothing for her shoulder       SUBJECTIVE:  Pt states she still feels tired all the time which she thinks is from the Gabapentin. She has an appt with her PCP tomorrow and is going to ask him if she can start to wean off it. She continues to have numbness and tingling in both hands and plans to talk to her PCP about this tomorrow as well. Her left shoulder is doing well. She had no soreness after last visit but she was very fatigued. She is using her left arm as much as she can at home but is still limited reaching above shoulder level. She tried taking a large pie dish out of an [place] kitchen cabinet yesterday but didn't have enough strength in her left arm to lift it out of the cabinet. (29 weeks post-op)        OBJECTIVE:     *Pt is right hand dominant    (1/19/21)  ROM PROM AROM  Comment    L R L R    Flexion 125°  110°      Abduction (scap) 125°  90°     ER 30°       IR NT  Belt line     Other        Other           (1/5/21)  Strength L R Comment   Flexion      Abduction      ER 3+/5     IR 4/5     Deltoid 4/5     Upper Trap      Lower Trap      Mid Trap      Rhomboids      Biceps 4/5     Triceps 4/5     Horizontal Abduction      Horizontal Adduction      Lats        (1/5/21)  Special Tests Results/Comment   [employer]mu Lang    Speeds    OBriens    Other    Neurologic Signs Pt reports no numbness or tingling in left UE but she c/o pins and needles in both hands which she thinks is from the Gabapentin.    Functional Reach          Reflexes/Sensation: (7/7/20)   []Dermatomes/Myotomes intact    []Reflexes equal and normal bilaterally   [x]Other: Intact to light touch    Joint mobility:    []Normal []Hypo   []Hyper    Palpation: No tenderness. (1/5/21)    Functional Mobility/Transfers: ADL's and self-care activities have gotten much easier and pt is able to use her left hand for toileting. She is still limited with reaching above shoulder level. She has to use both arms to get up from a chair.   (1/5/21)    Posture:  Forward head, rounded shoulders.  (1/5/21)    Bandages/Dressings/Incisions: Incision is healed. (1/5/21)    Gait: (include devices/WB status): WNL, no AD. (1/5/21)    Orthopedic Special Tests: See above                         RESTRICTIONS/PRECAUTIONS: Left Reverse TSA (Sx: 6/29/20)  ~AAROM FE to 140°, ER to 40°  ~Isometric Deltoid Strengthening          Exercises/Interventions:     Therapeutic Ex (76466) Sets/sec Reps Notes/CUES   AD UE BIKE            STRETCHING/ROM      Pulleys: Flexion 5-10\" hold X 30    Table Slides: Flexion and scap  X 30 On inclined table   Wand: ER 0 - 40° 10\" hold X 10 Seated   UE Vaughn: Flexion  X 30 Standing with UE ranger on table   Supine Cane Flexion  X 20 Semi-inclined on table (pt cannot lie flat)   Doorway      Wall Slides  X 20 Sliding up wall   CBA      TP BB 10\"  X 10 Pulling across     Sleeper       Elbow AROM  D/C   Scapular Clock  D/C      ISOMETRICS      Gripping   D/CRetraction  D/C   Abduction      Flexion      Internal Rotation      External Rotation  D/C   Deltoid 10\" hold X 10 Submax         STRENGTHENING-PREs      Flexion - Eccentric  (Punching up then slowly lowering left arm down to the table) 3 sets X 10 with 1# wt Semi-inclined on table   Abduction      Internal Rotation      External Rotation      Shrugs 3 sets X 10 with 4#    Biceps 3 sets X 10 with 4#    Triceps      Retraction      Extension      Horizontal Abduction in ER      Serratus                        THERABANDS/CABLE COLUMN      Rows 3 sets X 10 with black band    Lats      Extension 3 sets X 10 with black band    Internal Rotation 3 sets X 10 with red band External Rotation 3 sets  X 10 with red band    Biceps      Triceps 3 sets X 10 with black band    PNF                                    Manual Intervention (19035)      Scar Massage      STM      Hawkgrips      GH joint mobilizations      Foamroll      Manual PROM: Left Shoulder Flexion, scap, and ER (Within MD Parameters), Elbow Flexion and Extension  X 8' Pt inclined on table (pt cannot lie flat)         NMR re-education (12145)   CUES NEEDED   Plyoback      Therabar oscillations      Body Blade       Rhythmic Stabilization      Ball on the wall      Supine Codman's:  CW/CCW, Flex/Ext    X 30 each with 1#   Small, controlled range  Semi-inclined on table (pt cannot lie flat)                     Therapeutic Activity (51547)      Core training      Swiss ball activities                  Patient Education: diagnosis, prognosis, pt goals, rehab timeline, and surgical precautions. Also instructed pt and her friend on proper way to melonie/doff sling (7/7/20)  X 10'                          Therapeutic Exercise and NMR EXR  [x] (75361) Provided verbal/tactile cueing for activities related to strengthening, flexibility, endurance, ROM  for improvements in scapular, scapulothoracic and UE control with self care, reaching, carrying, lifting, house/yardwork, driving/computer work. [x] (79999) Provided verbal/tactile cueing for activities related to improving balance, coordination, kinesthetic sense, posture, motor skill, proprioception  to assist with  scapular, scapulothoracic and UE control with self care, reaching, carrying, lifting, house/yardwork, driving/computer work. Therapeutic Activities:    [x] (76327 or 16456) Provided verbal/tactile cueing for activities related to improving balance, coordination, kinesthetic sense, posture, motor skill, proprioception and motor activation to allow for proper function of scapular, scapulothoracic and UE control with self care, carrying, lifting, driving/computer work. Home Exercise Program:    [x] (95874) Reviewed/Progressed HEP activities related to strengthening, flexibility, endurance, ROM of scapular, scapulothoracic and UE control with self care, reaching, carrying, lifting, house/yardwork, driving/computer work - Reviewed and updated HEP with pt (see below). (12/8/20)  [x] (19916) Reviewed/Progressed HEP activities related to improving balance, coordination, kinesthetic sense, posture, motor skill, proprioception of scapular, scapulothoracic and UE control with self care, reaching, carrying, lifting, house/yardwork, driving/computer work        Access Code: [email or web address]   URL: [email or web address]   Date: 12/08/2020   Prepared by: Magui Hood   Seated Shoulder Flexion Towel Slide at Table Top - 10 reps - 1 sets - 10 sec hold - 1x daily - 7x weekly   Seated Shoulder Scaption Slide at Table Top with Forearm in Neutral - 10 reps - 1 sets - 10 sec hold - 1x daily - 7x weekly   Seated Shoulder External Rotation AAROM with Dowel - 10 reps - 1 sets - 10 sec hold - 1x daily - 7x weekly   Standing Shoulder Shrugs with Dumbbells - 10 reps - 3 sets - 1x daily - 7x weekly   Scapular Retraction with Resistance - 10 reps - 3 sets - 1x daily - 7x weekly   Standing Bicep Curls Supinated with Dumbbells - 10 reps - 3 sets - 1x daily - 7x weekly   Isometric Shoulder Abduction at Wall - 10 reps - 1 sets - 10 sec hold - 1x daily - 7x weekly   Standing Shoulder Internal Rotation Stretch with Towel - 10 reps - 1 sets - 10 sec hold - 1x daily - 7x weekly   Supine Shoulder Flexion Extension AAROM with Dowel - 10 reps - 1 sets - 5 sec hold - 1x daily - 7x weekly   Standing Elbow Extension with Anchored Resistance at Wall - 10 reps - 3 sets - 1x daily - 7x weekly   Standing Shoulder Flexion Wall Walk - 5 reps - 2 sets - 1x daily - 7x weekly   Supine Shoulder Circles - 10 reps - 3 sets - 1x daily - 7x weekly Shoulder Internal Rotation with Resistance - 10 reps - 3 sets - 1x daily - 7x weekly   Supine Shoulder Flexion Extension Full Range AROM - 10 reps - 1 sets - 1x daily - 7x weekly   Shoulder External Rotation with Anchored Resistance - 10 reps - 3 sets - 1x daily - 7x weekly           Manual Treatments:  PROM / STM / Oscillations-Mobs:  G-I, II, III, IV (PA's, Inf., Post.)  [x] (24532) Provided manual therapy to mobilize soft tissue/joints of cervical/CT, scapular GHJ and UE for the purpose of modulating pain, promoting relaxation,  increasing ROM, reducing/eliminating soft tissue swelling/inflammation/restriction, improving soft tissue extensibility and allowing for proper ROM for normal function with self care, reaching, carrying, lifting, house/yardwork, driving/computer work    Modalities:  ICE x 15' for the left shoulder   [] GAME READY (VASO)- for significant edema, swelling, pain control. Charges:  Timed Code Treatment Minutes: 61'   Total Treatment Minutes: 76'      [] EVAL (LOW) 61080 (typically 20 minutes face-to-face)  [] EVAL (MOD) 92114 (typically 30 minutes face-to-face)  [] EVAL (HIGH) 72401 (typically 45 minutes face-to-face)  [] RE-EVAL     [x] PG(64379) x 1 (22')   [] IONTO  [x] NMR (75249) x 1 (15')  [] VASO  [x] Manual (96584) x 1  (8')   [] Other:   [x] TA x  1 (15')  [] Mech Traction (53213)  [] ES(attended) (05391)     [] ES (un) (71426):         ASSESSMENT:  Pt did very well with the exercises today and her strength is improving. Increased to black tband with rows, extensions, and triceps with good form demonstrated. Eccentric shoulder flexion with 1# wt remains challenging but she was able to complete 3 sets of 10 today resting between sets. Active shoulder flexion in standing increased to 110° today with no compensatory shrug present.            GOALS:  (Goals updated 1/5/21)  Patient stated goal:  \"Increase function; be able to use my left arm for toileting; be self-sufficient\" [x] Progressing: [] Met: [] Not Met: [] Adjusted    Therapist goals for Patient:   Short Term Goals: To be achieved in: 2 weeks  1. Independent in HEP and progression per patient tolerance, in order to prevent re-injury. [] Progressing: [x] Met: [] Not Met: [] Adjusted  2. Patient will have a decrease in left shoulder pain to 1-2/10 to facilitate improvement in movement, function, and ADLs as indicated by Functional Deficits. [] Progressing: [x] Met: [] Not Met: [] Adjusted  3. Patient will be able to melonie/doff Ultrasling independently. [] Progressing: [x] Met: [] Not Met: [] Adjusted      Long Term Goals: To be achieved in: 12 weeks  1. Disability index score of 15% or less for the Quick DASH to assist with reaching prior level of function. [] Progressing: [x] Met: [] Not Met: [] Adjusted  2. Patient will demonstrate an increase in left shoulder AROM to at least 90° flexion to allow for proper joint functioning as indicated by patients Functional Deficits. [] Progressing: [x] Met: [] Not Met:  [] Adjusted  3. Patient will demonstrate an increase in left Deltoid strength to 4+/5 to allow for proper functional mobility as indicated by patients Functional Deficits. [x] Progressing: [] Met: [] Not Met: [] Adjusted  4. Patient will have a decrease in left shoulder pain to 0-1/10 with daily activities. [] Progressing: [x] Met: [] Not Met: [] Adjusted  5. Pt will be able to perform all ADL's independently and without compensation. (patient specific functional goal)    [x] Progressing: [] Met: [] Not Met: [] Adjusted       Overall Progression Towards Functional goals/ Treatment Progress Update:  [x] Patient is progressing as expected towards functional goals listed       [] Progression is slowed due to complexities/Impairments listed. [] Progression has been slowed due to co-morbidities.   [] Plan just implemented, too soon to assess goals progression <30days [] Goals require adjustment due to lack of progress  [] Patient is not progressing as expected and requires additional follow up with physician  [] Other         Prognosis for POC: [x] Good [] Fair  [] Poor      Patient requires continued skilled intervention: [x] Yes  [] No    Treatment/Activity Tolerance:  [x] Patient able to complete treatment  [] Patient limited by fatigue  [] Patient limited by pain    [] Patient limited by other medical complications  [] Other:           PLAN: Cont therapy for ROM, strengthening, and endurance. 1-2x/week for 4 weeks for ROM, strengthening, scap stability exercises, manual therapy, HEP, pt education, and modalities prn (1/5/21)  [x] Continue per plan of care [] Alter current plan   [] Plan of care initiated [] Hold pending MD visit [] Discharge      Electronically signed by:  Tomasa Moran PT     Physical Therapist Adore Rangel 421 #[id number]  Physical Therapist [place] License #[id number]    Note: If patient does not return for scheduled/ recommended follow up visits, this note will serve as a discharge from care along with most recent update on progress.

## 2021-01-21 ENCOUNTER — TREATMENT (OUTPATIENT)
Dept: PHYSICAL THERAPY | Age: 71
End: 2021-01-21
Payer: MEDICARE

## 2021-01-21 DIAGNOSIS — R29.898 SHOULDER WEAKNESS: ICD-10-CM

## 2021-01-21 DIAGNOSIS — M19.012 PRIMARY OSTEOARTHRITIS OF LEFT SHOULDER: Primary | ICD-10-CM

## 2021-01-21 DIAGNOSIS — M25.512 LEFT SHOULDER PAIN, UNSPECIFIED CHRONICITY: ICD-10-CM

## 2021-01-21 DIAGNOSIS — M25.612 DECREASED ROM OF LEFT SHOULDER: ICD-10-CM

## 2021-01-21 PROCEDURE — 97110 THERAPEUTIC EXERCISES: CPT | Performed by: PHYSICAL THERAPIST

## 2021-01-21 PROCEDURE — 97112 NEUROMUSCULAR REEDUCATION: CPT | Performed by: PHYSICAL THERAPIST

## 2021-01-21 PROCEDURE — G8427 DOCREV CUR MEDS BY ELIG CLIN: HCPCS | Performed by: PHYSICAL THERAPIST

## 2021-01-21 PROCEDURE — 97140 MANUAL THERAPY 1/> REGIONS: CPT | Performed by: PHYSICAL THERAPIST

## 2021-01-21 PROCEDURE — 97530 THERAPEUTIC ACTIVITIES: CPT | Performed by: PHYSICAL THERAPIST

## 2021-01-21 NOTE — PROGRESS NOTES
[facility]   Phone: [phone]    Fax: [phone]         Physical Therapy Treatment Note/ Progress Report:           Date:  2021    Patient Name:  Placido Forman    :  1950  MRN: <[id number]>  Restrictions/Precautions:  S/P Left Reverse TSA  Medical/Treatment Diagnosis Information:  Diagnosis: S/P Left Reverse TSA (Sx: 20) (M19.012)   Treatment Diagnosis: Left Shoulder Pain (M25.512), Decreased left Shoulder ROM (M25.612), Decreased Left Shoulder Strength (L72.365)  Insurance/Certification information:  PT Insurance Information: MedicareShyla  Physician Information:  Referring Practitioner: Dr. Anila Light  Has the plan of care been signed (Y/N):        [x]  Yes (POC signed 21)  []  No        Date of Patient follow up with Physician: 21      Is this a Progress Report:     []  Yes  [x]  No        If Yes:  Date Range for reporting period:  Beginning 20  Ending 21    Progress report will be due (10 Rx or 30 days whichever is less): 06      Recertification will be due (POC Duration  / 90 days whichever is less): 21         Visit # Insurance Allowable Auth Required   6 for   (26 total) Medical Necessity []  Yes [x]  No        Functional Scale:    Date assessed:  21  Functional Assessment Tool Used: Linette Dye  Score:  4.5% functional deficit (21)       (20)   Patient Age: 69years old    Patient Height: 5' 2\"  Patient Weight: 280 lbs   Patient BMI: 51.2      Pain (21)  [x]  Pain diagram was completed, pain was present and a follow up plan to address the pain is in the plan of care. ()   []  Pain diagram was completed and there was no pain present and therefore no follow up plan to address pain in the plan of care.  ()   []  Pain diagram was not completed and the reason for not completing the pain diagram is in the medical chart ()  []  Patient refused to participate []  Severe mental or physical capacity, patient is in urgent emergent situation and to complete the questionnaire would jeopardize the patient's health status        Functional Questionnaire (1/5/21)  [x]  Patient completed the functional outcome questionnaire and deficiencies were addressed in the plan of care. ()   []  Patient completed the outcome questionnaire and there were no functional deficits identified and therefore no plan of care is required. ()   []  Patient did not complete the functional outcome questionnaire and the reason why is documented in the medical chart. ()  []  Patient refused to participate   []  Patient unable to complete the questionnaire   []   A functional outcome assessment has been reported on within the last 30 days        Falls (1/5/21)  [x]  The patient has had no falls or 1 fall without injury in the past year. (1101F)   []  There is no documentation of fall in the medical chart (1101F,8P)     [] The patient has had 2 or more falls or one fall with injury in the past year that is documented in the medical chart. (1100F)  []  A falls risk assessment was completed and documented in the medical chart. (8645D)   []  Falls were addressed in the plan of care. (3623I)   []  A falls risk assessment was not completed and documented in the medical chart (9131F,0G)   []   Falls were not addressed in the plan of care and reason was documented in the plan of care. (0087S 1P)        Medication (1/5/21)     [x] A list of current medications (including prescription, over the counter, herbal supplements, vitamin supplements, mineral supplements, dietary supplements) was reviewed with the patient and documented in the medical chart. ()     Falls Risk Assessment (30 days): (1/5/21)  [x] Falls Risk assessed and no intervention required.   [] Falls Risk assessed and Patient requires intervention due to being higher risk   TUG score (>12s at risk): [] Falls education provided, including       PQRS:   Reviewed medication list with patient. Pt is starting to wean off the Gabapentin per PCP recommendation.    (1/21/21)      Latex Allergy:  [x]NO      []YES  Preferred Language for Healthcare:   [x]English       []other:      Pain level:  0-1/10 (1/21/21)  Medication: Nothing for her shoulder       SUBJECTIVE:  Pt saw her PCP yesterday and he wants her to start weaning off the Gabapentin. She went down to 2 Gabapentin yesterday and will decrease to 1 Gabapentin in a week. He is also referring her to a neurologist.  Pt states she continues to feel very tired and sleepy and she is hoping this will get better once she weans off the Gabapentin. She was tired after therapy on Tuesday but had no increase in pain. Reaching behind her back has gotten better and she can now wash her back in the shower.            (29+ weeks post-op)        OBJECTIVE:     *Pt is right hand dominant    (1/21/21)  ROM PROM AROM  Comment    L R L R    Flexion 125°  115°      Abduction (scap) 125°  90°     ER 30°       IR NT  Belt line     Other        Other           (1/5/21)  Strength L R Comment   Flexion      Abduction      ER 3+/5     IR 4/5     Deltoid 4/5     Upper Trap      Lower Trap      Mid Trap      Rhomboids      Biceps 4/5     Triceps 4/5     Horizontal Abduction      Horizontal Adduction      Lats        (1/5/21)  Special Tests Results/Comment   [employer]    Speeds    OBriens    Other    Neurologic Signs Pt reports no numbness or tingling in left UE but she c/o pins and needles in both hands which she thinks is from the Gabapentin.    Functional Reach          Reflexes/Sensation: (7/7/20)   []Dermatomes/Myotomes intact    []Reflexes equal and normal bilaterally   [x]Other: Intact to light touch    Joint mobility:    []Normal    []Hypo   []Hyper    Palpation: No tenderness. (1/5/21) Functional Mobility/Transfers: ADL's and self-care activities have gotten much easier and pt is able to use her left hand for toileting. She is still limited with reaching above shoulder level. She has to use both arms to get up from a chair.   (1/5/21)    Posture:  Forward head, rounded shoulders.  (1/5/21)    Bandages/Dressings/Incisions: Incision is healed. (1/5/21)    Gait: (include devices/WB status): WNL, no AD. (1/5/21)    Orthopedic Special Tests: See above                         RESTRICTIONS/PRECAUTIONS: Left Reverse TSA (Sx: 6/29/20)  ~AAROM FE to 140°, ER to 40°  ~Isometric Deltoid Strengthening          Exercises/Interventions:     Therapeutic Ex (51779) Sets/sec Reps Notes/CUES   AD UE BIKE            STRETCHING/ROM      Pulleys: Flexion 5-10\" hold X 30    Table Slides: Flexion and scap  X 30 On inclined table   Wand: ER 0 - 40° 10\" hold X 10 Seated   UE Clear: Flexion  X 30 Standing with UE ranger on table   Supine Cane Flexion  X 20 Semi-inclined on table (pt cannot lie flat)   Doorway      Wall Slides  X 20 Sliding up wall   CBA      TP BB 10\"  X 10 Pulling across     Sleeper       Elbow AROM  D/C   Scapular Clock  D/C      ISOMETRICS      Gripping   D/CRetraction  D/C   Abduction      Flexion      Internal Rotation      External Rotation  D/C   Deltoid 10\" hold X 10 Submax         STRENGTHENING-PREs      Flexion - Eccentric  (Punching up then slowly lowering left arm down to the table) 3 sets X 10 with 1# wt Semi-inclined on table   Abduction      Internal Rotation      External Rotation      Shrugs 3 sets X 10 with 4#    Biceps 3 sets X 10 with 4#    Triceps      Retraction      Extension      Horizontal Abduction in ER      Serratus                        THERABANDS/CABLE COLUMN      Rows 3 sets X 10 with black band    Lats      Extension 3 sets X 10 with black band    Internal Rotation 3 sets X 10 with red band    External Rotation 3 sets  X 10 with red band    Biceps [x] (96325) Reviewed/Progressed HEP activities related to strengthening, flexibility, endurance, ROM of scapular, scapulothoracic and UE control with self care, reaching, carrying, lifting, house/yardwork, driving/computer work - Reviewed and updated HEP with pt (see below). (12/8/20)  [x] (30306) Reviewed/Progressed HEP activities related to improving balance, coordination, kinesthetic sense, posture, motor skill, proprioception of scapular, scapulothoracic and UE control with self care, reaching, carrying, lifting, house/yardwork, driving/computer work        Access Code: [email or web address]   URL: [email or web address]   Date: 12/08/2020   Prepared by: Ilene Saldana     Exercises   Seated Shoulder Flexion Towel Slide at Table Top - 10 reps - 1 sets - 10 sec hold - 1x daily - 7x weekly   Seated Shoulder Scaption Slide at Table Top with Forearm in Neutral - 10 reps - 1 sets - 10 sec hold - 1x daily - 7x weekly   Seated Shoulder External Rotation AAROM with Dowel - 10 reps - 1 sets - 10 sec hold - 1x daily - 7x weekly   Standing Shoulder Shrugs with Dumbbells - 10 reps - 3 sets - 1x daily - 7x weekly   Scapular Retraction with Resistance - 10 reps - 3 sets - 1x daily - 7x weekly   Standing Bicep Curls Supinated with Dumbbells - 10 reps - 3 sets - 1x daily - 7x weekly   Isometric Shoulder Abduction at Wall - 10 reps - 1 sets - 10 sec hold - 1x daily - 7x weekly   Standing Shoulder Internal Rotation Stretch with Towel - 10 reps - 1 sets - 10 sec hold - 1x daily - 7x weekly   Supine Shoulder Flexion Extension AAROM with Dowel - 10 reps - 1 sets - 5 sec hold - 1x daily - 7x weekly   Standing Elbow Extension with Anchored Resistance at Wall - 10 reps - 3 sets - 1x daily - 7x weekly   Standing Shoulder Flexion Wall Walk - 5 reps - 2 sets - 1x daily - 7x weekly   Supine Shoulder Circles - 10 reps - 3 sets - 1x daily - 7x weekly   Shoulder Internal Rotation with Resistance - 10 reps - 3 sets - 1x daily - 7x weekly Supine Shoulder Flexion Extension Full Range AROM - 10 reps - 1 sets - 1x daily - 7x weekly   Shoulder External Rotation with Anchored Resistance - 10 reps - 3 sets - 1x daily - 7x weekly           Manual Treatments:  PROM / STM / Oscillations-Mobs:  G-I, II, III, IV (PA's, Inf., Post.)  [x] (66766) Provided manual therapy to mobilize soft tissue/joints of cervical/CT, scapular GHJ and UE for the purpose of modulating pain, promoting relaxation,  increasing ROM, reducing/eliminating soft tissue swelling/inflammation/restriction, improving soft tissue extensibility and allowing for proper ROM for normal function with self care, reaching, carrying, lifting, house/yardwork, driving/computer work    Modalities:  ICE x 15' for the left shoulder   [] GAME READY (VASO)- for significant edema, swelling, pain control. Charges:  Timed Code Treatment Minutes: 61'   Total Treatment Minutes: 76'      [] EVAL (LOW) 95569 (typically 20 minutes face-to-face)  [] EVAL (MOD) 94405 (typically 30 minutes face-to-face)  [] EVAL (HIGH) 69003 (typically 45 minutes face-to-face)  [] RE-EVAL     [x] GW(10400) x 1 (22')   [] IONTO  [x] NMR (82645) x 1 (15')  [] VASO  [x] Manual (54050) x 1  (8')   [] Other:   [x] TA x  1 (15')  [] Mech Traction (52262)  [] ES(attended) (76309)     [] ES (un) (44821):         ASSESSMENT:  Pt did very well with the exercises today. She was fatigued at the end of therapy but her strength is improving. Eccentric shoulder flexion with 1# wt continues to be her most challenging exercise but she was able to complete 3 sets of 10 today resting between sets. Active shoulder flexion in standing increased to 115° today. GOALS:  (Goals updated 1/5/21)  Patient stated goal:  \"Increase function; be able to use my left arm for toileting; be self-sufficient\"  [x] Progressing: [] Met: [] Not Met: [] Adjusted    Therapist goals for Patient:   Short Term Goals:  To be achieved in: 2 weeks 1. Independent in HEP and progression per patient tolerance, in order to prevent re-injury. [] Progressing: [x] Met: [] Not Met: [] Adjusted  2. Patient will have a decrease in left shoulder pain to 1-2/10 to facilitate improvement in movement, function, and ADLs as indicated by Functional Deficits. [] Progressing: [x] Met: [] Not Met: [] Adjusted  3. Patient will be able to melonie/doff Ultrasling independently. [] Progressing: [x] Met: [] Not Met: [] Adjusted      Long Term Goals: To be achieved in: 12 weeks  1. Disability index score of 15% or less for the Quick DASH to assist with reaching prior level of function. [] Progressing: [x] Met: [] Not Met: [] Adjusted  2. Patient will demonstrate an increase in left shoulder AROM to at least 90° flexion to allow for proper joint functioning as indicated by patients Functional Deficits. [] Progressing: [x] Met: [] Not Met:  [] Adjusted  3. Patient will demonstrate an increase in left Deltoid strength to 4+/5 to allow for proper functional mobility as indicated by patients Functional Deficits. [x] Progressing: [] Met: [] Not Met: [] Adjusted  4. Patient will have a decrease in left shoulder pain to 0-1/10 with daily activities. [] Progressing: [x] Met: [] Not Met: [] Adjusted  5. Pt will be able to perform all ADL's independently and without compensation. (patient specific functional goal)    [x] Progressing: [] Met: [] Not Met: [] Adjusted       Overall Progression Towards Functional goals/ Treatment Progress Update:  [x] Patient is progressing as expected towards functional goals listed       [] Progression is slowed due to complexities/Impairments listed. [] Progression has been slowed due to co-morbidities.   [] Plan just implemented, too soon to assess goals progression <30days   [] Goals require adjustment due to lack of progress  [] Patient is not progressing as expected and requires additional follow up with physician  [] Other Prognosis for POC: [x] Good [] Fair  [] Poor      Patient requires continued skilled intervention: [x] Yes  [] No    Treatment/Activity Tolerance:  [x] Patient able to complete treatment  [] Patient limited by fatigue  [] Patient limited by pain    [] Patient limited by other medical complications  [] Other:           PLAN: Cont therapy for ROM, strengthening, and endurance. 1-2x/week for 4 weeks for ROM, strengthening, scap stability exercises, manual therapy, HEP, pt education, and modalities prn (1/5/21)  [x] Continue per plan of care [] Alter current plan   [] Plan of care initiated [] Hold pending MD visit [] Discharge      Electronically signed by:  Julieth Hensley PT     Physical Therapist Adore Rangel 421 #[id number]  Physical Therapist [place] License #[id number]    Note: If patient does not return for scheduled/ recommended follow up visits, this note will serve as a discharge from care along with most recent update on progress.

## 2021-01-26 ENCOUNTER — TREATMENT (OUTPATIENT)
Dept: PHYSICAL THERAPY | Age: 71
End: 2021-01-26
Payer: MEDICARE

## 2021-01-26 DIAGNOSIS — M19.012 PRIMARY OSTEOARTHRITIS OF LEFT SHOULDER: Primary | ICD-10-CM

## 2021-01-26 DIAGNOSIS — M25.512 LEFT SHOULDER PAIN, UNSPECIFIED CHRONICITY: ICD-10-CM

## 2021-01-26 DIAGNOSIS — M25.612 DECREASED ROM OF LEFT SHOULDER: ICD-10-CM

## 2021-01-26 DIAGNOSIS — R29.898 SHOULDER WEAKNESS: ICD-10-CM

## 2021-01-26 PROCEDURE — 97112 NEUROMUSCULAR REEDUCATION: CPT | Performed by: PHYSICAL THERAPIST

## 2021-01-26 PROCEDURE — G8427 DOCREV CUR MEDS BY ELIG CLIN: HCPCS | Performed by: PHYSICAL THERAPIST

## 2021-01-26 PROCEDURE — 97530 THERAPEUTIC ACTIVITIES: CPT | Performed by: PHYSICAL THERAPIST

## 2021-01-26 PROCEDURE — 97110 THERAPEUTIC EXERCISES: CPT | Performed by: PHYSICAL THERAPIST

## 2021-01-26 PROCEDURE — 97140 MANUAL THERAPY 1/> REGIONS: CPT | Performed by: PHYSICAL THERAPIST

## 2021-01-26 NOTE — PROGRESS NOTES
[facility]   Phone: [phone]    Fax: [phone]         Physical Therapy Treatment Note/ Progress Report:           Date:  2021    Patient Name:  Leonila Lui    :  1950  MRN: <[id number]>  Restrictions/Precautions:  S/P Left Reverse TSA  Medical/Treatment Diagnosis Information:  Diagnosis: S/P Left Reverse TSA (Sx: 20) (M19.012)   Treatment Diagnosis: Left Shoulder Pain (M25.512), Decreased left Shoulder ROM (M25.612), Decreased Left Shoulder Strength (W41.032)  Insurance/Certification information:  PT Insurance Information: Medicare, [address] [facility]  Physician Information:  Referring Practitioner: Dr. Marina Mckinney  Has the plan of care been signed (Y/N):        [x]  Yes (POC signed 21)  []  No        Date of Patient follow up with Physician: 21      Is this a Progress Report:     []  Yes  [x]  No        If Yes:  Date Range for reporting period:  Beginning 20  Ending 21    Progress report will be due (10 Rx or 30 days whichever is less): 09      Recertification will be due (POC Duration  / 90 days whichever is less): 21         Visit # Insurance Allowable Auth Required   7 for   (27 total) Medical Necessity []  Yes [x]  No        Functional Scale:    Date assessed:  21  Functional Assessment Tool Used: Smita Lara  Score:  4.5% functional deficit (21)       (20)   Patient Age: 69years old    Patient Height: 5' 2\"  Patient Weight: 280 lbs   Patient BMI: 51.2      Pain (21)  [x]  Pain diagram was completed, pain was present and a follow up plan to address the pain is in the plan of care. ()   []  Pain diagram was completed and there was no pain present and therefore no follow up plan to address pain in the plan of care.  ()   []  Pain diagram was not completed and the reason for not completing the pain diagram is in the medical chart ()  []  Patient refused to participate []  Severe mental or physical capacity, patient is in urgent emergent situation and to complete the questionnaire would jeopardize the patient's health status        Functional Questionnaire (1/5/21)  [x]  Patient completed the functional outcome questionnaire and deficiencies were addressed in the plan of care. ()   []  Patient completed the outcome questionnaire and there were no functional deficits identified and therefore no plan of care is required. ()   []  Patient did not complete the functional outcome questionnaire and the reason why is documented in the medical chart. ()  []  Patient refused to participate   []  Patient unable to complete the questionnaire   []   A functional outcome assessment has been reported on within the last 30 days        Falls (1/5/21)  [x]  The patient has had no falls or 1 fall without injury in the past year. (1101F)   []  There is no documentation of fall in the medical chart (1101F,8P)     [] The patient has had 2 or more falls or one fall with injury in the past year that is documented in the medical chart. (1100F)  []  A falls risk assessment was completed and documented in the medical chart. (7812H)   []  Falls were addressed in the plan of care. (6014K)   []  A falls risk assessment was not completed and documented in the medical chart (1883J,0D)   []   Falls were not addressed in the plan of care and reason was documented in the plan of care. (0369P 1P)        Medication (1/5/21)     [x] A list of current medications (including prescription, over the counter, herbal supplements, vitamin supplements, mineral supplements, dietary supplements) was reviewed with the patient and documented in the medical chart. ()     Falls Risk Assessment (30 days): (1/5/21)  [x] Falls Risk assessed and no intervention required.   [] Falls Risk assessed and Patient requires intervention due to being higher risk   TUG score (>12s at risk): [] Falls education provided, including       PQRS:   Reviewed medication list with patient. Tomorrow she goes down to 1 Gabapentin a day for the next week then she will be off it completely. (1/26/21)      Latex Allergy:  [x]NO      []YES  Preferred Language for Healthcare:   [x]English       []other:      Pain level:  0-1/10 (1/26/21)  Medication: Nothing for her shoulder       SUBJECTIVE:  Pt states her left shoulder is doing well. She was fatigued after last visit but reported no pain. Reaching into [place] is still difficult. Tomorrow she goes down to 1 Gabapentin a day for the next week then she will be off it completely. She sees the Neurologist on Feb 25. (30 weeks post-op)        OBJECTIVE:     *Pt is right hand dominant    (1/26/21)  ROM PROM AROM  Comment    L R L R    Flexion 125°  115°      Abduction (scap) 125°  90°     ER 30°       IR NT  Belt line     Other        Other           (1/5/21)  Strength L R Comment   Flexion      Abduction      ER 3+/5     IR 4/5     Deltoid 4/5     Upper Trap      Lower Trap      Mid Trap      Rhomboids      Biceps 4/5     Triceps 4/5     Horizontal Abduction      Horizontal Adduction      Lats        (1/5/21)  Special Tests Results/Comment   Little-Scot    Neers    Speeds    OBriens    Other    Neurologic Signs Pt reports no numbness or tingling in left UE but she c/o pins and needles in both hands which she thinks is from the Gabapentin. Functional Reach          Reflexes/Sensation: (7/7/20)   []Dermatomes/Myotomes intact    []Reflexes equal and normal bilaterally   [x]Other: Intact to light touch    Joint mobility:    []Normal    []Hypo   []Hyper    Palpation: No tenderness. (1/5/21)    Functional Mobility/Transfers: ADL's and self-care activities have gotten much easier and pt is able to use her left hand for toileting. She is still limited with reaching above shoulder level.   She has to use both arms to get up from a chair.   (1/5/21) Posture:  Forward head, rounded shoulders.  (1/5/21)    Bandages/Dressings/Incisions: Incision is healed. (1/5/21)    Gait: (include devices/WB status): WNL, no AD. (1/5/21)    Orthopedic Special Tests: See above                         RESTRICTIONS/PRECAUTIONS: Left Reverse TSA (Sx: 6/29/20)  ~AAROM FE to 140°, ER to 40°  ~Isometric Deltoid Strengthening          Exercises/Interventions:     Therapeutic Ex (78538) Sets/sec Reps Notes/CUES   AD UE BIKE            STRETCHING/ROM      Pulleys: Flexion 5-10\" hold X 30    Table Slides: Flexion and scap  X 30 On inclined table   Wand: ER 0 - 40° 10\" hold X 10 Seated   UE [place]: Flexion  X 30 Standing with UE ranger on table   Supine Cane Flexion  X 20 Semi-inclined on table (pt cannot lie flat)   Doorway      Wall Slides  X 20 Sliding up wall   CBA      TP BB 10\"  X 10 Pulling across     Sleeper       Elbow AROM  D/C   Scapular Clock  D/C      ISOMETRICS      Gripping   D/CRetraction  D/C   Abduction      Flexion      Internal Rotation      External Rotation  D/C   Deltoid 10\" hold X 10 Submax         STRENGTHENING-PREs      Flexion - Eccentric  (Punching up then slowly lowering left arm down to the table) 3 sets X 10 with 1# wt Semi-inclined on table   Abduction      Internal Rotation      External Rotation      Shrugs 3 sets X 10 with 4#    Biceps 3 sets X 10 with 4#    Triceps      Retraction      Extension      Horizontal Abduction in ER      Serratus                        THERABANDS/CABLE COLUMN      Rows 3 sets X 10 with black band    Lats      Extension 3 sets X 10 with black band    Internal Rotation 3 sets X 10 with red band    External Rotation 3 sets  X 10 with red band    Biceps      Triceps 3 sets X 10 with black band    PNF                                    Manual Intervention (67624)      Scar Massage      STM      Hawkgrips      GH joint mobilizations      Foamroll Manual PROM: Left Shoulder Flexion, scap, and ER (Within MD Parameters), Elbow Flexion and Extension  X 8' Pt inclined on table (pt cannot lie flat)         NMR re-education (96763)   CUES NEEDED   Plyoback      Therabar oscillations      Body Blade       Rhythmic Stabilization      Ball on the wall      Supine Codman's:  CW/CCW, Flex/Ext    X 30 each with 1#   Small, controlled range  Semi-inclined on table (pt cannot lie flat)                     Therapeutic Activity (75091)      Core training      Swiss ball activities                  Patient Education: diagnosis, prognosis, pt goals, rehab timeline, and surgical precautions. Also instructed pt and her friend on proper way to melonie/doff sling (7/7/20)  X 10'                          Therapeutic Exercise and NMR EXR  [x] (84723) Provided verbal/tactile cueing for activities related to strengthening, flexibility, endurance, ROM  for improvements in scapular, scapulothoracic and UE control with self care, reaching, carrying, lifting, house/yardwork, driving/computer work. [x] (90901) Provided verbal/tactile cueing for activities related to improving balance, coordination, kinesthetic sense, posture, motor skill, proprioception  to assist with  scapular, scapulothoracic and UE control with self care, reaching, carrying, lifting, house/yardwork, driving/computer work. Therapeutic Activities:    [x] (37148 or 22681) Provided verbal/tactile cueing for activities related to improving balance, coordination, kinesthetic sense, posture, motor skill, proprioception and motor activation to allow for proper function of scapular, scapulothoracic and UE control with self care, carrying, lifting, driving/computer work.      Home Exercise Program: [x] (46119) Reviewed/Progressed HEP activities related to strengthening, flexibility, endurance, ROM of scapular, scapulothoracic and UE control with self care, reaching, carrying, lifting, house/yardwork, driving/computer work - Reviewed and updated HEP with pt (see below). (12/8/20)  [x] (27496) Reviewed/Progressed HEP activities related to improving balance, coordination, kinesthetic sense, posture, motor skill, proprioception of scapular, scapulothoracic and UE control with self care, reaching, carrying, lifting, house/yardwork, driving/computer work        Access Code: [email or web address]   URL: [employer]   Date: 12/08/2020   Prepared by: Amado Lindsay     Exercises   Seated Shoulder Flexion Towel Slide at Table Top - 10 reps - 1 sets - 10 sec hold - 1x daily - 7x weekly   Seated Shoulder Scaption Slide at Table Top with Forearm in Neutral - 10 reps - 1 sets - 10 sec hold - 1x daily - 7x weekly   Seated Shoulder External Rotation AAROM with Dowel - 10 reps - 1 sets - 10 sec hold - 1x daily - 7x weekly   Standing Shoulder Shrugs with Dumbbells - 10 reps - 3 sets - 1x daily - 7x weekly   Scapular Retraction with Resistance - 10 reps - 3 sets - 1x daily - 7x weekly   Standing Bicep Curls Supinated with Dumbbells - 10 reps - 3 sets - 1x daily - 7x weekly   Isometric Shoulder Abduction at Wall - 10 reps - 1 sets - 10 sec hold - 1x daily - 7x weekly   Standing Shoulder Internal Rotation Stretch with Towel - 10 reps - 1 sets - 10 sec hold - 1x daily - 7x weekly   Supine Shoulder Flexion Extension AAROM with Dowel - 10 reps - 1 sets - 5 sec hold - 1x daily - 7x weekly   Standing Elbow Extension with Anchored Resistance at Wall - 10 reps - 3 sets - 1x daily - 7x weekly   Standing Shoulder Flexion Wall Walk - 5 reps - 2 sets - 1x daily - 7x weekly   Supine Shoulder Circles - 10 reps - 3 sets - 1x daily - 7x weekly   Shoulder Internal Rotation with Resistance - 10 reps - 3 sets - 1x daily - 7x weekly Supine Shoulder Flexion Extension Full Range AROM - 10 reps - 1 sets - 1x daily - 7x weekly   Shoulder External Rotation with Anchored Resistance - 10 reps - 3 sets - 1x daily - 7x weekly           Manual Treatments:  PROM / STM / Oscillations-Mobs:  G-I, II, III, IV (PA's, Inf., Post.)  [x] (85928) Provided manual therapy to mobilize soft tissue/joints of cervical/CT, scapular GHJ and UE for the purpose of modulating pain, promoting relaxation,  increasing ROM, reducing/eliminating soft tissue swelling/inflammation/restriction, improving soft tissue extensibility and allowing for proper ROM for normal function with self care, reaching, carrying, lifting, house/yardwork, driving/computer work    Modalities:  ICE x 15' for the left shoulder   [] GAME READY (VASO)- for significant edema, swelling, pain control. Charges:  Timed Code Treatment Minutes: 61'   Total Treatment Minutes: 76'      [] EVAL (LOW) 39966 (typically 20 minutes face-to-face)  [] EVAL (MOD) 09271 (typically 30 minutes face-to-face)  [] EVAL (HIGH) 40255 (typically 45 minutes face-to-face)  [] RE-EVAL     [x] IQ(11027) x 1 (22')   [] IONTO  [x] NMR (44471) x 1 (15')  [] VASO  [x] Manual (18009) x 1  (8')   [] Other:   [x] TA x  1 (15')  [] Mech Traction (11246)  [] ES(attended) (07247)     [] ES (un) (31930):         ASSESSMENT:  Pt tolerated the exercises well today with fewer rest breaks needed. Eccentric shoulder flexion with 1# wt remains her most challenging exercise. Left shoulder was fatigued at the end of therapy. Active shoulder flexion in standing was 115° today with pt demonstrating better eccentric control. GOALS:  (Goals updated 1/5/21)  Patient stated goal:  \"Increase function; be able to use my left arm for toileting; be self-sufficient\"  [x] Progressing: [] Met: [] Not Met: [] Adjusted    Therapist goals for Patient:   Short Term Goals:  To be achieved in: 2 weeks 1. Independent in HEP and progression per patient tolerance, in order to prevent re-injury. [] Progressing: [x] Met: [] Not Met: [] Adjusted  2. Patient will have a decrease in left shoulder pain to 1-2/10 to facilitate improvement in movement, function, and ADLs as indicated by Functional Deficits. [] Progressing: [x] Met: [] Not Met: [] Adjusted  3. Patient will be able to melonie/doff Ultrasling independently. [] Progressing: [x] Met: [] Not Met: [] Adjusted      Long Term Goals: To be achieved in: 12 weeks  1. Disability index score of 15% or less for the Quick DASH to assist with reaching prior level of function. [] Progressing: [x] Met: [] Not Met: [] Adjusted  2. Patient will demonstrate an increase in left shoulder AROM to at least 90° flexion to allow for proper joint functioning as indicated by patients Functional Deficits. [] Progressing: [x] Met: [] Not Met:  [] Adjusted  3. Patient will demonstrate an increase in left Deltoid strength to 4+/5 to allow for proper functional mobility as indicated by patients Functional Deficits. [x] Progressing: [] Met: [] Not Met: [] Adjusted  4. Patient will have a decrease in left shoulder pain to 0-1/10 with daily activities. [] Progressing: [x] Met: [] Not Met: [] Adjusted  5. Pt will be able to perform all ADL's independently and without compensation. (patient specific functional goal)    [x] Progressing: [] Met: [] Not Met: [] Adjusted       Overall Progression Towards Functional goals/ Treatment Progress Update:  [x] Patient is progressing as expected towards functional goals listed       [] Progression is slowed due to complexities/Impairments listed. [] Progression has been slowed due to co-morbidities.   [] Plan just implemented, too soon to assess goals progression <30days   [] Goals require adjustment due to lack of progress  [] Patient is not progressing as expected and requires additional follow up with physician  [] Other Prognosis for POC: [x] Good [] Fair  [] Poor      Patient requires continued skilled intervention: [x] Yes  [] No    Treatment/Activity Tolerance:  [x] Patient able to complete treatment  [] Patient limited by fatigue  [] Patient limited by pain    [] Patient limited by other medical complications  [] Other:           PLAN: Cont therapy for ROM, strengthening, and endurance. 1-2x/week for 4 weeks for ROM, strengthening, scap stability exercises, manual therapy, HEP, pt education, and modalities prn (1/5/21)  [x] Continue per plan of care [] Alter current plan   [] Plan of care initiated [] Hold pending MD visit [] Discharge      Electronically signed by:  Zen Cooley PT     Physical Therapist [id number]  Physical Therapist [place] License #[id number]    Note: If patient does not return for scheduled/ recommended follow up visits, this note will serve as a discharge from care along with most recent update on progress.

## 2021-01-28 ENCOUNTER — TREATMENT (OUTPATIENT)
Dept: PHYSICAL THERAPY | Age: 71
End: 2021-01-28

## 2021-01-28 NOTE — PROGRESS NOTES
[facility]   Phone: [phone]    Fax: [phone]            Physical Therapy  Cancellation/No-show Note  Patient Name:  Megan Amaral  :  1950   Date:  2021  Cancelled visits to date: 10  No-shows to date: 0    For today's appointment patient:  [x]  Cancelled  []  Rescheduled appointment  []  No-show     Reason given by patient:  []  Patient ill  [x]  Conflicting appointment  []  No transportation    []  Conflict with work  []  No reason given  []  Other:     Comments:  Pt is waiting on a wheelchair to be delivered for her sister. Phone call information:   []  Phone call made today to patient at _ time at number provided:      []  Patient answered, conversation as follows:    []  Patient did not answer, message left as follows:  []  Phone call not made today  [x]  Phone call not needed - pt contacted us to cancel and provided reason for cancellation.      Electronically signed by:  Carlos Driscoll, PT    Physical Therapist [facility] [id number]  Physical Therapist [place] License #[id number]

## 2021-02-02 ENCOUNTER — TREATMENT (OUTPATIENT)
Dept: PHYSICAL THERAPY | Age: 71
End: 2021-02-02
Payer: MEDICARE

## 2021-02-02 DIAGNOSIS — R29.898 SHOULDER WEAKNESS: ICD-10-CM

## 2021-02-02 DIAGNOSIS — M25.612 DECREASED ROM OF LEFT SHOULDER: ICD-10-CM

## 2021-02-02 DIAGNOSIS — M25.512 LEFT SHOULDER PAIN, UNSPECIFIED CHRONICITY: ICD-10-CM

## 2021-02-02 DIAGNOSIS — M19.012 PRIMARY OSTEOARTHRITIS OF LEFT SHOULDER: Primary | ICD-10-CM

## 2021-02-02 PROCEDURE — 1101F PT FALLS ASSESS-DOCD LE1/YR: CPT | Performed by: PHYSICAL THERAPIST

## 2021-02-02 PROCEDURE — 97140 MANUAL THERAPY 1/> REGIONS: CPT | Performed by: PHYSICAL THERAPIST

## 2021-02-02 PROCEDURE — 97110 THERAPEUTIC EXERCISES: CPT | Performed by: PHYSICAL THERAPIST

## 2021-02-02 PROCEDURE — 97530 THERAPEUTIC ACTIVITIES: CPT | Performed by: PHYSICAL THERAPIST

## 2021-02-02 PROCEDURE — 97112 NEUROMUSCULAR REEDUCATION: CPT | Performed by: PHYSICAL THERAPIST

## 2021-02-02 PROCEDURE — G8427 DOCREV CUR MEDS BY ELIG CLIN: HCPCS | Performed by: PHYSICAL THERAPIST

## 2021-02-02 PROCEDURE — G8539 DOC FUNCT AND CARE PLAN: HCPCS | Performed by: PHYSICAL THERAPIST

## 2021-02-02 NOTE — PROGRESS NOTES
If Yes:  Date Range for reporting period:  Beginning 7/7/20  Ending 2/2/21    Progress report will be due (10 Rx or 30 days whichever is less): 7/8/66      Recertification will be due (POC Duration  / 90 days whichever is less): 3/2/21         Visit # Insurance Allowable Auth Required   8 for 2021  (28 total) Medical Necessity []  Yes [x]  No        Functional Scale:    Date assessed:  2/2/21  Functional Assessment Tool Used: Ariel Ek  Score:  4.5% functional deficit (2/2/21)       (7/7/20)   Patient Age: 69years old    Patient Height: 5' 2\"  Patient Weight: 280 lbs   Patient BMI: 51.2      Pain (2/2/21)  []  Pain diagram was completed, pain was present and a follow up plan to address the pain is in the plan of care. ()   [x]  Pain diagram was completed and there was no pain present and therefore no follow up plan to address pain in the plan of care. ()   []  Pain diagram was not completed and the reason for not completing the pain diagram is in the medical chart ()  []  Patient refused to participate   []  Severe mental or physical capacity, patient is in urgent emergent situation and to complete the questionnaire would jeopardize the patient's health status        Functional Questionnaire (2/2/21)  [x]  Patient completed the functional outcome questionnaire and deficiencies were addressed in the plan of care. ()   []  Patient completed the outcome questionnaire and there were no functional deficits identified and therefore no plan of care is required. ()   []  Patient did not complete the functional outcome questionnaire and the reason why is documented in the medical chart. ()  []  Patient refused to participate   []  Patient unable to complete the questionnaire   []   A functional outcome assessment has been reported on within the last 30 days        Falls (2/2/21)  [x]  The patient has had no falls or 1 fall without injury in the past year.  (1101F) Flexion 125°  120°      Abduction (scap) 125°  90°     ER 30°       IR NT  Just above her belt line     Other        Other           (2/2/21)  Strength L R Comment   Flexion      Abduction      ER 4-/5     IR 4+/5     Deltoid 4+/5     Upper Trap      Lower Trap      Mid Trap      Rhomboids      Biceps 4+/5     Triceps 4+/5     Horizontal Abduction      Horizontal Adduction      Lats        (2/2/21)  Special Tests Results/Comment   Umu Lang    Speeds    OBriens    Other    Neurologic Signs Pt continues to c/o numbness and tingling in both hands which she thinks is from the Gabapentin. Functional Reach          Reflexes/Sensation: (7/7/20)   []Dermatomes/Myotomes intact    []Reflexes equal and normal bilaterally   [x]Other: Intact to light touch    Joint mobility:    []Normal    []Hypo   []Hyper    Palpation: No tenderness. (2/2/21)    Functional Mobility/Transfers: ADL's and self-care activities have gotten much easier and pt is able to use her left hand for toileting. She is still limited with activities above shoulder level. She has to use both arms to get up from a chair.   (2/2/21)    Posture:  Forward head, rounded shoulders.  (2/2/21)    Bandages/Dressings/Incisions: Incision is healed. (2/2/21)    Gait: (include devices/WB status): WNL, no AD. (2/2/21)    Orthopedic Special Tests: See above                         RESTRICTIONS/PRECAUTIONS: Left Reverse TSA (Sx: 6/29/20)  ~AAROM FE to 140°, ER to 40°  ~Isometric Deltoid Strengthening          Exercises/Interventions:     Therapeutic Ex (10730) Sets/sec Reps Notes/CUES   AD UE BIKE            STRETCHING/ROM      Pulleys: Flexion 5-10\" hold X 30    Table Slides: Flexion and scap  X 30 On inclined table   Wand: ER 0 - 40° 10\" hold X 10 Seated   UE [place]: Flexion  X 30 Standing with UE ranger on table   Supine Cane Flexion  X 20 Semi-inclined on table (pt cannot lie flat)   Doorway      Wall Slides  X 20 Sliding up wall   CBA TP BB 10\"  X 10 Pulling across     Sleeper       Elbow AROM  D/C   Scapular Clock  D/C      ISOMETRICS      Gripping   D/CRetraction  D/C   Abduction      Flexion      Internal Rotation      External Rotation  D/C   Deltoid 10\" hold X 10 Submax         STRENGTHENING-PREs      Flexion - Eccentric  (Punching up then slowly lowering left arm down to the table) 3 sets X 10 with 1# wt Semi-inclined on table   Abduction      Internal Rotation      External Rotation      Shrugs 3 sets X 10 with 4#    Biceps 3 sets X 10 with 4#    Triceps      Retraction      Extension      Horizontal Abduction in ER      Serratus                        THERABANDS/CABLE COLUMN      Rows 3 sets X 10 with black band    Lats      Extension 3 sets X 10 with black band    Internal Rotation 3 sets X 10 with red band    External Rotation 3 sets  X 10 with red band    Biceps      Triceps 3 sets X 10 with black band    PNF                                    Manual Intervention (05975)      Scar Massage      STM      Hawkgrips      GH joint mobilizations      Foamroll      Manual PROM: Left Shoulder Flexion, scap, and ER (Within MD Parameters), Elbow Flexion and Extension  X 8' Pt inclined on table (pt cannot lie flat)         NMR re-education (87363)   CUES NEEDED   Plyoback      Therabar oscillations      Body Blade       Rhythmic Stabilization      Ball on the wall      Supine Codman's:  CW/CCW, Flex/Ext    X 30 each with 1#   Small, controlled range  Semi-inclined on table (pt cannot lie flat)                     Therapeutic Activity (37871)      Core training      Swiss ball activities                  Patient Education: diagnosis, prognosis, pt goals, rehab timeline, and surgical precautions.   Also instructed pt and her friend on proper way to melonie/doff sling (7/7/20)  X 10'                          Therapeutic Exercise and NMR EXR [x] (92858) Provided verbal/tactile cueing for activities related to strengthening, flexibility, endurance, ROM  for improvements in scapular, scapulothoracic and UE control with self care, reaching, carrying, lifting, house/yardwork, driving/computer work. [x] (16491) Provided verbal/tactile cueing for activities related to improving balance, coordination, kinesthetic sense, posture, motor skill, proprioception  to assist with  scapular, scapulothoracic and UE control with self care, reaching, carrying, lifting, house/yardwork, driving/computer work. Therapeutic Activities:    [x] (30697 or 92293) Provided verbal/tactile cueing for activities related to improving balance, coordination, kinesthetic sense, posture, motor skill, proprioception and motor activation to allow for proper function of scapular, scapulothoracic and UE control with self care, carrying, lifting, driving/computer work. Home Exercise Program:    [x] (30842) Reviewed/Progressed HEP activities related to strengthening, flexibility, endurance, ROM of scapular, scapulothoracic and UE control with self care, reaching, carrying, lifting, house/yardwork, driving/computer work - Reviewed and updated HEP with pt (see below). (12/8/20)  [x] (69887) Reviewed/Progressed HEP activities related to improving balance, coordination, kinesthetic sense, posture, motor skill, proprioception of scapular, scapulothoracic and UE control with self care, reaching, carrying, lifting, house/yardwork, driving/computer work        Access Code: [email or web address]   URL: [email or web address]   Date: 12/08/2020   Prepared by: Maribel Rosales     Exercises   Seated Shoulder Flexion Towel Slide at Table Top - 10 reps - 1 sets - 10 sec hold - 1x daily - 7x weekly   Seated Shoulder Scaption Slide at Table Top with Forearm in Neutral - 10 reps - 1 sets - 10 sec hold - 1x daily - 7x weekly Seated Shoulder External Rotation AAROM with Dowel - 10 reps - 1 sets - 10 sec hold - 1x daily - 7x weekly   Standing Shoulder Shrugs with Dumbbells - 10 reps - 3 sets - 1x daily - 7x weekly   Scapular Retraction with Resistance - 10 reps - 3 sets - 1x daily - 7x weekly   Standing Bicep Curls Supinated with Dumbbells - 10 reps - 3 sets - 1x daily - 7x weekly   Isometric Shoulder Abduction at Wall - 10 reps - 1 sets - 10 sec hold - 1x daily - 7x weekly   Standing Shoulder Internal Rotation Stretch with Towel - 10 reps - 1 sets - 10 sec hold - 1x daily - 7x weekly   Supine Shoulder Flexion Extension AAROM with Dowel - 10 reps - 1 sets - 5 sec hold - 1x daily - 7x weekly   Standing Elbow Extension with Anchored Resistance at Wall - 10 reps - 3 sets - 1x daily - 7x weekly   Standing Shoulder Flexion Wall Walk - 5 reps - 2 sets - 1x daily - 7x weekly   Supine Shoulder Circles - 10 reps - 3 sets - 1x daily - 7x weekly   Shoulder Internal Rotation with Resistance - 10 reps - 3 sets - 1x daily - 7x weekly   Supine Shoulder Flexion Extension Full Range AROM - 10 reps - 1 sets - 1x daily - 7x weekly   Shoulder External Rotation with Anchored Resistance - 10 reps - 3 sets - 1x daily - 7x weekly           Manual Treatments:  PROM / STM / Oscillations-Mobs:  G-I, II, III, IV (PA's, Inf., Post.)  [x] (48785) Provided manual therapy to mobilize soft tissue/joints of cervical/CT, scapular GHJ and UE for the purpose of modulating pain, promoting relaxation,  increasing ROM, reducing/eliminating soft tissue swelling/inflammation/restriction, improving soft tissue extensibility and allowing for proper ROM for normal function with self care, reaching, carrying, lifting, house/yardwork, driving/computer work    Modalities:  ICE x 15' for the left shoulder   [] GAME READY (VASO)- for significant edema, swelling, pain control.     Charges:  Timed Code Treatment Minutes: 61'   Total Treatment Minutes: 76' [] EVAL (LOW) 96280 (typically 20 minutes face-to-face)  [] EVAL (MOD) 69454 (typically 30 minutes face-to-face)  [] EVAL (HIGH) 70440 (typically 45 minutes face-to-face)  [] RE-EVAL     [x] HC(22666) x 1 (22')   [] IONTO  [x] NMR (64624) x 1 (15')  [] VASO  [x] Manual (12943) x 1  (8')   [] Other:   [x] TA x  1 (15')  [] Mech Traction (91631)  [] ES(attended) (20758)     [] ES (un) (26475):         ASSESSMENT:  Pt did very well with the exercises today with shorter rest breaks needed. Strength and endurance continue to improve. Eccentric shoulder flexion with 1# wt is still her most difficult exercise but her control is getting better. Active shoulder flexion in standing increased to 120° today. Pt is continuing to make progress in therapy and would benefit from 1 more month of skilled PT to continue increasing strength for [place] act. GOALS:  (Goals updated 2/2/21)  Patient stated goal:  \"Increase function; be able to use my left arm for toileting; be self-sufficient\"  [x] Progressing: [] Met: [] Not Met: [] Adjusted    Therapist goals for Patient:   Short Term Goals: To be achieved in: 2 weeks  1. Independent in HEP and progression per patient tolerance, in order to prevent re-injury. [] Progressing: [x] Met: [] Not Met: [] Adjusted  2. Patient will have a decrease in left shoulder pain to 1-2/10 to facilitate improvement in movement, function, and ADLs as indicated by Functional Deficits. [] Progressing: [x] Met: [] Not Met: [] Adjusted  3. Patient will be able to melonie/doff Ultrasling independently. [] Progressing: [x] Met: [] Not Met: [] Adjusted      Long Term Goals: To be achieved in: 12 weeks  1. Disability index score of 15% or less for the Quick DASH to assist with reaching prior level of function.    [] Progressing: [x] Met: [] Not Met: [] Adjusted 2. Patient will demonstrate an increase in left shoulder AROM to at least 90° flexion to allow for proper joint functioning as indicated by patients Functional Deficits. [] Progressing: [x] Met: [] Not Met:  [] Adjusted  3. Patient will demonstrate an increase in left Deltoid strength to 4+/5 to allow for proper functional mobility as indicated by patients Functional Deficits. [] Progressing: [x] Met: [] Not Met: [] Adjusted  4. Patient will have a decrease in left shoulder pain to 0-1/10 with daily activities. [] Progressing: [x] Met: [] Not Met: [] Adjusted  5. Pt will be able to perform all ADL's independently and without compensation. (patient specific functional goal)    [x] Progressing: [] Met: [] Not Met: [] Adjusted       Overall Progression Towards Functional goals/ Treatment Progress Update:  [x] Patient is progressing as expected towards functional goals listed       [] Progression is slowed due to complexities/Impairments listed. [] Progression has been slowed due to co-morbidities. [] Plan just implemented, too soon to assess goals progression <30days   [] Goals require adjustment due to lack of progress  [] Patient is not progressing as expected and requires additional follow up with physician  [] Other         Prognosis for POC: [x] Good [] Fair  [] Poor      Patient requires continued skilled intervention: [x] Yes  [] No    Treatment/Activity Tolerance:  [x] Patient able to complete treatment  [] Patient limited by fatigue  [] Patient limited by pain    [] Patient limited by other medical complications  [] Other:           PLAN: Cont therapy 1x/week for ROM, strengthening, and endurance.            1x/week for 4 weeks for ROM, strengthening, scap stability exercises, manual therapy, HEP, pt education, and modalities prn (2/2/21)  [x] Continue per plan of care [] Alter current plan   [] Plan of care initiated [] Hold pending MD visit [] Discharge

## 2021-02-09 ENCOUNTER — TREATMENT (OUTPATIENT)
Dept: PHYSICAL THERAPY | Age: 71
End: 2021-02-09

## 2021-02-09 NOTE — PROGRESS NOTES
[facility]   Phone: [phone]    Fax: [phone]            Physical Therapy  Cancellation/No-show Note  Patient Name:  Andrew Jimenes  :  1950   Date:  2021  Cancelled visits to date: 7  No-shows to date: 0    For today's appointment patient:  [x]  Cancelled  []  Rescheduled appointment  []  No-show     Reason given by patient:  []  Patient ill  []  Conflicting appointment  []  No transportation    []  Conflict with work  []  No reason given  [x]  Other:     Comments:  Pt cancelled due to weather/snow. Phone call information:   []  Phone call made today to patient at _ time at number provided:      []  Patient answered, conversation as follows:    []  Patient did not answer, message left as follows:  []  Phone call not made today  [x]  Phone call not needed - pt contacted us to cancel and provided reason for cancellation.      Electronically signed by:  Maryjane Song PT    Physical Therapist [facility] [address] #[id number]  Physical Therapist [place] License #[id number]

## 2021-03-04 ENCOUNTER — TREATMENT (OUTPATIENT)
Dept: PHYSICAL THERAPY | Age: 71
End: 2021-03-04
Payer: MEDICARE

## 2021-03-04 DIAGNOSIS — M25.512 LEFT SHOULDER PAIN, UNSPECIFIED CHRONICITY: ICD-10-CM

## 2021-03-04 DIAGNOSIS — M25.612 DECREASED ROM OF LEFT SHOULDER: ICD-10-CM

## 2021-03-04 DIAGNOSIS — M19.012 PRIMARY OSTEOARTHRITIS OF LEFT SHOULDER: Primary | ICD-10-CM

## 2021-03-04 DIAGNOSIS — R29.898 SHOULDER WEAKNESS: ICD-10-CM

## 2021-03-04 PROCEDURE — G8427 DOCREV CUR MEDS BY ELIG CLIN: HCPCS | Performed by: PHYSICAL THERAPIST

## 2021-03-04 PROCEDURE — 1101F PT FALLS ASSESS-DOCD LE1/YR: CPT | Performed by: PHYSICAL THERAPIST

## 2021-03-04 PROCEDURE — 97110 THERAPEUTIC EXERCISES: CPT | Performed by: PHYSICAL THERAPIST

## 2021-03-04 PROCEDURE — G8539 DOC FUNCT AND CARE PLAN: HCPCS | Performed by: PHYSICAL THERAPIST

## 2021-03-04 PROCEDURE — 97530 THERAPEUTIC ACTIVITIES: CPT | Performed by: PHYSICAL THERAPIST

## 2021-03-04 PROCEDURE — 97112 NEUROMUSCULAR REEDUCATION: CPT | Performed by: PHYSICAL THERAPIST

## 2021-03-04 PROCEDURE — 97140 MANUAL THERAPY 1/> REGIONS: CPT | Performed by: PHYSICAL THERAPIST

## 2021-03-04 NOTE — PROGRESS NOTES
[facility]   Phone: [phone]    Fax: [phone]     Physical Therapy Re-Certification Plan of Care    Dear  Dr. Anisa Hernandez,    We had the pleasure of treating the following patient for physical therapy services at [address] [place] [address]. A summary of our findings can be found in the updated assessment below. This includes our plan of care. If you have any questions or concerns regarding these findings, please do not hesitate to contact me at the office phone number checked above.   Thank you for the referral.     Physician Signature:________________________________Date:__________________  By signing above (or electronic signature), therapists plan is approved by physician      Overall Response to Treatment:   [x]Patient is responding well to treatment and improvement is noted with regards to goals   []Patient should continue to improve in reasonable time if they continue HEP   []Patient has plateaued and is no longer responding to skilled PT intervention    []Patient is getting worse and would benefit from return to referring MD   []Patient unable to adhere to initial POC   []Other:          Physical Therapy Treatment Note/ Progress Report:           Date:  3/4/2021    Patient Name:  Josue Mancera    :  1950  MRN: <[id number]>  Restrictions/Precautions:  S/P Left Reverse TSA  Medical/Treatment Diagnosis Information:  Diagnosis: S/P Left Reverse TSA (Sx: 20) (M19.012)   Treatment Diagnosis: Left Shoulder Pain (M25.512), Decreased left Shoulder ROM (M25.612), Decreased Left Shoulder Strength (Q76.925)  Insurance/Certification information:  PT Insurance Information: Medicare, [facility]  Physician Information:  Referring Practitioner: Dr. Anisa Hernandez  Has the plan of care been signed (Y/N):        [x]  Yes (POC signed 21)  []  No        Date of Patient follow up with Physician: 21      Is this a Progress Report:     [x]  Yes  []  No If Yes:  Date Range for reporting period:  Beginning 7/7/20  Ending 3/4/21    Progress report will be due (10 Rx or 30 days whichever is less): 9/3/54      Recertification will be due (POC Duration  / 90 days whichever is less): 4/1/21         Visit # Insurance Allowable Auth Required   9 for 2021  (29 total) Medical Necessity []  Yes [x]  No        Functional Scale:    Date assessed:  3/4/21  Functional Assessment Tool Used: Derek Dean  Score:  11.36% functional deficit (3/4/21)  *Quick Dash score was higher today due to numbness, tingling, and weakness in both hands which pt is seeing a neurologist for.        (7/7/20)   Patient Age: 69years old    Patient Height: 5' 2\"  Patient Weight: 280 lbs   Patient BMI: 51.2      Pain (3/4/21)  []  Pain diagram was completed, pain was present and a follow up plan to address the pain is in the plan of care. ()   [x]  Pain diagram was completed and there was no pain present and therefore no follow up plan to address pain in the plan of care. ()   []  Pain diagram was not completed and the reason for not completing the pain diagram is in the medical chart ()  []  Patient refused to participate   []  Severe mental or physical capacity, patient is in urgent emergent situation and to complete the questionnaire would jeopardize the patient's health status        Functional Questionnaire (3/4/21)  [x]  Patient completed the functional outcome questionnaire and deficiencies were addressed in the plan of care. ()   []  Patient completed the outcome questionnaire and there were no functional deficits identified and therefore no plan of care is required. ()   []  Patient did not complete the functional outcome questionnaire and the reason why is documented in the medical chart. ()  []  Patient refused to participate   []  Patient unable to complete the questionnaire   []   A functional outcome assessment has been reported on within the last 30 days Falls (3/4/21)  [x]  The patient has had no falls or 1 fall without injury in the past year. (1101F)   []  There is no documentation of fall in the medical chart (1101F,8P)     [] The patient has had 2 or more falls or one fall with injury in the past year that is documented in the medical chart. (1100F)  []  A falls risk assessment was completed and documented in the medical chart. (6054Q)   []  Falls were addressed in the plan of care. (8693X)   []  A falls risk assessment was not completed and documented in the medical chart (8390X,7L)   []   Falls were not addressed in the plan of care and reason was documented in the plan of care. (0696P 1P)        Medication (3/4/21)     [x] A list of current medications (including prescription, over the counter, herbal supplements, vitamin supplements, mineral supplements, dietary supplements) was reviewed with the patient and documented in the medical chart. ()     Falls Risk Assessment (30 days): (3/4/21)  [x] Falls Risk assessed and no intervention required. [] Falls Risk assessed and Patient requires intervention due to being higher risk   TUG score (>12s at risk):     [] Falls education provided, including       PQRS:   Reviewed medication list with patient. Pt is off the Gabapentin.   (3/4/21)      Latex Allergy:  [x]NO      []YES  Preferred Language for Healthcare:   [x]English       []other:      Pain level:  0/10 (3/4/21)  Medication: Nothing for her shoulder       SUBJECTIVE:  Pt returns to therapy today after last being seen on 2/2/21. She missed her last scheduled appt due to weather. She saw a neurologist (Dr. Bill Rankin) last Thursday regarding the numbness, tingling, and weakness in both hands and the neurologist ordered several tests, including an MRI and EMG. She has a follow-up with a different neurologist (Dr. Fanny Cespedes) on April 13. Pt states her left shoulder is doing well with no c/o pain. Reaching into [place] cabinets has gotten easier.   She is using her left arm a lot more around the house and she feels stronger. She still has difficulty washing her back. She admits to being non-compliant with her HEP during her absence from therapy. (35 weeks post-op)        OBJECTIVE:     *Pt is right hand dominant    (3/4/21)  ROM PROM AROM  Comment    L R L R    Flexion 125°  110°      Abduction (scap) 125°  90°     ER 30°       IR NT  Just above her belt line     Other        Other           (3/4/21)  Strength L R Comment   Flexion      Abduction      ER 4-/5     IR 4+/5     Deltoid 4+/5     Upper Trap      Lower Trap      Mid Trap      Rhomboids      Biceps 4+/5     Triceps 4+/5     Horizontal Abduction      Horizontal Adduction      Lats        (3/4/21)  Special Tests Results/Comment   Umu Lang    Speeds    OBriens    Other    Neurologic Signs Pt continues to c/o numbness and tingling in both hands for which she is seeing a neurologist.   Functional Reach          Reflexes/Sensation: (7/7/20)   []Dermatomes/Myotomes intact    []Reflexes equal and normal bilaterally   [x]Other: Intact to light touch    Joint mobility:    []Normal    []Hypo   []Hyper    Palpation: No tenderness. (3/4/21)    Functional Mobility/Transfers: ADL's and self-care activities have gotten much easier and pt is able to use her left hand for toileting. She still has some weakness performing activities above shoulder level. She still has difficulty washing her back. She has to use both arms to get up from a chair.   (3/4/21)    Posture:  Forward head, rounded shoulders.  (3/4/21)    Bandages/Dressings/Incisions: Incision is healed. (3/4/21)    Gait: (include devices/WB status): WNL, no AD. (3/4/21)    Orthopedic Special Tests: See above                         RESTRICTIONS/PRECAUTIONS: Left Reverse TSA (Sx: 6/29/20)  ~AAROM FE to 140°, ER to 40°  ~Isometric Deltoid Strengthening          Exercises/Interventions:     Therapeutic Ex (21232) Sets/sec Reps Notes/CUES   AD UE BIKE            STRETCHING/ROM      Pulleys: Flexion 5-10\" hold X 30    Table Slides: Flexion and scap  X 30 On inclined table   Wand: ER 0 - 40° 10\" hold X 10 Seated   UE [place]: Flexion  X 30 Standing with UE ranger on table   Supine Cane Flexion  X 20 Semi-inclined on table (pt cannot lie flat)   Doorway      Wall Slides  X 20 Sliding up wall   CBA      TP BB 10\"  X 10 Pulling across then up     Sleeper       Elbow AROM  D/C   Scapular Clock  D/C      ISOMETRICS      Gripping   D/CRetraction  D/C   Abduction      Flexion      Internal Rotation      External Rotation  D/C   Deltoid 10\" hold X 10 Submax         STRENGTHENING-PREs      Flexion - Eccentric  (Punching up then slowly lowering left arm down to the table) 3 sets X 10 with 1# wt Semi-inclined on table   Abduction      Internal Rotation      External Rotation      Shrugs 3 sets X 10 with 4#    Biceps 3 sets X 10 with 4#    Triceps      Retraction      Extension      Horizontal Abduction in ER      Serratus                        THERABANDS/CABLE COLUMN      Rows 3 sets X 10 with black band    Lats      Extension 3 sets X 10 with black band    Internal Rotation 3 sets X 10 with red band    External Rotation 3 sets  X 10 with red band    Biceps      Triceps 3 sets X 10 with black band    PNF                                    Manual Intervention (06069)      Scar Massage      STM      Hawkgrips      GH joint mobilizations      Foamroll      Manual PROM: Left Shoulder Flexion, scap, and ER (Within MD Parameters), Elbow Flexion and Extension  X 8' Pt inclined on table (pt cannot lie flat)         NMR re-education (74917)   CUES NEEDED   Plyoback      Therabar oscillations      Body Blade       Rhythmic Stabilization      Ball on the wall      Supine Codman's:  CW/CCW, Flex/Ext    X 30 each with 1#   Small, controlled range  Semi-inclined on table (pt cannot lie flat)                     Therapeutic Activity (96358)      Core training      Swiss ball activities Patient Education: diagnosis, prognosis, pt goals, rehab timeline, and surgical precautions. Also instructed pt and her friend on proper way to melonie/doff sling (7/7/20)  X 10'                          Therapeutic Exercise and NMR EXR  [x] (38921) Provided verbal/tactile cueing for activities related to strengthening, flexibility, endurance, ROM  for improvements in scapular, scapulothoracic and UE control with self care, reaching, carrying, lifting, house/yardwork, driving/computer work. [x] (98571) Provided verbal/tactile cueing for activities related to improving balance, coordination, kinesthetic sense, posture, motor skill, proprioception  to assist with  scapular, scapulothoracic and UE control with self care, reaching, carrying, lifting, house/yardwork, driving/computer work. Therapeutic Activities:    [x] (47320 or 84781) Provided verbal/tactile cueing for activities related to improving balance, coordination, kinesthetic sense, posture, motor skill, proprioception and motor activation to allow for proper function of scapular, scapulothoracic and UE control with self care, carrying, lifting, driving/computer work. Home Exercise Program:    [x] (67355) Reviewed/Progressed HEP activities related to strengthening, flexibility, endurance, ROM of scapular, scapulothoracic and UE control with self care, reaching, carrying, lifting, house/yardwork, driving/computer work - Reviewed and updated HEP with pt (see below). (12/8/20)  [x] (41399) Reviewed/Progressed HEP activities related to improving balance, coordination, kinesthetic sense, posture, motor skill, proprioception of scapular, scapulothoracic and UE control with self care, reaching, carrying, lifting, house/yardwork, driving/computer work        Access Code: [email or web address]   URL: [email or web address]   Date: 12/08/2020   Prepared by: Tera Serrano     Exercises   Seated Shoulder Flexion Towel Slide at Table Top - 10 reps - 1 sets - 10 sec hold - 1x daily - 7x weekly   Seated Shoulder Scaption Slide at Table Top with Forearm in Neutral - 10 reps - 1 sets - 10 sec hold - 1x daily - 7x weekly   Seated Shoulder External Rotation AAROM with Dowel - 10 reps - 1 sets - 10 sec hold - 1x daily - 7x weekly   Standing Shoulder Shrugs with Dumbbells - 10 reps - 3 sets - 1x daily - 7x weekly   Scapular Retraction with Resistance - 10 reps - 3 sets - 1x daily - 7x weekly   Standing Bicep Curls Supinated with Dumbbells - 10 reps - 3 sets - 1x daily - 7x weekly   Isometric Shoulder Abduction at Wall - 10 reps - 1 sets - 10 sec hold - 1x daily - 7x weekly   Standing Shoulder Internal Rotation Stretch with Towel - 10 reps - 1 sets - 10 sec hold - 1x daily - 7x weekly   Supine Shoulder Flexion Extension AAROM with Dowel - 10 reps - 1 sets - 5 sec hold - 1x daily - 7x weekly   Standing Elbow Extension with Anchored Resistance at Wall - 10 reps - 3 sets - 1x daily - 7x weekly   Standing Shoulder Flexion Wall Walk - 5 reps - 2 sets - 1x daily - 7x weekly   Supine Shoulder Circles - 10 reps - 3 sets - 1x daily - 7x weekly   Shoulder Internal Rotation with Resistance - 10 reps - 3 sets - 1x daily - 7x weekly   Supine Shoulder Flexion Extension Full Range AROM - 10 reps - 1 sets - 1x daily - 7x weekly   Shoulder External Rotation with Anchored Resistance - 10 reps - 3 sets - 1x daily - 7x weekly           Manual Treatments:  PROM / STM / Oscillations-Mobs:  G-I, II, III, IV (PA's, Inf., Post.)  [x] (58235) Provided manual therapy to mobilize soft tissue/joints of cervical/CT, scapular GHJ and UE for the purpose of modulating pain, promoting relaxation,  increasing ROM, reducing/eliminating soft tissue swelling/inflammation/restriction, improving soft tissue extensibility and allowing for proper ROM for normal function with self care, reaching, carrying, lifting, house/yardwork, driving/computer work    Modalities:  ICE x 15' for the left shoulder   [] GAME prior level of function. [] Progressing: [x] Met: [] Not Met: [] Adjusted  2. Patient will demonstrate an increase in left shoulder AROM to at least 90° flexion to allow for proper joint functioning as indicated by patients Functional Deficits. [] Progressing: [x] Met: [] Not Met:  [] Adjusted  3. Patient will demonstrate an increase in left Deltoid strength to 4+/5 to allow for proper functional mobility as indicated by patients Functional Deficits. [] Progressing: [x] Met: [] Not Met: [] Adjusted  4. Patient will have a decrease in left shoulder pain to 0-1/10 with daily activities. [] Progressing: [x] Met: [] Not Met: [] Adjusted  5. Pt will be able to perform all ADL's independently and without compensation. (patient specific functional goal)    [x] Progressing: [] Met: [] Not Met: [] Adjusted       Overall Progression Towards Functional goals/ Treatment Progress Update:  [x] Patient is progressing as expected towards functional goals listed       [] Progression is slowed due to complexities/Impairments listed. [] Progression has been slowed due to co-morbidities. [] Plan just implemented, too soon to assess goals progression <30days   [] Goals require adjustment due to lack of progress  [] Patient is not progressing as expected and requires additional follow up with physician  [] Other         Prognosis for POC: [x] Good [] Fair  [] Poor      Patient requires continued skilled intervention: [x] Yes  [] No    Treatment/Activity Tolerance:  [x] Patient able to complete treatment  [] Patient limited by fatigue  [] Patient limited by pain    [] Patient limited by other medical complications  [] Other:           PLAN: Cont therapy 1x/week for ROM, strengthening, and endurance.            1x/week for 4 weeks for ROM, strengthening, scap stability exercises, manual therapy, HEP, pt education, and modalities prn (3/4/21)  [x] Continue per plan of care [] Alter current plan   [] Plan of care initiated [] Hold pending MD visit [] Discharge      Electronically signed by:  Tomasa Moran PT     Physical Therapist [address] #[id number]  Physical Therapist [place] License #[id number]    Note: If patient does not return for scheduled/ recommended follow up visits, this note will serve as a discharge from care along with most recent update on progress.

## 2021-03-09 ENCOUNTER — TREATMENT (OUTPATIENT)
Dept: PHYSICAL THERAPY | Age: 71
End: 2021-03-09
Payer: MEDICARE

## 2021-03-09 DIAGNOSIS — M25.612 DECREASED ROM OF LEFT SHOULDER: ICD-10-CM

## 2021-03-09 DIAGNOSIS — R29.898 SHOULDER WEAKNESS: ICD-10-CM

## 2021-03-09 DIAGNOSIS — M19.012 PRIMARY OSTEOARTHRITIS OF LEFT SHOULDER: Primary | ICD-10-CM

## 2021-03-09 DIAGNOSIS — M25.512 LEFT SHOULDER PAIN, UNSPECIFIED CHRONICITY: ICD-10-CM

## 2021-03-09 PROCEDURE — G8427 DOCREV CUR MEDS BY ELIG CLIN: HCPCS | Performed by: PHYSICAL THERAPIST

## 2021-03-09 PROCEDURE — 97530 THERAPEUTIC ACTIVITIES: CPT | Performed by: PHYSICAL THERAPIST

## 2021-03-09 PROCEDURE — 97112 NEUROMUSCULAR REEDUCATION: CPT | Performed by: PHYSICAL THERAPIST

## 2021-03-09 PROCEDURE — 97110 THERAPEUTIC EXERCISES: CPT | Performed by: PHYSICAL THERAPIST

## 2021-03-09 PROCEDURE — 97140 MANUAL THERAPY 1/> REGIONS: CPT | Performed by: PHYSICAL THERAPIST

## 2021-03-09 NOTE — PROGRESS NOTES
Shawn Marte [facility]   Phone: [phone]    Fax: [phone]          Physical Therapy Treatment Note/ Progress Report:           Date:  3/9/2021    Patient Name:  Andrew Jimenes    :  1950  MRN: <[id number]>  Restrictions/Precautions:  S/P Left Reverse TSA  Medical/Treatment Diagnosis Information:  Diagnosis: S/P Left Reverse TSA (Sx: 20) (M19.012)   Treatment Diagnosis: Left Shoulder Pain (M25.512), Decreased left Shoulder ROM (M25.612), Decreased Left Shoulder Strength (E17.295)  Insurance/Certification information:  PT Insurance Information: Medicare, [facility]  Physician Information:  Referring Practitioner: Dr. Kam Ro  Has the plan of care been signed (Y/N):        [x]  Yes (POC signed 3/4/21)  []  No        Date of Patient follow up with Physician: 21      Is this a Progress Report:     []  Yes  [x]  No        If Yes:  Date Range for reporting period:  Beginning 20  Ending 3/4/21    Progress report will be due (10 Rx or 30 days whichever is less): 55      Recertification will be due (POC Duration  / 90 days whichever is less): 21         Visit # Insurance Allowable Auth Required   10 for   (30 total) Medical Necessity []  Yes [x]  No        Functional Scale:    Date assessed:  3/4/21  Functional Assessment Tool Used: Eduardo Kadiey  Score:  11.36% functional deficit (3/4/21)  *Quick Dash score was higher today due to numbness, tingling, and weakness in both hands which pt is seeing a neurologist for.        (20)   Patient Age: 69years old    Patient Height: 5' 2\"  Patient Weight: 280 lbs   Patient BMI: 51.2      Pain (3/4/21)  []  Pain diagram was completed, pain was present and a follow up plan to address the pain is in the plan of care. ()   [x]  Pain diagram was completed and there was no pain present and therefore no follow up plan to address pain in the plan of care.  ()   []  Pain diagram was not completed and the reason for not completing the pain diagram is in the medical chart ()  []  Patient refused to participate   []  Severe mental or physical capacity, patient is in urgent emergent situation and to complete the questionnaire would jeopardize the patient's health status        Functional Questionnaire (3/4/21)  [x]  Patient completed the functional outcome questionnaire and deficiencies were addressed in the plan of care. ()   []  Patient completed the outcome questionnaire and there were no functional deficits identified and therefore no plan of care is required. ()   []  Patient did not complete the functional outcome questionnaire and the reason why is documented in the medical chart. ()  []  Patient refused to participate   []  Patient unable to complete the questionnaire   []   A functional outcome assessment has been reported on within the last 30 days        Falls (3/4/21)  [x]  The patient has had no falls or 1 fall without injury in the past year. (1101F)   []  There is no documentation of fall in the medical chart (1101F,8P)     [] The patient has had 2 or more falls or one fall with injury in the past year that is documented in the medical chart. (1100F)  []  A falls risk assessment was completed and documented in the medical chart. (3083W)   []  Falls were addressed in the plan of care. (2165M)   []  A falls risk assessment was not completed and documented in the medical chart (6050O,9G)   []   Falls were not addressed in the plan of care and reason was documented in the plan of care. (1002V 1P)        Medication (3/4/21)     [x] A list of current medications (including prescription, over the counter, herbal supplements, vitamin supplements, mineral supplements, dietary supplements) was reviewed with the patient and documented in the medical chart. ()     Falls Risk Assessment (30 days): (3/4/21)  [x] Falls Risk assessed and no intervention required.   [] Falls Risk assessed and Patient requires intervention due to being higher risk   TUG score (>12s at risk):     [] Falls education provided, including       PQRS:   Reviewed medication list with patient. Pt is off the Gabapentin.   (3/9/21)      Latex Allergy:  [x]NO      []YES  Preferred Language for Healthcare:   [x]English       []other:      Pain level:  0/10 (3/9/21)  Medication: Nothing for her shoulder       SUBJECTIVE:  Pt states a varicose vein in her right leg opened up yesterday after taking a shower and she couldn't stop the bleeding. She ended up going to the emergency room and needed 2-3 sutures. Her shoulder is doing well and she reports no pain. She had no soreness after last visit with resuming the exercises. (36 weeks post-op)        OBJECTIVE:     *Pt is right hand dominant    (3/9/21)  ROM PROM AROM  Comment    L R L R    Flexion 125°  115°      Abduction (scap) 125°  90°     ER 30°       IR NT  Just above her belt line     Other        Other           (3/4/21)  Strength L R Comment   Flexion      Abduction      ER 4-/5     IR 4+/5     Deltoid 4+/5     Upper Trap      Lower Trap      Mid Trap      Rhomboids      Biceps 4+/5     Triceps 4+/5     Horizontal Abduction      Horizontal Adduction      Lats        (3/4/21)  Special Tests Results/Comment   [place]ot    Neers    Speeds    OBriens    Other    Neurologic Signs Pt continues to c/o numbness and tingling in both hands for which she is seeing a neurologist.   Functional Reach          Reflexes/Sensation: (7/7/20)   []Dermatomes/Myotomes intact    []Reflexes equal and normal bilaterally   [x]Other: Intact to light touch    Joint mobility:    []Normal    []Hypo   []Hyper    Palpation: No tenderness. (3/4/21)    Functional Mobility/Transfers: ADL's and self-care activities have gotten much easier and pt is able to use her left hand for toileting. She still has some weakness performing activities above shoulder level.   She still has difficulty washing her back. She has to use both arms to get up from a chair.   (3/4/21)    Posture:  Forward head, rounded shoulders.  (3/4/21)    Bandages/Dressings/Incisions: Incision is healed. (3/4/21)    Gait: (include devices/WB status): WNL, no AD. (3/4/21)    Orthopedic Special Tests: See above                         RESTRICTIONS/PRECAUTIONS: Left Reverse TSA (Sx: 6/29/20)  ~AAROM FE to 140°, ER to 40°  ~Isometric Deltoid Strengthening          Exercises/Interventions:     Therapeutic Ex (07980) Sets/sec Reps Notes/CUES   AD UE BIKE            STRETCHING/ROM      Pulleys: Flexion 5-10\" hold X 30    Table Slides: Flexion and scap  X 30 On inclined table   Wand: ER 0 - 40° 10\" hold X 10 Seated   UE [place]: Flexion  X 30 Standing with UE ranger on table   Supine Cane Flexion  X 20 Semi-inclined on table (pt cannot lie flat)   Doorway      Wall Slides  X 20 Sliding up wall   CBA      TP BB 10\"  X 10 Pulling across then up     Sleeper       Elbow AROM  D/C   Scapular Clock  D/C      ISOMETRICS      Gripping   D/CRetraction  D/C   Abduction      Flexion      Internal Rotation      External Rotation  D/C   Deltoid 10\" hold X 10 Submax         STRENGTHENING-PREs      Flexion - Eccentric  (Punching up then slowly lowering left arm down to the table) 3 sets X 10 with 1# wt Semi-inclined on table   Abduction      Internal Rotation      External Rotation      Shrugs 3 sets X 10 with 4#    Biceps 3 sets X 10 with 4#    Triceps      Retraction      Extension      Horizontal Abduction in ER      Serratus                        THERABANDS/CABLE COLUMN      Rows 3 sets X 10 with black band    Lats      Extension 3 sets X 10 with black band    Internal Rotation 3 sets X 10 with red band    External Rotation 3 sets  X 10 with red band    Biceps      Triceps 3 sets X 10 with black band    PNF                                    Manual Intervention (74136)      Scar Massage      STM      Hawkgrips      GH joint mobilizations      Foamroll Manual PROM: Left Shoulder Flexion, scap, and ER (Within MD Parameters), Elbow Flexion and Extension  X 8' Pt inclined on table (pt cannot lie flat)         NMR re-education (89983)   CUES NEEDED   Plyoback      Therabar oscillations      Body Blade       Rhythmic Stabilization      Ball on the wall      Supine Codman's:  CW/CCW, Flex/Ext    X 30 each with 1#   Small, controlled range  Semi-inclined on table (pt cannot lie flat)                     Therapeutic Activity (36229)      Core training      Swiss ball activities                  Patient Education: diagnosis, prognosis, pt goals, rehab timeline, and surgical precautions. Also instructed pt and her friend on proper way to melonie/doff sling (7/7/20)  X 10'                          Therapeutic Exercise and NMR EXR  [x] (28976) Provided verbal/tactile cueing for activities related to strengthening, flexibility, endurance, ROM  for improvements in scapular, scapulothoracic and UE control with self care, reaching, carrying, lifting, house/yardwork, driving/computer work. [x] (18373) Provided verbal/tactile cueing for activities related to improving balance, coordination, kinesthetic sense, posture, motor skill, proprioception  to assist with  scapular, scapulothoracic and UE control with self care, reaching, carrying, lifting, house/yardwork, driving/computer work. Therapeutic Activities:    [x] (49382 or 27625) Provided verbal/tactile cueing for activities related to improving balance, coordination, kinesthetic sense, posture, motor skill, proprioception and motor activation to allow for proper function of scapular, scapulothoracic and UE control with self care, carrying, lifting, driving/computer work.      Home Exercise Program:    [x] (97954) Reviewed/Progressed HEP activities related to strengthening, flexibility, endurance, ROM of scapular, scapulothoracic and UE control with self care, reaching, carrying, lifting, house/yardwork, driving/computer work - Reviewed and updated HEP with pt (see below). (12/8/20)  [x] (44595) Reviewed/Progressed HEP activities related to improving balance, coordination, kinesthetic sense, posture, motor skill, proprioception of scapular, scapulothoracic and UE control with self care, reaching, carrying, lifting, house/yardwork, driving/computer work        Access Code: [email or web address]   URL: [email or web address]   Date: 12/08/2020   Prepared by: Maribel Rosales     Exercises   Seated Shoulder Flexion Towel Slide at Table Top - 10 reps - 1 sets - 10 sec hold - 1x daily - 7x weekly   Seated Shoulder Scaption Slide at Table Top with Forearm in Neutral - 10 reps - 1 sets - 10 sec hold - 1x daily - 7x weekly   Seated Shoulder External Rotation AAROM with Dowel - 10 reps - 1 sets - 10 sec hold - 1x daily - 7x weekly   Standing Shoulder Shrugs with Dumbbells - 10 reps - 3 sets - 1x daily - 7x weekly   Scapular Retraction with Resistance - 10 reps - 3 sets - 1x daily - 7x weekly   Standing Bicep Curls Supinated with Dumbbells - 10 reps - 3 sets - 1x daily - 7x weekly   Isometric Shoulder Abduction at Wall - 10 reps - 1 sets - 10 sec hold - 1x daily - 7x weekly   Standing Shoulder Internal Rotation Stretch with Towel - 10 reps - 1 sets - 10 sec hold - 1x daily - 7x weekly   Supine Shoulder Flexion Extension AAROM with Dowel - 10 reps - 1 sets - 5 sec hold - 1x daily - 7x weekly   Standing Elbow Extension with Anchored Resistance at Wall - 10 reps - 3 sets - 1x daily - 7x weekly   Standing Shoulder Flexion Wall Walk - 5 reps - 2 sets - 1x daily - 7x weekly   Supine Shoulder Circles - 10 reps - 3 sets - 1x daily - 7x weekly   Shoulder Internal Rotation with Resistance - 10 reps - 3 sets - 1x daily - 7x weekly   Supine Shoulder Flexion Extension Full Range AROM - 10 reps - 1 sets - 1x daily - 7x weekly   Shoulder External Rotation with Anchored Resistance - 10 reps - 3 sets - 1x daily - 7x weekly           Manual Treatments:  PROM / STM / Oscillations-Mobs:  G-I, II, III, IV (PA's, Inf., Post.)  [x] (43152) Provided manual therapy to mobilize soft tissue/joints of cervical/CT, scapular GHJ and UE for the purpose of modulating pain, promoting relaxation,  increasing ROM, reducing/eliminating soft tissue swelling/inflammation/restriction, improving soft tissue extensibility and allowing for proper ROM for normal function with self care, reaching, carrying, lifting, house/yardwork, driving/computer work    Modalities:  ICE x 15' for the left shoulder   [] GAME READY (VASO)- for significant edema, swelling, pain control. Charges:  Timed Code Treatment Minutes: 61'   Total Treatment Minutes: 76'      [] EVAL (LOW) 80165 (typically 20 minutes face-to-face)  [] EVAL (MOD) 68162 (typically 30 minutes face-to-face)  [] EVAL (HIGH) 65940 (typically 45 minutes face-to-face)  [] RE-EVAL     [x] YL(10564) x 1 (22')   [] IONTO  [x] NMR (71947) x 1 (15')  [] VASO  [x] Manual (89453) x 1  (8')   [] Other:   [x] TA x  1 (15')  [] Mech Traction (20414)  [] ES(attended) (63446)     [] ES (un) (73600):         ASSESSMENT:  Pt did well with the exercises today and she was not as fatigued as last visit. Eccentric shoulder flexion with 1# wt is still her most challenging exercise. Active shoulder flexion in standing was better today (115°) with pt demonstrating good control. Pt is doing well but needs to continue increasing strength and endurance for [place] act. GOALS:  (Goals updated 3/4/21)  Patient stated goal:  \"Increase function; be able to use my left arm for toileting; be self-sufficient\"  [x] Progressing: [] Met: [] Not Met: [] Adjusted    Therapist goals for Patient:   Short Term Goals: To be achieved in: 2 weeks  1. Independent in HEP and progression per patient tolerance, in order to prevent re-injury. [] Progressing: [x] Met: [] Not Met: [] Adjusted  2.  Patient will have a decrease in left shoulder pain to 1-2/10 to facilitate improvement in movement, function, and ADLs as indicated by Functional Deficits. [] Progressing: [x] Met: [] Not Met: [] Adjusted  3. Patient will be able to melonie/doff Ultrasling independently. [] Progressing: [x] Met: [] Not Met: [] Adjusted      Long Term Goals: To be achieved in: 12 weeks  1. Disability index score of 15% or less for the Quick DASH to assist with reaching prior level of function. [] Progressing: [x] Met: [] Not Met: [] Adjusted  2. Patient will demonstrate an increase in left shoulder AROM to at least 90° flexion to allow for proper joint functioning as indicated by patients Functional Deficits. [] Progressing: [x] Met: [] Not Met:  [] Adjusted  3. Patient will demonstrate an increase in left Deltoid strength to 4+/5 to allow for proper functional mobility as indicated by patients Functional Deficits. [] Progressing: [x] Met: [] Not Met: [] Adjusted  4. Patient will have a decrease in left shoulder pain to 0-1/10 with daily activities. [] Progressing: [x] Met: [] Not Met: [] Adjusted  5. Pt will be able to perform all ADL's independently and without compensation. (patient specific functional goal)    [x] Progressing: [] Met: [] Not Met: [] Adjusted       Overall Progression Towards Functional goals/ Treatment Progress Update:  [x] Patient is progressing as expected towards functional goals listed       [] Progression is slowed due to complexities/Impairments listed. [] Progression has been slowed due to co-morbidities.   [] Plan just implemented, too soon to assess goals progression <30days   [] Goals require adjustment due to lack of progress  [] Patient is not progressing as expected and requires additional follow up with physician  [] Other         Prognosis for POC: [x] Good [] Fair  [] Poor      Patient requires continued skilled intervention: [x] Yes  [] No    Treatment/Activity Tolerance:  [x] Patient able to complete treatment  [] Patient limited by fatigue  [] Patient limited by pain    []

## 2021-03-16 ENCOUNTER — TREATMENT (OUTPATIENT)
Dept: PHYSICAL THERAPY | Age: 71
End: 2021-03-16
Payer: MEDICARE

## 2021-03-16 DIAGNOSIS — M25.512 LEFT SHOULDER PAIN, UNSPECIFIED CHRONICITY: ICD-10-CM

## 2021-03-16 DIAGNOSIS — M25.612 DECREASED ROM OF LEFT SHOULDER: ICD-10-CM

## 2021-03-16 DIAGNOSIS — M19.012 PRIMARY OSTEOARTHRITIS OF LEFT SHOULDER: Primary | ICD-10-CM

## 2021-03-16 DIAGNOSIS — R29.898 SHOULDER WEAKNESS: ICD-10-CM

## 2021-03-16 PROCEDURE — 97110 THERAPEUTIC EXERCISES: CPT | Performed by: PHYSICAL THERAPIST

## 2021-03-16 PROCEDURE — 97140 MANUAL THERAPY 1/> REGIONS: CPT | Performed by: PHYSICAL THERAPIST

## 2021-03-16 PROCEDURE — 97530 THERAPEUTIC ACTIVITIES: CPT | Performed by: PHYSICAL THERAPIST

## 2021-03-16 PROCEDURE — G8427 DOCREV CUR MEDS BY ELIG CLIN: HCPCS | Performed by: PHYSICAL THERAPIST

## 2021-03-16 PROCEDURE — 97112 NEUROMUSCULAR REEDUCATION: CPT | Performed by: PHYSICAL THERAPIST

## 2021-03-16 NOTE — PROGRESS NOTES
[facility]awn Mart[facility]   Phone: [phone]    Fax: [phone]          Physical Therapy Treatment Note/ Progress Report:           Date:  3/16/2021    Patient Name:  Radha Mendoza    :  1950  MRN: <[id number]>  Restrictions/Precautions:  S/P Left Reverse TSA  Medical/Treatment Diagnosis Information:  Diagnosis: S/P Left Reverse TSA (Sx: 20) (M19.012)   Treatment Diagnosis: Left Shoulder Pain (M25.512), Decreased left Shoulder ROM (M25.612), Decreased Left Shoulder Strength (C32.518)  Insurance/Certification information:  PT Insurance Information: Medicare, [address] [facility]  Physician Information:  Referring Practitioner: Dr. Elizabeth Canela  Has the plan of care been signed (Y/N):        [x]  Yes (POC signed 3/4/21)  []  No        Date of Patient follow up with Physician: 21      Is this a Progress Report:     []  Yes  [x]  No        If Yes:  Date Range for reporting period:  Beginning 20  Ending 3/4/21    Progress report will be due (10 Rx or 30 days whichever is less): 93      Recertification will be due (POC Duration  / 90 days whichever is less): 21         Visit # Insurance Allowable Auth Required   11 for   (31 total) Medical Necessity []  Yes [x]  No        Functional Scale:    Date assessed:  3/4/21  Functional Assessment Tool Used: Kyree Iglesias  Score:  11.36% functional deficit (3/4/21)  *Quick Dash score was higher today due to numbness, tingling, and weakness in both hands which pt is seeing a neurologist for.        (20)   Patient Age: 69years old    Patient Height: 5' 2\"  Patient Weight: 280 lbs   Patient BMI: 51.2      Pain (3/4/21)  []  Pain diagram was completed, pain was present and a follow up plan to address the pain is in the plan of care. ()   [x]  Pain diagram was completed and there was no pain present and therefore no follow up plan to address pain in the plan of care.  ()   []  Pain diagram was not completed and the reason for not completing the pain diagram is in the medical chart ()  []  Patient refused to participate   []  Severe mental or physical capacity, patient is in urgent emergent situation and to complete the questionnaire would jeopardize the patient's health status        Functional Questionnaire (3/4/21)  [x]  Patient completed the functional outcome questionnaire and deficiencies were addressed in the plan of care. ()   []  Patient completed the outcome questionnaire and there were no functional deficits identified and therefore no plan of care is required. ()   []  Patient did not complete the functional outcome questionnaire and the reason why is documented in the medical chart. ()  []  Patient refused to participate   []  Patient unable to complete the questionnaire   []   A functional outcome assessment has been reported on within the last 30 days        Falls (3/4/21)  [x]  The patient has had no falls or 1 fall without injury in the past year. (1101F)   []  There is no documentation of fall in the medical chart (1101F,8P)     [] The patient has had 2 or more falls or one fall with injury in the past year that is documented in the medical chart. (1100F)  []  A falls risk assessment was completed and documented in the medical chart. (3031L)   []  Falls were addressed in the plan of care. (7949Q)   []  A falls risk assessment was not completed and documented in the medical chart (0364Y,7F)   []   Falls were not addressed in the plan of care and reason was documented in the plan of care. (5345U 1P)        Medication (3/4/21)     [x] A list of current medications (including prescription, over the counter, herbal supplements, vitamin supplements, mineral supplements, dietary supplements) was reviewed with the patient and documented in the medical chart. ()     Falls Risk Assessment (30 days): (3/4/21)  [x] Falls Risk assessed and no intervention required.   [] Falls Risk assessed and Patient above shoulder level. She still has difficulty washing her back. She has to use both arms to get up from a chair.   (3/4/21)    Posture:  Forward head, rounded shoulders.  (3/4/21)    Bandages/Dressings/Incisions: Incision is healed. (3/4/21)    Gait: (include devices/WB status): WNL, no AD. (3/4/21)    Orthopedic Special Tests: See above                         RESTRICTIONS/PRECAUTIONS: Left Reverse TSA (Sx: 6/29/20)  ~AAROM FE to 140°, ER to 40°  ~Isometric Deltoid Strengthening          Exercises/Interventions:     Therapeutic Ex (35863) Sets/sec Reps Notes/CUES   AD UE BIKE            STRETCHING/ROM      Pulleys: Flexion 5-10\" hold X 30    Table Slides: Flexion and scap  X 30 On inclined table   Wand: ER 0 - 40° 10\" hold X 10 Seated   UE [place]: Flexion  X 30 Standing with UE ranger on table   Supine Cane Flexion  X 20 Semi-inclined on table (pt cannot lie flat)   Doorway      Wall Slides  X 20 Sliding up wall   CBA      TP BB 10\"  X 10 Pulling across then up     Sleeper       Elbow AROM  D/C   Scapular Clock  D/C      ISOMETRICS      Gripping   D/CRetraction  D/C   Abduction      Flexion      Internal Rotation      External Rotation  D/C   Deltoid 10\" hold X 10 Submax         STRENGTHENING-PREs      Flexion - Eccentric  (Punching up then slowly lowering left arm down to the table) 3 sets X 10 with 1# wt Semi-inclined on table   Abduction      Internal Rotation      External Rotation      Shrugs 3 sets X 10 with 4#    Biceps 3 sets X 10 with 4#    Triceps      Retraction      Extension      Horizontal Abduction in ER      Serratus                        THERABANDS/CABLE COLUMN      Rows 3 sets X 10 with black band    Lats      Extension 3 sets X 10 with black band    Internal Rotation 3 sets X 10 with red band    External Rotation 3 sets  X 10 with red band    Biceps      Triceps 3 sets X 10 with black band    PNF                                    Manual Intervention (99587)      Scar Massage      STM Hawkgrips      GH joint mobilizations      Foamroll      Manual PROM: Left Shoulder Flexion, scap, and ER (Within MD Parameters), Elbow Flexion and Extension  X 8' Pt inclined on table (pt cannot lie flat)         NMR re-education (33165)   CUES NEEDED   Plyoback      Therabar oscillations      Body Blade       Rhythmic Stabilization      Ball on the wall      Supine Codman's:  CW/CCW, Flex/Ext    X 30 each with 1#   Small, controlled range  Semi-inclined on table (pt cannot lie flat)                     Therapeutic Activity (45836)      Core training      Swiss ball activities                  Patient Education: diagnosis, prognosis, pt goals, rehab timeline, and surgical precautions. Also instructed pt and her friend on proper way to melonie/doff sling (7/7/20)  X 10'                          Therapeutic Exercise and NMR EXR  [x] (24471) Provided verbal/tactile cueing for activities related to strengthening, flexibility, endurance, ROM  for improvements in scapular, scapulothoracic and UE control with self care, reaching, carrying, lifting, house/yardwork, driving/computer work. [x] (34939) Provided verbal/tactile cueing for activities related to improving balance, coordination, kinesthetic sense, posture, motor skill, proprioception  to assist with  scapular, scapulothoracic and UE control with self care, reaching, carrying, lifting, house/yardwork, driving/computer work. Therapeutic Activities:    [x] (18644 or 67779) Provided verbal/tactile cueing for activities related to improving balance, coordination, kinesthetic sense, posture, motor skill, proprioception and motor activation to allow for proper function of scapular, scapulothoracic and UE control with self care, carrying, lifting, driving/computer work.      Home Exercise Program:    [x] (78271) Reviewed/Progressed HEP activities related to strengthening, flexibility, endurance, ROM of scapular, scapulothoracic and UE control with self care, reaching, carrying, lifting, house/yardwork, driving/computer work - Reviewed and updated HEP with pt (see below). (12/8/20)  [x] (42724) Reviewed/Progressed HEP activities related to improving balance, coordination, kinesthetic sense, posture, motor skill, proprioception of scapular, scapulothoracic and UE control with self care, reaching, carrying, lifting, house/yardwork, driving/computer work        Access Code: [email or web address]   URL: [employer]/   Date: 12/08/2020   Prepared by: Phoenix Asher     Exercises   Seated Shoulder Flexion Towel Slide at Table Top - 10 reps - 1 sets - 10 sec hold - 1x daily - 7x weekly   Seated Shoulder Scaption Slide at Table Top with Forearm in Neutral - 10 reps - 1 sets - 10 sec hold - 1x daily - 7x weekly   Seated Shoulder External Rotation AAROM with Dowel - 10 reps - 1 sets - 10 sec hold - 1x daily - 7x weekly   Standing Shoulder Shrugs with Dumbbells - 10 reps - 3 sets - 1x daily - 7x weekly   Scapular Retraction with Resistance - 10 reps - 3 sets - 1x daily - 7x weekly   Standing Bicep Curls Supinated with Dumbbells - 10 reps - 3 sets - 1x daily - 7x weekly   Isometric Shoulder Abduction at Wall - 10 reps - 1 sets - 10 sec hold - 1x daily - 7x weekly   Standing Shoulder Internal Rotation Stretch with Towel - 10 reps - 1 sets - 10 sec hold - 1x daily - 7x weekly   Supine Shoulder Flexion Extension AAROM with Dowel - 10 reps - 1 sets - 5 sec hold - 1x daily - 7x weekly   Standing Elbow Extension with Anchored Resistance at Wall - 10 reps - 3 sets - 1x daily - 7x weekly   Standing Shoulder Flexion Wall Walk - 5 reps - 2 sets - 1x daily - 7x weekly   Supine Shoulder Circles - 10 reps - 3 sets - 1x daily - 7x weekly   Shoulder Internal Rotation with Resistance - 10 reps - 3 sets - 1x daily - 7x weekly   Supine Shoulder Flexion Extension Full Range AROM - 10 reps - 1 sets - 1x daily - 7x weekly   Shoulder External Rotation with Anchored Resistance - 10 reps - 3 sets - 1x daily - 7x weekly           Manual Treatments:  PROM / STM / Oscillations-Mobs:  G-I, II, III, IV (PA's, Inf., Post.)  [x] (64034) Provided manual therapy to mobilize soft tissue/joints of cervical/CT, scapular GHJ and UE for the purpose of modulating pain, promoting relaxation,  increasing ROM, reducing/eliminating soft tissue swelling/inflammation/restriction, improving soft tissue extensibility and allowing for proper ROM for normal function with self care, reaching, carrying, lifting, house/yardwork, driving/computer work    Modalities:  ICE x 15' for the left shoulder   [] GAME READY (VASO)- for significant edema, swelling, pain control. Charges:  Timed Code Treatment Minutes: 61'   Total Treatment Minutes: 76'      [] EVAL (LOW) 35532 (typically 20 minutes face-to-face)  [] EVAL (MOD) 76284 (typically 30 minutes face-to-face)  [] EVAL (HIGH) 46099 (typically 45 minutes face-to-face)  [] RE-EVAL     [x] SS(63680) x 1 (22')   [] IONTO  [x] NMR (63310) x 1 (15')  [] VASO  [x] Manual (36082) x 1  (8')   [] Other:   [x] TA x  1 (15')  [] Mech Traction (40469)  [] ES(attended) (11502)     [] ES (un) (41135):         ASSESSMENT:  Pt continues to do well in therapy. Active shoulder flexion in standing improved to 120° today. She demonstrated good form and control with all the exercises but eccentric shoulder flexion is still her most challenging exercise. Left shoulder was fatigued at the end of therapy but pt needed fewer rest breaks during her session today. GOALS:  (Goals updated 3/4/21)  Patient stated goal:  \"Increase function; be able to use my left arm for toileting; be self-sufficient\"  [x] Progressing: [] Met: [] Not Met: [] Adjusted    Therapist goals for Patient:   Short Term Goals: To be achieved in: 2 weeks  1. Independent in HEP and progression per patient tolerance, in order to prevent re-injury. [] Progressing: [x] Met: [] Not Met: [] Adjusted  2.  Patient will have a decrease in left shoulder pain to 1-2/10 to facilitate improvement in movement, function, and ADLs as indicated by Functional Deficits. [] Progressing: [x] Met: [] Not Met: [] Adjusted  3. Patient will be able to melonie/doff Ultrasling independently. [] Progressing: [x] Met: [] Not Met: [] Adjusted      Long Term Goals: To be achieved in: 12 weeks  1. Disability index score of 15% or less for the Quick DASH to assist with reaching prior level of function. [] Progressing: [x] Met: [] Not Met: [] Adjusted  2. Patient will demonstrate an increase in left shoulder AROM to at least 90° flexion to allow for proper joint functioning as indicated by patients Functional Deficits. [] Progressing: [x] Met: [] Not Met:  [] Adjusted  3. Patient will demonstrate an increase in left Deltoid strength to 4+/5 to allow for proper functional mobility as indicated by patients Functional Deficits. [] Progressing: [x] Met: [] Not Met: [] Adjusted  4. Patient will have a decrease in left shoulder pain to 0-1/10 with daily activities. [] Progressing: [x] Met: [] Not Met: [] Adjusted  5. Pt will be able to perform all ADL's independently and without compensation. (patient specific functional goal)    [x] Progressing: [] Met: [] Not Met: [] Adjusted       Overall Progression Towards Functional goals/ Treatment Progress Update:  [x] Patient is progressing as expected towards functional goals listed       [] Progression is slowed due to complexities/Impairments listed. [] Progression has been slowed due to co-morbidities.   [] Plan just implemented, too soon to assess goals progression <30days   [] Goals require adjustment due to lack of progress  [] Patient is not progressing as expected and requires additional follow up with physician  [] Other         Prognosis for POC: [x] Good [] Fair  [] Poor      Patient requires continued skilled intervention: [x] Yes  [] No    Treatment/Activity Tolerance:  [x] Patient able to complete treatment  [] Patient limited by fatigue  [] Patient limited by pain    [] Patient limited by other medical complications  [] Other:           PLAN: Cont therapy 1x/week for ROM, strengthening, and endurance. 1x/week for 4 weeks for ROM, strengthening, scap stability exercises, manual therapy, HEP, pt education, and modalities prn (3/4/21)  [x] Continue per plan of care [] Alter current plan   [] Plan of care initiated [] Hold pending MD visit [] Discharge      Electronically signed by:  Tomasa Moran, PT     Physical Therapist Adore Rangel [address] #[id number]  Physical Therapist [place] License #[id number]    Note: If patient does not return for scheduled/ recommended follow up visits, this note will serve as a discharge from care along with most recent update on progress.

## 2021-03-23 ENCOUNTER — TREATMENT (OUTPATIENT)
Dept: PHYSICAL THERAPY | Age: 71
End: 2021-03-23
Payer: MEDICARE

## 2021-03-23 DIAGNOSIS — M19.012 PRIMARY OSTEOARTHRITIS OF LEFT SHOULDER: Primary | ICD-10-CM

## 2021-03-23 DIAGNOSIS — M25.612 DECREASED ROM OF LEFT SHOULDER: ICD-10-CM

## 2021-03-23 DIAGNOSIS — R29.898 SHOULDER WEAKNESS: ICD-10-CM

## 2021-03-23 DIAGNOSIS — M25.512 LEFT SHOULDER PAIN, UNSPECIFIED CHRONICITY: ICD-10-CM

## 2021-03-23 PROCEDURE — 97530 THERAPEUTIC ACTIVITIES: CPT | Performed by: PHYSICAL THERAPIST

## 2021-03-23 PROCEDURE — G8427 DOCREV CUR MEDS BY ELIG CLIN: HCPCS | Performed by: PHYSICAL THERAPIST

## 2021-03-23 PROCEDURE — 97112 NEUROMUSCULAR REEDUCATION: CPT | Performed by: PHYSICAL THERAPIST

## 2021-03-23 PROCEDURE — 97140 MANUAL THERAPY 1/> REGIONS: CPT | Performed by: PHYSICAL THERAPIST

## 2021-03-23 PROCEDURE — 97110 THERAPEUTIC EXERCISES: CPT | Performed by: PHYSICAL THERAPIST

## 2021-03-23 NOTE — PROGRESS NOTES
[facility]   Phone: [phone]    Fax: [phone]          Physical Therapy Treatment Note/ Progress Report:           Date:  3/23/2021    Patient Name:  Ely Thomas    :  1950  MRN: <[id number]>  Restrictions/Precautions:  S/P Left Reverse TSA  Medical/Treatment Diagnosis Information:  Diagnosis: S/P Left Reverse TSA (Sx: 20) (M19.012)   Treatment Diagnosis: Left Shoulder Pain (M25.512), Decreased left Shoulder ROM (M25.612), Decreased Left Shoulder Strength (I38.883)  Insurance/Certification information:  PT Insurance Information: Medicare, [facility]  Physician Information:  Referring Practitioner: Dr. Carmelina Gannon  Has the plan of care been signed (Y/N):        [x]  Yes (POC signed 3/4/21)  []  No        Date of Patient follow up with Physician: 21      Is this a Progress Report:     []  Yes  [x]  No        If Yes:  Date Range for reporting period:  Beginning 20  Ending 3/4/21    Progress report will be due (10 Rx or 30 days whichever is less): [birth year]      Recertification will be due (POC Duration  / 90 days whichever is less): 21         Visit # Insurance Allowable Auth Required   12 for   (32 total) Medical Necessity []  Yes [x]  No        Functional Scale:    Date assessed:  3/4/21  Functional Assessment Tool Used: Marcia Shutter  Score:  11.36% functional deficit (3/4/21)  *Quick Dash score was higher today due to numbness, tingling, and weakness in both hands which pt is seeing a neurologist for.        (20)   Patient Age: 69years old    Patient Height: 5' 2\"  Patient Weight: 280 lbs   Patient BMI: 51.2      Pain (3/4/21)  []  Pain diagram was completed, pain was present and a follow up plan to address the pain is in the plan of care. ()   [x]  Pain diagram was completed and there was no pain present and therefore no follow up plan to address pain in the plan of care.  ()   []  Pain diagram was not completed and the reason for not completing the pain diagram is in the medical chart ()  []  Patient refused to participate   []  Severe mental or physical capacity, patient is in urgent emergent situation and to complete the questionnaire would jeopardize the patient's health status        Functional Questionnaire (3/4/21)  [x]  Patient completed the functional outcome questionnaire and deficiencies were addressed in the plan of care. ()   []  Patient completed the outcome questionnaire and there were no functional deficits identified and therefore no plan of care is required. ()   []  Patient did not complete the functional outcome questionnaire and the reason why is documented in the medical chart. ()  []  Patient refused to participate   []  Patient unable to complete the questionnaire   []   A functional outcome assessment has been reported on within the last 30 days        Falls (3/4/21)  [x]  The patient has had no falls or 1 fall without injury in the past year. (1101F)   []  There is no documentation of fall in the medical chart (1101F,8P)     [] The patient has had 2 or more falls or one fall with injury in the past year that is documented in the medical chart. (1100F)  []  A falls risk assessment was completed and documented in the medical chart. (7607T)   []  Falls were addressed in the plan of care. (1456V)   []  A falls risk assessment was not completed and documented in the medical chart (3385X,5K)   []   Falls were not addressed in the plan of care and reason was documented in the plan of care. (5991L 1P)        Medication (3/4/21)     [x] A list of current medications (including prescription, over the counter, herbal supplements, vitamin supplements, mineral supplements, dietary supplements) was reviewed with the patient and documented in the medical chart. ()     Falls Risk Assessment (30 days): (3/4/21)  [x] Falls Risk assessed and no intervention required.   [] Falls Risk assessed and Patient requires intervention due to being higher risk   TUG score (>12s at risk):     [] Falls education provided, including       PQRS:   Reviewed medication list with patient. No changes reported since last PT visit.   (3/23/21)      Latex Allergy:  [x]NO      []YES  Preferred Language for Healthcare:   [x]English       []other:      Pain level:  3/10 (3/23/21)  Medication: Nothing for her shoulder       SUBJECTIVE:  Pt states she has had increased pain on the top of her left shoulder extending into her upper arm, rated 3/10, which started last week after having several tests done (pt had an MRI and EMG done). She has been using Voltaren cream on her shoulder which is helping but she has not been icing. MRI of her brain showed normal aging. MRI of her neck showed C3- C6 involvement. EMG showed carpal tunnel in both hands and also something else but she isn't sure what.   She has an appt with Dr. Drew Carrillo who is a neurologist on April 13.           (38 weeks post-op)        OBJECTIVE:     *Pt is right hand dominant    (3/23/21)  ROM PROM AROM  Comment    L R L R    Flexion 125°  120°      Abduction (scap) 125°  90°     ER 30°       IR NT  Just above her belt line     Other        Other           (3/4/21)  Strength L R Comment   Flexion      Abduction      ER 4-/5     IR 4+/5     Deltoid 4+/5     Upper Trap      Lower Trap      Mid Trap      Rhomboids      Biceps 4+/5     Triceps 4+/5     Horizontal Abduction      Horizontal Adduction      Lats        (3/4/21)  Special Tests Results/Comment   [place]    Neers    Speeds    [place]ri[place]    Other    Neurologic Signs Pt continues to c/o numbness and tingling in both hands for which she is seeing a neurologist.   Functional Reach          Reflexes/Sensation: (7/7/20)   []Dermatomes/Myotomes intact    []Reflexes equal and normal bilaterally   [x]Other: Intact to light touch    Joint mobility:    []Normal    []Hypo   []Hyper    Palpation: No tenderness. (3/4/21) Functional Mobility/Transfers: ADL's and self-care activities have gotten much easier and pt is able to use her left hand for toileting. She still has some weakness performing activities above shoulder level. She still has difficulty washing her back. She has to use both arms to get up from a chair.   (3/4/21)    Posture:  Forward head, rounded shoulders.  (3/4/21)    Bandages/Dressings/Incisions: Incision is healed. (3/4/21)    Gait: (include devices/WB status): WNL, no AD. (3/4/21)    Orthopedic Special Tests: See above                         RESTRICTIONS/PRECAUTIONS: Left Reverse TSA (Sx: 6/29/20)  ~AAROM FE to 140°, ER to 40°  ~Isometric Deltoid Strengthening          Exercises/Interventions:     Therapeutic Ex (51089) Sets/sec Reps Notes/CUES   AD UE BIKE            STRETCHING/ROM      Pulleys: Flexion 5-10\" hold X 30    Table Slides: Flexion and scap  X 30 On inclined table   Wand: ER 0 - 40° 10\" hold X 10 Seated   UE [place]: Flexion  X 30 Standing with UE ranger on table   Supine Cane Flexion  X 20 Semi-inclined on table (pt cannot lie flat)   Doorway      Wall Slides  X 20 Sliding up wall   CBA      TP BB 10\"  X 10 Pulling across then up     Sleeper       Elbow AROM  D/C   Scapular Clock  D/C      ISOMETRICS      Gripping   D/CRetraction  D/C   Abduction      Flexion      Internal Rotation      External Rotation  D/C   Deltoid 10\" hold X 10 Submax         STRENGTHENING-PREs      Flexion - Eccentric  (Punching up then slowly lowering left arm down to the table) 3 sets X 10 with 1# wt Semi-inclined on table   Abduction      Internal Rotation      External Rotation      Shrugs 3 sets X 10 with 4#    Biceps 3 sets X 10 with 4#    Triceps      Retraction      Extension      Horizontal Abduction in ER      Serratus                        THERABANDS/CABLE COLUMN      Rows 3 sets X 10 with black band    Lats      Extension 3 sets X 10 with black band    Internal Rotation 3 sets X 10 with red band    External Exercise Program:    [x] (51364) Reviewed/Progressed HEP activities related to strengthening, flexibility, endurance, ROM of scapular, scapulothoracic and UE control with self care, reaching, carrying, lifting, house/yardwork, driving/computer work - Reviewed and updated HEP with pt (see below). (12/8/20)  [x] (03870) Reviewed/Progressed HEP activities related to improving balance, coordination, kinesthetic sense, posture, motor skill, proprioception of scapular, scapulothoracic and UE control with self care, reaching, carrying, lifting, house/yardwork, driving/computer work        Access Code: [email or web address]   URL: [email or web address]/   Date: 12/08/2020   Prepared by: Mark Watson     Exercises   Seated Shoulder Flexion Towel Slide at Table Top - 10 reps - 1 sets - 10 sec hold - 1x daily - 7x weekly   Seated Shoulder Scaption Slide at Table Top with Forearm in Neutral - 10 reps - 1 sets - 10 sec hold - 1x daily - 7x weekly   Seated Shoulder External Rotation AAROM with Dowel - 10 reps - 1 sets - 10 sec hold - 1x daily - 7x weekly   Standing Shoulder Shrugs with Dumbbells - 10 reps - 3 sets - 1x daily - 7x weekly   Scapular Retraction with Resistance - 10 reps - 3 sets - 1x daily - 7x weekly   Standing Bicep Curls Supinated with Dumbbells - 10 reps - 3 sets - 1x daily - 7x weekly   Isometric Shoulder Abduction at Wall - 10 reps - 1 sets - 10 sec hold - 1x daily - 7x weekly   Standing Shoulder Internal Rotation Stretch with Towel - 10 reps - 1 sets - 10 sec hold - 1x daily - 7x weekly   Supine Shoulder Flexion Extension AAROM with Dowel - 10 reps - 1 sets - 5 sec hold - 1x daily - 7x weekly   Standing Elbow Extension with Anchored Resistance at Wall - 10 reps - 3 sets - 1x daily - 7x weekly   Standing Shoulder Flexion Wall Walk - 5 reps - 2 sets - 1x daily - 7x weekly   Supine Shoulder Circles - 10 reps - 3 sets - 1x daily - 7x weekly   Shoulder Internal Rotation with Resistance - 10 reps - 3 sets - 1x daily - 7x weekly   Supine Shoulder Flexion Extension Full Range AROM - 10 reps - 1 sets - 1x daily - 7x weekly   Shoulder External Rotation with Anchored Resistance - 10 reps - 3 sets - 1x daily - 7x weekly           Manual Treatments:  PROM / STM / Oscillations-Mobs:  G-I, II, III, IV (PA's, Inf., Post.)  [x] (34829) Provided manual therapy to mobilize soft tissue/joints of cervical/CT, scapular GHJ and UE for the purpose of modulating pain, promoting relaxation,  increasing ROM, reducing/eliminating soft tissue swelling/inflammation/restriction, improving soft tissue extensibility and allowing for proper ROM for normal function with self care, reaching, carrying, lifting, house/yardwork, driving/computer work    Modalities:  ICE x 15' for the left shoulder   [] GAME READY (VASO)- for significant edema, swelling, pain control. Charges:  Timed Code Treatment Minutes: 61'   Total Treatment Minutes: 76'      [] EVAL (LOW) 75717 (typically 20 minutes face-to-face)  [] EVAL (MOD) 08290 (typically 30 minutes face-to-face)  [] EVAL (HIGH) 05830 (typically 45 minutes face-to-face)  [] RE-EVAL     [x] IK(39408) x 1 (22')   [] IONTO  [x] NMR (04095) x 1 (15')  [] VASO  [x] Manual (54868) x 1  (8')   [] Other:   [x] TA x  1 (15')  [] Mech Traction (77431)  [] ES(attended) (98884)     [] ES (un) (94651):         ASSESSMENT:  Pt was more fatigued with her exercises today and needed more rest breaks. Eccentric shoulder flexion continues to be her most challenging exercise. Active shoulder flexion in standing remains 120° in standing and reaching into [place] cabinets has gotten easier. Left shoulder has been sore since undergoing testing last week. She has been using Voltaren cream which is helping, also recommend she ice her shoulder. HEP compliance continues to be an issue and pt knows she has to be more consistent with the exercises at home.   She demonstrates good understanding of her program.  Plan to transition pt to an independent HEP after her next visit. GOALS:  (Goals updated 3/4/21)  Patient stated goal:  \"Increase function; be able to use my left arm for toileting; be self-sufficient\"  [x] Progressing: [] Met: [] Not Met: [] Adjusted    Therapist goals for Patient:   Short Term Goals: To be achieved in: 2 weeks  1. Independent in HEP and progression per patient tolerance, in order to prevent re-injury. [] Progressing: [x] Met: [] Not Met: [] Adjusted  2. Patient will have a decrease in left shoulder pain to 1-2/10 to facilitate improvement in movement, function, and ADLs as indicated by Functional Deficits. [] Progressing: [x] Met: [] Not Met: [] Adjusted  3. Patient will be able to melonie/doff Ultrasling independently. [] Progressing: [x] Met: [] Not Met: [] Adjusted      Long Term Goals: To be achieved in: 12 weeks  1. Disability index score of 15% or less for the Quick DASH to assist with reaching prior level of function. [] Progressing: [x] Met: [] Not Met: [] Adjusted  2. Patient will demonstrate an increase in left shoulder AROM to at least 90° flexion to allow for proper joint functioning as indicated by patients Functional Deficits. [] Progressing: [x] Met: [] Not Met:  [] Adjusted  3. Patient will demonstrate an increase in left Deltoid strength to 4+/5 to allow for proper functional mobility as indicated by patients Functional Deficits. [] Progressing: [x] Met: [] Not Met: [] Adjusted  4. Patient will have a decrease in left shoulder pain to 0-1/10 with daily activities. [] Progressing: [x] Met: [] Not Met: [] Adjusted  5.  Pt will be able to perform all ADL's independently and without compensation. (patient specific functional goal)    [x] Progressing: [] Met: [] Not Met: [] Adjusted       Overall Progression Towards Functional goals/ Treatment Progress Update:  [x] Patient is progressing as expected towards functional goals listed       [] Progression is slowed due to complexities/Impairments

## 2021-03-30 ENCOUNTER — TREATMENT (OUTPATIENT)
Dept: PHYSICAL THERAPY | Age: 71
End: 2021-03-30
Payer: MEDICARE

## 2021-03-30 DIAGNOSIS — M25.512 LEFT SHOULDER PAIN, UNSPECIFIED CHRONICITY: ICD-10-CM

## 2021-03-30 DIAGNOSIS — R29.898 SHOULDER WEAKNESS: ICD-10-CM

## 2021-03-30 DIAGNOSIS — M19.012 PRIMARY OSTEOARTHRITIS OF LEFT SHOULDER: Primary | ICD-10-CM

## 2021-03-30 DIAGNOSIS — M25.612 DECREASED ROM OF LEFT SHOULDER: ICD-10-CM

## 2021-03-30 PROCEDURE — 97530 THERAPEUTIC ACTIVITIES: CPT | Performed by: PHYSICAL THERAPIST

## 2021-03-30 PROCEDURE — 97140 MANUAL THERAPY 1/> REGIONS: CPT | Performed by: PHYSICAL THERAPIST

## 2021-03-30 PROCEDURE — G8427 DOCREV CUR MEDS BY ELIG CLIN: HCPCS | Performed by: PHYSICAL THERAPIST

## 2021-03-30 PROCEDURE — 1101F PT FALLS ASSESS-DOCD LE1/YR: CPT | Performed by: PHYSICAL THERAPIST

## 2021-03-30 PROCEDURE — 97110 THERAPEUTIC EXERCISES: CPT | Performed by: PHYSICAL THERAPIST

## 2021-03-30 PROCEDURE — 97112 NEUROMUSCULAR REEDUCATION: CPT | Performed by: PHYSICAL THERAPIST

## 2021-03-30 PROCEDURE — G8539 DOC FUNCT AND CARE PLAN: HCPCS | Performed by: PHYSICAL THERAPIST

## 2021-03-30 NOTE — PROGRESS NOTES
[facility]   Phone: [phone]    Fax: [phone]     Physical Therapy Discharge Summary    Dear  Dr. Anila Light,    We had the pleasure of treating the following patient for physical therapy services at [address] [place] [address]. A summary of our findings can be found in the discharge summary below. If you have any questions or concerns regarding these findings, please do not hesitate to contact me at the office phone number checked above.   Thank you for the referral.     Physician Signature:________________________________Date:__________________  By signing above (or electronic signature), therapists plan is approved by physician      Overall Response to Treatment:   [x]Patient is responding well to treatment and improvement is noted with regards to goals   [x]Patient should continue to improve in reasonable time if they continue HEP   []Patient has plateaued and is no longer responding to skilled PT intervention    []Patient is getting worse and would benefit from return to referring MD   []Patient unable to adhere to initial POC   []Other:     Date range of Visits: 20 - 3/30/21    Total Visits: 33          Physical Therapy Treatment Note/ Progress Report:           Date:  3/30/2021    Patient Name:  Placido Forman    :  1950  MRN: <[id number]>  Restrictions/Precautions:  S/P Left Reverse TSA  Medical/Treatment Diagnosis Information:  Diagnosis: S/P Left Reverse TSA (Sx: 20) (M19.012)   Treatment Diagnosis: Left Shoulder Pain (M25.512), Decreased left Shoulder ROM (M25.612), Decreased Left Shoulder Strength (L48.848)  Insurance/Certification information:  PT Insurance Information: Medicare, [address]  Physician Information:  Referring Practitioner: Dr. Anila Light  Has the plan of care been signed (Y/N):        [x]  Yes (POC signed 3/4/21)  []  No        Date of Patient follow up with Physician: 21      Is this a Progress Report:     [x] Yes  []  No        If Yes:  Date Range for reporting period:  Beginning 7/7/20  Ending 3/30/21    Progress report will be due (10 Rx or 30 days whichever is less): Pt discharged today      Recertification will be due (POC Duration  / 90 days whichever is less): Pt discharged today         Visit # Insurance Allowable Auth Required   13 for 2021  (33 total) Medical Necessity []  Yes [x]  No        Functional Scale:    Date assessed:  3/30/21  Functional Assessment Tool Used: Cristobal Aguero (copy scanned into media)  Score:  2.3% functional deficit (3/30/21)         (7/7/20)   Patient Age: 69years old    Patient Height: 5' 2\"  Patient Weight: 280 lbs   Patient BMI: 51.2      Pain (3/30/21)  [x]  Pain diagram was completed, pain was present and a follow up plan to address the pain is in the plan of care. ()   []  Pain diagram was completed and there was no pain present and therefore no follow up plan to address pain in the plan of care. ()   []  Pain diagram was not completed and the reason for not completing the pain diagram is in the medical chart ()  []  Patient refused to participate   []  Severe mental or physical capacity, patient is in urgent emergent situation and to complete the questionnaire would jeopardize the patient's health status        Functional Questionnaire (3/30/21)  [x]  Patient completed the functional outcome questionnaire and deficiencies were addressed in the plan of care. ()   []  Patient completed the outcome questionnaire and there were no functional deficits identified and therefore no plan of care is required. ()   []  Patient did not complete the functional outcome questionnaire and the reason why is documented in the medical chart. ()  []  Patient refused to participate   []  Patient unable to complete the questionnaire   []   A functional outcome assessment has been reported on within the last 30 days        Falls (3/30/21)  [x]  The patient has had no falls or 1 fall without injury in the past year. (1101F)   []  There is no documentation of fall in the medical chart (1101F,8P)     [] The patient has had 2 or more falls or one fall with injury in the past year that is documented in the medical chart. (1100F)  []  A falls risk assessment was completed and documented in the medical chart. (9704V)   []  Falls were addressed in the plan of care. (7664O)   []  A falls risk assessment was not completed and documented in the medical chart (7747T,9L)   []   Falls were not addressed in the plan of care and reason was documented in the plan of care. (1697R 1P)        Medication (3/30/21)     [x] A list of current medications (including prescription, over the counter, herbal supplements, vitamin supplements, mineral supplements, dietary supplements) was reviewed with the patient and documented in the medical chart. ()     Falls Risk Assessment (30 days): (3/30/21)  [x] Falls Risk assessed and no intervention required. [] Falls Risk assessed and Patient requires intervention due to being higher risk   TUG score (>12s at risk):     [] Falls education provided, including       PQRS:   Reviewed medication list with patient. No changes reported since last PT visit. (3/30/21)      Latex Allergy:  [x]NO      []YES  Preferred Language for Healthcare:   [x]English       []other:      Pain level:  0-1/10 (3/30/21)  Medication: Nothing for her shoulder       SUBJECTIVE:  Pt states her left shoulder is doing well with occasion soreness present. She is using Voltaren or Biofreeze as needed. She has not been icing but knows she should be. Pt states overall she is very pleased with her progress and the improvement in her function. She is able to use her left arm for toileting (which was her #1 goal) and she can reach into [facility] [employer] cabinets. She feels ready to continue with an independent HEP after today's visit.            (39 weeks post-op)        OBJECTIVE:     *Pt is right hand dominant (3/30/21)  ROM PROM AROM  Comment    L R L R    Flexion 125°  120°      Abduction (scap) 125°  100°     ER 30°       IR NT  Just above her belt line     Other        Other           (3/30/21)  Strength L R Comment   Flexion      Abduction      ER 4-/5     IR 4+/5     Deltoid 5/5     Upper Trap      Lower Trap      Mid Trap      Rhomboids      Biceps 5/5     Triceps 5/5     Horizontal Abduction      Horizontal Adduction      Lats        (3/30/21)  Special Tests Results/Comment   [place]    Speeds    OBriens    Other    Neurologic Signs Pt continues to c/o numbness and tingling in both hands for which she is seeing a neurologist.   Functional Reach          Reflexes/Sensation: (7/7/20)   []Dermatomes/Myotomes intact    []Reflexes equal and normal bilaterally   [x]Other: Intact to light touch    Joint mobility:    []Normal    []Hypo   []Hyper    Palpation: No tenderness. (3/30/21)    Functional Mobility/Transfers: ADL's and self-care activities have gotten much easier and pt is able to use her left arm for toileting. She is also able to use her left arm to help push herself up from a chair.   (3/30/21)    Posture: Forward head, rounded shoulders. (3/30/21)    Bandages/Dressings/Incisions: Incision is healed. (3/30/21)    Gait: (include devices/WB status): WNL, no AD.  (3/30/21)    Orthopedic Special Tests: See above                         RESTRICTIONS/PRECAUTIONS: Left Reverse TSA (Sx: 6/29/20)  ~AAROM FE to 140°, ER to 40°  ~Isometric Deltoid Strengthening          Exercises/Interventions:     Therapeutic Ex (81408) Sets/sec Reps Notes/CUES   AD UE BIKE            STRETCHING/ROM      Pulleys: Flexion 5-10\" hold X 30    Table Slides: Flexion and scap  X 30 On inclined table   Wand: ER 0 - 40° 10\" hold X 10 Seated   UE [place]: Flexion  X 30 Standing with UE ranger on table   Supine Cane Flexion  X 20 Semi-inclined on table (pt cannot lie flat)   Doorway      Wall Slides  X 20 Sliding up wall   CBA TP BB 10\"  X 10 Pulling across then up     Sleeper       Elbow AROM  D/C   Scapular Clock  D/C      ISOMETRICS      Gripping   D/CRetraction  D/C   Abduction      Flexion      Internal Rotation      External Rotation  D/C   Deltoid 10\" hold X 10 Submax         STRENGTHENING-PREs      Flexion - Eccentric  (Punching up then slowly lowering left arm down to the table) 3 sets X 10 with 1# wt Semi-inclined on table   Abduction      Internal Rotation      External Rotation      Shrugs 3 sets X 10 with 4#    Biceps 3 sets X 10 with 4#    Triceps      Retraction      Extension      Horizontal Abduction in ER      Serratus                        THERABANDS/CABLE COLUMN      Rows 3 sets X 10 with black band    Lats      Extension 3 sets X 10 with black band    Internal Rotation 3 sets X 10 with red band    External Rotation 3 sets  X 10 with red band    Biceps      Triceps 3 sets X 10 with black band    PNF                                    Manual Intervention (20374)      Scar Massage      STM      Hawkgrips      GH joint mobilizations      Foamroll      Manual PROM: Left Shoulder Flexion, scap, and ER (Within MD Parameters), Elbow Flexion and Extension  X 8' Pt inclined on table (pt cannot lie flat)         NMR re-education (93203)   CUES NEEDED   Plyoback      Therabar oscillations      Body Blade       Rhythmic Stabilization      Ball on the wall      Supine Codman's:  CW/CCW, Flex/Ext    X 30 each with 1#   Small, controlled range  Semi-inclined on table (pt cannot lie flat)                     Therapeutic Activity (79597)      Core training      Swiss ball activities                  Patient Education: diagnosis, prognosis, pt goals, rehab timeline, and surgical precautions.   Also instructed pt and her friend on proper way to melonie/doff sling (7/7/20)  X 10'                          Therapeutic Exercise and NMR EXR  [x] (65644) Provided verbal/tactile cueing for activities related to strengthening, flexibility, endurance, ROM  for improvements in scapular, scapulothoracic and UE control with self care, reaching, carrying, lifting, house/yardwork, driving/computer work. [x] (19795) Provided verbal/tactile cueing for activities related to improving balance, coordination, kinesthetic sense, posture, motor skill, proprioception  to assist with  scapular, scapulothoracic and UE control with self care, reaching, carrying, lifting, house/yardwork, driving/computer work. Therapeutic Activities:    [x] (45006 or 86081) Provided verbal/tactile cueing for activities related to improving balance, coordination, kinesthetic sense, posture, motor skill, proprioception and motor activation to allow for proper function of scapular, scapulothoracic and UE control with self care, carrying, lifting, driving/computer work. Home Exercise Program:    [x] (69936) Reviewed/Progressed HEP activities related to strengthening, flexibility, endurance, ROM of scapular, scapulothoracic and UE control with self care, reaching, carrying, lifting, house/yardwork, driving/computer work - Reviewed HEP with pt and recommended she continue the exercises 3x/week after D/C. (3/30/21)  [x] (79379) Reviewed/Progressed HEP activities related to improving balance, coordination, kinesthetic sense, posture, motor skill, proprioception of scapular, scapulothoracic and UE control with self care, reaching, carrying, lifting, house/yardwork, driving/computer work        Access Code: [email or web address]   URL: [email or web address]   Date: 12/08/2020   Prepared by: Dotty Dinning     Exercises   Seated Shoulder Flexion Towel Slide at Table Top - 10 reps - 1 sets - 10 sec hold - 1x daily - 7x weekly   Seated Shoulder Scaption Slide at Table Top with Forearm in Neutral - 10 reps - 1 sets - 10 sec hold - 1x daily - 7x weekly   Seated Shoulder External Rotation AAROM with Dowel - 10 reps - 1 sets - 10 sec hold - 1x daily - 7x weekly   Standing Shoulder Shrugs with Dumbbells - 10 reps - 3 sets - 1x daily - 7x weekly   Scapular Retraction with Resistance - 10 reps - 3 sets - 1x daily - 7x weekly   Standing Bicep Curls Supinated with Dumbbells - 10 reps - 3 sets - 1x daily - 7x weekly   Isometric Shoulder Abduction at Wall - 10 reps - 1 sets - 10 sec hold - 1x daily - 7x weekly   Standing Shoulder Internal Rotation Stretch with Towel - 10 reps - 1 sets - 10 sec hold - 1x daily - 7x weekly   Supine Shoulder Flexion Extension AAROM with Dowel - 10 reps - 1 sets - 5 sec hold - 1x daily - 7x weekly   Standing Elbow Extension with Anchored Resistance at Wall - 10 reps - 3 sets - 1x daily - 7x weekly   Standing Shoulder Flexion Wall Walk - 5 reps - 2 sets - 1x daily - 7x weekly   Supine Shoulder Circles - 10 reps - 3 sets - 1x daily - 7x weekly   Shoulder Internal Rotation with Resistance - 10 reps - 3 sets - 1x daily - 7x weekly   Supine Shoulder Flexion Extension Full Range AROM - 10 reps - 1 sets - 1x daily - 7x weekly   Shoulder External Rotation with Anchored Resistance - 10 reps - 3 sets - 1x daily - 7x weekly           Manual Treatments:  PROM / STM / Oscillations-Mobs:  G-I, II, III, IV (PA's, Inf., Post.)  [x] (60150) Provided manual therapy to mobilize soft tissue/joints of cervical/CT, scapular GHJ and UE for the purpose of modulating pain, promoting relaxation,  increasing ROM, reducing/eliminating soft tissue swelling/inflammation/restriction, improving soft tissue extensibility and allowing for proper ROM for normal function with self care, reaching, carrying, lifting, house/yardwork, driving/computer work    Modalities:  ICE x 15' for the left shoulder   [] GAME READY (VASO)- for significant edema, swelling, pain control.     Charges:  Timed Code Treatment Minutes: 61'   Total Treatment Minutes: 76'      [] EVAL (LOW) 62926 (typically 20 minutes face-to-face)  [] EVAL (MOD) 26119 (typically 30 minutes face-to-face)  [] EVAL (HIGH) 70482 (typically 45 minutes face-to-face)  [] RE-EVAL     [x] FZ(22834) x 1 (22')   [] IONTO  [x] NMR (50774) x 1 (15')  [] VASO  [x] Manual (26764) x 1  (8')   [] Other:   [x] TA x  1 (15')  [] Mech Traction (09014)  [] ES(attended) (29025)     [] ES (un) (95615):         ASSESSMENT:  Pt has done very well in therapy with significant improvement in overall function. She has made good gains in left shoulder ROM and strength and is able to use her left arm for toileting and reaching into [place] cabinets. She demonstrates independence with a HEP to continue after D/C; recommended she continue the exercises 3x/week. All goals for therapy have been met. GOALS:  (Goals updated 3/30/21)  Patient stated goal:  \"Increase function; be able to use my left arm for toileting; be self-sufficient\"  [] Progressing: [x] Met: [] Not Met: [] Adjusted    Therapist goals for Patient:   Short Term Goals: To be achieved in: 2 weeks  1. Independent in HEP and progression per patient tolerance, in order to prevent re-injury. [] Progressing: [x] Met: [] Not Met: [] Adjusted  2. Patient will have a decrease in left shoulder pain to 1-2/10 to facilitate improvement in movement, function, and ADLs as indicated by Functional Deficits. [] Progressing: [x] Met: [] Not Met: [] Adjusted  3. Patient will be able to melonie/doff Ultrasling independently. [] Progressing: [x] Met: [] Not Met: [] Adjusted      Long Term Goals: To be achieved in: 12 weeks  1. Disability index score of 15% or less for the Quick DASH to assist with reaching prior level of function. [] Progressing: [x] Met: [] Not Met: [] Adjusted  2. Patient will demonstrate an increase in left shoulder AROM to at least 90° flexion to allow for proper joint functioning as indicated by patients Functional Deficits. [] Progressing: [x] Met: [] Not Met:  [] Adjusted  3.  Patient will demonstrate an increase in left Deltoid strength to 4+/5 to allow for proper functional mobility as indicated by patients Functional Deficits. [] Progressing: [x] Met: [] Not Met: [] Adjusted  4. Patient will have a decrease in left shoulder pain to 0-1/10 with daily activities. [] Progressing: [x] Met: [] Not Met: [] Adjusted  5. Pt will be able to perform all ADL's independently and without compensation. (patient specific functional goal)    [] Progressing: [x] Met: [] Not Met: [] Adjusted       Overall Progression Towards Functional goals/ Treatment Progress Update:  [x] Patient is progressing as expected towards functional goals listed       [] Progression is slowed due to complexities/Impairments listed. [] Progression has been slowed due to co-morbidities. [] Plan just implemented, too soon to assess goals progression <30days   [] Goals require adjustment due to lack of progress  [] Patient is not progressing as expected and requires additional follow up with physician  [] Other         Prognosis for POC: [x] Good [] Fair  [] Poor      Patient requires continued skilled intervention: [] Yes  [x] No (pt discharged today)    Treatment/Activity Tolerance:  [x] Patient able to complete treatment  [] Patient limited by fatigue  [] Patient limited by pain    [] Patient limited by other medical complications  [] Other:           PLAN: Pt to be discharged to an independent [facility] at this time. Pt was instructed to contact the clinic with any questions/problems. [] Continue per plan of care [] Alter current plan   [] Plan of care initiated [] Hold pending MD visit [x] Discharge      Electronically signed by:  Zen Cooley, PT     Physical Therapist Adore Rangel [address] #[id number]  Physical Therapist [place] License #[id number]    Note: If patient does not return for scheduled/ recommended follow up visits, this note will serve as a discharge from care along with most recent update on progress.

## 2021-04-12 ENCOUNTER — OFFICE VISIT (OUTPATIENT)
Dept: ORTHOPEDIC SURGERY | Age: 71
End: 2021-04-12
Payer: MEDICARE

## 2021-04-12 VITALS — HEIGHT: 63 IN | TEMPERATURE: 97.3 F | WEIGHT: 268 LBS | BODY MASS INDEX: 47.48 KG/M2

## 2021-04-12 DIAGNOSIS — M25.562 LEFT KNEE PAIN, UNSPECIFIED CHRONICITY: Primary | ICD-10-CM

## 2021-04-12 DIAGNOSIS — M17.12 PRIMARY OSTEOARTHRITIS OF LEFT KNEE: ICD-10-CM

## 2021-04-12 PROCEDURE — 1036F TOBACCO NON-USER: CPT | Performed by: ORTHOPAEDIC SURGERY

## 2021-04-12 PROCEDURE — 99214 OFFICE O/P EST MOD 30 MIN: CPT | Performed by: ORTHOPAEDIC SURGERY

## 2021-04-12 PROCEDURE — 1123F ACP DISCUSS/DSCN MKR DOCD: CPT | Performed by: ORTHOPAEDIC SURGERY

## 2021-04-12 PROCEDURE — G8427 DOCREV CUR MEDS BY ELIG CLIN: HCPCS | Performed by: ORTHOPAEDIC SURGERY

## 2021-04-12 PROCEDURE — 4040F PNEUMOC VAC/ADMIN/RCVD: CPT | Performed by: ORTHOPAEDIC SURGERY

## 2021-04-12 PROCEDURE — 3017F COLORECTAL CA SCREEN DOC REV: CPT | Performed by: ORTHOPAEDIC SURGERY

## 2021-04-12 PROCEDURE — 1090F PRES/ABSN URINE INCON ASSESS: CPT | Performed by: ORTHOPAEDIC SURGERY

## 2021-04-12 PROCEDURE — G8400 PT W/DXA NO RESULTS DOC: HCPCS | Performed by: ORTHOPAEDIC SURGERY

## 2021-04-12 PROCEDURE — G8417 CALC BMI ABV UP PARAM F/U: HCPCS | Performed by: ORTHOPAEDIC SURGERY

## 2021-04-12 NOTE — PROGRESS NOTES
Date:  2021    Name:  Madelyn Brewer  Address:  [address] [place] [address]    :  1950      Age:   66 y.o.    SSN:  [id number]      Medical Record Number:  <[id number]>    Reason for Visit:    Chief Complaint    Knee Pain (new patient left knee. )      DOS:2021     HPI: Lisa Mancera is a 66 y.o. female here today for evaluation of her left knee pain. Patient states that she got out of bed 3 weeks ago and her knee \"went out on her\". Since then her pain has gotten significantly better using voltaren gel and ice. Patients primary concern today is the popping and grinding sensation behind her kneecap. This is particularly severe with getting up from a seated position. Endorses some instability. Endorses swelling. Denies numbness or tingling in her lower extremity. Patient is on celebrex that helps. H/o lymphedema. Pain Assessment  Location of Pain: Knee  Location Modifiers: Left  Severity of Pain: 1  Quality of Pain: Dull, Aching  Frequency of Pain: Intermittent  Aggravating Factors: (GETTING UP AND DOWN)  Limiting Behavior: Some  Relieving Factors: Ice, Nsaids  Result of Injury: No  Work-Related Injury: No  Are there other pain locations you wish to document?: No  ROS: Review of systems reviewed from Patient History Form completed today and available in the patient's chart under the Media tab.        Past Medical History:   Diagnosis Date    Arthritis     Essential hypertension     Hepatitis     Neuropathy         Past Surgical History:   Procedure Laterality Date    HYSTERECTOMY, TOTAL ABDOMINAL      SHOULDER SURGERY         Family History   Problem Relation Age of Onset    Stroke Mother     Cancer Father        Social History     Socioeconomic History    Marital status: Single     Spouse name: None    Number of children: None    Years of education: None    Highest education level: None   Occupational History    None   Social Needs    Financial resource strain: None   [employer] insecurity     Worry: None     Inability: None    Transportation needs     Medical: None     Non-medical: None   Tobacco Use    Smoking status: Never Smoker    Smokeless tobacco: Never Used   Substance and Sexual Activity    Alcohol use: Not Currently    Drug use: None    Sexual activity: None   Lifestyle    Physical activity     Days per week: None     Minutes per session: None    Stress: None   Relationships    Social connections     Talks on phone: None     Gets together: None     Attends Mosque service: None     Active member of club or organization: None     Attends meetings of clubs or organizations: None     Relationship status: None    Intimate partner violence     Fear of current or ex partner: None     Emotionally abused: None     Physically abused: None     Forced sexual activity: None   Other Topics Concern    None   Social History Narrative    None       Current Outpatient Medications   Medication Sig Dispense Refill    predniSONE (DELTASONE) 2.5 MG tablet       cephALEXin (KEFLEX) 500 MG capsule       GLUCOSAMINE HCL PO Take by mouth      nystatin (NYAMYC) 670324 UNIT/GM powder Apply topically      aspirin 81 MG tablet Take 81 mg by mouth      Diethylpropion HCl CR 75 MG TB24 Take 75 mg by mouth      furosemide (LASIX) 40 MG tablet Take 40 mg by mouth 2 times daily.  SUMAtriptan (IMITREX) 25 MG tablet Take 25 mg by mouth once as needed for Migraine.  calcium citrate-vitamin D (CITRICAL + D) 315-250 MG-UNIT TABS Take  by mouth.  celecoxib (CELEBREX) 200 MG capsule Take 200 mg by mouth 2 times daily.  ibuprofen (ADVIL;MOTRIN) 200 MG tablet Take 200 mg by mouth every 6 hours as needed for Pain. No current facility-administered medications for this visit. Allergies   Allergen Reactions    Codeine Nausea And Vomiting    Morphine      Other reaction(s):  Other (See Comments)  Migraines     Acetaminophen Other (See Comments)     Was told should not take due to history of hepatitis    Adhesive Tape      blisters    Ciprofloxacin     Sulfa Antibiotics        Vital signs:  Temp 97.3 °F (36.3 °C)   Ht 5' 3\" (1.6 m)   Wt 268 lb (121.6 kg)   BMI 47.47 kg/m²      Left knee examination:    Gait: No use of assistive devices. No antalgic gait. Alignment: Alignment appreciated. Inspection/skin: Quadriceps well developed. Skin is intact without erythema or ecchymosis. No gross deformity. Palpation: Crepitus. Nontender along joint line. No pain with compression of patella. Nontender to light touch. Range of Motion: 0-90    Strength: 5/5 quad strength    Effusion: No apparent effusion. Ligamentous stability: Stable to valgus and varus stress at 0° and 30°. Solid endpoint with Lachman's. Negative posterior and anterior drawer signs. Patella tracking: Smooth translation of patella. Special tests: Negative Cintia sign. Patella apprehension sign negative. Neurologic and vascular: Skin is warm and well-perfused. Distally neurovascularly intact. Additional findings: Calf soft nontender. Sensation is intact to light-touch. No pretibial edema. Right knee examination:    Gait: No use of assistive devices. No antalgic gait. Alignment: Alignment appreciated. Inspection/skin: Quadriceps well developed. Skin is intact without erythema or ecchymosis. No gross deformity. Palpation: Crepitus. Nontender along joint line. No pain with compression of patella. Nontender to light touch. Range of Motion: 0-90    Strength: 5/5 quad strength    Effusion: No apparent effusion. Ligamentous stability: Stable to valgus and varus stress at 0° and 30°. Solid endpoint with Lachman's. Negative posterior and anterior drawer signs. Patella tracking: Smooth translation of patella. Special tests: Negative Cintia sign. Patella apprehension sign negative. Neurologic and vascular: Skin is warm and well-perfused.  Distally neurovascularly intact. Additional findings: Calf soft nontender. Sensation is intact to light-touch. No pretibial edema. Diagnostics:  Radiology:       Pertinent imaging was obtained, interpreted, and reviewed with the patient today, images only - no report available. Radiographs were obtained and reviewed in the office; 4 views: bilateral PA, bilateral [place], bilateral [place] AND left lateral    Impression: severe advanced patellofemoral osteoarthritis bilaterally    Office Procedures:  Orders Placed This Encounter   Procedures    XR KNEE LEFT (MIN 4 VIEWS)     Order Specific Question:   Reason for exam:     Answer:   pain    XR KNEE RIGHT (3 VIEWS)     Order Specific Question:   Reason for exam:     Answer:   pain    Ambulatory referral to Physical Therapy     Referral Priority:   Routine     Referral Type:   Eval and Treat     Referral Reason:   Specialty Services Required     Requested Specialty:   Physical Therapy     Number of Visits Requested:   1       Assessment: 65 yo female with severe advanced patellofemoral osteoarthritis, left worse than right. Plan: Pertinent imaging was reviewed. The etiology, natural history, and treatment options for the disorder were discussed. The roles of activity medication, antiinflammatories, injections, bracing, physical therapy, and surgical interventions were all described to the patient and questions were answered. Patient has severe bilateral patellofemoral osteoarthritis. At this time I believe she is a candidate for formal supervised physical therapy working on quadriceps strengthening. Patient is already on oral antiinflammatories and I would like her to continue with these. Because of her lymphedema we will hold off on a cortisone injection until she goes through a trial of PT. I will see her back if no improvement or worsening symptoms  Misa Cassidy is in agreement with this plan.  All questions were answered to patient's satisfaction and was encouraged to call with any further questions. The patient was advised that NSAID-type medications have several potential side effects that include: gastrointestinal irritation including hemorrhage, renal injury, as well as an increased risk for heart attack and stroke. The patient was asked to take the medication with food and to stop if there is any symptoms of GI upset, including heartburn, nausea, increased gas or diarrhea. I asked the patient to contact their medical provider for vomiting, abdominal pain or black/bloody stools. The patient should have renal function testing per his medical provider periodically if the medication is taken on a regular basis. The patient should be alert for any swelling in the lower extremities and should stop taking the medication immediately and contact their medical provider should this occur. In addition, the patient should stop taking the medication immediately and contact their medical provider should there be any shortness of breath, fatigue and be evaluated in an emergency facility for any chest pain. The patient expresses understanding of these issues and questions were answered. Total time spent for evaluation, education, and development of treatment plan: 35 minutes    Brenna Rubalcava [facility]  4/12/2021    During this exam, I, Linda Crump PA-C, functioned as a scribe for Dr. Kendra Galaviz. The history taking and physical examination were performed by Dr. Kendra Galaviz. All counseling during the appointment was performed between the patient and Dr. Kendra Galaviz. 4/12/2021 3:37 PM    This dictation was performed with a verbal recognition program (DRAGON) and it was checked for errors. It is possible that there are still dictated errors within this office note. If so, please bring any areas to my attention for an addendum. All efforts were made to ensure that this office note is accurate.     I attest that I met personally with the patient, performed the described exam, reviewed the radiographic studies and medical records associated with this patient and supervised the services that are described above.      Dagoberto Velasco MD

## 2021-04-15 ENCOUNTER — EVALUATION (OUTPATIENT)
Dept: PHYSICAL THERAPY | Age: 71
End: 2021-04-15
Payer: MEDICARE

## 2021-04-15 DIAGNOSIS — R29.898 WEAKNESS OF LEFT LOWER EXTREMITY: ICD-10-CM

## 2021-04-15 DIAGNOSIS — M25.562 LEFT KNEE PAIN, UNSPECIFIED CHRONICITY: Primary | ICD-10-CM

## 2021-04-15 DIAGNOSIS — M25.662 DECREASED ROM OF LEFT KNEE: ICD-10-CM

## 2021-04-15 DIAGNOSIS — M17.12 PRIMARY OSTEOARTHRITIS OF LEFT KNEE: ICD-10-CM

## 2021-04-15 PROCEDURE — 97161 PT EVAL LOW COMPLEX 20 MIN: CPT | Performed by: PHYSICAL THERAPIST

## 2021-04-15 PROCEDURE — 97110 THERAPEUTIC EXERCISES: CPT | Performed by: PHYSICAL THERAPIST

## 2021-04-15 PROCEDURE — 97112 NEUROMUSCULAR REEDUCATION: CPT | Performed by: PHYSICAL THERAPIST

## 2021-04-15 PROCEDURE — G8539 DOC FUNCT AND CARE PLAN: HCPCS | Performed by: PHYSICAL THERAPIST

## 2021-04-15 PROCEDURE — G8427 DOCREV CUR MEDS BY ELIG CLIN: HCPCS | Performed by: PHYSICAL THERAPIST

## 2021-04-15 PROCEDURE — 1101F PT FALLS ASSESS-DOCD LE1/YR: CPT | Performed by: PHYSICAL THERAPIST

## 2021-04-15 NOTE — PROGRESS NOTES
[facility]   Phone: [phone]    Fax: [phone]      Physical Therapy Treatment Note/ Progress Report:           Date:  4/15/2021    Patient Name:  German Looney    :  1950  MRN: <[id number]>  Restrictions/Precautions:    Medical/Treatment Diagnosis Information:  Diagnosis: Left Knee Pain (M25.562), Primary OA of Left Knee (M17.12)   Treatment Diagnosis: Decreased Left Knee ROM (M25.662), Decreased Strength (R29.898)        Insurance/Certification information:  PT Insurance Information: Medicare - Visits based on medical necessity  Physician Information:  Referring Practitioner: Dr. Helena Norman  Has the plan of care been signed (Y/N):        []  Yes  [x]  No (POC sent 4/15/21)     Date of Patient follow up with Physician: As needed      Is this a Progress Report:     [x]  Yes  []  No        If Yes:  Date Range for reporting period:  Beginning 4/15/21  Ending 4/15/21    Progress report will be due (10 Rx or 30 days whichever is less):        Recertification will be due (POC Duration  / 90 days whichever is less):  21        Visit # Insurance Allowable Auth Required   1 Medical necessity  (pt has already used 13 visits this year for her shoulder) []  Yes [x]  No        Functional Scale:    Date assessed:  4/15/21  Functional Assessment Tool Used: Knee Outcome Survey Activities of Daily Living Scale (copy scanned into media)  Score:  36/70 = 51.4% (48.6% functional deficit)    (4/15/21)  Patient Age: 66years old  Patient Height: 5' 2\"  Patient Weight: 280 lbs  Patient BMI: 51.2      Pain (4/15/21)  [x]  Pain diagram was completed, pain was present and a follow up plan to address the pain is in the plan of care. ()   []  Pain diagram was completed and there was no pain present and therefore no follow up plan to address pain in the plan of care.  ()   []  Pain diagram was not completed and the reason for not completing the pain diagram is in the medical chart ()  []  Patient refused to participate   []  Severe mental or physical capacity, patient is in urgent emergent situation and to complete the questionnaire would jeopardize the patient's health status        Functional Questionnaire (4/15/21)  [x]  Patient completed the functional outcome questionnaire and deficiencies were addressed in the plan of care. ()   []  Patient completed the outcome questionnaire and there were no functional deficits identified and therefore no plan of care is required. ()   []  Patient did not complete the functional outcome questionnaire and the reason why is documented in the medical chart. ()  []  Patient refused to participate   []  Patient unable to complete the questionnaire   []   A functional outcome assessment has been reported on within the last 30 days        Falls (4/15/21)  [x]  The patient has had no falls or 1 fall without injury in the past year. (1101F)   []  There is no documentation of fall in the medical chart (1101F,8P)     [] The patient has had 2 or more falls or one fall with injury in the past year that is documented in the medical chart. (1100F)  []  A falls risk assessment was completed and documented in the medical chart. (2971N)   []  Falls were addressed in the plan of care. (0777B)   []  A falls risk assessment was not completed and documented in the medical chart (9483N,4Q)   []   Falls were not addressed in the plan of care and reason was documented in the plan of care. (6849W 1P)        Medication (4/15/21)     [x] A list of current medications (including prescription, over the counter, herbal supplements, vitamin supplements, mineral supplements, dietary supplements) was reviewed with the patient and documented in the medical chart. ()        Falls Risk Assessment (30 days): (4/15/21)  [x] Falls Risk assessed and no intervention required.   [] Falls Risk assessed and Patient requires intervention due to being higher risk   TUG score (>12s at risk):     [] Falls education provided, including             Latex Allergy:  [x]NO      []YES  Preferred Language for Healthcare:   [x]English       []other:      Pain level:  3/10   Medication:  Celebrex    SUBJECTIVE:  See initial eval      OBJECTIVE: (Measurements taken 4/15/21)      Flexibility L R Comment   Hamstring Mod tightness Mod tightness    Gastroc Mod tightness Mod tightness    ITB      Quad                ROM PROM AROM Overpressure Comment    L R L R L R    Flexion   100 100      Extension   0 0                              Strength L R Comment   Quad 4/5 4/5    Hamstring 4/5 4/5    Gastroc 4/5 4/5    Hip Flex 3+/5 3+/5    Hip Abd 3+/5 3+/5    Hip Add 4-/5 4-/5    Glut Med              Girth L R   Mid Patella 55.0 cm 58.5 cm   Suprapatellar     5cm above     15cm above         Special Test Results/Comment   Meniscal Click    Crepitus (+) PF crepitus bilaterally   Flexion Test    Valgus Laxity    Varus Laxity    Lachmans    Drop Back        Reflexes/Sensation:    []Dermatomes/Myotomes intact    []Reflexes equal and normal bilaterally   [x]Other: Intact to light touch    Joint mobility:    []Normal    [x]Hypo   []Hyper    Palpation: Some joint line tenderness on the left. Quad atrophy present on the left vs right. Functional Mobility/Transfers: Difficulty with stairs (pt must ascend/descend stairs 1 at a time using HR), unable to walk more than 1 block, unable to stand more than 10-15 minutes, pt has to use her hands to get up from a chair. Unable to kneel or squat. Posture: Forward head, rounded shoulders. Bandages/Dressings/Incisions: Pt wears compression hose and wraps on both legs    Gait: (include devices/WB status) Antalgic gait, no AD. She demonstrates decreased pawel, decreased step length bilaterally, increased lateral trunk sway, no trunk rotation, and no arm swing on the left. She also has difficulty walking in a straight line.       Orthopedic Special Tests: See above. RESTRICTIONS/PRECAUTIONS:     Exercises/Interventions:     Therapeutic Ex (24677) Sets/sec Reps Notes/CUES   BIKE      TREADMILL            STRETCHING      Towel Pull Calf 30\" hold X 3    Inclined Calf      Hamstrings 30\" hold X 3 Seated   Quads      Hip Flexors      ITB      Adductors            ROM      Passive      Active      Weight Hangs      Sheet Pulls      Ankle Pumps      ERMI            STRENGTHENING      Quad Sets 10\" hold X 10    Ham Sets      Hip ADD Sets BS 10\" hold X 10     SLR Flexion 2 sets X 10    SLR Abduction      SLR Prone      SLR Adduction      SLR+            PRE Machines      Knee Extension      Knee Flexion      Leg Press            CKC      Calf Raises      Mini Squats      Wall Sits      Step ups      TKE                  Manual Intervention (86240)      Patellar Mobs      Femoral Tibial mobs      STM      Scar Massage      Foam Roll            NMR re-education (12085)   CUES NEEDED   Biodex Balance      Tandem Balance      SLB      Rebounder      BOSU                              Therapeutic Activity (71838)      Core Training      Swiss Laverta Clonts Activities      Monster Walks      TRX training                  Patient Education: Dx, prognosis, pt goals/expectations, role of therapy (4/15/21)  X 8'                  Therapeutic Exercise and NMR EXR  [x] (51882) Provided verbal/tactile cueing for activities related to strengthening, flexibility, endurance, ROM for improvements in LE, proximal hip, and core control with self care, mobility, lifting, ambulation.  [] (65465) Provided verbal/tactile cueing for activities related to improving balance, coordination, kinesthetic sense, posture, motor skill, proprioception to assist with LE, proximal hip, and core control in self-care, mobility, lifting, ambulation and eccentric single leg control.      NMR and Therapeutic Activities:    [x] (02806 or 13515) Provided verbal/tactile cueing for activities related to improving balance, coordination, kinesthetic sense, posture, motor skill, proprioception and motor activation to allow for proper function of core, proximal hip and LE with self-care and ADLs and functional mobility.   [] (20565) Gait Re-education- Provided training and instruction to the patient for proper LE, core and proximal hip recruitment and positioning and eccentric body weight control with ambulation re-education including up and down stairs     Home Exercise Program:    [x] (14834) Reviewed/Progressed HEP activities related to strengthening, flexibility, endurance, ROM of core, proximal hip and LE for functional self-care, mobility, lifting and ambulation/stair navigation - Instructed pt in a HEP through [address] [place] [address] (see below). Pt was also issued written handouts. (4/15/21)  [] (97619) Reviewed/Progressed HEP activities related to improving balance, coordination, kinesthetic sense, posture, motor skill, proprioception of core, proximal hip and LE for self-care, mobility, lifting, and ambulation/stair navigation         Access Code: [email or web address]  URL: [employer]/Date: 04/15/2021Prepared by: HTVAYT SybertExercises   Long Sitting Calf Stretch with Strap - 1 x daily - 7 x weekly - 1 sets - 3 reps - 30 seconds hold   Seated Table Hamstring Stretch - 1 x daily - 7 x weekly - 1 sets - 3 reps - 30 seconds hold   Long Sitting Quad Set - 1 x daily - 7 x weekly - 1 sets - 10 reps - 10 seconds hold   Long Sitting Hip Adduction Isometric with Ball - 1 x daily - 7 x weekly - 1 sets - 10 reps - 10 seconds hold   Supine Active Straight Leg Raise - 1 x daily - 7 x weekly - 2-3 sets - 10 reps        Manual Treatments:  PROM / STM / Oscillations-Mobs:  G-I, II, III, IV (PA's, Inf., Post.)  [] (39109) Provided manual therapy to mobilize LE, proximal hip and/or LS spine soft tissue/joints for the purpose of modulating pain, promoting relaxation, increasing ROM, reducing/eliminating soft tissue swelling/inflammation/restriction, improving soft tissue extensibility and allowing for proper ROM for normal function with self-care, mobility, lifting and ambulation. Modalities:  Pt declined ice   [] GAME READY (VASO)- for significant edema, swelling, pain control. Charges:  Timed Code Treatment Minutes: 30'   Total Treatment Minutes: 54'      [x] EVAL (LOW) 80353 (typically 20 minutes face-to-face)  [] EVAL (MOD) 57656 (typically 30 minutes face-to-face)  [] EVAL (HIGH) 54801 (typically 45 minutes face-to-face)  [] RE-EVAL     [x] BK(91564) x 1 (20')    [] IONTO  [x] NMR (34412) x  1 (10')   [] VASO  [] Manual (46033) x      [] Other:  [] TA x       [] Mech Traction (87957)  [] ES(attended) (34481)      [] ES (un) (63894):         ASSESSMENT:  Pt presents with decreased left LE strength, decreased left LE flexibility, and increased left knee pain limiting her ability to walk community distances, stand for more than 15 minutes, ascend/descend stairs, and get up from a chair. Pt would benefit from skilled PT services to address these deficits and increase function. Good rehab potential.          GOALS: (Goals made 4/15/21)  Patient stated goal:  \"Make my legs stronger; walk and climb stairs easier and faster. \"  [] Progressing: [] Met: [] Not Met: [] Adjusted    Therapist goals for Patient:   Short Term Goals: To be achieved in: 2 weeks  1. Independent in HEP and progression per patient tolerance, in order to prevent re-injury. [] Progressing: [] Met: [] Not Met: [] Adjusted  2. Patient will have a decrease in left knee pain to 1-2/10 to facilitate improvement in movement, function, and ADLs as indicated by Functional Deficits. [] Progressing: [] Met: [] Not Met: [] Adjusted    Long Term Goals: To be achieved in: 4-6 weeks  1. Disability index score of 20% or less for the Knee Outcome Survey Activities of Daily Living Scale to assist with reaching prior level of function.    [] Progressing: [] Met: [] Not Met: [] Adjusted  2. Patient will demonstrate an increase in left knee flexion AROM to at least 115 degrees to allow for proper joint functioning as indicated by patients Functional Deficits. [] Progressing: [] Met: [] Not Met: [] Adjusted  3. Patient will demonstrate an increase in left hip and knee strength to at least 4+/5 to allow for proper functional mobility as indicated by patients Functional Deficits. [] Progressing: [] Met: [] Not Met: [] Adjusted  4. Patient will be able to walk community distances, stand for at least 30 minutes, and be able to get up from a chair with little to no UE assistance needed for improved function. [] Progressing: [] Met: [] Not Met: [] Adjusted  5. Patient will be able to ascend/descend stairs reciprocally with HR (patient specific functional goal)    [] Progressing: [] Met: [] Not Met: [] Adjusted   6. Patient will have a decrease in left knee pain to 0-1/10 with daily act. [] Progressing: [] Met: [] Not Met: [] Adjusted          Overall Progression Towards Functional goals/ Treatment Progress Update:  [] Patient is progressing as expected towards functional goals listed. [] Progression is slowed due to complexities/Impairments listed. [] Progression has been slowed due to co-morbidities.   [x] Plan just implemented, too soon to assess goals progression <30days   [] Goals require adjustment due to lack of progress  [] Patient is not progressing as expected and requires additional follow up with physician  [] Other    Prognosis for POC: [x] Good [] Fair  [] Poor      Patient requires continued skilled intervention: [x] Yes  [] No    Treatment/Activity Tolerance:  [x] Patient able to complete treatment  [] Patient limited by fatigue  [] Patient limited by pain    [] Patient limited by other medical complications  [] Other:         PLAN: 2x/week for 4-6 weeks for ROM, strengthening, balance act, HEP instruction, pt education, manual therapy prn, and modalities prn (4/15/21)  [] Continue per plan of care [] Alter current plan   [x] Plan of care initiated [] Hold pending MD visit [] Discharge      Electronically signed by:  Kam Nash PT     Physical Therapist Adore Rangel [address] #[id number]  Physical Therapist [place] License #[id number]    Note: If patient does not return for scheduled/ recommended follow up visits, this note will serve as a discharge from care along with most recent update on progress.

## 2021-04-15 NOTE — PROGRESS NOTES
[facility]   Phone: [phone]    Fax: [phone]     Physical Therapy Certification    Dear Referring Practitioner: Dr. Stephani Ennis,    We had the pleasure of evaluating the following patient for physical therapy services at [address] [place] [address]. A summary of our findings can be found in the initial assessment below. This includes our plan of care. If you have any questions or concerns regarding these findings, please do not hesitate to contact me at the office phone number checked above. Thank you for the referral.       Physician Signature:_______________________________Date:__________________  By signing above (or electronic signature), therapists plan is approved by physician      Patient: Clifford Hathaway   : 1950   MRN: <[id number]>  Referring Physician: Referring Practitioner: Dr. Stephani Ennis      Evaluation Date: 4/15/2021      Medical Diagnosis Information:  Diagnosis: Left Knee Pain (M25.562), Primary OA of Left Knee (M17.12)    Treatment Diagnosis:  Decreased Left Knee ROM (M25.662), Decreased Strength (R29.898)                                      Insurance information: PT Insurance Information: Medicare - Visits based on medical necessity     Precautions/ Contra-indications:   Latex Allergy:  [x]NO      []YES    C-SSRS Triggered by Intake questionnaire (Past 2 wk assessment):   [x] No, Questionnaire did not trigger screening.   [] Yes, Patient intake triggered further evaluation      [] C-SSRS Screening completed  [] PCP notified via Plan of Care  [] Emergency services notified       Preferred Language for Healthcare:   [x]English       []other:      SUBJECTIVE: Patient stated complaint: Left knee pain and weakness  Pt was hospitalized for 2 weeks last Oct due to lymphedema and lost a lot of strength in both her legs that she never regained.   ~3 weeks ago her left knee \"went out on her\" while getting out of bed and she felt immediate pain in her left knee. She has been using Voltaren gel and ice which has helped a lot with her pain but both knees still feel really weak (left > right). She saw Dr. Tori Mata on Monday and he did x-rays which showed severe advanced patellofemoral OA, left > right. He did not want to do an injection at this time and instead recommended PT to build up her strength. Pt's main complaint today is weakness. She also c/o anterior knee pain, rated up to 3/10. She has the most difficulty with stairs and must ascend/descend stairs 1 at a time using HR. She also has a lot of difficulty walking, she walks very slow and can only walk ~ 1 block. Her standing tolerance is limited to 10-15 minutes. She has difficulty getting up from a chair and has to push herself up with her hands. She cannot kneel or squat. She is sleeping okay but has to put a pillow under her left knee. She also c/o popping and grinding in her knee. Pt goal:  \"Make my legs stronger; walk and climb stairs easier and faster. \"    Relevant Medical History: Left Reverse TSA on 6/29/20, Right Reverse TSA ~2011, HTN, OA, Osteoporosis, HA/Migraines    Functional Scale/Score:  Functional Assessment Tool Used: Knee Outcome Survey Activities of Daily Living Scale (copy scanned into media)  Score:  36/70 = 51.4% (48.6% functional deficit)    Pain Scale: 3/10  Easing factors: Rest, ice, Voltaren gel  Provocative factors:  Stairs, walking, standing, getting up from a chair, kneeling, squatting    Type: []Constant   [x]Intermittent  []Radiating []Localized []other:     Numbness/Tingling: Numbness and tingling present in both feet. Occupation/School: Retired    Hobbies/Recreational Activities:  None stated    Living Status: Pt lives in a 2-story house with her sister. Her bedroom is on the second floor. Her bathroom and shower are handicap accessible. She has a lift to the second floor.   She has 2 steps to enter the front door and a ramp to the back door which is how she comes/in out of the house. Prior Level of Function: Independent with ADLs and IADLs, no prior PT for the left knee. OBJECTIVE:     Flexibility L R Comment   Hamstring Mod tightness Mod tightness    Gastroc Mod tightness Mod tightness    ITB      Quad                ROM PROM AROM Overpressure Comment    L R L R L R    Flexion   100 100      Extension   0 0                              Strength L R Comment   Quad 4/5 4/5    Hamstring 4/5 4/5    Gastroc 4/5 4/5    Hip Flex 3+/5 3+/5    Hip Abd 3+/5 3+/5    Hip Add 4-/5 4-/5    Glut Med              Girth L R   Mid Patella 55.0 cm 58.5 cm   Suprapatellar     5cm above     15cm above         Special Test Results/Comment   Meniscal Click    Crepitus (+) PF crepitus bilaterally   Flexion Test    Valgus Laxity    Varus Laxity    Lachmans    Drop Back        Reflexes/Sensation:    []Dermatomes/Myotomes intact    []Reflexes equal and normal bilaterally   [x]Other: Intact to light touch    Joint mobility:    []Normal    [x]Hypo   []Hyper    Palpation: Some joint line tenderness on the left. Quad atrophy present on the left vs right. Functional Mobility/Transfers: Difficulty with stairs (pt must ascend/descend stairs 1 at a time using HR), unable to walk more than 1 block, unable to stand more than 10-15 minutes, pt has to use her hands to get up from a chair. Unable to kneel or squat. Posture: Forward head, rounded shoulders. Bandages/Dressings/Incisions: Pt wears compression hose and wraps on both legs    Gait: (include devices/WB status) Antalgic gait, no AD. She demonstrates decreased pawel, decreased step length bilaterally, increased lateral trunk sway, no trunk rotation, and no arm swing on the left. She also has difficulty walking in a straight line. Orthopedic Special Tests: See above                       [x] Patient history, allergies, meds reviewed. Medical chart reviewed. See intake form.      Review Of Systems (ROS):  [x]Performed Review of systems (Integumentary, CardioPulmonary, Neurological) by intake and observation. Intake form has been scanned into medical record. Patient has been instructed to contact their primary care physician regarding ROS issues if not already being addressed at this time. Co-morbidities/Complexities (which will affect course of rehabilitation):   []None           Arthritic conditions   []Rheumatoid arthritis (M05.9)  [x]Osteoarthritis (M19.91)   Cardiovascular conditions   [x]Hypertension (I10)  []Hyperlipidemia (E78.5)  []Angina pectoris (I20)  []Atherosclerosis (I70)  []CVA Musculoskeletal conditions   []Disc pathology   []Congenital spine pathologies   []Prior surgical intervention  [x]Osteoporosis (M81.8)  []Osteopenia (M85.8)   Endocrine conditions   []Hypothyroid (E03.9)  []Hyperthyroid Gastrointestinal conditions   []Constipation (Z60.70)   Metabolic conditions   []Morbid obesity (E66.01)  []Diabetes type 1(E10.65) or 2 (E11.65)   []Neuropathy (G60.9)     Pulmonary conditions   []Asthma (J45)  []Coughing   []COPD (J44.9)   Psychological Disorders  []Anxiety (F41.9)  []Depression (F32.9)   []Other:   []Other:          Barriers to/and or personal factors that will affect rehab potential:              []Age  []Sex    []Smoker              [x]Motivation/Lack of Motivation - pt is motivated to get stronger and increase function                        [x]Co-Morbidities - HTN, OA, HA's/Migraines, Osteoporosis              []Cognitive Function, education/learning barriers              [x]Environmental, home barriers - pt lives in a 2-story but has a lift to the second floor.               []profession/work barriers  [x]past PT/medical experience - no prior PT for the left knee  []other:  Justification:     Falls Risk Assessment (30 days):   [x] Falls Risk assessed and no intervention required.   [] Falls Risk assessed and Patient requires intervention due to being higher risk   TUG score (>12s at risk):     [] Falls education provided, including         ASSESSMENT: Pt presents with decreased left LE strength, decreased left LE flexibility, and increased left knee pain limiting her ability to walk community distances, stand for more than 15 minutes, ascend/descend stairs, and get up from a chair. Pt would benefit from skilled PT services to address these deficits and increase function. Good rehab potential.            Patient Age: 66years old  Patient Height: 5' 2\"  Patient Weight: 280 lbs  Patient BMI: 51.2      Pain  [x]  Pain diagram was completed, pain was present and a follow up plan to address the pain is in the plan of care. ()   []  Pain diagram was completed and there was no pain present and therefore no follow up plan to address pain in the plan of care. ()   []  Pain diagram was not completed and the reason for not completing the pain diagram is in the medical chart ()  []  Patient refused to participate   []  Severe mental or physical capacity, patient is in urgent emergent situation and to complete the questionnaire would jeopardize the patient's health status        Functional Questionnaire  [x]  Patient completed the functional outcome questionnaire and deficiencies were addressed in the plan of care. ()   []  Patient completed the outcome questionnaire and there were no functional deficits identified and therefore no plan of care is required. ()   []  Patient did not complete the functional outcome questionnaire and the reason why is documented in the medical chart. ()  []  Patient refused to participate   []  Patient unable to complete the questionnaire   []   A functional outcome assessment has been reported on within the last 30 days        Falls  [x]  The patient has had no falls or 1 fall without injury in the past year.  (1101F)   []  There is no documentation of fall in the medical chart (1101F,8P)     [] The patient has had 2 or more falls or one fall with injury in the past year that is documented in the medical chart. (1100F)  []  A falls risk assessment was completed and documented in the medical chart. (4211S)   []  Falls were addressed in the plan of care. (3447F)   []  A falls risk assessment was not completed and documented in the medical chart (8652H,4B)   []   Falls were not addressed in the plan of care and reason was documented in the plan of care. (3716C 1P)        Medication     [x] A list of current medications (including prescription, over the counter, herbal supplements, vitamin supplements, mineral supplements, dietary supplements) was reviewed with the patient and documented in the medical chart. ()      Functional Impairments:     [x]Noted lumbar/proximal hip/LE hypomobility   [x]Decreased LE functional ROM   [x]Decreased core/proximal hip strength and neuromuscular control   [x]Decreased LE functional strength   [x]Reduced balance/proprioceptive control   []other:      Functional Activity Limitations (from functional questionnaire and intake)   [x]Reduced ability to tolerate prolonged functional positions   [x]Reduced ability or difficulty with changes of positions or transfers between positions   [x]Reduced ability to maintain good posture and demonstrate good body mechanics with sitting, bending, and lifting   [x]Reduced ability to sleep   [] Reduced ability or tolerance with driving and/or computer work   [x]Reduced ability to perform lifting, carrying tasks   [x]Reduced ability to squat   []Reduced ability to forward bend   [x]Reduced ability to ambulate prolonged functional periods/distances/surfaces   [x]Reduced ability to ascend/descend stairs   []Reduced ability to run, hop or jump - NA   []other:     Participation Restrictions   [x]Reduced participation in self care activities   [x]Reduced participation in home management activities   []Reduced participation in work activities   [x]Reduced participation in social activities. []Reduced participation in sport activities. Classification :    []Signs/symptoms consistent with post-surgical status including decreased ROM, strength and function. []Signs/symptoms consistent with joint sprain/strain   []Signs/symptoms consistent with patella-femoral syndrome   [x]Signs/symptoms consistent with knee OA/hip OA   []Signs/symptoms consistent with internal derangement of knee/Hip   []Signs/symptoms consistent with functional hip weakness/NMR control      []Signs/symptoms consistent with tendinitis/tendinosis    []signs/symptoms consistent with pathology which may benefit from Dry needling      []other:      Prognosis/Rehab Potential:      []Excellent   [x]Good    []Fair   []Poor    Tolerance of evaluation/treatment:    []Excellent   [x]Good    []Fair   []Poor    Physical Therapy Evaluation Complexity Justification  [x] A history of present problem with:  [] no personal factors and/or comorbidities that impact the plan of care;  [x]1-2 personal factors and/or comorbidities that impact the plan of care  []3 personal factors and/or comorbidities that impact the plan of care  [x] An examination of body systems using standardized tests and measures addressing any of the following: body structures and functions (impairments), activity limitations, and/or participation restrictions;:  [] a total of 1-2 or more elements   [] a total of 3 or more elements   [x] a total of 4 or more elements   [x] A clinical presentation with:  [x] stable and/or uncomplicated characteristics   [] evolving clinical presentation with changing characteristics  [] unstable and unpredictable characteristics;   [x] Clinical decision making of [x] low, [] moderate, [] high complexity using standardized patient assessment instrument and/or measurable assessment of functional outcome.     [x] EVAL (LOW) 91312 (typically 20 minutes face-to-face)  [] EVAL (MOD) 71166 (typically 30 minutes face-to-face)  [] EVAL (HIGH) 91042 (typically 45 minutes face-to-face)  [] RE-EVAL     PLAN:  Frequency/Duration:  2 days per week for 4-6 Weeks:  Interventions:  [x]  Therapeutic exercise including: strength training, ROM, for Lower extremity and core   [x]  NMR activation and proprioception for LE, Glutes and Core   [x]  Manual therapy as indicated for LE, Hip and spine to include: Dry Needling/IASTM, STM, PROM, Gr I-IV mobilizations, manipulation. [x] Modalities as needed that may include: thermal agents, E-stim, Biofeedback, US, iontophoresis as indicated  [x] Patient education on joint protection, postural re-education, activity modification, progression of HEP. HEP instruction: Instructed patient in a HEP through [address] [place] [address]. Patient demonstrated exercises correctly. Handout with pictures and # of reps/sets was given to pt and a copy was scanned into media. Exercises are listed on flowsheet. Patient Education:  Educated patient on Physical Therapy process. Also educated on diagnosis, related anatomy, pathology and prognosis. Reviewed patient goals/expectations and answered all questions. Patient displays understanding and in agreement with plan of care. Discussed importance of HEP and icing, activity modification, and role of PT      GOALS:  Patient stated goal:  \"Make my legs stronger; walk and climb stairs easier and faster. \"  [] Progressing: [] Met: [] Not Met: [] Adjusted    Therapist goals for Patient:   Short Term Goals: To be achieved in: 2 weeks  1. Independent in HEP and progression per patient tolerance, in order to prevent re-injury. [] Progressing: [] Met: [] Not Met: [] Adjusted  2. Patient will have a decrease in left knee pain to 1-2/10    to facilitate improvement in movement, function, and ADLs as indicated by Functional Deficits. [] Progressing: [] Met: [] Not Met: [] Adjusted    Long Term Goals: To be achieved in: 4-6 weeks  1.  Disability index score of 20% or less for the Knee Outcome Survey Activities of Daily Living Scale to assist with reaching prior level of function. [] Progressing: [] Met: [] Not Met: [] Adjusted  2. Patient will demonstrate an increase in left knee flexion AROM to at least 115 degrees to allow for proper joint functioning as indicated by patients Functional Deficits. [] Progressing: [] Met: [] Not Met: [] Adjusted  3. Patient will demonstrate an increase in left hip and knee strength to at least 4+/5 to allow for proper functional mobility as indicated by patients Functional Deficits. [] Progressing: [] Met: [] Not Met: [] Adjusted  4. Patient will be able to walk community distances, stand for at least 30 minutes, and be able to get up from a chair with little to no UE assistance needed for improved function. [] Progressing: [] Met: [] Not Met: [] Adjusted  5. Patient will be able to ascend/descend stairs reciprocally with HR (patient specific functional goal)    [] Progressing: [] Met: [] Not Met: [] Adjusted   6. Patient will have a decrease in left knee pain to 0-1/10 with daily act.     [] Progressing: [] Met: [] Not Met: [] Adjusted      Electronically signed by:  Mary Carter PT     Physical Therapist Adore Rangel 421 #[id number]  Physical Therapist [place] License #[id number]

## 2021-04-20 ENCOUNTER — TREATMENT (OUTPATIENT)
Dept: PHYSICAL THERAPY | Age: 71
End: 2021-04-20
Payer: MEDICARE

## 2021-04-20 DIAGNOSIS — M17.12 PRIMARY OSTEOARTHRITIS OF LEFT KNEE: ICD-10-CM

## 2021-04-20 DIAGNOSIS — M25.662 DECREASED ROM OF LEFT KNEE: ICD-10-CM

## 2021-04-20 DIAGNOSIS — M25.562 LEFT KNEE PAIN, UNSPECIFIED CHRONICITY: Primary | ICD-10-CM

## 2021-04-20 DIAGNOSIS — R29.898 WEAKNESS OF LEFT LOWER EXTREMITY: ICD-10-CM

## 2021-04-20 PROCEDURE — G8427 DOCREV CUR MEDS BY ELIG CLIN: HCPCS | Performed by: PHYSICAL THERAPIST

## 2021-04-20 PROCEDURE — 97530 THERAPEUTIC ACTIVITIES: CPT | Performed by: PHYSICAL THERAPIST

## 2021-04-20 PROCEDURE — 97112 NEUROMUSCULAR REEDUCATION: CPT | Performed by: PHYSICAL THERAPIST

## 2021-04-20 PROCEDURE — 97110 THERAPEUTIC EXERCISES: CPT | Performed by: PHYSICAL THERAPIST

## 2021-04-20 NOTE — PROGRESS NOTES
[facility]   Phone: [phone]    Fax: [phone]      Physical Therapy Treatment Note/ Progress Report:           Date:  2021    Patient Name:  Lizzeth Brand    :  1950  MRN: <[id number]>  Restrictions/Precautions:    Medical/Treatment Diagnosis Information:  Diagnosis: Left Knee Pain (M25.562), Primary OA of Left Knee (M17.12)   Treatment Diagnosis: Decreased Left Knee ROM (M25.662), Decreased Strength (R29.898)        Insurance/Certification information:  PT Insurance Information: Medicare - Visits based on medical necessity  Physician Information:  Referring Practitioner: Dr. Deon Rome  Has the plan of care been signed (Y/N):        []  Yes  [x]  No (POC sent 4/15/21)     Date of Patient follow up with Physician: As needed      Is this a Progress Report:     []  Yes  [x]  No        If Yes:  Date Range for reporting period:  Beginning 4/15/21  Ending 4/15/21    Progress report will be due (10 Rx or 30 days whichever is less):        Recertification will be due (POC Duration  / 90 days whichever is less):  21        Visit # Insurance Allowable Auth Required   2 Medical necessity  (pt has already used 13 visits this year for her shoulder) []  Yes [x]  No        Functional Scale:    Date assessed:  4/15/21  Functional Assessment Tool Used: Knee Outcome Survey Activities of Daily Living Scale (copy scanned into media)  Score:  36/70 = 51.4% (48.6% functional deficit)    (4/15/21)  Patient Age: 66years old  Patient Height: 5' 2\"  Patient Weight: 280 lbs  Patient BMI: 51.2      Pain (4/15/21)  [x]  Pain diagram was completed, pain was present and a follow up plan to address the pain is in the plan of care. ()   []  Pain diagram was completed and there was no pain present and therefore no follow up plan to address pain in the plan of care.  ()   []  Pain diagram was not completed and the reason for not completing the pain diagram is in the medical chart ()  []  Patient refused to participate   []  Severe mental or physical capacity, patient is in urgent emergent situation and to complete the questionnaire would jeopardize the patient's health status        Functional Questionnaire (4/15/21)  [x]  Patient completed the functional outcome questionnaire and deficiencies were addressed in the plan of care. ()   []  Patient completed the outcome questionnaire and there were no functional deficits identified and therefore no plan of care is required. ()   []  Patient did not complete the functional outcome questionnaire and the reason why is documented in the medical chart. ()  []  Patient refused to participate   []  Patient unable to complete the questionnaire   []   A functional outcome assessment has been reported on within the last 30 days        Falls (4/15/21)  [x]  The patient has had no falls or 1 fall without injury in the past year. (1101F)   []  There is no documentation of fall in the medical chart (1101F,8P)     [] The patient has had 2 or more falls or one fall with injury in the past year that is documented in the medical chart. (1100F)  []  A falls risk assessment was completed and documented in the medical chart. (3298K)   []  Falls were addressed in the plan of care. (5888Y)   []  A falls risk assessment was not completed and documented in the medical chart (1316D,8D)   []   Falls were not addressed in the plan of care and reason was documented in the plan of care. (6718Z 1P)        Medication (4/15/21)     [x] A list of current medications (including prescription, over the counter, herbal supplements, vitamin supplements, mineral supplements, dietary supplements) was reviewed with the patient and documented in the medical chart. ()        Falls Risk Assessment (30 days): (4/15/21)  [x] Falls Risk assessed and no intervention required.   [] Falls Risk assessed and Patient requires intervention due to being higher stairs 1 at a time using HR), unable to walk more than 1 block, unable to stand more than 10-15 minutes, pt has to use her hands to get up from a chair. Unable to kneel or squat. (4/15/21)    Posture: Forward head, rounded shoulders. (4/15/21)    Bandages/Dressings/Incisions: Pt wears compression hose and wraps on both legs (4/15/21)     Gait: (include devices/WB status) Antalgic gait, no AD. She demonstrates decreased pawel, decreased step length bilaterally, increased lateral trunk sway, no trunk rotation, and no arm swing on the left. She also has difficulty walking in a straight line. (4/15/21)    Orthopedic Special Tests: See above.       RESTRICTIONS/PRECAUTIONS:     Exercises/Interventions:     Therapeutic Ex (16448) Sets/sec Reps Notes/CUES   BIKE      TREADMILL            STRETCHING      Towel Pull Calf 30\" hold X 3    Inclined Calf      Hamstrings 30\" hold X 3 Seated   Quads      Hip Flexors      ITB      Adductors            ROM      Passive      Active      Weight Hangs      Sheet Pulls 10\" hold X 5    Ankle Pumps      ERMI            STRENGTHENING      Quad Sets 10\" hold X 10    Ham Sets      Hip ADD Sets BS 10\" hold X 10     SLR Flexion 3 sets X 10    SLR Abduction      SLR Prone      SLR Adduction      SLR+      Supine Hip Abduction 3 sets X 10  Green band         Standing Marches 3 sets X 10 Standing at half wall         PRE Machines      Knee Extension      Knee Flexion      Leg Press            CKC      Calf Raises 3 sets X 10 Seated   Mini Squats      Wall Sits      Step ups      TKE                  Manual Intervention (56514)      Patellar Mobs      Femoral Tibial mobs      STM      Scar Massage      Foam Roll            NMR re-education (33290)   CUES NEEDED   Biodex Balance      Tandem Balance 15\" hold X 2 each *Feet staggered  *Standing next to half wall   SLB      Rebounder      BOSU                              Therapeutic Activity (53007)      Core Training      [employer] Activities SybertExercises   Long Sitting Calf Stretch with Strap - 1 x daily - 7 x weekly - 1 sets - 3 reps - 30 seconds hold   Seated Table Hamstring Stretch - 1 x daily - 7 x weekly - 1 sets - 3 reps - 30 seconds hold   Long Sitting Quad Set - 1 x daily - 7 x weekly - 1 sets - 10 reps - 10 seconds hold   Long Sitting Hip Adduction Isometric with Ball - 1 x daily - 7 x weekly - 1 sets - 10 reps - 10 seconds hold   Supine Active Straight Leg Raise - 1 x daily - 7 x weekly - 2-3 sets - 10 reps   Hooklying Isometric Clamshell - 1 x daily - 7 x weekly - 3 sets - 10 reps   Standing March with Counter Support - 1 x daily - 7 x weekly - 3 sets - 10 reps   Seated Heel Raise - 1 x daily - 7 x weekly - 3 sets - 10 reps            Manual Treatments:  PROM / STM / Oscillations-Mobs:  G-I, II, III, IV (PA's, Inf., Post.)  [] (91837) Provided manual therapy to mobilize LE, proximal hip and/or LS spine soft tissue/joints for the purpose of modulating pain, promoting relaxation, increasing ROM, reducing/eliminating soft tissue swelling/inflammation/restriction, improving soft tissue extensibility and allowing for proper ROM for normal function with self-care, mobility, lifting and ambulation. Modalities:  Pt declined ice   [] GAME READY (VASO)- for significant edema, swelling, pain control. Charges:  Timed Code Treatment Minutes: 62'   Total Treatment Minutes: 62'      [] EVAL (LOW) 35512 (typically 20 minutes face-to-face)  [] EVAL (MOD) 39613 (typically 30 minutes face-to-face)  [] EVAL (HIGH) 88144 (typically 45 minutes face-to-face)  [] RE-EVAL     [x] VG(30815) x 2 (30')    [] IONTO  [x] NMR (91757) x  1 (15')   [] VASO  [] Manual (90848) x      [] Other:  [x] TA x 1 (12')      [] [facility] (37530)  [] ES(attended) (55647)      [] ES (un) (64330):         ASSESSMENT:  Endurance is very limited with several rest breaks needed during today's session.   She could not perform calf raises in standing due to foot issues but she was able to perform them seated. Standing marches were challenging with pt needing to use the half wall to help with balance. Tandem stance was also very challenging, especially with right leg in back, with frequent UE touches needed. Knee flexion ROM improved to 110 degrees with SP today. Reviewed/ updated HEP with pt and continued to stress compliance. GOALS: (Goals made 4/15/21)  Patient stated goal:  \"Make my legs stronger; walk and climb stairs easier and faster. \"  [] Progressing: [] Met: [] Not Met: [] Adjusted    Therapist goals for Patient:   Short Term Goals: To be achieved in: 2 weeks  1. Independent in HEP and progression per patient tolerance, in order to prevent re-injury. [] Progressing: [] Met: [] Not Met: [] Adjusted  2. Patient will have a decrease in left knee pain to 1-2/10 to facilitate improvement in movement, function, and ADLs as indicated by Functional Deficits. [] Progressing: [] Met: [] Not Met: [] Adjusted    Long Term Goals: To be achieved in: 4-6 weeks  1. Disability index score of 20% or less for the Knee Outcome Survey Activities of Daily Living Scale to assist with reaching prior level of function. [] Progressing: [] Met: [] Not Met: [] Adjusted  2. Patient will demonstrate an increase in left knee flexion AROM to at least 115 degrees to allow for proper joint functioning as indicated by patients Functional Deficits. [] Progressing: [] Met: [] Not Met: [] Adjusted  3. Patient will demonstrate an increase in left hip and knee strength to at least 4+/5 to allow for proper functional mobility as indicated by patients Functional Deficits. [] Progressing: [] Met: [] Not Met: [] Adjusted  4. Patient will be able to walk community distances, stand for at least 30 minutes, and be able to get up from a chair with little to no UE assistance needed for improved function. [] Progressing: [] Met: [] Not Met: [] Adjusted  5.  Patient will be able to ascend/descend stairs reciprocally with HR (patient specific functional goal)    [] Progressing: [] Met: [] Not Met: [] Adjusted   6. Patient will have a decrease in left knee pain to 0-1/10 with daily act. [] Progressing: [] Met: [] Not Met: [] Adjusted          Overall Progression Towards Functional goals/ Treatment Progress Update:  [] Patient is progressing as expected towards functional goals listed. [] Progression is slowed due to complexities/Impairments listed. [] Progression has been slowed due to co-morbidities. [x] Plan just implemented, too soon to assess goals progression <30days   [] Goals require adjustment due to lack of progress  [] Patient is not progressing as expected and requires additional follow up with physician  [] Other    Prognosis for POC: [x] Good [] Fair  [] Poor      Patient requires continued skilled intervention: [x] Yes  [] No    Treatment/Activity Tolerance:  [] Patient able to complete treatment  [x] Patient limited by fatigue  [] Patient limited by pain    [] Patient limited by other medical complications  [] Other:         PLAN: Continue therapy to improve ROM, strength, and balance. 2x/week for 4-6 weeks for ROM, strengthening, balance act, HEP instruction, pt education, manual therapy prn, and modalities prn (4/15/21)  [x] Continue per plan of care [] Alter current plan   [] Plan of care initiated [] Hold pending MD visit [] Discharge      Electronically signed by:  Maggy Sanchez PT     Physical Therapist Adore Rangel [address] #[id number]  Physical Therapist [place] License #[id number]    Note: If patient does not return for scheduled/ recommended follow up visits, this note will serve as a discharge from care along with most recent update on progress.

## 2021-04-22 ENCOUNTER — TREATMENT (OUTPATIENT)
Dept: PHYSICAL THERAPY | Age: 71
End: 2021-04-22
Payer: MEDICARE

## 2021-04-22 DIAGNOSIS — M25.562 LEFT KNEE PAIN, UNSPECIFIED CHRONICITY: Primary | ICD-10-CM

## 2021-04-22 DIAGNOSIS — M17.12 PRIMARY OSTEOARTHRITIS OF LEFT KNEE: ICD-10-CM

## 2021-04-22 DIAGNOSIS — M25.662 DECREASED ROM OF LEFT KNEE: ICD-10-CM

## 2021-04-22 DIAGNOSIS — R29.898 WEAKNESS OF LEFT LOWER EXTREMITY: ICD-10-CM

## 2021-04-22 PROCEDURE — 97530 THERAPEUTIC ACTIVITIES: CPT | Performed by: PHYSICAL THERAPIST

## 2021-04-22 PROCEDURE — G8427 DOCREV CUR MEDS BY ELIG CLIN: HCPCS | Performed by: PHYSICAL THERAPIST

## 2021-04-22 PROCEDURE — 97112 NEUROMUSCULAR REEDUCATION: CPT | Performed by: PHYSICAL THERAPIST

## 2021-04-22 PROCEDURE — 97110 THERAPEUTIC EXERCISES: CPT | Performed by: PHYSICAL THERAPIST

## 2021-04-22 NOTE — PROGRESS NOTES
[facility]   Phone: [phone]    Fax: [phone]      Physical Therapy Treatment Note/ Progress Report:           Date:  2021    Patient Name:  Mercedes Haq    :  1950  MRN: [id number]  Restrictions/Precautions:    Medical/Treatment Diagnosis Information:  Diagnosis: Left Knee Pain (M25.562), Primary OA of Left Knee (M17.12)   Treatment Diagnosis: Decreased Left Knee ROM (M25.662), Decreased Strength (R29.898)        Insurance/Certification information:  PT Insurance Information: Medicare - Visits based on medical necessity  Physician Information:  Referring Practitioner: Dr. Sheeba Porras  Has the plan of care been signed (Y/N):        [x]  Yes (POC signed 21)  []  No      Date of Patient follow up with Physician: As needed      Is this a Progress Report:     []  Yes  [x]  No        If Yes:  Date Range for reporting period:  Beginning 4/15/21  Ending 4/15/21    Progress report will be due (10 Rx or 30 days whichever is less):  2/10/95      Recertification will be due (POC Duration  / 90 days whichever is less):  21        Visit # Insurance Allowable Auth Required   3 Medical necessity  (pt has already used 13 visits this year for her shoulder) []  Yes [x]  No        Functional Scale:    Date assessed:  4/15/21  Functional Assessment Tool Used: Knee Outcome Survey Activities of Daily Living Scale (copy scanned into media)  Score:  36/70 = 51.4% (48.6% functional deficit)    (4/15/21)  Patient Age: 66years old  Patient Height: 5' 2\"  Patient Weight: 280 lbs  Patient BMI: 51.2      Pain (4/15/21)  [x]  Pain diagram was completed, pain was present and a follow up plan to address the pain is in the plan of care. ()   []  Pain diagram was completed and there was no pain present and therefore no follow up plan to address pain in the plan of care.  ()   []  Pain diagram was not completed and the reason for not completing the pain diagram is in the medical chart ()  []  Patient refused to participate   []  Severe mental or physical capacity, patient is in urgent emergent situation and to complete the questionnaire would jeopardize the patient's health status        Functional Questionnaire (4/15/21)  [x]  Patient completed the functional outcome questionnaire and deficiencies were addressed in the plan of care. ()   []  Patient completed the outcome questionnaire and there were no functional deficits identified and therefore no plan of care is required. ()   []  Patient did not complete the functional outcome questionnaire and the reason why is documented in the medical chart. ()  []  Patient refused to participate   []  Patient unable to complete the questionnaire   []   A functional outcome assessment has been reported on within the last 30 days        Falls (4/15/21)  [x]  The patient has had no falls or 1 fall without injury in the past year. (1101F)   []  There is no documentation of fall in the medical chart (1101F,8P)     [] The patient has had 2 or more falls or one fall with injury in the past year that is documented in the medical chart. (1100F)  []  A falls risk assessment was completed and documented in the medical chart. (5119O)   []  Falls were addressed in the plan of care. (5075X)   []  A falls risk assessment was not completed and documented in the medical chart (9615W,6Q)   []   Falls were not addressed in the plan of care and reason was documented in the plan of care. (9078U 1P)        Medication (4/15/21)     [x] A list of current medications (including prescription, over the counter, herbal supplements, vitamin supplements, mineral supplements, dietary supplements) was reviewed with the patient and documented in the medical chart. ()        Falls Risk Assessment (30 days): (4/15/21)  [x] Falls Risk assessed and no intervention required.   [] Falls Risk assessed and Patient requires intervention due to being higher risk   TUG score (>12s at risk):     [] Falls education provided, including     PQRS:   Reviewed medication list with patient. No changes reported by pt since last PT visit.  (4/22/21)          Latex Allergy:  [x]NO      []YES  Preferred Language for Healthcare:   [x]English       []other:      Pain level:  0/10 (4/22/21)   Medication:  Celebrex      SUBJECTIVE:  Pt states she was very tired after her last visit and went home and took a nap. She has not had any pain in her left knee since last visit. She remains compliant with HEP. She also states she has not had any cramping in her left leg the last few days and she has been drinking more water. Pt states one of her goals is to be able to ascend/descend her basement stairs. OBJECTIVE:       (4/15/21)  Flexibility L R Comment   Hamstring Mod tightness Mod tightness    Gastroc Mod tightness Mod tightness    ITB      Quad              (4/22/21)  ROM PROM AROM Overpressure Comment    L R L R L R    Flexion 115 with SP  100 100      Extension   0 0                            (4/15/21)  Strength L R Comment   Quad 4/5 4/5    Hamstring 4/5 4/5    Gastroc 4/5 4/5    Hip Flex 3+/5 3+/5    Hip Abd 3+/5 3+/5    Hip Add 4-/5 4-/5    Glut Med              (4/15/21)  Girth L R   Mid Patella 55.0 cm 58.5 cm   Suprapatellar     5cm above     15cm above       (4/15/21)  Special Test Results/Comment   Meniscal Click    Crepitus (+) PF crepitus bilaterally   Flexion Test    Valgus Laxity    Varus Laxity    Lachmans    Drop Back        Reflexes/Sensation: (4/15/21)   []Dermatomes/Myotomes intact    []Reflexes equal and normal bilaterally   [x]Other: Intact to light touch    Joint mobility: (4/15/21)   []Normal    [x]Hypo   []Hyper    Palpation: Some joint line tenderness on the left.   Quad atrophy present on the left vs right.(4/15/21)    Functional Mobility/Transfers: Difficulty with stairs (pt must ascend/descend stairs 1 at a time using HR), unable to walk more than 1 block, unable to stand more than 10-15 minutes, pt has to use her hands to get up from a chair. Unable to kneel or squat. (4/15/21)    Posture: Forward head, rounded shoulders. (4/15/21)    Bandages/Dressings/Incisions: Pt wears compression hose and wraps on both legs (4/15/21)     Gait: (include devices/WB status) Antalgic gait, no AD. She demonstrates decreased pawel, decreased step length bilaterally, increased lateral trunk sway, no trunk rotation, and no arm swing on the left. She also has difficulty walking in a straight line. (4/15/21)    Orthopedic Special Tests: See above.       RESTRICTIONS/PRECAUTIONS:     Exercises/Interventions:     Therapeutic Ex (34684) Sets/sec Reps Notes/CUES   BIKE      TREADMILL            STRETCHING      Towel Pull Calf 30\" hold X 3    Inclined Calf      Hamstrings 30\" hold X 3 Seated   Quads      Hip Flexors      ITB      Adductors            ROM      Passive      Active      Weight Hangs      Sheet Pulls 10\" hold X 5    Ankle Pumps      ERMI            STRENGTHENING      Quad Sets 10\" hold X 10    Ham Sets      Hip ADD Sets BS 10\" hold X 10     SLR Flexion 3 sets X 10    SLR Abduction      SLR Prone      SLR Adduction      SLR+      Supine Hip Abduction 3 sets X 10  Blue band         Standing Marches 3 sets X 10 Standing at half wall         PRE Machines      Knee Extension      Knee Flexion      Leg Press            CKC      Calf Raises 3 sets X 10 Seated   Mini Squats      Wall Sits      Step ups - no riser 2 sets X 10 Using half wall   TKE 2 sets X 10 with blue band                Manual Intervention (55923)      Patellar Mobs      Femoral Tibial mobs      STM      Scar Massage      Foam Roll            NMR re-education (28727)   CUES NEEDED   Biodex Balance      Tandem Balance 15\" hold X 2 each *Feet staggered  *Standing next to half wall   SLB      Rebounder      BOSU                              Therapeutic Activity (30923)      Core Training      [place] [place]con[place] Activities      [facility] Walks      TRX training                  Patient Education: Dx, prognosis, pt goals/expectations, role of therapy (4/15/21)  X 8'                  Therapeutic Exercise and NMR EXR  [x] (01451) Provided verbal/tactile cueing for activities related to strengthening, flexibility, endurance, ROM for improvements in LE, proximal hip, and core control with self care, mobility, lifting, ambulation.  [] (15545) Provided verbal/tactile cueing for activities related to improving balance, coordination, kinesthetic sense, posture, motor skill, proprioception to assist with LE, proximal hip, and core control in self-care, mobility, lifting, ambulation and eccentric single leg control. NMR and Therapeutic Activities:    [x] (80096 or 27102) Provided verbal/tactile cueing for activities related to improving balance, coordination, kinesthetic sense, posture, motor skill, proprioception and motor activation to allow for proper function of core, proximal hip and LE with self-care and ADLs and functional mobility.   [] (20377) Gait Re-education- Provided training and instruction to the patient for proper LE, core and proximal hip recruitment and positioning and eccentric body weight control with ambulation re-education including up and down stairs     Home Exercise Program:    [x] (53902) Reviewed/Progressed HEP activities related to strengthening, flexibility, endurance, ROM of core, proximal hip and LE for functional self-care, mobility, lifting and ambulation/stair navigation - Updated HEP through [employer] (see below). Pt was also issued new written handouts. (4/20/21)  [] (04951) Reviewed/Progressed HEP activities related to improving balance, coordination, kinesthetic sense, posture, motor skill, proprioception of core, proximal hip and LE for self-care, mobility, lifting, and ambulation/stair navigation         Access Code: [email or web address]  URL: [email or web address]/Date: 04/20/2021Prepared by: Added step ups which were challenging with pt needing to use the half wall for balance/support. Pt was very hesitant with step ups at first and lacked confidence in her left knee but her confidence improved as she went along. Knee flexion ROM increased to 115 degrees with SP today. Pt needs to continue increasing strength ans endurance for improved function. GOALS: (Goals made 4/15/21)  Patient stated goal:  \"Make my legs stronger; walk and climb stairs easier and faster. \"  [] Progressing: [] Met: [] Not Met: [] Adjusted    Therapist goals for Patient:   Short Term Goals: To be achieved in: 2 weeks  1. Independent in HEP and progression per patient tolerance, in order to prevent re-injury. [] Progressing: [] Met: [] Not Met: [] Adjusted  2. Patient will have a decrease in left knee pain to 1-2/10 to facilitate improvement in movement, function, and ADLs as indicated by Functional Deficits. [] Progressing: [] Met: [] Not Met: [] Adjusted    Long Term Goals: To be achieved in: 4-6 weeks  1. Disability index score of 20% or less for the Knee Outcome Survey Activities of Daily Living Scale to assist with reaching prior level of function. [] Progressing: [] Met: [] Not Met: [] Adjusted  2. Patient will demonstrate an increase in left knee flexion AROM to at least 115 degrees to allow for proper joint functioning as indicated by patients Functional Deficits. [] Progressing: [] Met: [] Not Met: [] Adjusted  3. Patient will demonstrate an increase in left hip and knee strength to at least 4+/5 to allow for proper functional mobility as indicated by patients Functional Deficits. [] Progressing: [] Met: [] Not Met: [] Adjusted  4. Patient will be able to walk community distances, stand for at least 30 minutes, and be able to get up from a chair with little to no UE assistance needed for improved function. [] Progressing: [] Met: [] Not Met: [] Adjusted  5.  Patient will be able to ascend/descend stairs reciprocally with HR (patient specific functional goal)    [] Progressing: [] Met: [] Not Met: [] Adjusted   6. Patient will have a decrease in left knee pain to 0-1/10 with daily act. [] Progressing: [] Met: [] Not Met: [] Adjusted          Overall Progression Towards Functional goals/ Treatment Progress Update:  [] Patient is progressing as expected towards functional goals listed. [] Progression is slowed due to complexities/Impairments listed. [] Progression has been slowed due to co-morbidities. [x] Plan just implemented, too soon to assess goals progression <30days   [] Goals require adjustment due to lack of progress  [] Patient is not progressing as expected and requires additional follow up with physician  [] Other    Prognosis for POC: [x] Good [] Fair  [] Poor      Patient requires continued skilled intervention: [x] Yes  [] No    Treatment/Activity Tolerance:  [] Patient able to complete treatment  [x] Patient limited by fatigue  [] Patient limited by pain    [] Patient limited by other medical complications  [] Other:         PLAN: Continue therapy to improve ROM, strength, and balance. 2x/week for 4-6 weeks for ROM, strengthening, balance act, HEP instruction, pt education, manual therapy prn, and modalities prn (4/15/21)  [x] Continue per plan of care [] Alter current plan   [] Plan of care initiated [] Hold pending MD visit [] Discharge      Electronically signed by:  Marvin Cotto, PT     Physical Therapist Adore Rangel [address] #[id number]  Physical Therapist [place] License #[id number]    Note: If patient does not return for scheduled/ recommended follow up visits, this note will serve as a discharge from care along with most recent update on progress.

## 2021-04-27 ENCOUNTER — TREATMENT (OUTPATIENT)
Dept: PHYSICAL THERAPY | Age: 71
End: 2021-04-27
Payer: MEDICARE

## 2021-04-27 DIAGNOSIS — M25.662 DECREASED ROM OF LEFT KNEE: ICD-10-CM

## 2021-04-27 DIAGNOSIS — M17.12 PRIMARY OSTEOARTHRITIS OF LEFT KNEE: ICD-10-CM

## 2021-04-27 DIAGNOSIS — M25.562 LEFT KNEE PAIN, UNSPECIFIED CHRONICITY: Primary | ICD-10-CM

## 2021-04-27 DIAGNOSIS — R29.898 WEAKNESS OF LEFT LOWER EXTREMITY: ICD-10-CM

## 2021-04-27 PROCEDURE — 97530 THERAPEUTIC ACTIVITIES: CPT | Performed by: PHYSICAL THERAPIST

## 2021-04-27 PROCEDURE — G8427 DOCREV CUR MEDS BY ELIG CLIN: HCPCS | Performed by: PHYSICAL THERAPIST

## 2021-04-27 PROCEDURE — 97112 NEUROMUSCULAR REEDUCATION: CPT | Performed by: PHYSICAL THERAPIST

## 2021-04-27 PROCEDURE — 97110 THERAPEUTIC EXERCISES: CPT | Performed by: PHYSICAL THERAPIST

## 2021-04-27 NOTE — PROGRESS NOTES
[facility]   Phone: [phone]    Fax: [phone]      Physical Therapy Treatment Note/ Progress Report:           Date:  2021    Patient Name:  Silvio Banks    :  1950  MRN: [id number]  Restrictions/Precautions:    Medical/Treatment Diagnosis Information:  Diagnosis: Left Knee Pain (M25.562), Primary OA of Left Knee (M17.12)   Treatment Diagnosis: Decreased Left Knee ROM (M25.662), Decreased Strength (R29.898)        Insurance/Certification information:  PT Insurance Information: Medicare - Visits based on medical necessity  Physician Information:  Referring Practitioner: Dr. Kortney Chen  Has the plan of care been signed (Y/N):        [x]  Yes (POC signed 21)  []  No      Date of Patient follow up with Physician: As needed      Is this a Progress Report:     []  Yes  [x]  No        If Yes:  Date Range for reporting period:  Beginning 4/15/21  Ending 4/15/21    Progress report will be due (10 Rx or 30 days whichever is less):        Recertification will be due (POC Duration  / 90 days whichever is less):  21        Visit # Insurance Allowable Auth Required   4 Medical necessity  (pt has already used 13 visits this year for her shoulder) []  Yes [x]  No        Functional Scale:    Date assessed:  4/15/21  Functional Assessment Tool Used: Knee Outcome Survey Activities of Daily Living Scale (copy scanned into media)  Score:  36/70 = 51.4% (48.6% functional deficit)    (4/15/21)  Patient Age: 66years old  Patient Height: 5' 2\"  Patient Weight: 280 lbs  Patient BMI: 51.2      Pain (4/15/21)  [x]  Pain diagram was completed, pain was present and a follow up plan to address the pain is in the plan of care. ()   []  Pain diagram was completed and there was no pain present and therefore no follow up plan to address pain in the plan of care.  ()   []  Pain diagram was not completed and the reason for not completing the pain diagram is in the medical chart ()  []  Patient refused to participate   []  Severe mental or physical capacity, patient is in urgent emergent situation and to complete the questionnaire would jeopardize the patient's health status        Functional Questionnaire (4/15/21)  [x]  Patient completed the functional outcome questionnaire and deficiencies were addressed in the plan of care. ()   []  Patient completed the outcome questionnaire and there were no functional deficits identified and therefore no plan of care is required. ()   []  Patient did not complete the functional outcome questionnaire and the reason why is documented in the medical chart. ()  []  Patient refused to participate   []  Patient unable to complete the questionnaire   []   A functional outcome assessment has been reported on within the last 30 days        Falls (4/15/21)  [x]  The patient has had no falls or 1 fall without injury in the past year. (1101F)   []  There is no documentation of fall in the medical chart (1101F,8P)     [] The patient has had 2 or more falls or one fall with injury in the past year that is documented in the medical chart. (1100F)  []  A falls risk assessment was completed and documented in the medical chart. (2872F)   []  Falls were addressed in the plan of care. (0465O)   []  A falls risk assessment was not completed and documented in the medical chart (1198F,0T)   []   Falls were not addressed in the plan of care and reason was documented in the plan of care. (5388Q 1P)        Medication (4/15/21)     [x] A list of current medications (including prescription, over the counter, herbal supplements, vitamin supplements, mineral supplements, dietary supplements) was reviewed with the patient and documented in the medical chart. ()        Falls Risk Assessment (30 days): (4/15/21)  [x] Falls Risk assessed and no intervention required.   [] Falls Risk assessed and Patient requires intervention due to being higher risk   TUG score (>12s at risk):     [] Falls education provided, including     PQRS:   Reviewed medication list with patient. No changes reported by pt since last PT visit. (4/27/21)          Latex Allergy:  [x]NO      []YES  Preferred Language for Healthcare:   [x]English       []other:      Pain level:  0-2/10 (4/27/21)   Medication:  Celebrex      SUBJECTIVE:  Pt states her knees are doing better. The only time she really has pain is with getting up from a chair after prolonged sitting, rated 2/10. Her knees also get really stiff with prolonged sitting. Pain and stiffness go away as soon as she gets up and starts walking. Both knees still grind. She reports compliance with HEP. OBJECTIVE:       (4/15/21)  Flexibility L R Comment   Hamstring Mod tightness Mod tightness    Gastroc Mod tightness Mod tightness    ITB      Quad              (4/27/21)  ROM PROM AROM Overpressure Comment    L R L R L R    Flexion 118 with SP  100 100      Extension   0 0                            (4/15/21)  Strength L R Comment   Quad 4/5 4/5    Hamstring 4/5 4/5    Gastroc 4/5 4/5    Hip Flex 3+/5 3+/5    Hip Abd 3+/5 3+/5    Hip Add 4-/5 4-/5    Glut Med              (4/15/21)  Girth L R   Mid Patella 55.0 cm 58.5 cm   Suprapatellar     5cm above     15cm above       (4/15/21)  Special Test Results/Comment   Meniscal Click    Crepitus (+) PF crepitus bilaterally   Flexion Test    Valgus Laxity    Varus Laxity    Lachmans    Drop Back        Reflexes/Sensation: (4/15/21)   []Dermatomes/Myotomes intact    []Reflexes equal and normal bilaterally   [x]Other: Intact to light touch    Joint mobility: (4/15/21)   []Normal    [x]Hypo   []Hyper    Palpation: Some joint line tenderness on the left.   Quad atrophy present on the left vs right.(4/15/21)    Functional Mobility/Transfers: Difficulty with stairs (pt must ascend/descend stairs 1 at a time using HR), unable to walk more than 1 block, unable to stand more than 10-15 minutes, pt has to use her hands to get up from a chair. Unable to kneel or squat. (4/15/21)    Posture: Forward head, rounded shoulders. (4/15/21)    Bandages/Dressings/Incisions: Pt wears compression hose and wraps on both legs (4/15/21)     Gait: (include devices/WB status) Antalgic gait, no AD. She demonstrates decreased pawel, decreased step length bilaterally, increased lateral trunk sway, no trunk rotation, and no arm swing on the left. She also has difficulty walking in a straight line. (4/15/21)    Orthopedic Special Tests: See above.       RESTRICTIONS/PRECAUTIONS:     Exercises/Interventions:     Therapeutic Ex (90615) Sets/sec Reps Notes/CUES   BIKE      TREADMILL            STRETCHING      Towel Pull Calf 30\" hold X 3    Inclined Calf      Hamstrings 30\" hold X 3 Seated   Quads      Hip Flexors      ITB      Adductors            ROM      Passive      Active      Weight Hangs      Sheet Pulls 10\" hold X 5    Ankle Pumps      ERMI            STRENGTHENING      Quad Sets 10\" hold X 10    Ham Sets      Hip ADD Sets BS 10\" hold X 10     SLR Flexion 3 sets X 10    SLR Abduction      SLR Prone      SLR Adduction      SLR+      Supine Hip Abduction 3 sets X 10  Blue band         Standing Marches 3 sets X 10 Standing at half wall         PRE Machines      Knee Extension      Knee Flexion      Leg Press            CKC      Calf Raises 3 sets X 10 Seated   Mini Squats      Wall Sits      Step ups - no riser 3 sets X 10 Using half wall   TKE 2 sets X 10 with blue band                Manual Intervention (00967)      Patellar Mobs      Femoral Tibial mobs      STM      Scar Massage      Foam Roll            NMR re-education (10639)   CUES NEEDED   Biodex Balance      Tandem Balance 20\" hold X 2 each *Feet staggered  *Standing next to half wall   SLB      Rebounder      BOSU            Sit -> Stand  X 15 With cushion on chair  No arms               Therapeutic Activity (47977)      Core Training      [employer] [place] [employer] Activities      [facility]      TRX training                  Patient Education: Dx, prognosis, pt goals/expectations, role of therapy (4/15/21)  X 8'                  Therapeutic Exercise and NMR EXR  [x] (13221) Provided verbal/tactile cueing for activities related to strengthening, flexibility, endurance, ROM for improvements in LE, proximal hip, and core control with self care, mobility, lifting, ambulation.  [] (34723) Provided verbal/tactile cueing for activities related to improving balance, coordination, kinesthetic sense, posture, motor skill, proprioception to assist with LE, proximal hip, and core control in self-care, mobility, lifting, ambulation and eccentric single leg control. NMR and Therapeutic Activities:    [x] (98458 or 74835) Provided verbal/tactile cueing for activities related to improving balance, coordination, kinesthetic sense, posture, motor skill, proprioception and motor activation to allow for proper function of core, proximal hip and LE with self-care and ADLs and functional mobility.   [] (30123) Gait Re-education- Provided training and instruction to the patient for proper LE, core and proximal hip recruitment and positioning and eccentric body weight control with ambulation re-education including up and down stairs     Home Exercise Program:    [x] (31202) Reviewed/Progressed HEP activities related to strengthening, flexibility, endurance, ROM of core, proximal hip and LE for functional self-care, mobility, lifting and ambulation/stair navigation - Updated HEP through [address] [place] [address] (see below). Pt was also issued new written handouts. (4/27/21)  [] (54250) Reviewed/Progressed HEP activities related to improving balance, coordination, kinesthetic sense, posture, motor skill, proprioception of core, proximal hip and LE for self-care, mobility, lifting, and ambulation/stair navigation          Access Code: [email or web address]  URL: [email or web address]: 04/20/2021Prepared by: YWHVWK SybertExercises   Long Sitting Calf Stretch with Strap - 1 x daily - 7 x weekly - 1 sets - 3 reps - 30 seconds hold   Seated Table Hamstring Stretch - 1 x daily - 7 x weekly - 1 sets - 3 reps - 30 seconds hold   Long Sitting Quad Set - 1 x daily - 7 x weekly - 1 sets - 10 reps - 10 seconds hold   Long Sitting Hip Adduction Isometric with Ball - 1 x daily - 7 x weekly - 1 sets - 10 reps - 10 seconds hold   Supine Active Straight Leg Raise - 1 x daily - 7 x weekly - 2-3 sets - 10 reps   Hooklying Isometric Clamshell - 1 x daily - 7 x weekly - 3 sets - 10 reps   Standing March with Counter Support - 1 x daily - 7 x weekly - 3 sets - 10 reps   Seated Heel Raise - 1 x daily - 7 x weekly - 3 sets - 10 reps   Sit to Stand - 1 x daily - 7 x weekly - 15 reps   Tandem Stance with Support - 1 x daily - 7 x weekly - 2 reps - 20 seconds hold        Manual Treatments:  PROM / STM / Oscillations-Mobs:  G-I, II, III, IV (PA's, Inf., Post.)  [] (00133) Provided manual therapy to mobilize LE, proximal hip and/or LS spine soft tissue/joints for the purpose of modulating pain, promoting relaxation, increasing ROM, reducing/eliminating soft tissue swelling/inflammation/restriction, improving soft tissue extensibility and allowing for proper ROM for normal function with self-care, mobility, lifting and ambulation. Modalities:  Pt declined ice   [] GAME READY (VASO)- for significant edema, swelling, pain control.      Charges:  Timed Code Treatment Minutes: 61'   Total Treatment Minutes: 61'      [] EVAL (LOW) 62714 (typically 20 minutes face-to-face)  [] EVAL (MOD) 24081 (typically 30 minutes face-to-face)  [] EVAL (HIGH) 93282 (typically 45 minutes face-to-face)  [] RE-EVAL     [x] NI(83909) x 2 (30')    [] IONTO  [x] NMR (87698) x  1 (15')   [] VASO  [] Manual (24518) x      [] Other:  [x] TA x 1 (15')      [] Mech Traction (20299)  [] ES(attended) (72756)      [] ES (un) (90334):         ASSESSMENT:  Pt did not fatigue Not Met: [] Adjusted  5. Patient will be able to ascend/descend stairs reciprocally with HR (patient specific functional goal)    [] Progressing: [] Met: [] Not Met: [] Adjusted   6. Patient will have a decrease in left knee pain to 0-1/10 with daily act. [] Progressing: [] Met: [] Not Met: [] Adjusted          Overall Progression Towards Functional goals/ Treatment Progress Update:  [] Patient is progressing as expected towards functional goals listed. [] Progression is slowed due to complexities/Impairments listed. [] Progression has been slowed due to co-morbidities. [x] Plan just implemented, too soon to assess goals progression <30days   [] Goals require adjustment due to lack of progress  [] Patient is not progressing as expected and requires additional follow up with physician  [] Other    Prognosis for POC: [x] Good [] Fair  [] Poor      Patient requires continued skilled intervention: [x] Yes  [] No    Treatment/Activity Tolerance:  [] Patient able to complete treatment  [x] Patient limited by fatigue  [] Patient limited by pain    [] Patient limited by other medical complications  [] Other:         PLAN: Continue therapy to improve ROM, strength, and balance. 2x/week for 4-6 weeks for ROM, strengthening, balance act, HEP instruction, pt education, manual therapy prn, and modalities prn (4/15/21)  [x] Continue per plan of care [] Alter current plan   [] Plan of care initiated [] Hold pending MD visit [] Discharge      Electronically signed by:  Marvin Cotto, PT     Physical Therapist [address] [address] #[id number]  Physical Therapist [place] License #[id number]    Note: If patient does not return for scheduled/ recommended follow up visits, this note will serve as a discharge from care along with most recent update on progress.

## 2021-04-29 ENCOUNTER — TREATMENT (OUTPATIENT)
Dept: PHYSICAL THERAPY | Age: 71
End: 2021-04-29
Payer: MEDICARE

## 2021-04-29 DIAGNOSIS — M25.562 LEFT KNEE PAIN, UNSPECIFIED CHRONICITY: Primary | ICD-10-CM

## 2021-04-29 DIAGNOSIS — M25.662 DECREASED ROM OF LEFT KNEE: ICD-10-CM

## 2021-04-29 DIAGNOSIS — R29.898 WEAKNESS OF LEFT LOWER EXTREMITY: ICD-10-CM

## 2021-04-29 DIAGNOSIS — M17.12 PRIMARY OSTEOARTHRITIS OF LEFT KNEE: ICD-10-CM

## 2021-04-29 PROCEDURE — 97530 THERAPEUTIC ACTIVITIES: CPT | Performed by: PHYSICAL THERAPIST

## 2021-04-29 PROCEDURE — 97110 THERAPEUTIC EXERCISES: CPT | Performed by: PHYSICAL THERAPIST

## 2021-04-29 PROCEDURE — G8427 DOCREV CUR MEDS BY ELIG CLIN: HCPCS | Performed by: PHYSICAL THERAPIST

## 2021-04-29 PROCEDURE — 97112 NEUROMUSCULAR REEDUCATION: CPT | Performed by: PHYSICAL THERAPIST

## 2021-04-29 NOTE — PROGRESS NOTES
[facility]   Phone: [phone]    Fax: [phone]      Physical Therapy Treatment Note/ Progress Report:           Date:  2021    Patient Name:  Kajal Minaya    :  1950  MRN: [id number]  Restrictions/Precautions:    Medical/Treatment Diagnosis Information:  Diagnosis: Left Knee Pain (M25.562), Primary OA of Left Knee (M17.12)   Treatment Diagnosis: Decreased Left Knee ROM (M25.662), Decreased Strength (R29.898)        Insurance/Certification information:  PT Insurance Information: Medicare - Visits based on medical necessity  Physician Information:  Referring Practitioner: Dr. Malone Notice  Has the plan of care been signed (Y/N):        [x]  Yes (POC signed 21)  []  No      Date of Patient follow up with Physician: As needed      Is this a Progress Report:     []  Yes  [x]  No        If Yes:  Date Range for reporting period:  Beginning 4/15/21  Ending 4/15/21    Progress report will be due (10 Rx or 30 days whichever is less):  84      Recertification will be due (POC Duration  / 90 days whichever is less):  21        Visit # Insurance Allowable Auth Required   5 Medical necessity  (pt has already used 13 visits this year for her shoulder) []  Yes [x]  No        Functional Scale:    Date assessed:  4/15/21  Functional Assessment Tool Used: Knee Outcome Survey Activities of Daily Living Scale (copy scanned into media)  Score:  36/70 = 51.4% (48.6% functional deficit)    (4/15/21)  Patient Age: 66years old  Patient Height: 5' 2\"  Patient Weight: 280 lbs  Patient BMI: 51.2      Pain (4/15/21)  [x]  Pain diagram was completed, pain was present and a follow up plan to address the pain is in the plan of care. ()   []  Pain diagram was completed and there was no pain present and therefore no follow up plan to address pain in the plan of care.  ()   []  Pain diagram was not completed and the reason for not completing the pain diagram is in the medical chart ()  []  Patient refused to participate   []  Severe mental or physical capacity, patient is in urgent emergent situation and to complete the questionnaire would jeopardize the patient's health status        Functional Questionnaire (4/15/21)  [x]  Patient completed the functional outcome questionnaire and deficiencies were addressed in the plan of care. ()   []  Patient completed the outcome questionnaire and there were no functional deficits identified and therefore no plan of care is required. ()   []  Patient did not complete the functional outcome questionnaire and the reason why is documented in the medical chart. ()  []  Patient refused to participate   []  Patient unable to complete the questionnaire   []   A functional outcome assessment has been reported on within the last 30 days        Falls (4/15/21)  [x]  The patient has had no falls or 1 fall without injury in the past year. (1101F)   []  There is no documentation of fall in the medical chart (1101F,8P)     [] The patient has had 2 or more falls or one fall with injury in the past year that is documented in the medical chart. (1100F)  []  A falls risk assessment was completed and documented in the medical chart. (9427L)   []  Falls were addressed in the plan of care. (0941N)   []  A falls risk assessment was not completed and documented in the medical chart (1912D,0R)   []   Falls were not addressed in the plan of care and reason was documented in the plan of care. (3135N 1P)        Medication (4/15/21)     [x] A list of current medications (including prescription, over the counter, herbal supplements, vitamin supplements, mineral supplements, dietary supplements) was reviewed with the patient and documented in the medical chart. ()        Falls Risk Assessment (30 days): (4/15/21)  [x] Falls Risk assessed and no intervention required.   [] Falls Risk assessed and Patient requires intervention due to being at a time using HR), unable to walk more than 1 block, unable to stand more than 10-15 minutes, pt has to use her hands to get up from a chair. Unable to kneel or squat. (4/15/21)    Posture: Forward head, rounded shoulders. (4/15/21)    Bandages/Dressings/Incisions: Pt wears compression hose and wraps on both legs (4/15/21)     Gait: (include devices/WB status) Antalgic gait, no AD. She demonstrates decreased pawel, decreased step length bilaterally, increased lateral trunk sway, no trunk rotation, and no arm swing on the left. She also has difficulty walking in a straight line. (4/15/21)    Orthopedic Special Tests: See above.       RESTRICTIONS/PRECAUTIONS:     Exercises/Interventions:     Therapeutic Ex (69843) Sets/sec Reps Notes/CUES   BIKE      TREADMILL            STRETCHING      Towel Pull Calf 30\" hold X 3    Inclined Calf      Hamstrings 30\" hold X 3 Seated   Quads      Hip Flexors      ITB      Adductors            ROM      Passive      Active      Weight Hangs      Sheet Pulls 10\" hold X 5    Ankle Pumps      ERMI            STRENGTHENING      Quad Sets 10\" hold X 10    Ham Sets      Hip ADD Sets BS 10\" hold X 10     SLR Flexion 3 sets X 10    SLR Abduction      SLR Prone      SLR Adduction      SLR+      Supine Hip Abduction 3 sets X 10  Blue band         Standing Marches 3 sets X 10 Standing at half wall         PRE Machines      Knee Extension      Knee Flexion      Leg Press            CKC      Calf Raises 3 sets X 10 Seated   Mini Squats      Wall Sits      Step ups - no riser 3 sets X 10 Using half wall   TKE 2 sets X 10 with blue band                Manual Intervention (78477)      Patellar Mobs      Femoral Tibial mobs      STM      Scar Massage      Foam Roll            NMR re-education (15593)   CUES NEEDED   Biodex Balance      Tandem Balance 20\" hold X 2 each *Feet staggered  *Standing next to half wall   SLB      Rebounder      BOSU            Sit -> Stand  X 15 With cushion on chair Access Code: [email or web address]  URL: [email or web address]: 04/20/2021Prepared by: [email or web address] SybertExercises   Long Sitting Calf Stretch with Strap - 1 x daily - 7 x weekly - 1 sets - 3 reps - 30 seconds hold   Seated Table Hamstring Stretch - 1 x daily - 7 x weekly - 1 sets - 3 reps - 30 seconds hold   Long Sitting Quad Set - 1 x daily - 7 x weekly - 1 sets - 10 reps - 10 seconds hold   Long Sitting Hip Adduction Isometric with Ball - 1 x daily - 7 x weekly - 1 sets - 10 reps - 10 seconds hold   Supine Active Straight Leg Raise - 1 x daily - 7 x weekly - 2-3 sets - 10 reps   Hooklying Isometric Clamshell - 1 x daily - 7 x weekly - 3 sets - 10 reps   Standing March with Counter Support - 1 x daily - 7 x weekly - 3 sets - 10 reps   Seated Heel Raise - 1 x daily - 7 x weekly - 3 sets - 10 reps   Sit to Stand - 1 x daily - 7 x weekly - 15 reps   Tandem Stance with Support - 1 x daily - 7 x weekly - 2 reps - 20 seconds hold        Manual Treatments:  PROM / STM / Oscillations-Mobs:  G-I, II, III, IV (PA's, Inf., Post.)  [] (28348) Provided manual therapy to mobilize LE, proximal hip and/or LS spine soft tissue/joints for the purpose of modulating pain, promoting relaxation, increasing ROM, reducing/eliminating soft tissue swelling/inflammation/restriction, improving soft tissue extensibility and allowing for proper ROM for normal function with self-care, mobility, lifting and ambulation. Modalities:  Pt declined ice   [] GAME READY (VASO)- for significant edema, swelling, pain control.      Charges:  Timed Code Treatment Minutes: 61'   Total Treatment Minutes: 61'      [] EVAL (LOW) 22571 (typically 20 minutes face-to-face)  [] EVAL (MOD) 52725 (typically 30 minutes face-to-face)  [] EVAL (HIGH) 75656 (typically 45 minutes face-to-face)  [] RE-EVAL     [x] PY(21885) x 2 (30')    [] IONTO  [x] NMR (50005) x  1 (15')   [] VASO  [] Manual (67921) x      [] Other:  [x] TA x 1 (15')      [] [facility] Traction (34003)  [] ES(attended) (42610)      [] ES (un) (82180):         ASSESSMENT:  Pt is tolerating the exercises much better with fewer rest breaks needed. No UE touches needed with tandem stance today. Her confidence continues to improve with step ups but she is not quite ready to increase step height yet. She stated sit->stand felt a little easier today and she is trying not to push through her arms as much when getting up from a chair at home. SLR's are still very challenging but she reported no pain in her left knee while doing them today. Pt is unable to perform calf raises in standing due to bilateral foot pain/arthritis. MEDICARE CAP EXCEPTION DOCUMENTATION      I certify that this patient meets one of the below criteria necessary for becoming an exception to the Medicare cap on therapy services:    [x]  The patient has a condition identified by an ICD-9 code that has a direct and significant impact on the need for therapy. (Significantly impacts the rate of recovery.)                     []  The patient has a complexity identified by an ICD-9 code that has a direct and significant impact on the need for therapy. (Significantly impacts the rate of recovery and is associated with a primary condition.)         []  The patient has associated variables that influence the amount of treatment to include:  Social support, self-efficacy/motivation, prognosis, time since onset/acuity. []  The patient has generalized musculoskeletal conditions or a condition affecting multiple sites that will have a direct impact on the rate of recovery. [x]  The patient had a prior episode of outpatient therapy during this calendar year for a different condition. []  The patient has a mental or cognitive disorder in addition to the condition being treated that will have a direct and significant impact on the rate of recovery.                 GOALS: (Goals made 4/15/21)  Patient stated goal:  \"Make my legs stronger; walk and climb stairs easier and faster. \"  [] Progressing: [] Met: [] Not Met: [] Adjusted    Therapist goals for Patient:   Short Term Goals: To be achieved in: 2 weeks  1. Independent in HEP and progression per patient tolerance, in order to prevent re-injury. [] Progressing: [] Met: [] Not Met: [] Adjusted  2. Patient will have a decrease in left knee pain to 1-2/10 to facilitate improvement in movement, function, and ADLs as indicated by Functional Deficits. [] Progressing: [] Met: [] Not Met: [] Adjusted    Long Term Goals: To be achieved in: 4-6 weeks  1. Disability index score of 20% or less for the Knee Outcome Survey Activities of Daily Living Scale to assist with reaching prior level of function. [] Progressing: [] Met: [] Not Met: [] Adjusted  2. Patient will demonstrate an increase in left knee flexion AROM to at least 115 degrees to allow for proper joint functioning as indicated by patients Functional Deficits. [] Progressing: [] Met: [] Not Met: [] Adjusted  3. Patient will demonstrate an increase in left hip and knee strength to at least 4+/5 to allow for proper functional mobility as indicated by patients Functional Deficits. [] Progressing: [] Met: [] Not Met: [] Adjusted  4. Patient will be able to walk community distances, stand for at least 30 minutes, and be able to get up from a chair with little to no UE assistance needed for improved function. [] Progressing: [] Met: [] Not Met: [] Adjusted  5. Patient will be able to ascend/descend stairs reciprocally with HR (patient specific functional goal)    [] Progressing: [] Met: [] Not Met: [] Adjusted   6. Patient will have a decrease in left knee pain to 0-1/10 with daily act. [] Progressing: [] Met: [] Not Met: [] Adjusted          Overall Progression Towards Functional goals/ Treatment Progress Update:  [] Patient is progressing as expected towards functional goals listed.     [] Progression is slowed due to complexities/Impairments listed. [] Progression has been slowed due to co-morbidities. [x] Plan just implemented, too soon to assess goals progression <30days   [] Goals require adjustment due to lack of progress  [] Patient is not progressing as expected and requires additional follow up with physician  [] Other    Prognosis for POC: [x] Good [] Fair  [] Poor      Patient requires continued skilled intervention: [x] Yes  [] No    Treatment/Activity Tolerance:  [] Patient able to complete treatment  [x] Patient limited by fatigue  [] Patient limited by pain    [] Patient limited by other medical complications  [] Other:         PLAN: Continue therapy to improve ROM, strength, and balance. 2x/week for 4-6 weeks for ROM, strengthening, balance act, HEP instruction, pt education, manual therapy prn, and modalities prn (4/15/21)  [x] Continue per plan of care [] Alter current plan   [] Plan of care initiated [] Hold pending MD visit [] Discharge      Electronically signed by:  Yoana Scott, PT     Physical Therapist Adore Rangel 421 #[id number]  Physical Therapist [place] License #[id number]    Note: If patient does not return for scheduled/ recommended follow up visits, this note will serve as a discharge from care along with most recent update on progress.

## 2021-05-04 ENCOUNTER — TREATMENT (OUTPATIENT)
Dept: PHYSICAL THERAPY | Age: 71
End: 2021-05-04
Payer: MEDICARE

## 2021-05-04 DIAGNOSIS — M25.562 LEFT KNEE PAIN, UNSPECIFIED CHRONICITY: Primary | ICD-10-CM

## 2021-05-04 DIAGNOSIS — M17.12 PRIMARY OSTEOARTHRITIS OF LEFT KNEE: ICD-10-CM

## 2021-05-04 DIAGNOSIS — M25.662 DECREASED ROM OF LEFT KNEE: ICD-10-CM

## 2021-05-04 DIAGNOSIS — R29.898 WEAKNESS OF LEFT LOWER EXTREMITY: ICD-10-CM

## 2021-05-04 PROCEDURE — 97110 THERAPEUTIC EXERCISES: CPT | Performed by: PHYSICAL THERAPIST

## 2021-05-04 PROCEDURE — 97530 THERAPEUTIC ACTIVITIES: CPT | Performed by: PHYSICAL THERAPIST

## 2021-05-04 PROCEDURE — G8427 DOCREV CUR MEDS BY ELIG CLIN: HCPCS | Performed by: PHYSICAL THERAPIST

## 2021-05-04 PROCEDURE — 97112 NEUROMUSCULAR REEDUCATION: CPT | Performed by: PHYSICAL THERAPIST

## 2021-05-04 NOTE — PROGRESS NOTES
[facility]   Phone: [phone]    Fax: [phone]      Physical Therapy Treatment Note/ Progress Report:           Date:  2021    Patient Name:  Lizzeth Brand    :  1950  MRN: [id number]  Restrictions/Precautions:    Medical/Treatment Diagnosis Information:  Diagnosis: Left Knee Pain (M25.562), Primary OA of Left Knee (M17.12)   Treatment Diagnosis: Decreased Left Knee ROM (M25.662), Decreased Strength (R29.898)        Insurance/Certification information:  PT Insurance Information: Medicare - Visits based on medical necessity  Physician Information:  Referring Practitioner: Dr. Deon Rome  Has the plan of care been signed (Y/N):        [x]  Yes (POC signed 21)  []  No      Date of Patient follow up with Physician: As needed      Is this a Progress Report:     []  Yes  [x]  No        If Yes:  Date Range for reporting period:  Beginning 4/15/21  Ending 4/15/21    Progress report will be due (10 Rx or 30 days whichever is less):        Recertification will be due (POC Duration  / 90 days whichever is less):  21        Visit # Insurance Allowable Auth Required   6 Medical necessity  (pt has already used 13 visits this year for her shoulder) []  Yes [x]  No        Functional Scale:    Date assessed:  4/15/21  Functional Assessment Tool Used: Knee Outcome Survey Activities of Daily Living Scale (copy scanned into media)  Score:  36/70 = 51.4% (48.6% functional deficit)    (4/15/21)  Patient Age: 66years old  Patient Height: 5' 2\"  Patient Weight: 280 lbs  Patient BMI: 51.2      Pain (4/15/21)  [x]  Pain diagram was completed, pain was present and a follow up plan to address the pain is in the plan of care. ()   []  Pain diagram was completed and there was no pain present and therefore no follow up plan to address pain in the plan of care.  ()   []  Pain diagram was not completed and the reason for not completing the pain diagram is in the medical chart ()  []  Patient refused to participate   []  Severe mental or physical capacity, patient is in urgent emergent situation and to complete the questionnaire would jeopardize the patient's health status        Functional Questionnaire (4/15/21)  [x]  Patient completed the functional outcome questionnaire and deficiencies were addressed in the plan of care. ()   []  Patient completed the outcome questionnaire and there were no functional deficits identified and therefore no plan of care is required. ()   []  Patient did not complete the functional outcome questionnaire and the reason why is documented in the medical chart. ()  []  Patient refused to participate   []  Patient unable to complete the questionnaire   []   A functional outcome assessment has been reported on within the last 30 days        Falls (4/15/21)  [x]  The patient has had no falls or 1 fall without injury in the past year. (1101F)   []  There is no documentation of fall in the medical chart (1101F,8P)     [] The patient has had 2 or more falls or one fall with injury in the past year that is documented in the medical chart. (1100F)  []  A falls risk assessment was completed and documented in the medical chart. (9117L)   []  Falls were addressed in the plan of care. (7620Z)   []  A falls risk assessment was not completed and documented in the medical chart (3792N,5Z)   []   Falls were not addressed in the plan of care and reason was documented in the plan of care. (0734U 1P)        Medication (4/15/21)     [x] A list of current medications (including prescription, over the counter, herbal supplements, vitamin supplements, mineral supplements, dietary supplements) was reviewed with the patient and documented in the medical chart. ()        Falls Risk Assessment (30 days): (4/15/21)  [x] Falls Risk assessed and no intervention required.   [] Falls Risk assessed and Patient requires intervention due to being higher risk   TUG score (>12s at risk):     [] Falls education provided, including     PQRS:   Reviewed medication list with patient. No changes reported by pt since last PT visit.  (5/4/21)          Latex Allergy:  [x]NO      []YES  Preferred Language for Healthcare:   [x]English       []other:      Pain level:  0-2/10 (5/4/21)   Medication:  Celebrex      SUBJECTIVE:  Pt had to get up early this morning for an MRI of both her wrists. She is exhausted and her left shoulder is very sore from how she had to be positioned during the MRI. Her legs are getting stronger and she has noticed improvement with getting up from a chair but both knees still grind. Sometimes she'll get a sharp pain in her left knee that lasts a few seconds then goes away with no known cause. She is using Voltaren gel on both knees which helps. She continues to report compliance with HEP. OBJECTIVE:       (4/15/21)  Flexibility L R Comment   Hamstring Mod tightness Mod tightness    Gastroc Mod tightness Mod tightness    ITB      Quad              (5/4/21)  ROM PROM AROM Overpressure Comment    L R L R L R    Flexion 118 with SP  100 100      Extension   0 0                            (4/15/21)  Strength L R Comment   Quad 4/5 4/5    Hamstring 4/5 4/5    Gastroc 4/5 4/5    Hip Flex 3+/5 3+/5    Hip Abd 3+/5 3+/5    Hip Add 4-/5 4-/5    Glut Med              (4/15/21)  Girth L R   Mid Patella 55.0 cm 58.5 cm   Suprapatellar     5cm above     15cm above       (4/15/21)  Special Test Results/Comment   Meniscal Click    Crepitus (+) PF crepitus bilaterally   Flexion Test    Valgus Laxity    Varus Laxity    Lachmans    Drop Back        Reflexes/Sensation: (4/15/21)   []Dermatomes/Myotomes intact    []Reflexes equal and normal bilaterally   [x]Other: Intact to light touch    Joint mobility: (4/15/21)   []Normal    [x]Hypo   []Hyper    Palpation: Some joint line tenderness on the left.   Quad atrophy present on the left vs right.(4/15/21) Functional Mobility/Transfers: Difficulty with stairs (pt must ascend/descend stairs 1 at a time using HR), unable to walk more than 1 block, unable to stand more than 10-15 minutes, pt has to use her hands to get up from a chair. Unable to kneel or squat. (4/15/21)    Posture: Forward head, rounded shoulders. (4/15/21)    Bandages/Dressings/Incisions: Pt wears compression hose and wraps on both legs (4/15/21)     Gait: (include devices/WB status) Antalgic gait, no AD. She demonstrates decreased pawel, decreased step length bilaterally, increased lateral trunk sway, no trunk rotation, and no arm swing on the left. She also has difficulty walking in a straight line. (4/15/21)    Orthopedic Special Tests: See above.       RESTRICTIONS/PRECAUTIONS:     Exercises/Interventions:     Therapeutic Ex (30361) Sets/sec Reps Notes/CUES   BIKE      TREADMILL            STRETCHING      Towel Pull Calf 30\" hold X 3    Inclined Calf      Hamstrings 30\" hold X 3 Seated   Quads      Hip Flexors      ITB      Adductors            ROM      Passive      Active      Weight Hangs      Sheet Pulls 10\" hold X 5    Ankle Pumps      ERMI            STRENGTHENING      Quad Sets 10\" hold X 10    Ham Sets      Hip ADD Sets BS 10\" hold X 10     SLR Flexion 3 sets X 10 *Try 1# wt next visit   SLR Abduction 2 sets X 10    SLR Prone      SLR Adduction      SLR+      Supine Hip Abduction 3 sets X 10  Blue band         Standing Marches 3 sets X 10 Standing at half wall         PRE Machines      Knee Extension      Knee Flexion      Leg Press            CKC      Calf Raises 3 sets X 10 Seated   Mini Squats      Wall Sits      Step ups - no riser 3 sets X 10 Using half wall   TKE 2 sets X 10 with black band                Manual Intervention (94683)      Patellar Mobs      Femoral Tibial mobs      STM      Scar Massage      Foam Roll            NMR re-education (33209)   CUES NEEDED   Biodex Balance      Tandem Balance 30\" hold X 2 each *Feet staggered  *Standing next to half wall   SLB      Rebounder      BOSU            Sit -> Stand  X 15 With cushion on chair  No arms               Therapeutic Activity (43361)      Core Training      [employer] Activities      Monster Walks      TRX training                  Patient Education: Dx, prognosis, pt goals/expectations, role of therapy (4/15/21)  X 8'                  Therapeutic Exercise and NMR EXR  [x] (14818) Provided verbal/tactile cueing for activities related to strengthening, flexibility, endurance, ROM for improvements in LE, proximal hip, and core control with self care, mobility, lifting, ambulation.  [] (46110) Provided verbal/tactile cueing for activities related to improving balance, coordination, kinesthetic sense, posture, motor skill, proprioception to assist with LE, proximal hip, and core control in self-care, mobility, lifting, ambulation and eccentric single leg control. NMR and Therapeutic Activities:    [x] (32041 or 91484) Provided verbal/tactile cueing for activities related to improving balance, coordination, kinesthetic sense, posture, motor skill, proprioception and motor activation to allow for proper function of core, proximal hip and LE with self-care and ADLs and functional mobility.   [] (99993) Gait Re-education- Provided training and instruction to the patient for proper LE, core and proximal hip recruitment and positioning and eccentric body weight control with ambulation re-education including up and down stairs     Home Exercise Program:    [x] (18644) Reviewed/Progressed HEP activities related to strengthening, flexibility, endurance, ROM of core, proximal hip and LE for functional self-care, mobility, lifting and ambulation/stair navigation - Updated HEP through [address] [place] [address] (see below). Pt was also issued new written handouts.  (4/27/21)  [] (60344) Reviewed/Progressed HEP activities related to improving balance, coordination, kinesthetic sense, posture, motor skill, proprioception of core, proximal hip and LE for self-care, mobility, lifting, and ambulation/stair navigation          Access Code: [email or web address]  URL: [email or web address]/Date: 04/20/2021Prepared by: [email or web address] SybertExercises   Long Sitting Calf Stretch with Strap - 1 x daily - 7 x weekly - 1 sets - 3 reps - 30 seconds hold   Seated Table Hamstring Stretch - 1 x daily - 7 x weekly - 1 sets - 3 reps - 30 seconds hold   Long Sitting Quad Set - 1 x daily - 7 x weekly - 1 sets - 10 reps - 10 seconds hold   Long Sitting Hip Adduction Isometric with Ball - 1 x daily - 7 x weekly - 1 sets - 10 reps - 10 seconds hold   Supine Active Straight Leg Raise - 1 x daily - 7 x weekly - 2-3 sets - 10 reps   Hooklying Isometric Clamshell - 1 x daily - 7 x weekly - 3 sets - 10 reps   Standing March with Counter Support - 1 x daily - 7 x weekly - 3 sets - 10 reps   Seated Heel Raise - 1 x daily - 7 x weekly - 3 sets - 10 reps   Sit to Stand - 1 x daily - 7 x weekly - 15 reps   Tandem Stance with Support - 1 x daily - 7 x weekly - 2 reps - 20 seconds hold        Manual Treatments:  PROM / STM / Oscillations-Mobs:  G-I, II, III, IV (PA's, Inf., Post.)  [] (05516) Provided manual therapy to mobilize LE, proximal hip and/or LS spine soft tissue/joints for the purpose of modulating pain, promoting relaxation, increasing ROM, reducing/eliminating soft tissue swelling/inflammation/restriction, improving soft tissue extensibility and allowing for proper ROM for normal function with self-care, mobility, lifting and ambulation. Modalities:  Pt declined ice   [] GAME READY (VASO)- for significant edema, swelling, pain control.      Charges:  Timed Code Treatment Minutes: 61'   Total Treatment Minutes: 61'      [] EVAL (LOW) 33550 (typically 20 minutes face-to-face)  [] EVAL (MOD) 82828 (typically 30 minutes face-to-face)  [] EVAL (HIGH) 86591 (typically 45 minutes face-to-face)  [] RE-EVAL     [x] RM(23171) x 2 (30')    [] IONTO  [x] NMR (00606) x  1 (15')   [] VASO  [] Manual (60395) x      [] Other:  [x] TA x 1 (15')      [] Mech Traction (66781)  [] ES(attended) (83410)      [] ES (un) (66740):         ASSESSMENT:  Pt was very tired coming into therapy today (due to getting up early for an MRI) and the exercises were a little more difficult. Tandem stance was more challenging with a few UE touches needed. Step ups were also more difficult due to fatigue but she was able to complete all reps with rest breaks needed. She did well with sit->stand today with no UE support needed. SLR's are getting easier and pt may be ready to add 1# wt next visit. Added sidelying abduction today which was challenging with cuing needed to keep her left knee straight. MEDICARE CAP EXCEPTION DOCUMENTATION      I certify that this patient meets one of the below criteria necessary for becoming an exception to the Medicare cap on therapy services:    [x]  The patient has a condition identified by an ICD-9 code that has a direct and significant impact on the need for therapy. (Significantly impacts the rate of recovery.)                     []  The patient has a complexity identified by an ICD-9 code that has a direct and significant impact on the need for therapy. (Significantly impacts the rate of recovery and is associated with a primary condition.)         []  The patient has associated variables that influence the amount of treatment to include:  Social support, self-efficacy/motivation, prognosis, time since onset/acuity. []  The patient has generalized musculoskeletal conditions or a condition affecting multiple sites that will have a direct impact on the rate of recovery. [x]  The patient had a prior episode of outpatient therapy during this calendar year for a different condition.            []  The patient has a mental or cognitive disorder in addition to the condition being treated that will have a direct and significant impact on the rate of recovery. GOALS: (Goals made 4/15/21)  Patient stated goal:  \"Make my legs stronger; walk and climb stairs easier and faster. \"  [] Progressing: [] Met: [] Not Met: [] Adjusted    Therapist goals for Patient:   Short Term Goals: To be achieved in: 2 weeks  1. Independent in HEP and progression per patient tolerance, in order to prevent re-injury. [] Progressing: [] Met: [] Not Met: [] Adjusted  2. Patient will have a decrease in left knee pain to 1-2/10 to facilitate improvement in movement, function, and ADLs as indicated by Functional Deficits. [] Progressing: [] Met: [] Not Met: [] Adjusted    Long Term Goals: To be achieved in: 4-6 weeks  1. Disability index score of 20% or less for the Knee Outcome Survey Activities of Daily Living Scale to assist with reaching prior level of function. [] Progressing: [] Met: [] Not Met: [] Adjusted  2. Patient will demonstrate an increase in left knee flexion AROM to at least 115 degrees to allow for proper joint functioning as indicated by patients Functional Deficits. [] Progressing: [] Met: [] Not Met: [] Adjusted  3. Patient will demonstrate an increase in left hip and knee strength to at least 4+/5 to allow for proper functional mobility as indicated by patients Functional Deficits. [] Progressing: [] Met: [] Not Met: [] Adjusted  4. Patient will be able to walk community distances, stand for at least 30 minutes, and be able to get up from a chair with little to no UE assistance needed for improved function. [] Progressing: [] Met: [] Not Met: [] Adjusted  5. Patient will be able to ascend/descend stairs reciprocally with HR (patient specific functional goal)    [] Progressing: [] Met: [] Not Met: [] Adjusted   6. Patient will have a decrease in left knee pain to 0-1/10 with daily act.     [] Progressing: [] Met: [] Not Met: [] Adjusted          Overall Progression Towards Functional goals/ Treatment Progress Update:  [] Patient is progressing as expected towards functional goals listed. [] Progression is slowed due to complexities/Impairments listed. [] Progression has been slowed due to co-morbidities. [x] Plan just implemented, too soon to assess goals progression <30days   [] Goals require adjustment due to lack of progress  [] Patient is not progressing as expected and requires additional follow up with physician  [] Other    Prognosis for POC: [x] Good [] Fair  [] Poor      Patient requires continued skilled intervention: [x] Yes  [] No    Treatment/Activity Tolerance:  [] Patient able to complete treatment  [x] Patient limited by fatigue  [] Patient limited by pain    [] Patient limited by other medical complications  [] Other:         PLAN: Continue therapy to improve ROM, strength, and balance. 2x/week for 4-6 weeks for ROM, strengthening, balance act, HEP instruction, pt education, manual therapy prn, and modalities prn (4/15/21)  [x] Continue per plan of care [] Alter current plan   [] Plan of care initiated [] Hold pending MD visit [] Discharge      Electronically signed by:  Gustavo King, PT     Physical Therapist [address] #[id number]  Physical Therapist [place] License #[id number]    Note: If patient does not return for scheduled/ recommended follow up visits, this note will serve as a discharge from care along with most recent update on progress.

## 2021-05-06 ENCOUNTER — TREATMENT (OUTPATIENT)
Dept: PHYSICAL THERAPY | Age: 71
End: 2021-05-06
Payer: MEDICARE

## 2021-05-06 DIAGNOSIS — R29.898 WEAKNESS OF LEFT LOWER EXTREMITY: ICD-10-CM

## 2021-05-06 DIAGNOSIS — M25.562 LEFT KNEE PAIN, UNSPECIFIED CHRONICITY: Primary | ICD-10-CM

## 2021-05-06 DIAGNOSIS — M17.12 PRIMARY OSTEOARTHRITIS OF LEFT KNEE: ICD-10-CM

## 2021-05-06 DIAGNOSIS — M25.662 DECREASED ROM OF LEFT KNEE: ICD-10-CM

## 2021-05-06 PROCEDURE — G8427 DOCREV CUR MEDS BY ELIG CLIN: HCPCS | Performed by: PHYSICAL THERAPIST

## 2021-05-06 PROCEDURE — 97112 NEUROMUSCULAR REEDUCATION: CPT | Performed by: PHYSICAL THERAPIST

## 2021-05-06 PROCEDURE — 97110 THERAPEUTIC EXERCISES: CPT | Performed by: PHYSICAL THERAPIST

## 2021-05-06 PROCEDURE — 97530 THERAPEUTIC ACTIVITIES: CPT | Performed by: PHYSICAL THERAPIST

## 2021-05-06 NOTE — PROGRESS NOTES
[facility]   Phone: [phone]    Fax: [phone]      Physical Therapy Treatment Note/ Progress Report:           Date:  2021    Patient Name:  Cassy Gomez    :  1950  MRN: [id number]  Restrictions/Precautions:    Medical/Treatment Diagnosis Information:  Diagnosis: Left Knee Pain (M25.562), Primary OA of Left Knee (M17.12)   Treatment Diagnosis: Decreased Left Knee ROM (M25.662), Decreased Strength (R29.898)        Insurance/Certification information:  PT Insurance Information: Medicare - Visits based on medical necessity  Physician Information:  Referring Practitioner: Dr. Efraín Taylor  Has the plan of care been signed (Y/N):        [x]  Yes (POC signed 21)  []  No      Date of Patient follow up with Physician: As needed      Is this a Progress Report:     []  Yes  [x]  No        If Yes:  Date Range for reporting period:  Beginning 4/15/21  Ending 4/15/21    Progress report will be due (10 Rx or 30 days whichever is less):        Recertification will be due (POC Duration  / 90 days whichever is less):  21        Visit # Insurance Allowable Auth Required   7 Medical necessity  (pt has already used 13 visits this year for her shoulder) []  Yes [x]  No        Functional Scale:    Date assessed:  4/15/21  Functional Assessment Tool Used: Knee Outcome Survey Activities of Daily Living Scale (copy scanned into media)  Score:  36/70 = 51.4% (48.6% functional deficit)    (4/15/21)  Patient Age: 66years old  Patient Height: 5' 2\"  Patient Weight: 280 lbs  Patient BMI: 51.2      Pain (4/15/21)  [x]  Pain diagram was completed, pain was present and a follow up plan to address the pain is in the plan of care. ()   []  Pain diagram was completed and there was no pain present and therefore no follow up plan to address pain in the plan of care.  ()   []  Pain diagram was not completed and the reason for not completing the pain diagram is in the medical chart ()  []  Patient refused to participate   []  Severe mental or physical capacity, patient is in urgent emergent situation and to complete the questionnaire would jeopardize the patient's health status        Functional Questionnaire (4/15/21)  [x]  Patient completed the functional outcome questionnaire and deficiencies were addressed in the plan of care. ()   []  Patient completed the outcome questionnaire and there were no functional deficits identified and therefore no plan of care is required. ()   []  Patient did not complete the functional outcome questionnaire and the reason why is documented in the medical chart. ()  []  Patient refused to participate   []  Patient unable to complete the questionnaire   []   A functional outcome assessment has been reported on within the last 30 days        Falls (4/15/21)  [x]  The patient has had no falls or 1 fall without injury in the past year. (1101F)   []  There is no documentation of fall in the medical chart (1101F,8P)     [] The patient has had 2 or more falls or one fall with injury in the past year that is documented in the medical chart. (1100F)  []  A falls risk assessment was completed and documented in the medical chart. (4483N)   []  Falls were addressed in the plan of care. (1268N)   []  A falls risk assessment was not completed and documented in the medical chart (7568B,6H)   []   Falls were not addressed in the plan of care and reason was documented in the plan of care. (5870R 1P)        Medication (4/15/21)     [x] A list of current medications (including prescription, over the counter, herbal supplements, vitamin supplements, mineral supplements, dietary supplements) was reviewed with the patient and documented in the medical chart. ()        Falls Risk Assessment (30 days): (4/15/21)  [x] Falls Risk assessed and no intervention required.   [] Falls Risk assessed and Patient requires intervention due to being higher risk   TUG score (>12s at risk):     [] Falls education provided, including     PQRS:   Reviewed medication list with patient. No changes reported by pt since last PT visit.  (5/6/21)          Latex Allergy:  [x]NO      []YES  Preferred Language for Healthcare:   [x]English       []other:      Pain level:  0-2/10 (5/4/21)   Medication:  Celebrex      SUBJECTIVE:  Pt states both ankles are really hurting today which she attributes to the weather. Both knees still grind, especially with getting up from a chair. She has not had any sharp pains in her left knee since last visit. OBJECTIVE:       (4/15/21)  Flexibility L R Comment   Hamstring Mod tightness Mod tightness    Gastroc Mod tightness Mod tightness    ITB      Quad              (5/4/21)  ROM PROM AROM Overpressure Comment    L R L R L R    Flexion 118 with SP  100 100      Extension   0 0                            (4/15/21)  Strength L R Comment   Quad 4/5 4/5    Hamstring 4/5 4/5    Gastroc 4/5 4/5    Hip Flex 3+/5 3+/5    Hip Abd 3+/5 3+/5    Hip Add 4-/5 4-/5    Glut Med              (4/15/21)  Girth L R   Mid Patella 55.0 cm 58.5 cm   Suprapatellar     5cm above     15cm above       (4/15/21)  Special Test Results/Comment   Meniscal Click    Crepitus (+) PF crepitus bilaterally   Flexion Test    Valgus Laxity    Varus Laxity    Lachmans    Drop Back        Reflexes/Sensation: (4/15/21)   []Dermatomes/Myotomes intact    []Reflexes equal and normal bilaterally   [x]Other: Intact to light touch    Joint mobility: (4/15/21)   []Normal    [x]Hypo   []Hyper    Palpation: Some joint line tenderness on the left. Quad atrophy present on the left vs right.(4/15/21)    Functional Mobility/Transfers: Difficulty with stairs (pt must ascend/descend stairs 1 at a time using HR), unable to walk more than 1 block, unable to stand more than 10-15 minutes, pt has to use her hands to get up from a chair. Unable to kneel or squat.   (4/15/21)    Posture: Forward head, rounded shoulders. (4/15/21)    Bandages/Dressings/Incisions: Pt wears compression hose and wraps on both legs (4/15/21)     Gait: (include devices/WB status) Antalgic gait, no AD. She demonstrates decreased pawel, decreased step length bilaterally, increased lateral trunk sway, no trunk rotation, and no arm swing on the left. She also has difficulty walking in a straight line. (4/15/21)    Orthopedic Special Tests: See above.       RESTRICTIONS/PRECAUTIONS:     Exercises/Interventions:     Therapeutic Ex (22577) Sets/sec Reps Notes/CUES   BIKE      TREADMILL            STRETCHING      Towel Pull Calf 30\" hold X 3    Inclined Calf      Hamstrings 30\" hold X 3 Seated   Quads      Hip Flexors      ITB      Adductors            ROM      Passive      Active      Weight Hangs      Sheet Pulls 10\" hold X 5    Ankle Pumps      ERMI            STRENGTHENING      Quad Sets 10\" hold X 10    Ham Sets      Hip ADD Sets BS 10\" hold X 10     SLR Flexion 3 sets X 10 with 1#    SLR Abduction  Held today per pt request due to pt not wanting to mess up her hair   SLR Prone      SLR Adduction      SLR+      Supine Hip Abduction 3 sets X 10  Black band         Standing Marches 3 sets X 10 Standing at half wall         PRE Machines      Knee Extension      Knee Flexion      Leg Press            CKC      Calf Raises 3 sets X 10 Seated   Mini Squats      Wall Sits      Step ups - no riser 3 sets X 10 Using half wall   TKE 2 sets X 10 with black band                Manual Intervention (21029)      Patellar Mobs      Femoral Tibial mobs      STM      Scar Massage      Foam Roll            NMR re-education (24014)   CUES NEEDED   Biodex Balance      Tandem Balance 30\" hold X 2 each *Feet staggered  *Standing next to half wall   SLB      Rebounder      BOSU            Sit -> Stand  X 15 With cushion on chair  No arms               Therapeutic Activity (87303)      Core Training      [facility] still needed rest breaks during her program.   Added 1# wt to flexion SLR's which was challenging but she demonstrated good form. Held sidelying abduction today per pt request (pt is a [employer] and the ceremony is tonight, she just got her hair done and doesn't want to mess it up). Pt states she will do them later tonight before bed. She did better today with tandem stance with only 1 LOB. Step ups are still challenging with pt needing to use half wall. MEDICARE CAP EXCEPTION DOCUMENTATION      I certify that this patient meets one of the below criteria necessary for becoming an exception to the Medicare cap on therapy services:    [x]  The patient has a condition identified by an ICD-9 code that has a direct and significant impact on the need for therapy. (Significantly impacts the rate of recovery.)                     []  The patient has a complexity identified by an ICD-9 code that has a direct and significant impact on the need for therapy. (Significantly impacts the rate of recovery and is associated with a primary condition.)         []  The patient has associated variables that influence the amount of treatment to include:  Social support, self-efficacy/motivation, prognosis, time since onset/acuity. []  The patient has generalized musculoskeletal conditions or a condition affecting multiple sites that will have a direct impact on the rate of recovery. [x]  The patient had a prior episode of outpatient therapy during this calendar year for a different condition. []  The patient has a mental or cognitive disorder in addition to the condition being treated that will have a direct and significant impact on the rate of recovery. GOALS: (Goals made 4/15/21)  Patient stated goal:  \"Make my legs stronger; walk and climb stairs easier and faster. \"  [] Progressing: [] Met: [] Not Met: [] Adjusted    Therapist goals for Patient:   Short Term Goals:  To be achieved in: 2 weeks  1. Independent in HEP and progression per patient tolerance, in order to prevent re-injury. [] Progressing: [] Met: [] Not Met: [] Adjusted  2. Patient will have a decrease in left knee pain to 1-2/10 to facilitate improvement in movement, function, and ADLs as indicated by Functional Deficits. [] Progressing: [] Met: [] Not Met: [] Adjusted    Long Term Goals: To be achieved in: 4-6 weeks  1. Disability index score of 20% or less for the Knee Outcome Survey Activities of Daily Living Scale to assist with reaching prior level of function. [] Progressing: [] Met: [] Not Met: [] Adjusted  2. Patient will demonstrate an increase in left knee flexion AROM to at least 115 degrees to allow for proper joint functioning as indicated by patients Functional Deficits. [] Progressing: [] Met: [] Not Met: [] Adjusted  3. Patient will demonstrate an increase in left hip and knee strength to at least 4+/5 to allow for proper functional mobility as indicated by patients Functional Deficits. [] Progressing: [] Met: [] Not Met: [] Adjusted  4. Patient will be able to walk community distances, stand for at least 30 minutes, and be able to get up from a chair with little to no UE assistance needed for improved function. [] Progressing: [] Met: [] Not Met: [] Adjusted  5. Patient will be able to ascend/descend stairs reciprocally with HR (patient specific functional goal)    [] Progressing: [] Met: [] Not Met: [] Adjusted   6. Patient will have a decrease in left knee pain to 0-1/10 with daily act. [] Progressing: [] Met: [] Not Met: [] Adjusted          Overall Progression Towards Functional goals/ Treatment Progress Update:  [] Patient is progressing as expected towards functional goals listed. [] Progression is slowed due to complexities/Impairments listed. [] Progression has been slowed due to co-morbidities.   [x] Plan just implemented, too soon to assess goals progression <30days

## 2021-05-11 ENCOUNTER — TREATMENT (OUTPATIENT)
Dept: PHYSICAL THERAPY | Age: 71
End: 2021-05-11
Payer: MEDICARE

## 2021-05-11 DIAGNOSIS — M17.12 PRIMARY OSTEOARTHRITIS OF LEFT KNEE: ICD-10-CM

## 2021-05-11 DIAGNOSIS — M25.662 DECREASED ROM OF LEFT KNEE: ICD-10-CM

## 2021-05-11 DIAGNOSIS — M25.562 LEFT KNEE PAIN, UNSPECIFIED CHRONICITY: Primary | ICD-10-CM

## 2021-05-11 DIAGNOSIS — R29.898 WEAKNESS OF LEFT LOWER EXTREMITY: ICD-10-CM

## 2021-05-11 PROCEDURE — G8427 DOCREV CUR MEDS BY ELIG CLIN: HCPCS | Performed by: PHYSICAL THERAPIST

## 2021-05-11 PROCEDURE — 97110 THERAPEUTIC EXERCISES: CPT | Performed by: PHYSICAL THERAPIST

## 2021-05-11 PROCEDURE — 97530 THERAPEUTIC ACTIVITIES: CPT | Performed by: PHYSICAL THERAPIST

## 2021-05-11 PROCEDURE — 97112 NEUROMUSCULAR REEDUCATION: CPT | Performed by: PHYSICAL THERAPIST

## 2021-05-11 NOTE — PROGRESS NOTES
[facility]   Phone: [phone]    Fax: [phone]      Physical Therapy Treatment Note/ Progress Report:           Date:  2021    Patient Name:  Mike Olivares    :  1950  MRN: [id number]  Restrictions/Precautions:    Medical/Treatment Diagnosis Information:  Diagnosis: Left Knee Pain (M25.562), Primary OA of Left Knee (M17.12)   Treatment Diagnosis: Decreased Left Knee ROM (M25.662), Decreased Strength (R29.898)        Insurance/Certification information:  PT Insurance Information: Medicare - Visits based on medical necessity  Physician Information:  Referring Practitioner: Dr. Dangelo Flair  Has the plan of care been signed (Y/N):        [x]  Yes (POC signed 21)  []  No      Date of Patient follow up with Physician: As needed      Is this a Progress Report:     []  Yes  [x]  No        If Yes:  Date Range for reporting period:  Beginning 4/15/21  Ending 4/15/21    Progress report will be due (10 Rx or 30 days whichever is less):        Recertification will be due (POC Duration  / 90 days whichever is less):  21        Visit # Insurance Allowable Auth Required   8 Medical necessity  (pt has already used 13 visits this year for her shoulder) []  Yes [x]  No        Functional Scale:    Date assessed:  4/15/21  Functional Assessment Tool Used: Knee Outcome Survey Activities of Daily Living Scale (copy scanned into media)  Score:  36/70 = 51.4% (48.6% functional deficit)    (4/15/21)  Patient Age: 66years old  Patient Height: 5' 2\"  Patient Weight: 280 lbs  Patient BMI: 51.2      Pain (4/15/21)  [x]  Pain diagram was completed, pain was present and a follow up plan to address the pain is in the plan of care. ()   []  Pain diagram was completed and there was no pain present and therefore no follow up plan to address pain in the plan of care.  ()   []  Pain diagram was not completed and the reason for not completing the pain diagram is in the medical chart ()  []  Patient refused to participate   []  Severe mental or physical capacity, patient is in urgent emergent situation and to complete the questionnaire would jeopardize the patient's health status        Functional Questionnaire (4/15/21)  [x]  Patient completed the functional outcome questionnaire and deficiencies were addressed in the plan of care. ()   []  Patient completed the outcome questionnaire and there were no functional deficits identified and therefore no plan of care is required. ()   []  Patient did not complete the functional outcome questionnaire and the reason why is documented in the medical chart. ()  []  Patient refused to participate   []  Patient unable to complete the questionnaire   []   A functional outcome assessment has been reported on within the last 30 days        Falls (4/15/21)  [x]  The patient has had no falls or 1 fall without injury in the past year. (1101F)   []  There is no documentation of fall in the medical chart (1101F,8P)     [] The patient has had 2 or more falls or one fall with injury in the past year that is documented in the medical chart. (1100F)  []  A falls risk assessment was completed and documented in the medical chart. (7200J)   []  Falls were addressed in the plan of care. (9058W)   []  A falls risk assessment was not completed and documented in the medical chart (7243P,4T)   []   Falls were not addressed in the plan of care and reason was documented in the plan of care. (9414V 1P)        Medication (4/15/21)     [x] A list of current medications (including prescription, over the counter, herbal supplements, vitamin supplements, mineral supplements, dietary supplements) was reviewed with the patient and documented in the medical chart. ()        Falls Risk Assessment (30 days): (4/15/21)  [x] Falls Risk assessed and no intervention required.   [] Falls Risk assessed and Patient requires intervention due to being higher risk   TUG score (>12s at risk):     [] Falls education provided, including     PQRS:   Reviewed medication list with patient. No changes reported by pt since last PT visit. (5/11/21)          Latex Allergy:  [x]NO      []YES  Preferred Language for Healthcare:   [x]English       []other:      Pain level:  0-2/10 (5/11/21)   Medication:  Celebrex      SUBJECTIVE:  Pt states she is feeling better this week and is not as tired as last week. MRI results were inconclusive for both her wrists (MRI films were too blurry) and she is waiting to hear what needs to be done next. She has occasional pain in her left knee but it's inconsistent and never goes above 2/10. Her knees still grind when getting up from a chair. OBJECTIVE:       (4/15/21)  Flexibility L R Comment   Hamstring Mod tightness Mod tightness    Gastroc Mod tightness Mod tightness    ITB      Quad              (5/11/21)  ROM PROM AROM Overpressure Comment    L R L R L R    Flexion 118 with SP  100 100      Extension   0 0                            (4/15/21)  Strength L R Comment   Quad 4/5 4/5    Hamstring 4/5 4/5    Gastroc 4/5 4/5    Hip Flex 3+/5 3+/5    Hip Abd 3+/5 3+/5    Hip Add 4-/5 4-/5    Glut Med              (4/15/21)  Girth L R   Mid Patella 55.0 cm 58.5 cm   Suprapatellar     5cm above     15cm above       (4/15/21)  Special Test Results/Comment   Meniscal Click    Crepitus (+) PF crepitus bilaterally   Flexion Test    Valgus Laxity    Varus Laxity    Lachmans    Drop Back        Reflexes/Sensation: (4/15/21)   []Dermatomes/Myotomes intact    []Reflexes equal and normal bilaterally   [x]Other: Intact to light touch    Joint mobility: (4/15/21)   []Normal    [x]Hypo   []Hyper    Palpation: Some joint line tenderness on the left.   Quad atrophy present on the left vs right.(4/15/21)    Functional Mobility/Transfers: Difficulty with stairs (pt must ascend/descend stairs 1 at a time using HR), unable to walk more than 1 block, unable to stand more than 10-15 minutes, pt has to use her hands to get up from a chair. Unable to kneel or squat. (4/15/21)    Posture: Forward head, rounded shoulders. (4/15/21)    Bandages/Dressings/Incisions: Pt wears compression hose and wraps on both legs (4/15/21)     Gait: (include devices/WB status) Antalgic gait, no AD. She demonstrates decreased pawel, decreased step length bilaterally, increased lateral trunk sway, no trunk rotation, and no arm swing on the left. She also has difficulty walking in a straight line. (4/15/21)    Orthopedic Special Tests: See above.       RESTRICTIONS/PRECAUTIONS:     Exercises/Interventions:     Therapeutic Ex (30952) Sets/sec Reps Notes/CUES   BIKE      TREADMILL            STRETCHING      Towel Pull Calf 30\" hold X 3    Inclined Calf      Hamstrings 30\" hold X 3 Seated   Quads      Hip Flexors      ITB      Adductors            ROM      Passive      Active      Weight Hangs      Sheet Pulls 10\" hold X 5    Ankle Pumps      ERMI            STRENGTHENING      Quad Sets 10\" hold X 10    Ham Sets      Hip ADD Sets BS 10\" hold X 10     SLR Flexion 3 sets X 10 with 1#    SLR Abduction 3 sets X 10     SLR Prone      SLR Adduction      SLR+      Supine Hip Abduction 3 sets X 10  Black band         Standing Marches 3 sets X 10 Standing at half wall         PRE Machines      Knee Extension      Knee Flexion      Leg Press            CKC      Calf Raises 3 sets X 10 Seated   Mini Squats      Wall Sits      Step ups - no riser 3 sets X 10 Using half wall   TKE 2 sets X 10 with black band                Manual Intervention (30037)      Patellar Mobs      Femoral Tibial mobs      STM      Scar Massage      Foam Roll            NMR re-education (23091)   CUES NEEDED   Biodex Balance      Tandem Balance 30\" hold X 2 each *Feet staggered  *Standing next to half wall   SLB      Rebounder      BOSU            Sit -> Stand  X 15 With cushion on chair  No arms               Therapeutic Activity (99722)      Core Training      [facility]      [facility] Walks      TRX training                  Patient Education: Dx, prognosis, pt goals/expectations, role of therapy (4/15/21)  X 8'                  Therapeutic Exercise and NMR EXR  [x] (93400) Provided verbal/tactile cueing for activities related to strengthening, flexibility, endurance, ROM for improvements in LE, proximal hip, and core control with self care, mobility, lifting, ambulation.  [] (90161) Provided verbal/tactile cueing for activities related to improving balance, coordination, kinesthetic sense, posture, motor skill, proprioception to assist with LE, proximal hip, and core control in self-care, mobility, lifting, ambulation and eccentric single leg control. NMR and Therapeutic Activities:    [x] (94785 or 19865) Provided verbal/tactile cueing for activities related to improving balance, coordination, kinesthetic sense, posture, motor skill, proprioception and motor activation to allow for proper function of core, proximal hip and LE with self-care and ADLs and functional mobility.   [] (51729) Gait Re-education- Provided training and instruction to the patient for proper LE, core and proximal hip recruitment and positioning and eccentric body weight control with ambulation re-education including up and down stairs     Home Exercise Program:    [x] (59230) Reviewed/Progressed HEP activities related to strengthening, flexibility, endurance, ROM of core, proximal hip and LE for functional self-care, mobility, lifting and ambulation/stair navigation - Updated HEP through [address] [place] [address] (see below). Pt was also issued new written handouts.  (4/27/21)  [] (39735) Reviewed/Progressed HEP activities related to improving balance, coordination, kinesthetic sense, posture, motor skill, proprioception of core, proximal hip and LE for self-care, mobility, lifting, and ambulation/stair navigation          Access Code: [email or web address]  URL: (un) (53220):         ASSESSMENT:  Pt was not as fatigued with the exercises today but her program is still very challenging with rest breaks needed. She had difficulty with the first rep with tandem stance today but had no problems thereafter with no UE touches needed. Flexion SLR with 1# wt remains challenging but she was able to complete all reps with good form. Step ups are also still challenging with no riser with pt needing to use half wall. MEDICARE CAP EXCEPTION DOCUMENTATION      I certify that this patient meets one of the below criteria necessary for becoming an exception to the Medicare cap on therapy services:    [x]  The patient has a condition identified by an ICD-9 code that has a direct and significant impact on the need for therapy. (Significantly impacts the rate of recovery.)                     []  The patient has a complexity identified by an ICD-9 code that has a direct and significant impact on the need for therapy. (Significantly impacts the rate of recovery and is associated with a primary condition.)         []  The patient has associated variables that influence the amount of treatment to include:  Social support, self-efficacy/motivation, prognosis, time since onset/acuity. []  The patient has generalized musculoskeletal conditions or a condition affecting multiple sites that will have a direct impact on the rate of recovery. [x]  The patient had a prior episode of outpatient therapy during this calendar year for a different condition. []  The patient has a mental or cognitive disorder in addition to the condition being treated that will have a direct and significant impact on the rate of recovery. GOALS: (Goals made 4/15/21)  Patient stated goal:  \"Make my legs stronger; walk and climb stairs easier and faster. \"  [] Progressing: [] Met: [] Not Met: [] Adjusted    Therapist goals for Patient:   Short Term Goals:  To be achieved in: 2 weeks  1. Independent in HEP and progression per patient tolerance, in order to prevent re-injury. [] Progressing: [] Met: [] Not Met: [] Adjusted  2. Patient will have a decrease in left knee pain to 1-2/10 to facilitate improvement in movement, function, and ADLs as indicated by Functional Deficits. [] Progressing: [] Met: [] Not Met: [] Adjusted    Long Term Goals: To be achieved in: 4-6 weeks  1. Disability index score of 20% or less for the Knee Outcome Survey Activities of Daily Living Scale to assist with reaching prior level of function. [] Progressing: [] Met: [] Not Met: [] Adjusted  2. Patient will demonstrate an increase in left knee flexion AROM to at least 115 degrees to allow for proper joint functioning as indicated by patients Functional Deficits. [] Progressing: [] Met: [] Not Met: [] Adjusted  3. Patient will demonstrate an increase in left hip and knee strength to at least 4+/5 to allow for proper functional mobility as indicated by patients Functional Deficits. [] Progressing: [] Met: [] Not Met: [] Adjusted  4. Patient will be able to walk community distances, stand for at least 30 minutes, and be able to get up from a chair with little to no UE assistance needed for improved function. [] Progressing: [] Met: [] Not Met: [] Adjusted  5. Patient will be able to ascend/descend stairs reciprocally with HR (patient specific functional goal)    [] Progressing: [] Met: [] Not Met: [] Adjusted   6. Patient will have a decrease in left knee pain to 0-1/10 with daily act. [] Progressing: [] Met: [] Not Met: [] Adjusted          Overall Progression Towards Functional goals/ Treatment Progress Update:  [] Patient is progressing as expected towards functional goals listed. [] Progression is slowed due to complexities/Impairments listed. [] Progression has been slowed due to co-morbidities.   [x] Plan just implemented, too soon to assess goals progression <30days   [] Goals require adjustment due to lack of progress  [] Patient is not progressing as expected and requires additional follow up with physician  [] Other    Prognosis for POC: [x] Good [] Fair  [] Poor      Patient requires continued skilled intervention: [x] Yes  [] No    Treatment/Activity Tolerance:  [] Patient able to complete treatment  [x] Patient limited by fatigue  [] Patient limited by pain    [] Patient limited by other medical complications  [] Other:         PLAN: Continue therapy to improve ROM, strength, and balance. Re-assess next visit. 2x/week for 4-6 weeks for ROM, strengthening, balance act, HEP instruction, pt education, manual therapy prn, and modalities prn (4/15/21)  [x] Continue per plan of care [] Alter current plan   [] Plan of care initiated [] Hold pending MD visit [] Discharge      Electronically signed by:  Mary Carter, PT     Physical Therapist [address] #[id number]  Physical Therapist [place] License #[id number]    Note: If patient does not return for scheduled/ recommended follow up visits, this note will serve as a discharge from care along with most recent update on progress.

## 2021-05-13 ENCOUNTER — TREATMENT (OUTPATIENT)
Dept: PHYSICAL THERAPY | Age: 71
End: 2021-05-13
Payer: MEDICARE

## 2021-05-13 DIAGNOSIS — R29.898 WEAKNESS OF LEFT LOWER EXTREMITY: ICD-10-CM

## 2021-05-13 DIAGNOSIS — M17.12 PRIMARY OSTEOARTHRITIS OF LEFT KNEE: ICD-10-CM

## 2021-05-13 DIAGNOSIS — M25.662 DECREASED ROM OF LEFT KNEE: ICD-10-CM

## 2021-05-13 DIAGNOSIS — M25.562 LEFT KNEE PAIN, UNSPECIFIED CHRONICITY: Primary | ICD-10-CM

## 2021-05-13 PROCEDURE — 97530 THERAPEUTIC ACTIVITIES: CPT | Performed by: PHYSICAL THERAPIST

## 2021-05-13 PROCEDURE — 1101F PT FALLS ASSESS-DOCD LE1/YR: CPT | Performed by: PHYSICAL THERAPIST

## 2021-05-13 PROCEDURE — G8427 DOCREV CUR MEDS BY ELIG CLIN: HCPCS | Performed by: PHYSICAL THERAPIST

## 2021-05-13 PROCEDURE — 97112 NEUROMUSCULAR REEDUCATION: CPT | Performed by: PHYSICAL THERAPIST

## 2021-05-13 PROCEDURE — G8539 DOC FUNCT AND CARE PLAN: HCPCS | Performed by: PHYSICAL THERAPIST

## 2021-05-13 PROCEDURE — 97110 THERAPEUTIC EXERCISES: CPT | Performed by: PHYSICAL THERAPIST

## 2021-05-13 NOTE — PROGRESS NOTES
[facility]   Phone: [phone]    Fax: [phone]     Physical Therapy Re-Certification Plan of Care    Dear  Dr. Stephani Ennis,    We had the pleasure of treating the following patient for physical therapy services at [address] [place] [address]. A summary of our findings can be found in the updated assessment below. This includes our plan of care. If you have any questions or concerns regarding these findings, please do not hesitate to contact me at the office phone number checked above.   Thank you for the referral.     Physician Signature:________________________________Date:__________________  By signing above (or electronic signature), therapists plan is approved by physician      Overall Response to Treatment:   [x]Patient is responding well to treatment and improvement is noted with regards to goals   []Patient should continue to improve in reasonable time if they continue HEP   []Patient has plateaued and is no longer responding to skilled PT intervention    []Patient is getting worse and would benefit from return to referring MD   []Patient unable to adhere to initial POC   []Other:       Physical Therapy Treatment Note/ Progress Report:           Date:  2021    Patient Name:  Clifford Hathaway    :  1950  MRN: [id number]  Restrictions/Precautions:    Medical/Treatment Diagnosis Information:  Diagnosis: Left Knee Pain (M25.562), Primary OA of Left Knee (M17.12)   Treatment Diagnosis: Decreased Left Knee ROM (M25.662), Decreased Strength (R29.898)        Insurance/Certification information:  PT Insurance Information: Medicare - Visits based on medical necessity  Physician Information:  Referring Practitioner: Dr. Stephani Ennis  Has the plan of care been signed (Y/N):        [x]  Yes (POC signed 21)  []  No      Date of Patient follow up with Physician: As needed      Is this a Progress Report:     [x]  Yes  []  No        If Yes:  Date Range had no falls or 1 fall without injury in the past year. (1101F)   []  There is no documentation of fall in the medical chart (1101F,8P)     [] The patient has had 2 or more falls or one fall with injury in the past year that is documented in the medical chart. (1100F)  []  A falls risk assessment was completed and documented in the medical chart. (0118C)   []  Falls were addressed in the plan of care. (2525G)   []  A falls risk assessment was not completed and documented in the medical chart (2947H,9Q)   []   Falls were not addressed in the plan of care and reason was documented in the plan of care. (1570Z 1P)        Medication (5/13/21)     [x] A list of current medications (including prescription, over the counter, herbal supplements, vitamin supplements, mineral supplements, dietary supplements) was reviewed with the patient and documented in the medical chart. ()        Falls Risk Assessment (30 days): (5/13/21)  [x] Falls Risk assessed and no intervention required. [] Falls Risk assessed and Patient requires intervention due to being higher risk   TUG score (>12s at risk):     [] Falls education provided, including     PQRS:   Reviewed medication list with patient. No changes reported by pt since last PT visit. (5/13/21)          Latex Allergy:  [x]NO      []YES  Preferred Language for Healthcare:   [x]English       []other:      Pain level:  0-2/10 (5/13/21)   Medication:  Celebrex      SUBJECTIVE:  Pt states she was in and out of the car all day yesterday and she feels stiff and sore all over today. She c/o pain on the outside of her left knee and in the front of her right knee today which she attributes to getting in/out of the car. Both knees feel stronger and getting up from a chair and in/out bed is getting easier. Both knees still grind when getting up from a chair.           OBJECTIVE:       (5/13/21)  Flexibility L R Comment   Hamstring Mod tightness Mod tightness    Gastroc Mild tightness Mild tightness    ITB      Quad              (5/13/21)  ROM PROM AROM Overpressure Comment    L R L R L R    Flexion 120 with SP  110 110      Extension   0 0                            (5/13/21)  Strength L R Comment   Quad 4/5 4/5    Hamstring 4+/5 4+/5    Gastroc 4/5 4/5    Hip Flex 4-/5 4-/5    Hip Abd 4-/5 4-/5    Hip Add 4/5 4/5    Glut Med              (5/13/21)  Girth L R   Mid Patella 55.0 cm 58.0 cm   Suprapatellar     5cm above     15cm above       (5/13/21)  Special Test Results/Comment   Meniscal Click    Crepitus (+) PF crepitus bilaterally   Flexion Test    Valgus Laxity    Varus Laxity    Lachmans    Drop Back        Reflexes/Sensation: (4/15/21)   []Dermatomes/Myotomes intact    []Reflexes equal and normal bilaterally   [x]Other: Intact to light touch    Joint mobility: (5/13/21)   []Normal    [x]Hypo   []Hyper    Palpation: Joint line tenderness still present on the left. Quad atrophy present on the left vs right.(5/13/21)    Functional Mobility/Transfers: Stairs are still difficult (pt must ascend/descend stairs 1 at a time using HR), she is still limited with standing and walking, she still has to use her hands to get up from a chair. Unable to kneel or squat. (5/13/21)    Posture: Forward head, rounded shoulders. (5/13/21)    Bandages/Dressings/Incisions: Pt wears compression hose and wraps on both legs (5/13/21)     Gait: (include devices/WB status) Antalgic, stiff leg gait, no AD. She continues to demonstrates decreased pawel, she still demonstrates increased lateral trunk sway, very little trunk rotation, and no arm swing on the left. Step length has increased some. (5/13/21)    Orthopedic Special Tests: See above.       RESTRICTIONS/PRECAUTIONS:     Exercises/Interventions:     Therapeutic Ex (44496) Sets/sec Reps Notes/CUES   BIKE      TREADMILL            STRETCHING      Towel Pull Calf 30\" hold X 3    Inclined Calf      Hamstrings 30\" hold X 3 Seated   Quads      Hip Flexors ITB      Adductors            ROM      Passive      Active      Weight Hangs      Sheet Pulls 10\" hold X 5    Ankle Pumps      ERMI            STRENGTHENING      Quad Sets 10\" hold X 10    Ham Sets      Hip ADD Sets BS 10\" hold X 10     SLR Flexion 3 sets X 10 with 1#    SLR Abduction 3 sets X 10     SLR Prone      SLR Adduction      SLR+      Supine Hip Abduction 3 sets X 10  Black band         Standing Marches 3 sets X 10 Standing at half wall   Standing Knee Flexion 3 sets X 10 Standing at half wall         PRE Machines      Knee Extension      Knee Flexion      Leg Press            CKC      Calf Raises 3 sets X 10 Seated   Mini Squats      Wall Sits      Step ups - no riser 3 sets X 10 Using half wall   TKE 2 sets X 10 with black band                Manual Intervention (46048)      Patellar Mobs      Femoral Tibial mobs      STM      Scar Massage      Foam Roll            NMR re-education (88383)   CUES NEEDED   Biodex Balance      Tandem Balance 30\" hold X 2 each *Feet staggered  *Standing next to half wall   SLB      Rebounder      BOSU            Sit -> Stand  X 15 With cushion on chair  No arms               Therapeutic Activity (05145)      Core Training      [employer] Activities      Monster Walks      TRX training                  Patient Education: Dx, prognosis, pt goals/expectations, role of therapy (4/15/21)  X 8'                  Therapeutic Exercise and NMR EXR  [x] (98062) Provided verbal/tactile cueing for activities related to strengthening, flexibility, endurance, ROM for improvements in LE, proximal hip, and core control with self care, mobility, lifting, ambulation.  [] (67105) Provided verbal/tactile cueing for activities related to improving balance, coordination, kinesthetic sense, posture, motor skill, proprioception to assist with LE, proximal hip, and core control in self-care, mobility, lifting, ambulation and eccentric single leg control.      NMR and Therapeutic Activities:    [x] (74190 or ) Provided verbal/tactile cueing for activities related to improving balance, coordination, kinesthetic sense, posture, motor skill, proprioception and motor activation to allow for proper function of core, proximal hip and LE with self-care and ADLs and functional mobility.   [] (63716) Gait Re-education- Provided training and instruction to the patient for proper LE, core and proximal hip recruitment and positioning and eccentric body weight control with ambulation re-education including up and down stairs     Home Exercise Program:    [x] (48603) Reviewed/Progressed HEP activities related to strengthening, flexibility, endurance, ROM of core, proximal hip and LE for functional self-care, mobility, lifting and ambulation/stair navigation - Updated HEP through [address] [place] [address] (see below). Pt was also issued new written handouts. (4/27/21)  [] (14268) Reviewed/Progressed HEP activities related to improving balance, coordination, kinesthetic sense, posture, motor skill, proprioception of core, proximal hip and LE for self-care, mobility, lifting, and ambulation/stair navigation          Access Code: [email or web address]  URL: [employer]/Date: 04/20/2021Prepared by: [email or web address] SybertExercises   Long Sitting Calf Stretch with Strap - 1 x daily - 7 x weekly - 1 sets - 3 reps - 30 seconds hold   Seated Table Hamstring Stretch - 1 x daily - 7 x weekly - 1 sets - 3 reps - 30 seconds hold   Long Sitting Quad Set - 1 x daily - 7 x weekly - 1 sets - 10 reps - 10 seconds hold   Long Sitting Hip Adduction Isometric with Ball - 1 x daily - 7 x weekly - 1 sets - 10 reps - 10 seconds hold   Supine Active Straight Leg Raise - 1 x daily - 7 x weekly - 2-3 sets - 10 reps   Hooklying Isometric Clamshell - 1 x daily - 7 x weekly - 3 sets - 10 reps   Standing March with Counter Support - 1 x daily - 7 x weekly - 3 sets - 10 reps   Seated Heel Raise - 1 x daily - 7 x weekly - 3 sets - 10 reps   Sit to Stand - 1 x daily - 7 x weekly - 15 reps   Tandem Stance with Support - 1 x daily - 7 x weekly - 2 reps - 20 seconds hold        Manual Treatments:  PROM / STM / Oscillations-Mobs:  G-I, II, III, IV (PA's, Inf., Post.)  [] (10137) Provided manual therapy to mobilize LE, proximal hip and/or LS spine soft tissue/joints for the purpose of modulating pain, promoting relaxation, increasing ROM, reducing/eliminating soft tissue swelling/inflammation/restriction, improving soft tissue extensibility and allowing for proper ROM for normal function with self-care, mobility, lifting and ambulation. Modalities:  Pt declined ice   [] GAME READY (VASO)- for significant edema, swelling, pain control. Charges:  Timed Code Treatment Minutes: 61'   Total Treatment Minutes: 61'      [] EVAL (LOW) 62263 (typically 20 minutes face-to-face)  [] EVAL (MOD) 72341 (typically 30 minutes face-to-face)  [] EVAL (HIGH) 92060 (typically 45 minutes face-to-face)  [] RE-EVAL     [x] BL(92406) x 2 (30')    [] IONTO  [x] NMR (13017) x  1 (15')   [] VASO  [] Manual (98819) x      [] Other:  [x] TA x 1 (15')      [] Mech Traction (41021)  [] ES(attended) (48612)      [] ES (un) (05431):         ASSESSMENT:  Upon re-assessment today, pt demonstrated improved ROM and strength in bilateral LE's. However, strength deficits are still present and her endurance is still low which is limiting her ability to stand for long periods, walk long distances, and ascend/descend stairs. She continues to be challenged with the exercises with rest breaks needed during her program.  Her balance has also improved and she is walking a little better but several gait deviations are still present. Pt would continue to benefit from skilled therapy to increase strength and endurance for improved function and gait.              MEDICARE CAP EXCEPTION DOCUMENTATION      I certify that this patient meets one of the below criteria necessary for becoming an exception to the Medicare cap on demonstrate an increase in left knee flexion AROM to at least 115 degrees to allow for proper joint functioning as indicated by patients Functional Deficits. [x] Progressing: [] Met: [] Not Met: [] Adjusted  3. Patient will demonstrate an increase in left hip and knee strength to at least 4+/5 to allow for proper functional mobility as indicated by patients Functional Deficits. [x] Progressing: [] Met: [] Not Met: [] Adjusted  4. Patient will be able to walk community distances, stand for at least 30 minutes, and be able to get up from a chair with little to no UE assistance needed for improved function. [x] Progressing: [] Met: [] Not Met: [] Adjusted  5. Patient will be able to ascend/descend stairs reciprocally with HR (patient specific functional goal)    [x] Progressing: [] Met: [] Not Met: [] Adjusted   6. Patient will have a decrease in left knee pain to 0-1/10 with daily act. [x] Progressing: [] Met: [] Not Met: [] Adjusted          Overall Progression Towards Functional goals/ Treatment Progress Update:  [x] Patient is progressing as expected towards functional goals listed. [] Progression is slowed due to complexities/Impairments listed. [] Progression has been slowed due to co-morbidities. [] Plan just implemented, too soon to assess goals progression <30days   [] Goals require adjustment due to lack of progress  [] Patient is not progressing as expected and requires additional follow up with physician  [] Other    Prognosis for POC: [x] Good [] Fair  [] Poor      Patient requires continued skilled intervention: [x] Yes  [] No    Treatment/Activity Tolerance:  [] Patient able to complete treatment  [x] Patient limited by fatigue  [] Patient limited by pain    [] Patient limited by other medical complications  [] Other:         PLAN: Continue therapy to improve ROM, strength, and balance.       2x/week for 4 more weeks for ROM, strengthening, balance act, HEP instruction, pt education, manual therapy prn, and modalities prn (5/13/21)  [x] Continue per plan of care [] Alter current plan   [] Plan of care initiated [] Hold pending MD visit [] Discharge      Electronically signed by:  Yoana Scott, PT     Physical Therapist [facility] [id number]  Physical Therapist [facility] License #[id number]    Note: If patient does not return for scheduled/ recommended follow up visits, this note will serve as a discharge from care along with most recent update on progress.

## 2021-05-18 ENCOUNTER — TREATMENT (OUTPATIENT)
Dept: PHYSICAL THERAPY | Age: 71
End: 2021-05-18

## 2021-05-18 DIAGNOSIS — M17.12 PRIMARY OSTEOARTHRITIS OF LEFT KNEE: ICD-10-CM

## 2021-05-18 DIAGNOSIS — M25.662 DECREASED ROM OF LEFT KNEE: ICD-10-CM

## 2021-05-18 DIAGNOSIS — M25.562 LEFT KNEE PAIN, UNSPECIFIED CHRONICITY: Primary | ICD-10-CM

## 2021-05-18 DIAGNOSIS — R29.898 WEAKNESS OF LEFT LOWER EXTREMITY: ICD-10-CM

## 2021-05-18 NOTE — PROGRESS NOTES
[facility]   Phone: [phone]    Fax: [phone]            Physical Therapy  Cancellation/No-show Note  Patient Name:  Rowdy Peralta  :  1950   Date:  2021  Cancelled visits to date: 1  No-shows to date: 0    For today's appointment patient:  [x]  Cancelled  []  Rescheduled appointment  []  No-show     Reason given by patient:  []  Patient ill  []  Conflicting appointment  []  No transportation    []  Conflict with work  []  No reason given  [x]  Other:     Comments: Pt pulled a muscle in her right leg and wants to hold off on therapy today. Phone call information:   []  Phone call made today to patient at _ time at number provided:      []  Patient answered, conversation as follows:    []  Patient did not answer, message left as follows:  []  Phone call not made today  [x]  Phone call not needed - pt contacted us to cancel and provided reason for cancellation.      Electronically signed by:  Robert Vargas PT     Physical Therapist [facility] [id number]  Physical Therapist [place] License #[id number]

## 2021-05-20 ENCOUNTER — TREATMENT (OUTPATIENT)
Dept: PHYSICAL THERAPY | Age: 71
End: 2021-05-20
Payer: MEDICARE

## 2021-05-20 DIAGNOSIS — R29.898 WEAKNESS OF LEFT LOWER EXTREMITY: ICD-10-CM

## 2021-05-20 DIAGNOSIS — M17.12 PRIMARY OSTEOARTHRITIS OF LEFT KNEE: ICD-10-CM

## 2021-05-20 DIAGNOSIS — M25.662 DECREASED ROM OF LEFT KNEE: ICD-10-CM

## 2021-05-20 DIAGNOSIS — M25.562 LEFT KNEE PAIN, UNSPECIFIED CHRONICITY: Primary | ICD-10-CM

## 2021-05-20 PROCEDURE — 97110 THERAPEUTIC EXERCISES: CPT | Performed by: PHYSICAL THERAPIST

## 2021-05-20 PROCEDURE — 97530 THERAPEUTIC ACTIVITIES: CPT | Performed by: PHYSICAL THERAPIST

## 2021-05-20 PROCEDURE — 97112 NEUROMUSCULAR REEDUCATION: CPT | Performed by: PHYSICAL THERAPIST

## 2021-05-20 PROCEDURE — G8427 DOCREV CUR MEDS BY ELIG CLIN: HCPCS | Performed by: PHYSICAL THERAPIST

## 2021-05-20 NOTE — PROGRESS NOTES
[facility]   Phone: [phone]    Fax: [phone]       Physical Therapy Treatment Note/ Progress Report:           Date:  2021    Patient Name:  Clifford Hathaway    :  1950  MRN: [id number]  Restrictions/Precautions:    Medical/Treatment Diagnosis Information:  Diagnosis: Left Knee Pain (M25.562), Primary OA of Left Knee (M17.12)   Treatment Diagnosis: Decreased Left Knee ROM (M25.662), Decreased Strength (R29.898)        Insurance/Certification information:  PT Insurance Information: Medicare - Visits based on medical necessity  Physician Information:  Referring Practitioner: Dr. Stephani Ennis  Has the plan of care been signed (Y/N):        [x]  Yes (POC signed 21)  []  No      Date of Patient follow up with Physician: As needed      Is this a Progress Report:     []  Yes  [x]  No        If Yes:  Date Range for reporting period:  Beginning 4/15/21  Ending 21    Progress report will be due (10 Rx or 30 days whichever is less):  24      Recertification will be due (POC Duration  / 90 days whichever is less):  6/10/21        Visit # Insurance Allowable Auth Required   10 Medical necessity  (pt has already used 13 visits this year for her shoulder) []  Yes [x]  No        Functional Scale:    Date assessed:  21  Functional Assessment Tool Used: Knee Outcome Survey Activities of Daily Living Scale (copy scanned into media)  Score:  42/70 = 60% (40% functional deficit)    (4/15/21)  Patient Age: 66years old  Patient Height: 5' 2\"  Patient Weight: 280 lbs  Patient BMI: 51.2      Pain (21)  [x]  Pain diagram was completed, pain was present and a follow up plan to address the pain is in the plan of care. ()   []  Pain diagram was completed and there was no pain present and therefore no follow up plan to address pain in the plan of care.  ()   []  Pain diagram was not completed and the reason for not completing the pain diagram is in the medical chart ()  []  Patient refused to participate   []  Severe mental or physical capacity, patient is in urgent emergent situation and to complete the questionnaire would jeopardize the patient's health status        Functional Questionnaire (5/13/21)  [x]  Patient completed the functional outcome questionnaire and deficiencies were addressed in the plan of care. ()   []  Patient completed the outcome questionnaire and there were no functional deficits identified and therefore no plan of care is required. ()   []  Patient did not complete the functional outcome questionnaire and the reason why is documented in the medical chart. ()  []  Patient refused to participate   []  Patient unable to complete the questionnaire   []   A functional outcome assessment has been reported on within the last 30 days        Falls (5/13/21)  [x]  The patient has had no falls or 1 fall without injury in the past year. (1101F)   []  There is no documentation of fall in the medical chart (1101F,8P)     [] The patient has had 2 or more falls or one fall with injury in the past year that is documented in the medical chart. (1100F)  []  A falls risk assessment was completed and documented in the medical chart. (1999O)   []  Falls were addressed in the plan of care. (0651S)   []  A falls risk assessment was not completed and documented in the medical chart (5971L,4R)   []   Falls were not addressed in the plan of care and reason was documented in the plan of care. (7534V 1P)        Medication (5/13/21)     [x] A list of current medications (including prescription, over the counter, herbal supplements, vitamin supplements, mineral supplements, dietary supplements) was reviewed with the patient and documented in the medical chart. ()        Falls Risk Assessment (30 days): (5/13/21)  [x] Falls Risk assessed and no intervention required.   [] Falls Risk assessed and Patient requires intervention due to being higher Functional Mobility/Transfers: Stairs are still difficult (pt must ascend/descend stairs 1 at a time using HR), she is still limited with standing and walking, she still has to use her hands to get up from a chair. Unable to kneel or squat. (5/13/21)    Posture: Forward head, rounded shoulders. (5/13/21)    Bandages/Dressings/Incisions: Pt wears compression hose and wraps on both legs (5/13/21)     Gait: (include devices/WB status) Antalgic, stiff leg gait, no AD. She continues to demonstrates decreased pawel, she still demonstrates increased lateral trunk sway, very little trunk rotation, and no arm swing on the left. Step length has increased some. (5/13/21)    Orthopedic Special Tests: See above.       RESTRICTIONS/PRECAUTIONS:     Exercises/Interventions:     Therapeutic Ex (65881) Sets/sec Reps Notes/CUES   BIKE      TREADMILL            STRETCHING      Towel Pull Calf 30\" hold X 3    Inclined Calf      Hamstrings 30\" hold X 3 Seated   Quads      Hip Flexors      ITB      Adductors            ROM      Passive      Active      Weight Hangs      Sheet Pulls 10\" hold X 10    Ankle Pumps      ERMI            STRENGTHENING      Quad Sets 10\" hold X 10    Ham Sets      Hip ADD Sets BS 10\" hold X 10     SLR Flexion  Held today due to pt feeling pain in her right hamstring   SLR Abduction 3 sets X 10     SLR Prone      SLR Adduction      SLR+      Supine Hip Abduction  Held today due to pt feeling pain in her right hamstring      Standing Marches 3 sets X 10  Standing at half wall   Standing Knee Flexion 3 sets X 10 Standing at half wall         PRE Machines      Knee Extension      Knee Flexion      Leg Press            CKC      Calf Raises 3 sets X 10 Seated   Mini Squats      Wall Sits      Step ups - no riser 3 sets X 10 Using half wall   TKE 2 sets X 10 with black band                Manual Intervention (90062)      Patellar Mobs      Femoral Tibial mobs      STM      Scar Massage      Foam Roll NMR re-education (26513)   CUES NEEDED   Biodex Balance      Tandem Balance  Held due to pt pulling her right hamstringSLB      Rebounder      BOSU            Sit -> Stand   Held due to pt pulling her right hamstring            Therapeutic Activity (60052)      Core Training      [place] [address]      TRX training                  Patient Education: Dx, prognosis, pt goals/expectations, role of therapy (4/15/21)  X 8'                  Therapeutic Exercise and NMR EXR  [x] (95115) Provided verbal/tactile cueing for activities related to strengthening, flexibility, endurance, ROM for improvements in LE, proximal hip, and core control with self care, mobility, lifting, ambulation.  [] (63784) Provided verbal/tactile cueing for activities related to improving balance, coordination, kinesthetic sense, posture, motor skill, proprioception to assist with LE, proximal hip, and core control in self-care, mobility, lifting, ambulation and eccentric single leg control. NMR and Therapeutic Activities:    [x] (77741 or 33266) Provided verbal/tactile cueing for activities related to improving balance, coordination, kinesthetic sense, posture, motor skill, proprioception and motor activation to allow for proper function of core, proximal hip and LE with self-care and ADLs and functional mobility.   [] (46888) Gait Re-education- Provided training and instruction to the patient for proper LE, core and proximal hip recruitment and positioning and eccentric body weight control with ambulation re-education including up and down stairs     Home Exercise Program:    [x] (89039) Reviewed/Progressed HEP activities related to strengthening, flexibility, endurance, ROM of core, proximal hip and LE for functional self-care, mobility, lifting and ambulation/stair navigation - Updated HEP through Leilani Villareal (see below). Pt was also issued new written handouts.  (4/27/21)  [] (77981) Reviewed/Progressed HEP activities related to improving balance, coordination, kinesthetic sense, posture, motor skill, proprioception of core, proximal hip and LE for self-care, mobility, lifting, and ambulation/stair navigation          Access Code: [email or web address]  URL: [employer]/Date: 04/20/2021Prepared by: [email or web address] SybertExercises   Long Sitting Calf Stretch with Strap - 1 x daily - 7 x weekly - 1 sets - 3 reps - 30 seconds hold   Seated Table Hamstring Stretch - 1 x daily - 7 x weekly - 1 sets - 3 reps - 30 seconds hold   Long Sitting Quad Set - 1 x daily - 7 x weekly - 1 sets - 10 reps - 10 seconds hold   Long Sitting Hip Adduction Isometric with Ball - 1 x daily - 7 x weekly - 1 sets - 10 reps - 10 seconds hold   Supine Active Straight Leg Raise - 1 x daily - 7 x weekly - 2-3 sets - 10 reps   Hooklying Isometric Clamshell - 1 x daily - 7 x weekly - 3 sets - 10 reps   Standing March with Counter Support - 1 x daily - 7 x weekly - 3 sets - 10 reps   Seated Heel Raise - 1 x daily - 7 x weekly - 3 sets - 10 reps   Sit to Stand - 1 x daily - 7 x weekly - 15 reps   Tandem Stance with Support - 1 x daily - 7 x weekly - 2 reps - 20 seconds hold        Manual Treatments:  PROM / STM / Oscillations-Mobs:  G-I, II, III, IV (PA's, Inf., Post.)  [] (75649) Provided manual therapy to mobilize LE, proximal hip and/or LS spine soft tissue/joints for the purpose of modulating pain, promoting relaxation, increasing ROM, reducing/eliminating soft tissue swelling/inflammation/restriction, improving soft tissue extensibility and allowing for proper ROM for normal function with self-care, mobility, lifting and ambulation. Modalities:  Pt declined ice   [] GAME READY (VASO)- for significant edema, swelling, pain control.      Charges:  Timed Code Treatment Minutes: 61'   Total Treatment Minutes: 61'      [] EVAL (LOW) 84985 (typically 20 minutes face-to-face)  [] EVAL (MOD) 32887 (typically 30 minutes face-to-face)  [] EVAL (HIGH) 423 7801 (typically 45 minutes face-to-face)  [] RE-EVAL     [x] NT(01114) x 2 (30')    [] IONTO  [x] NMR (12456) x  1 (15')   [] VASO  [] Manual (58049) x      [] Other:  [x] TA x 1 (15')      [] Mech Traction (47274)  [] ES(attended) (66434)      [] ES (un) (59685):         ASSESSMENT:  Exercise program had to be modified today due to pt pulling her right hamstring last week. Held flexion and abduction SLR's, tandem stance, and sit->stand. Pt did well with remaining exercises. Rest breaks still needed during her session due to fatigue. Instructed pt to continue icing her right hamstring and using Voltaren cream.          MEDICARE CAP EXCEPTION DOCUMENTATION      I certify that this patient meets one of the below criteria necessary for becoming an exception to the Medicare cap on therapy services:    [x]  The patient has a condition identified by an ICD-9 code that has a direct and significant impact on the need for therapy. (Significantly impacts the rate of recovery.)                     []  The patient has a complexity identified by an ICD-9 code that has a direct and significant impact on the need for therapy. (Significantly impacts the rate of recovery and is associated with a primary condition.)         []  The patient has associated variables that influence the amount of treatment to include:  Social support, self-efficacy/motivation, prognosis, time since onset/acuity. []  The patient has generalized musculoskeletal conditions or a condition affecting multiple sites that will have a direct impact on the rate of recovery. [x]  The patient had a prior episode of outpatient therapy during this calendar year for a different condition. []  The patient has a mental or cognitive disorder in addition to the condition being treated that will have a direct and significant impact on the rate of recovery.                 GOALS: (Goals updated 5/13/21)  Patient stated goal:  \"Make my legs stronger; walk and climb

## 2021-06-03 ENCOUNTER — TREATMENT (OUTPATIENT)
Dept: PHYSICAL THERAPY | Age: 71
End: 2021-06-03
Payer: MEDICARE

## 2021-06-03 DIAGNOSIS — M25.562 LEFT KNEE PAIN, UNSPECIFIED CHRONICITY: Primary | ICD-10-CM

## 2021-06-03 DIAGNOSIS — M17.12 PRIMARY OSTEOARTHRITIS OF LEFT KNEE: ICD-10-CM

## 2021-06-03 DIAGNOSIS — R29.898 WEAKNESS OF LEFT LOWER EXTREMITY: ICD-10-CM

## 2021-06-03 DIAGNOSIS — M25.662 DECREASED ROM OF LEFT KNEE: ICD-10-CM

## 2021-06-03 PROCEDURE — 97530 THERAPEUTIC ACTIVITIES: CPT | Performed by: PHYSICAL THERAPIST

## 2021-06-03 PROCEDURE — 97110 THERAPEUTIC EXERCISES: CPT | Performed by: PHYSICAL THERAPIST

## 2021-06-03 PROCEDURE — G8427 DOCREV CUR MEDS BY ELIG CLIN: HCPCS | Performed by: PHYSICAL THERAPIST

## 2021-06-03 NOTE — PROGRESS NOTES
[facility]   Phone: [phone]    Fax: [phone]       Physical Therapy Treatment Note/ Progress Report:           Date:  6/3/2021    Patient Name:  Lobo Centeno    :  1950  MRN: [id number]  Restrictions/Precautions:    Medical/Treatment Diagnosis Information:  Diagnosis: Left Knee Pain (M25.562), Primary OA of Left Knee (M17.12)   Treatment Diagnosis: Decreased Left Knee ROM (M25.662), Decreased Strength (R29.898)        Insurance/Certification information:  PT Insurance Information: Medicare - Visits based on medical necessity  Physician Information:  Referring Practitioner: Dr. Bret Miranda  Has the plan of care been signed (Y/N):        [x]  Yes (POC signed 21)  []  No      Date of Patient follow up with Physician: As needed      Is this a Progress Report:     []  Yes  [x]  No        If Yes:  Date Range for reporting period:  Beginning 4/15/21  Ending 21    Progress report will be due (10 Rx or 30 days whichever is less):        Recertification will be due (POC Duration  / 90 days whichever is less):  6/10/21        Visit # Insurance Allowable Auth Required   11 Medical necessity  (pt has already used 13 visits this year for her shoulder) []  Yes [x]  No        Functional Scale:    Date assessed:  21  Functional Assessment Tool Used: Knee Outcome Survey Activities of Daily Living Scale (copy scanned into media)  Score:  42/70 = 60% (40% functional deficit)    (4/15/21)  Patient Age: 66years old  Patient Height: 5' 2\"  Patient Weight: 280 lbs  Patient BMI: 51.2      Pain (21)  [x]  Pain diagram was completed, pain was present and a follow up plan to address the pain is in the plan of care. ()   []  Pain diagram was completed and there was no pain present and therefore no follow up plan to address pain in the plan of care.  ()   []  Pain diagram was not completed and the reason for not completing the pain diagram is in the medical chart ()  []  Patient refused to participate   []  Severe mental or physical capacity, patient is in urgent emergent situation and to complete the questionnaire would jeopardize the patient's health status        Functional Questionnaire (5/13/21)  [x]  Patient completed the functional outcome questionnaire and deficiencies were addressed in the plan of care. ()   []  Patient completed the outcome questionnaire and there were no functional deficits identified and therefore no plan of care is required. ()   []  Patient did not complete the functional outcome questionnaire and the reason why is documented in the medical chart. ()  []  Patient refused to participate   []  Patient unable to complete the questionnaire   []   A functional outcome assessment has been reported on within the last 30 days        Falls (5/13/21)  [x]  The patient has had no falls or 1 fall without injury in the past year. (1101F)   []  There is no documentation of fall in the medical chart (1101F,8P)     [] The patient has had 2 or more falls or one fall with injury in the past year that is documented in the medical chart. (1100F)  []  A falls risk assessment was completed and documented in the medical chart. (0608H)   []  Falls were addressed in the plan of care. (9733T)   []  A falls risk assessment was not completed and documented in the medical chart (5513U,5W)   []   Falls were not addressed in the plan of care and reason was documented in the plan of care. (5477Q 1P)        Medication (5/13/21)     [x] A list of current medications (including prescription, over the counter, herbal supplements, vitamin supplements, mineral supplements, dietary supplements) was reviewed with the patient and documented in the medical chart. ()        Falls Risk Assessment (30 days): (5/13/21)  [x] Falls Risk assessed and no intervention required.   [] Falls Risk assessed and Patient requires intervention due to being higher risk   TUG score (>12s at risk):     [] Falls education provided, including     PQRS:   Reviewed medication list with patient. No changes reported by pt since last PT visit.  (6/3/21)          Latex Allergy:  [x]NO      []YES  Preferred Language for Healthcare:   [x]English       []other:      Pain level:  0-2/10 (6/3/21)   Medication:  Celebrex      SUBJECTIVE:  Pt saw her PCP on 5/21/21 and he gave her a cortisone injection in her right buttock. Her right hamstring is feeling much better but she still has to be very careful bending over to tie her shoes. She twisted her left knee getting out of the shower ~2 weeks ago and had severe pain in her left knee, rated 10/10. She had to use a walker to amb for ~10 days after the incident. She is still using the walker first thing in the morning when she gets up \"just to be safe\" but once she gets downstairs she stops using it. She rates her current pain 0-2/10. She also mentions her left knee catches sometimes when she walks. Pt states getting up from a chair has been more difficult and she is afraid her left knee is going to give way. She is using Voltaren cream on her left knee 4x/day. She has also been using Aspercreme patches which helps.       OBJECTIVE:       (5/13/21)  Flexibility L R Comment   Hamstring Mod tightness Mod tightness    Gastroc Mild tightness Mild tightness    ITB      Quad              (6/3/21)  ROM PROM AROM Overpressure Comment    L R L R L R    Flexion 110 with SP  105 110      Extension   0 0                            (5/13/21)  Strength L R Comment   Quad 4/5 4/5    Hamstring 4+/5 4+/5    Gastroc 4/5 4/5    Hip Flex 4-/5 4-/5    Hip Abd 4-/5 4-/5    Hip Add 4/5 4/5    Glut Med              (5/13/21)  Girth L R   Mid Patella 55.0 cm 58.0 cm   Suprapatellar     5cm above     15cm above       (5/13/21)  Special Test Results/Comment   Meniscal Click    Crepitus (+) PF crepitus bilaterally   Flexion Test    Valgus Laxity    Varus Laxity Lachmans    Drop Back        Reflexes/Sensation: (4/15/21)   []Dermatomes/Myotomes intact    []Reflexes equal and normal bilaterally   [x]Other: Intact to light touch    Joint mobility: (5/13/21)   []Normal    [x]Hypo   []Hyper    Palpation: Joint line tenderness still present on the left. Quad atrophy present on the left vs right.(5/13/21)    Functional Mobility/Transfers: Stairs are still difficult (pt must ascend/descend stairs 1 at a time using HR), she is still limited with standing and walking, she still has to use her hands to get up from a chair. Unable to kneel or squat. (5/13/21)    Posture: Forward head, rounded shoulders. (5/13/21)    Bandages/Dressings/Incisions: Pt wears compression hose and wraps on both legs (5/13/21)     Gait: (include devices/WB status) Antalgic, stiff leg gait, no AD. She continues to demonstrates decreased pawel, she still demonstrates increased lateral trunk sway, very little trunk rotation, and no arm swing on the left. Step length has increased some. (5/13/21)    Orthopedic Special Tests: See above.       RESTRICTIONS/PRECAUTIONS:     Exercises/Interventions:     Therapeutic Ex (86676) Sets/sec Reps Notes/CUES   BIKE      TREADMILL            STRETCHING      Towel Pull Calf 30\" hold X 3    Inclined Calf      Hamstrings 30\" hold X 3 Seated   Quads      Hip Flexors      ITB      Adductors            ROM      Passive      Active      Weight Hangs      Sheet Pulls 10\" hold X 10    Ankle Pumps      ERMI            STRENGTHENING      Quad Sets 10\" hold X 10    Ham Sets      Hip ADD Sets BS 10\" hold X 10     SLR Flexion 2 sets X 10     SLR Abduction  Held today   SLR Prone      SLR Adduction      SLR+      Supine Hip Abduction 2 sets X 10  Black band         Standing Marches  Held todayStanding Knee Flexion  Held today      PRE Machines      Knee Extension      Knee Flexion      Leg Press            CKC      Calf Raises 3 sets X 10 Seated   Mini Squats      Wall Sits 21353 (typically 20 minutes face-to-face)  [] EVAL (MOD) 97630 (typically 30 minutes face-to-face)  [] EVAL (HIGH) 40326 (typically 45 minutes face-to-face)  [] RE-EVAL     [x] HE(32056) x 2 (30')    [] IONTO  [] NMR (93573) x      [] VASO  [] Manual (65163) x      [] Other:  [x] TA x 1 (15')      [] Mech Traction (24410)  [] ES(attended) (73101)      [] ES (un) (03247):         ASSESSMENT:  Pt's exercise program had to be modified again today due to pt twisting her left knee 2 weeks ago and not being seen in therapy since 5/20/21. Held all standing/WBing exercises. Pt could only perform 2 sets with flexion SLR's and supine hip abduction with tband today due to fatigue. Left knee flexion ROM has regressed since last visit with increased stiffness present. Pt was very fatigued with the exercises today, even with modifying her program, but she reported no increase in left knee pain. Pt is having increased difficulty getting up from a chair and has to use both arms to push herself up. MEDICARE CAP EXCEPTION DOCUMENTATION      I certify that this patient meets one of the below criteria necessary for becoming an exception to the Medicare cap on therapy services:    [x]  The patient has a condition identified by an ICD-9 code that has a direct and significant impact on the need for therapy. (Significantly impacts the rate of recovery.)                     []  The patient has a complexity identified by an ICD-9 code that has a direct and significant impact on the need for therapy. (Significantly impacts the rate of recovery and is associated with a primary condition.)         []  The patient has associated variables that influence the amount of treatment to include:  Social support, self-efficacy/motivation, prognosis, time since onset/acuity. []  The patient has generalized musculoskeletal conditions or a condition affecting multiple sites that will have a direct impact on the rate of recovery.     [x] The patient had a prior episode of outpatient therapy during this calendar year for a different condition. []  The patient has a mental or cognitive disorder in addition to the condition being treated that will have a direct and significant impact on the rate of recovery. GOALS: (Goals updated 5/13/21)  Patient stated goal:  \"Make my legs stronger; walk and climb stairs easier and faster. \"  [x] Progressing: [] Met: [] Not Met: [] Adjusted    Therapist goals for Patient:   Short Term Goals: To be achieved in: 2 weeks  1. Independent in HEP and progression per patient tolerance, in order to prevent re-injury. [] Progressing: [x] Met: [] Not Met: [] Adjusted  2. Patient will have a decrease in left knee pain to 1-2/10 to facilitate improvement in movement, function, and ADLs as indicated by Functional Deficits. [] Progressing: [x] Met: [] Not Met: [] Adjusted    Long Term Goals: To be achieved in: 4-6 weeks  1. Disability index score of 20% or less for the Knee Outcome Survey Activities of Daily Living Scale to assist with reaching prior level of function. [x] Progressing: [] Met: [] Not Met: [] Adjusted  2. Patient will demonstrate an increase in left knee flexion AROM to at least 115 degrees to allow for proper joint functioning as indicated by patients Functional Deficits. [x] Progressing: [] Met: [] Not Met: [] Adjusted  3. Patient will demonstrate an increase in left hip and knee strength to at least 4+/5 to allow for proper functional mobility as indicated by patients Functional Deficits. [x] Progressing: [] Met: [] Not Met: [] Adjusted  4. Patient will be able to walk community distances, stand for at least 30 minutes, and be able to get up from a chair with little to no UE assistance needed for improved function. [x] Progressing: [] Met: [] Not Met: [] Adjusted  5.  Patient will be able to ascend/descend stairs reciprocally with HR (patient specific functional goal) [x] Progressing: [] Met: [] Not Met: [] Adjusted   6. Patient will have a decrease in left knee pain to 0-1/10 with daily act. [x] Progressing: [] Met: [] Not Met: [] Adjusted          Overall Progression Towards Functional goals/ Treatment Progress Update:  [x] Patient is progressing as expected towards functional goals listed. [] Progression is slowed due to complexities/Impairments listed. [] Progression has been slowed due to co-morbidities. [] Plan just implemented, too soon to assess goals progression <30days   [] Goals require adjustment due to lack of progress  [] Patient is not progressing as expected and requires additional follow up with physician  [] Other    Prognosis for POC: [x] Good [] Fair  [] Poor      Patient requires continued skilled intervention: [x] Yes  [] No    Treatment/Activity Tolerance:  [] Patient able to complete treatment  [x] Patient limited by fatigue  [] Patient limited by pain    [] Patient limited by other medical complications  [] Other:         PLAN: Continue therapy to improve ROM, strength, and balance. Re-assess next week. 2x/week for 4 more weeks for ROM, strengthening, balance act, HEP instruction, pt education, manual therapy prn, and modalities prn (5/13/21)  [x] Continue per plan of care [] Alter current plan   [] Plan of care initiated [] Hold pending MD visit [] Discharge      Electronically signed by:  Yoana Scott, PT     Physical Therapist Ad[facility] Rangel 421 #[id number]  Physical Therapist [place] License #[id number]    Note: If patient does not return for scheduled/ recommended follow up visits, this note will serve as a discharge from care along with most recent update on progress.

## 2021-06-08 ENCOUNTER — TREATMENT (OUTPATIENT)
Dept: PHYSICAL THERAPY | Age: 71
End: 2021-06-08
Payer: MEDICARE

## 2021-06-08 DIAGNOSIS — M25.662 DECREASED ROM OF LEFT KNEE: ICD-10-CM

## 2021-06-08 DIAGNOSIS — M25.562 LEFT KNEE PAIN, UNSPECIFIED CHRONICITY: Primary | ICD-10-CM

## 2021-06-08 DIAGNOSIS — M17.12 PRIMARY OSTEOARTHRITIS OF LEFT KNEE: ICD-10-CM

## 2021-06-08 DIAGNOSIS — R29.898 WEAKNESS OF LEFT LOWER EXTREMITY: ICD-10-CM

## 2021-06-08 PROCEDURE — G8427 DOCREV CUR MEDS BY ELIG CLIN: HCPCS | Performed by: PHYSICAL THERAPIST

## 2021-06-08 PROCEDURE — 97110 THERAPEUTIC EXERCISES: CPT | Performed by: PHYSICAL THERAPIST

## 2021-06-08 PROCEDURE — 97530 THERAPEUTIC ACTIVITIES: CPT | Performed by: PHYSICAL THERAPIST

## 2021-06-08 PROCEDURE — 97112 NEUROMUSCULAR REEDUCATION: CPT | Performed by: PHYSICAL THERAPIST

## 2021-06-08 NOTE — PROGRESS NOTES
[facility]   Phone: [phone]    Fax: [phone]       Physical Therapy Treatment Note/ Progress Report:           Date:  2021    Patient Name:  Wilian Conroy    :  1950  MRN: [id number]  Restrictions/Precautions:    Medical/Treatment Diagnosis Information:  Diagnosis: Left Knee Pain (M25.562), Primary OA of Left Knee (M17.12)   Treatment Diagnosis: Decreased Left Knee ROM (M25.662), Decreased Strength (R29.898)        Insurance/Certification information:  PT Insurance Information: Medicare - Visits based on medical necessity  Physician Information:  Referring Practitioner: Dr. Theodora Chaudhary  Has the plan of care been signed (Y/N):        [x]  Yes (POC signed 21)  []  No      Date of Patient follow up with Physician: As needed      Is this a Progress Report:     []  Yes  [x]  No        If Yes:  Date Range for reporting period:  Beginning 4/15/21  Ending 21    Progress report will be due (10 Rx or 30 days whichever is less):        Recertification will be due (POC Duration  / 90 days whichever is less):  6/10/21        Visit # Insurance Allowable Auth Required   12 Medical necessity  (pt has already used 13 visits this year for her shoulder) []  Yes [x]  No        Functional Scale:    Date assessed:  21  Functional Assessment Tool Used: Knee Outcome Survey Activities of Daily Living Scale (copy scanned into media)  Score:  42/70 = 60% (40% functional deficit)    (4/15/21)  Patient Age: 66years old  Patient Height: 5' 2\"  Patient Weight: 280 lbs  Patient BMI: 51.2      Pain (21)  [x]  Pain diagram was completed, pain was present and a follow up plan to address the pain is in the plan of care. ()   []  Pain diagram was completed and there was no pain present and therefore no follow up plan to address pain in the plan of care.  ()   []  Pain diagram was not completed and the reason for not completing the pain diagram is in the risk   TUG score (>12s at risk):     [] Falls education provided, including     PQRS:   Reviewed medication list with patient. No changes reported by pt since last PT visit.  (6/8/21)          Latex Allergy:  [x]NO      []YES  Preferred Language for Healthcare:   [x]English       []other:      Pain level:  0-2/10 (6/8/21)   Medication:  Celebrex      SUBJECTIVE:  Pt states both her legs are doing better. She can bend over to tie her shoes with less pulling in the back of her legs. Her back and legs get stiff with prolonged sitting. When she stands up it takes awhile to get going. She feels a \"twinge\" of pain in her left knee with lifting her leg into bed and getting under the covers. She also feels a \"twinge\" with stepping in and out of the tub shower. Getting up from a chair has gotten a little easier but she still needs to use her arms to help push herself up. She hasn't noticed her left knee catching as much when she walks. She also isn't as fearful of her left knee giving way. Both knees felt really good after her last therapy session with no increase in pain.            OBJECTIVE:       (5/13/21)  Flexibility L R Comment   Hamstring Mod tightness Mod tightness    Gastroc Mild tightness Mild tightness    ITB      Quad              (6/8/21)  ROM PROM AROM Overpressure Comment    L R L R L R    Flexion 110 with SP  105 110      Extension   0 0                            (5/13/21)  Strength L R Comment   Quad 4/5 4/5    Hamstring 4+/5 4+/5    Gastroc 4/5 4/5    Hip Flex 4-/5 4-/5    Hip Abd 4-/5 4-/5    Hip Add 4/5 4/5    Glut Med              (5/13/21)  Girth L R   Mid Patella 55.0 cm 58.0 cm   Suprapatellar     5cm above     15cm above       (5/13/21)  Special Test Results/Comment   Meniscal Click    Crepitus (+) PF crepitus bilaterally   Flexion Test    Valgus Laxity    Varus Laxity    Lachmans    Drop Back        Reflexes/Sensation: (4/15/21)   []Dermatomes/Myotomes intact    []Reflexes equal and normal bilaterally   [x]Other: Intact to light touch    Joint mobility: (5/13/21)   []Normal    [x]Hypo   []Hyper    Palpation: Joint line tenderness still present on the left. Quad atrophy present on the left vs right.(5/13/21)    Functional Mobility/Transfers: Stairs are still difficult (pt must ascend/descend stairs 1 at a time using HR), she is still limited with standing and walking, she still has to use her hands to get up from a chair. Unable to kneel or squat. (5/13/21)    Posture: Forward head, rounded shoulders. (5/13/21)    Bandages/Dressings/Incisions: Pt wears compression hose and wraps on both legs (5/13/21)     Gait: (include devices/WB status) Antalgic, stiff leg gait, no AD. She continues to demonstrates decreased pawel, she still demonstrates increased lateral trunk sway, very little trunk rotation, and no arm swing on the left. Step length has increased some. (5/13/21)    Orthopedic Special Tests: See above.       RESTRICTIONS/PRECAUTIONS:     Exercises/Interventions:     Therapeutic Ex (89941) Sets/sec Reps Notes/CUES   BIKE      TREADMILL            STRETCHING      Towel Pull Calf 30\" hold X 3    Inclined Calf      Hamstrings 30\" hold X 3 Seated   Quads      Hip Flexors      ITB      Adductors            ROM      Passive      Active      Weight Hangs      Sheet Pulls 10\" hold X 10    Ankle Pumps      ERMI            STRENGTHENING      Quad Sets 10\" hold X 10    Ham Sets      Hip ADD Sets BS 10\" hold X 10     SLR Flexion 3 sets X 10     SLR Abduction  Held today   SLR Prone      SLR Adduction      SLR+      Supine Hip Abduction 2 sets X 10  Black band         Standing Marches 3 sets X 10  Standing at half wall   Standing Knee Flexion 3 sets X 10  Standing at half wall         PRE Machines      Knee Extension      Knee Flexion      Leg Press            CKC      Calf Raises 3 sets X 10 Seated   Mini Squats      Wall Sits      Step ups - no riser  Held todayTKE  Held today               Manual Intervention (14340)      Patellar Mobs      Femoral Tibial mobs      STM      Scar Massage      Foam Roll            NMR re-education (31079)   CUES NEEDED   Biodex Balance      Tandem Balance 30\" hold X 2 each *Feet staggered  *Standing next to half wallSLB      Rebounder      BOSU            Sit -> Stand  X 10 With cushion on chair  Using arms to help                Therapeutic Activity (64396)      Core Training      [address] [address]      TRX training                  Patient Education: Dx, prognosis, pt goals/expectations, role of therapy (4/15/21)  X 8'                  Therapeutic Exercise and NMR EXR  [x] (24538) Provided verbal/tactile cueing for activities related to strengthening, flexibility, endurance, ROM for improvements in LE, proximal hip, and core control with self care, mobility, lifting, ambulation.  [] (68513) Provided verbal/tactile cueing for activities related to improving balance, coordination, kinesthetic sense, posture, motor skill, proprioception to assist with LE, proximal hip, and core control in self-care, mobility, lifting, ambulation and eccentric single leg control.      NMR and Therapeutic Activities:    [x] (34349 or 60387) Provided verbal/tactile cueing for activities related to improving balance, coordination, kinesthetic sense, posture, motor skill, proprioception and motor activation to allow for proper function of core, proximal hip and LE with self-care and ADLs and functional mobility.   [] (67077) Gait Re-education- Provided training and instruction to the patient for proper LE, core and proximal hip recruitment and positioning and eccentric body weight control with ambulation re-education including up and down stairs     Home Exercise Program:    [x] (29076) Reviewed/Progressed HEP activities related to strengthening, flexibility, endurance, ROM of core, proximal hip and LE for functional self-care, mobility, lifting and ambulation/stair navigation - Updated HEP through [address] [place] [address] (see below). Pt was also issued new written handouts. (4/27/21)  [] (10470) Reviewed/Progressed HEP activities related to improving balance, coordination, kinesthetic sense, posture, motor skill, proprioception of core, proximal hip and LE for self-care, mobility, lifting, and ambulation/stair navigation          Access Code: [email or web address]  URL: medineering/Date: 04/20/2021Prepared by: [email or web address] SybertExercises   Long Sitting Calf Stretch with Strap - 1 x daily - 7 x weekly - 1 sets - 3 reps - 30 seconds hold   Seated Table Hamstring Stretch - 1 x daily - 7 x weekly - 1 sets - 3 reps - 30 seconds hold   Long Sitting Quad Set - 1 x daily - 7 x weekly - 1 sets - 10 reps - 10 seconds hold   Long Sitting Hip Adduction Isometric with Ball - 1 x daily - 7 x weekly - 1 sets - 10 reps - 10 seconds hold   Supine Active Straight Leg Raise - 1 x daily - 7 x weekly - 2-3 sets - 10 reps   Hooklying Isometric Clamshell - 1 x daily - 7 x weekly - 3 sets - 10 reps   Standing March with Counter Support - 1 x daily - 7 x weekly - 3 sets - 10 reps   Seated Heel Raise - 1 x daily - 7 x weekly - 3 sets - 10 reps   Sit to Stand - 1 x daily - 7 x weekly - 15 reps   Tandem Stance with Support - 1 x daily - 7 x weekly - 2 reps - 20 seconds hold        Manual Treatments:  PROM / STM / Oscillations-Mobs:  G-I, II, III, IV (PA's, Inf., Post.)  [] (47217) Provided manual therapy to mobilize LE, proximal hip and/or LS spine soft tissue/joints for the purpose of modulating pain, promoting relaxation, increasing ROM, reducing/eliminating soft tissue swelling/inflammation/restriction, improving soft tissue extensibility and allowing for proper ROM for normal function with self-care, mobility, lifting and ambulation. Modalities:   Pt declined ice   [] GAME READY (VASO)- for significant edema, swelling, pain control.      Charges:  Timed Code Treatment Minutes: 54'   Total Treatment Minutes: 54'      [] EVAL (LOW) 76112 (typically 20 minutes face-to-face)  [] EVAL (MOD) 75528 (typically 30 minutes face-to-face)  [] EVAL (HIGH) 75465 (typically 45 minutes face-to-face)  [] RE-EVAL     [x] FH(95274) x 2 (30')    [] IONTO  [x] NMR (52842) x 1 (10')   [] VASO  [] Manual (08393) x      [] Other:  [x] TA x 1 (15')     [] Mech Traction (61947)  [] ES(attended) (01133)      [] ES (un) (06549):         ASSESSMENT:  Pt tolerated the exercises well today but she was very fatigued at the end of her program.  She was able to perform 3 sets of 10 with flexion SLR's but it was challenging with rest breaks needed between sets. Re-introduced standing marches, standing hamstring curls, and tandem stance into her program today with good tolerance. Tandem stance was challenging with a few UE touches needed. Also performed sit->stand today with pt needing to use her arms to help push herself up. Strength and endurance deficits continue to be present limiting her ability to get up from a chair, ascend/descend stairs, and be on her feet long periods of time. MEDICARE CAP EXCEPTION DOCUMENTATION      I certify that this patient meets one of the below criteria necessary for becoming an exception to the Medicare cap on therapy services:    [x]  The patient has a condition identified by an ICD-9 code that has a direct and significant impact on the need for therapy. (Significantly impacts the rate of recovery.)                     []  The patient has a complexity identified by an ICD-9 code that has a direct and significant impact on the need for therapy. (Significantly impacts the rate of recovery and is associated with a primary condition.)         []  The patient has associated variables that influence the amount of treatment to include:  Social support, self-efficacy/motivation, prognosis, time since onset/acuity.          []  The patient has generalized musculoskeletal conditions or a condition affecting multiple sites that will have a direct impact on the rate of recovery. [x]  The patient had a prior episode of outpatient therapy during this calendar year for a different condition. []  The patient has a mental or cognitive disorder in addition to the condition being treated that will have a direct and significant impact on the rate of recovery. GOALS: (Goals updated 5/13/21)  Patient stated goal:  \"Make my legs stronger; walk and climb stairs easier and faster. \"  [x] Progressing: [] Met: [] Not Met: [] Adjusted    Therapist goals for Patient:   Short Term Goals: To be achieved in: 2 weeks  1. Independent in HEP and progression per patient tolerance, in order to prevent re-injury. [] Progressing: [x] Met: [] Not Met: [] Adjusted  2. Patient will have a decrease in left knee pain to 1-2/10 to facilitate improvement in movement, function, and ADLs as indicated by Functional Deficits. [] Progressing: [x] Met: [] Not Met: [] Adjusted    Long Term Goals: To be achieved in: 4-6 weeks  1. Disability index score of 20% or less for the Knee Outcome Survey Activities of Daily Living Scale to assist with reaching prior level of function. [x] Progressing: [] Met: [] Not Met: [] Adjusted  2. Patient will demonstrate an increase in left knee flexion AROM to at least 115 degrees to allow for proper joint functioning as indicated by patients Functional Deficits. [x] Progressing: [] Met: [] Not Met: [] Adjusted  3. Patient will demonstrate an increase in left hip and knee strength to at least 4+/5 to allow for proper functional mobility as indicated by patients Functional Deficits. [x] Progressing: [] Met: [] Not Met: [] Adjusted  4. Patient will be able to walk community distances, stand for at least 30 minutes, and be able to get up from a chair with little to no UE assistance needed for improved function. [x] Progressing: [] Met: [] Not Met: [] Adjusted  5.  Patient will be able to ascend/descend stairs reciprocally with HR (patient specific functional goal)    [x] Progressing: [] Met: [] Not Met: [] Adjusted   6. Patient will have a decrease in left knee pain to 0-1/10 with daily act. [x] Progressing: [] Met: [] Not Met: [] Adjusted          Overall Progression Towards Functional goals/ Treatment Progress Update:  [x] Patient is progressing as expected towards functional goals listed. [] Progression is slowed due to complexities/Impairments listed. [] Progression has been slowed due to co-morbidities. [] Plan just implemented, too soon to assess goals progression <30days   [] Goals require adjustment due to lack of progress  [] Patient is not progressing as expected and requires additional follow up with physician  [] Other    Prognosis for POC: [x] Good [] Fair  [] Poor      Patient requires continued skilled intervention: [x] Yes  [] No    Treatment/Activity Tolerance:  [] Patient able to complete treatment  [x] Patient limited by fatigue  [] Patient limited by pain    [] Patient limited by other medical complications  [] Other:         PLAN: Continue therapy to improve ROM, strength, and balance. Re-assess next visit. 2x/week for 4 more weeks for ROM, strengthening, balance act, HEP instruction, pt education, manual therapy prn, and modalities prn (5/13/21)  [x] Continue per plan of care [] Alter current plan   [] Plan of care initiated [] Hold pending MD visit [] Discharge      Electronically signed by:  Dinora Guillermo, PT     Physical Therapist Adore Rangel [address] #[id number]  Physical Therapist [place] License #[id number]    Note: If patient does not return for scheduled/ recommended follow up visits, this note will serve as a discharge from care along with most recent update on progress.

## 2021-06-10 ENCOUNTER — TREATMENT (OUTPATIENT)
Dept: PHYSICAL THERAPY | Age: 71
End: 2021-06-10
Payer: MEDICARE

## 2021-06-10 DIAGNOSIS — R29.898 WEAKNESS OF LEFT LOWER EXTREMITY: ICD-10-CM

## 2021-06-10 DIAGNOSIS — M25.562 LEFT KNEE PAIN, UNSPECIFIED CHRONICITY: Primary | ICD-10-CM

## 2021-06-10 DIAGNOSIS — M25.662 DECREASED ROM OF LEFT KNEE: ICD-10-CM

## 2021-06-10 DIAGNOSIS — M17.12 PRIMARY OSTEOARTHRITIS OF LEFT KNEE: ICD-10-CM

## 2021-06-10 PROCEDURE — G8427 DOCREV CUR MEDS BY ELIG CLIN: HCPCS | Performed by: PHYSICAL THERAPIST

## 2021-06-10 PROCEDURE — 97112 NEUROMUSCULAR REEDUCATION: CPT | Performed by: PHYSICAL THERAPIST

## 2021-06-10 PROCEDURE — 97530 THERAPEUTIC ACTIVITIES: CPT | Performed by: PHYSICAL THERAPIST

## 2021-06-10 PROCEDURE — G8539 DOC FUNCT AND CARE PLAN: HCPCS | Performed by: PHYSICAL THERAPIST

## 2021-06-10 PROCEDURE — 97110 THERAPEUTIC EXERCISES: CPT | Performed by: PHYSICAL THERAPIST

## 2021-06-10 PROCEDURE — 1101F PT FALLS ASSESS-DOCD LE1/YR: CPT | Performed by: PHYSICAL THERAPIST

## 2021-06-10 NOTE — PROGRESS NOTES
[facility]   Phone: [phone]    Fax: [phone]     Physical Therapy Re-Certification Plan of Care    Dear  Dr. Stephani Ennis,    We had the pleasure of treating the following patient for physical therapy services at [address] [place] [address]. A summary of our findings can be found in the updated assessment below. This includes our plan of care. If you have any questions or concerns regarding these findings, please do not hesitate to contact me at the office phone number checked above. Thank you for the referral.     Physician Signature:________________________________Date:__________________  By signing above (or electronic signature), therapists plan is approved by physician      Overall Response to Treatment:   []Patient is responding well to treatment and improvement is noted with regards to goals   []Patient should continue to improve in reasonable time if they continue HEP   []Patient has plateaued and is no longer responding to skilled PT intervention    []Patient is getting worse and would benefit from return to referring MD   []Patient unable to adhere to initial POC   [x]Other: Pt has had a couple of set backs over the last few weeks. She pulled her hamstring muscle in her right leg and had to miss 1 week of therapy. Then she twisted her left knee getting out of the shower which caused her to miss another 2 weeks of therapy. Pt just came back to therapy last week and her exercise program had to be modified due to decreased strength and endurance with missing 3 weeks of therapy and not being active at home. Pt has done much better with the exercises this week.          Physical Therapy Treatment Note/ Progress Report:           Date:  6/10/2021    Patient Name:  Clifford Hathaway    :  1950  MRN: [id number]  Restrictions/Precautions:    Medical/Treatment Diagnosis Information:  Diagnosis: Left Knee Pain (M25.562), Primary OA of Left Knee (M17.12)   Treatment Diagnosis: Decreased Left Knee ROM (M25.662), Decreased Strength (R29.898)        Insurance/Certification information:  PT Insurance Information: Medicare - Visits based on medical necessity  Physician Information:  Referring Practitioner: Dr. Raya Mcghee  Has the plan of care been signed (Y/N):        [x]  Yes (POC signed 5/13/21)  []  No      Date of Patient follow up with Physician: As needed      Is this a Progress Report:     [x]  Yes  []  No        If Yes:  Date Range for reporting period:  Beginning 4/15/21  Ending 6/10/21    Progress report will be due (10 Rx or 30 days whichever is less):  1/2/11      Recertification will be due (POC Duration  / 90 days whichever is less):  7/8/21        Visit # Insurance Allowable Auth Required   13 Medical necessity  (pt has already used 13 visits this year for her shoulder) []  Yes [x]  No        Functional Scale:    Date assessed:  6/10/21  Functional Assessment Tool Used: Knee Outcome Survey Activities of Daily Living Scale (copy scanned into media)  Score:  44/70 = 63% (37% functional deficit)    (4/15/21)  Patient Age: 66years old  Patient Height: 5' 2\"  Patient Weight: 280 lbs  Patient BMI: 51.2      Pain (6/10/21)  [x]  Pain diagram was completed, pain was present and a follow up plan to address the pain is in the plan of care. ()   []  Pain diagram was completed and there was no pain present and therefore no follow up plan to address pain in the plan of care. ()   []  Pain diagram was not completed and the reason for not completing the pain diagram is in the medical chart ()  []  Patient refused to participate   []  Severe mental or physical capacity, patient is in urgent emergent situation and to complete the questionnaire would jeopardize the patient's health status        Functional Questionnaire (6/10/21)  [x]  Patient completed the functional outcome questionnaire and deficiencies were addressed in the plan of care.  () gait, no AD. She continues to demonstrates decreased pawel, she still demonstrates increased lateral trunk sway, very little trunk rotation, and no arm swing on the left. Step length has increased some. (6/10/21)    Orthopedic Special Tests: See above.       RESTRICTIONS/PRECAUTIONS:     Exercises/Interventions:     Therapeutic Ex (33821) Sets/sec Reps Notes/CUES   BIKE      TREADMILL            STRETCHING      Towel Pull Calf 30\" hold X 3    Inclined Calf      Hamstrings 30\" hold X 3 Seated   Quads      Hip Flexors      ITB      Adductors            ROM      Passive      Active      Weight Hangs      Sheet Pulls 10\" hold X 10    Ankle Pumps      ERMI            STRENGTHENING      Quad Sets 10\" hold X 10    Ham Sets      Hip ADD Sets BS 10\" hold X 10     SLR Flexion 3 sets X 10     SLR Abduction  Held today   SLR Prone      SLR Adduction      SLR+      Supine Hip Abduction 2 sets X 10  Black band         Standing Marches 3 sets X 10  Standing at half wall   Standing Knee Flexion 3 sets X 10  Standing at half wall         PRE Machines      Knee Extension      Knee Flexion      Leg Press            CKC      Calf Raises 3 sets X 10 Seated   Mini Squats      Wall Sits      Step ups - no riser  Held todayTKE  Held today               Manual Intervention (64511)      Patellar Mobs      Femoral Tibial mobs      STM      Scar Massage      Foam Roll            NMR re-education (96629)   CUES NEEDED   Biodex Balance      Tandem Balance 30\" hold X 2 each *Feet staggered  *Standing next to half wallSLB      Rebounder      BOSU            Sit -> Stand  X 10 using arms to help    X 5 with no arms With cushion on chair                 Therapeutic Activity (68411)      Core Training      [address]      TRX training                  Patient Education: Dx, prognosis, pt goals/expectations, role of therapy (4/15/21)  X 8'                  Therapeutic Exercise and NMR EXR  [x] (05746) Provided verbal/tactile cueing for activities related to strengthening, flexibility, endurance, ROM for improvements in LE, proximal hip, and core control with self care, mobility, lifting, ambulation.  [] (87082) Provided verbal/tactile cueing for activities related to improving balance, coordination, kinesthetic sense, posture, motor skill, proprioception to assist with LE, proximal hip, and core control in self-care, mobility, lifting, ambulation and eccentric single leg control. NMR and Therapeutic Activities:    [x] (16094 or 60757) Provided verbal/tactile cueing for activities related to improving balance, coordination, kinesthetic sense, posture, motor skill, proprioception and motor activation to allow for proper function of core, proximal hip and LE with self-care and ADLs and functional mobility.   [] (86751) Gait Re-education- Provided training and instruction to the patient for proper LE, core and proximal hip recruitment and positioning and eccentric body weight control with ambulation re-education including up and down stairs     Home Exercise Program:    [x] (80995) Reviewed/Progressed HEP activities related to strengthening, flexibility, endurance, ROM of core, proximal hip and LE for functional self-care, mobility, lifting and ambulation/stair navigation - Updated HEP through [employer] (see below). Pt was also issued new written handouts. (4/27/21)  [] (16894) Reviewed/Progressed HEP activities related to improving balance, coordination, kinesthetic sense, posture, motor skill, proprioception of core, proximal hip and LE for self-care, mobility, lifting, and ambulation/stair navigation          Access Code: [email or web address]  URL: [email or web address] com/Date: 04/20/2021Prepared by: SHAHIDA SychepeExercises   Long Sitting Calf Stretch with Strap - 1 x daily - 7 x weekly - 1 sets - 3 reps - 30 seconds hold   Seated Table Hamstring Stretch - 1 x daily - 7 x weekly - 1 sets - 3 reps - 30 seconds hold   Long Sitting Quad Set - 1 x daily - 7 x weekly - 1 sets - 10 reps - 10 seconds hold   Long Sitting Hip Adduction Isometric with Ball - 1 x daily - 7 x weekly - 1 sets - 10 reps - 10 seconds hold   Supine Active Straight Leg Raise - 1 x daily - 7 x weekly - 2-3 sets - 10 reps   Hooklying Isometric Clamshell - 1 x daily - 7 x weekly - 3 sets - 10 reps   Standing March with Counter Support - 1 x daily - 7 x weekly - 3 sets - 10 reps   Seated Heel Raise - 1 x daily - 7 x weekly - 3 sets - 10 reps   Sit to Stand - 1 x daily - 7 x weekly - 15 reps   Tandem Stance with Support - 1 x daily - 7 x weekly - 2 reps - 20 seconds hold        Manual Treatments:  PROM / STM / Oscillations-Mobs:  G-I, II, III, IV (PA's, Inf., Post.)  [] (99580) Provided manual therapy to mobilize LE, proximal hip and/or LS spine soft tissue/joints for the purpose of modulating pain, promoting relaxation, increasing ROM, reducing/eliminating soft tissue swelling/inflammation/restriction, improving soft tissue extensibility and allowing for proper ROM for normal function with self-care, mobility, lifting and ambulation. Modalities:   Pt declined ice   [] GAME READY (VASO)- for significant edema, swelling, pain control. Charges:  Timed Code Treatment Minutes: 54'   Total Treatment Minutes: 54'      [] EVAL (LOW) 94816 (typically 20 minutes face-to-face)  [] EVAL (MOD) 41369 (typically 30 minutes face-to-face)  [] EVAL (HIGH) 13880 (typically 45 minutes face-to-face)  [] RE-EVAL     [x] IF(13494) x 2 (30')    [] IONTO  [x] NMR (38910) x 1 (10')   [] VASO  [] Manual (24344) x      [] Other:  [x] TA x 1 (15')     [] [facility] (54386)  [] ES(attended) (69158)      [] ES (un) (77128):         ASSESSMENT:  Pt is doing better with the exercises and she was not as fatigued at the end of therapy today. Pt had some soreness in the back of both knees after last session so exercises were not advanced (will continue to monitor).   She did much better with sit->stand today and was able to perform the last 5 reps with no UE assistance needed. No significant change in flexibility or strength since last re-assessment due to pt missing 3 weeks of therapy. Pt would continue to benefit from skilled therapy to increase strength and endurance for ascending/descending stairs, walking community distances, and improved function. MEDICARE CAP EXCEPTION DOCUMENTATION      I certify that this patient meets one of the below criteria necessary for becoming an exception to the Medicare cap on therapy services:    [x]  The patient has a condition identified by an ICD-9 code that has a direct and significant impact on the need for therapy. (Significantly impacts the rate of recovery.)                     []  The patient has a complexity identified by an ICD-9 code that has a direct and significant impact on the need for therapy. (Significantly impacts the rate of recovery and is associated with a primary condition.)         []  The patient has associated variables that influence the amount of treatment to include:  Social support, self-efficacy/motivation, prognosis, time since onset/acuity. []  The patient has generalized musculoskeletal conditions or a condition affecting multiple sites that will have a direct impact on the rate of recovery. [x]  The patient had a prior episode of outpatient therapy during this calendar year for a different condition. []  The patient has a mental or cognitive disorder in addition to the condition being treated that will have a direct and significant impact on the rate of recovery. GOALS: (Goals updated 6/10/21)  Patient stated goal:  \"Make my legs stronger; walk and climb stairs easier and faster. \"  [x] Progressing: [] Met: [] Not Met: [] Adjusted    Therapist goals for Patient:   Short Term Goals: To be achieved in: 2 weeks  1.  Independent in HEP and progression per patient tolerance, in order to prevent re-injury. [] Progressing: [x] Met: [] Not Met: [] Adjusted  2. Patient will have a decrease in left knee pain to 1-2/10 to facilitate improvement in movement, function, and ADLs as indicated by Functional Deficits. [] Progressing: [x] Met: [] Not Met: [] Adjusted    Long Term Goals: To be achieved in: 4-6 weeks  1. Disability index score of 20% or less for the Knee Outcome Survey Activities of Daily Living Scale to assist with reaching prior level of function. [x] Progressing: [] Met: [] Not Met: [] Adjusted  2. Patient will demonstrate an increase in left knee flexion AROM to at least 115 degrees to allow for proper joint functioning as indicated by patients Functional Deficits. [x] Progressing: [] Met: [] Not Met: [] Adjusted  3. Patient will demonstrate an increase in left hip and knee strength to at least 4+/5 to allow for proper functional mobility as indicated by patients Functional Deficits. [x] Progressing: [] Met: [] Not Met: [] Adjusted  4. Patient will be able to walk community distances, stand for at least 30 minutes, and be able to get up from a chair with little to no UE assistance needed for improved function. [x] Progressing: [] Met: [] Not Met: [] Adjusted  5. Patient will be able to ascend/descend stairs reciprocally with HR (patient specific functional goal)    [x] Progressing: [] Met: [] Not Met: [] Adjusted   6. Patient will have a decrease in left knee pain to 0-1/10 with daily act. [x] Progressing: [] Met: [] Not Met: [] Adjusted          Overall Progression Towards Functional goals/ Treatment Progress Update:  [x] Patient is progressing as expected towards functional goals listed. [] Progression is slowed due to complexities/Impairments listed. [] Progression has been slowed due to co-morbidities.   [] Plan just implemented, too soon to assess goals progression <30days   [] Goals require adjustment due to lack of progress  [] Patient is not progressing as

## 2021-06-15 ENCOUNTER — TREATMENT (OUTPATIENT)
Dept: PHYSICAL THERAPY | Age: 71
End: 2021-06-15
Payer: MEDICARE

## 2021-06-15 DIAGNOSIS — M17.12 PRIMARY OSTEOARTHRITIS OF LEFT KNEE: ICD-10-CM

## 2021-06-15 DIAGNOSIS — M25.562 LEFT KNEE PAIN, UNSPECIFIED CHRONICITY: Primary | ICD-10-CM

## 2021-06-15 DIAGNOSIS — R29.898 WEAKNESS OF LEFT LOWER EXTREMITY: ICD-10-CM

## 2021-06-15 DIAGNOSIS — M25.662 DECREASED ROM OF LEFT KNEE: ICD-10-CM

## 2021-06-15 PROCEDURE — 97110 THERAPEUTIC EXERCISES: CPT | Performed by: PHYSICAL THERAPIST

## 2021-06-15 PROCEDURE — 97112 NEUROMUSCULAR REEDUCATION: CPT | Performed by: PHYSICAL THERAPIST

## 2021-06-15 PROCEDURE — 97530 THERAPEUTIC ACTIVITIES: CPT | Performed by: PHYSICAL THERAPIST

## 2021-06-15 PROCEDURE — G8427 DOCREV CUR MEDS BY ELIG CLIN: HCPCS | Performed by: PHYSICAL THERAPIST

## 2021-06-15 NOTE — PROGRESS NOTES
[facility]   Phone: [phone]    Fax: [phone]          Physical Therapy Treatment Note/ Progress Report:           Date:  6/15/2021    Patient Name:  Mindi Reid    :  1950  MRN: [id number]  Restrictions/Precautions:    Medical/Treatment Diagnosis Information:  Diagnosis: Left Knee Pain (M25.562), Primary OA of Left Knee (M17.12)   Treatment Diagnosis: Decreased Left Knee ROM (M25.662), Decreased Strength (R29.898)        Insurance/Certification information:  PT Insurance Information: Medicare - Visits based on medical necessity  Physician Information:  Referring Practitioner: Dr. Alcon Mendoza  Has the plan of care been signed (Y/N):        [x]  Yes (POC signed 21)  []  No      Date of Patient follow up with Physician: As needed      Is this a Progress Report:     []  Yes  [x]  No        If Yes:  Date Range for reporting period:  Beginning 4/15/21  Ending 6/10/21    Progress report will be due (10 Rx or 30 days whichever is less):  12      Recertification will be due (POC Duration  / 90 days whichever is less):  21        Visit # Insurance Allowable Auth Required   14 Medical necessity  (pt has already used 13 visits this year for her shoulder) []  Yes [x]  No        Functional Scale:    Date assessed:  6/10/21  Functional Assessment Tool Used: Knee Outcome Survey Activities of Daily Living Scale (copy scanned into media)  Score:  44/70 = 63% (37% functional deficit)    (4/15/21)  Patient Age: 66years old  Patient Height: 5' 2\"  Patient Weight: 280 lbs  Patient BMI: 51.2      Pain (6/10/21)  [x]  Pain diagram was completed, pain was present and a follow up plan to address the pain is in the plan of care. ()   []  Pain diagram was completed and there was no pain present and therefore no follow up plan to address pain in the plan of care.  ()   []  Pain diagram was not completed and the reason for not completing the pain diagram is in the medical chart ()  []  Patient refused to participate   []  Severe mental or physical capacity, patient is in urgent emergent situation and to complete the questionnaire would jeopardize the patient's health status        Functional Questionnaire (6/10/21)  [x]  Patient completed the functional outcome questionnaire and deficiencies were addressed in the plan of care. ()   []  Patient completed the outcome questionnaire and there were no functional deficits identified and therefore no plan of care is required. ()   []  Patient did not complete the functional outcome questionnaire and the reason why is documented in the medical chart. ()  []  Patient refused to participate   []  Patient unable to complete the questionnaire   []   A functional outcome assessment has been reported on within the last 30 days        Falls (6/10/21)  [x]  The patient has had no falls or 1 fall without injury in the past year. (1101F)   []  There is no documentation of fall in the medical chart (1101F,8P)     [] The patient has had 2 or more falls or one fall with injury in the past year that is documented in the medical chart. (1100F)  []  A falls risk assessment was completed and documented in the medical chart. (5920Y)   []  Falls were addressed in the plan of care. (9665W)   []  A falls risk assessment was not completed and documented in the medical chart (6996Z,3U)   []   Falls were not addressed in the plan of care and reason was documented in the plan of care. (6195K 1P)        Medication (6/10/21)     [x] A list of current medications (including prescription, over the counter, herbal supplements, vitamin supplements, mineral supplements, dietary supplements) was reviewed with the patient and documented in the medical chart. ()        Falls Risk Assessment (30 days): (6/10/21)  [x] Falls Risk assessed and no intervention required.   [] Falls Risk assessed and Patient requires intervention due to being higher risk   TUG score (>12s at risk):     [] Falls education provided, including     PQRS:   Reviewed medication list with patient. No changes reported by pt since last PT visit. (6/15/21)          Latex Allergy:  [x]NO      []YES  Preferred Language for Healthcare:   [x]English       []other:      Pain level:  0-2/10 (6/15/21)   Medication:  Celebrex      SUBJECTIVE:  Pt states she was not as sore after last visit but she was very tired/worn out when she got home and she took a nap. The only time she really notices pain now is when she bends and straightens her knee in bed and when she pushes through her left leg to re-position herself in bed. OBJECTIVE:       (6/10/21)  Flexibility L R Comment   Hamstring Mod tightness Mod tightness    Gastroc Mild tightness Mild tightness    ITB      Quad              (6/15/21)  ROM PROM AROM Overpressure Comment    L R L R L R    Flexion 115 with SP  105 110      Extension   0 0                            (6/10/21)  Strength L R Comment   Quad 4/5 4/5    Hamstring 4+/5 4+/5    Gastroc 4/5 4/5    Hip Flex 4-/5 4-/5    Hip Abd 4-/5 4-/5    Hip Add 4/5 4/5    Glut Med              (6/10/21)  Girth L R   Mid Patella 55.0 cm 58.0 cm   Suprapatellar     5cm above     15cm above       (6/10/21)  Special Test Results/Comment   Meniscal Click    Crepitus (+) PF crepitus bilaterally   Flexion Test    Valgus Laxity    Varus Laxity    Lachmans    Drop Back        Reflexes/Sensation: (4/15/21)   []Dermatomes/Myotomes intact    []Reflexes equal and normal bilaterally   [x]Other: Intact to light touch    Joint mobility: (6/10/21)   []Normal    [x]Hypo   []Hyper    Palpation: Joint line tenderness still present on the left.   Quad atrophy present on the left vs right.(6/10/21)    Functional Mobility/Transfers: Stairs are still difficult (pt must ascend/descend stairs 1 at a time using HR), she is still limited with standing and walking, she still has to use her hands to help push herself up from a chair. Unable to kneel or squat. (6/10/21)    Posture: Forward head, rounded shoulders. (6/10/21)    Bandages/Dressings/Incisions: Pt wears compression hose and wraps on both legs (6/10/21)     Gait: (include devices/WB status) Antalgic, stiff leg gait, no AD. She continues to demonstrates decreased pawel, she still demonstrates increased lateral trunk sway, very little trunk rotation, and no arm swing on the left. Step length has increased some. (6/10/21)    Orthopedic Special Tests: See above.       RESTRICTIONS/PRECAUTIONS:     Exercises/Interventions:     Therapeutic Ex (67747) Sets/sec Reps Notes/CUES   BIKE      TREADMILL            STRETCHING      Towel Pull Calf 30\" hold X 3    Inclined Calf      Hamstrings 30\" hold X 3 Seated   Quads      Hip Flexors      ITB      Adductors            ROM      Passive      Active      Weight Hangs      Sheet Pulls 10\" hold X 10    Ankle Pumps      ERMI            STRENGTHENING      Quad Sets 10\" hold X 10    Ham Sets      Hip ADD Sets BS 10\" hold X 10     SLR Flexion 3 sets X 10     SLR Abduction  Held today   SLR Prone      SLR Adduction      SLR+      Supine Hip Abduction 2 sets X 10  Black band         Standing Marches 3 sets X 10  Standing at half wall   Standing Knee Flexion 3 sets X 10  Standing at half wall         PRE Machines      Knee Extension      Knee Flexion      Leg Press            CKC      Calf Raises 3 sets X 10 Seated   Mini Squats      Wall Sits      Step ups - no riser 1 set X 15  Using half wall   TKE  Held today               Manual Intervention (98935)      Patellar Mobs      Femoral Tibial mobs      STM      Scar Massage      Foam Roll            NMR re-education (13467)   CUES NEEDED   Biodex Balance      Tandem Balance 30\" hold X 2 each *Feet staggered  *Standing next to half wallSLB      Rebounder      BOSU            Sit -> Stand  X 15 no arms     With cushion on chair                 Therapeutic Activity (79293) Core Training      [facility] Activities      Monster Walks      TRX training                  Patient Education: Dx, prognosis, pt goals/expectations, role of therapy (4/15/21)  X 8'                  Therapeutic Exercise and NMR EXR  [x] (23137) Provided verbal/tactile cueing for activities related to strengthening, flexibility, endurance, ROM for improvements in LE, proximal hip, and core control with self care, mobility, lifting, ambulation. [x] (94606) Provided verbal/tactile cueing for activities related to improving balance, coordination, kinesthetic sense, posture, motor skill, proprioception to assist with LE, proximal hip, and core control in self-care, mobility, lifting, ambulation and eccentric single leg control. NMR and Therapeutic Activities:    [x] (28182 or 32606) Provided verbal/tactile cueing for activities related to improving balance, coordination, kinesthetic sense, posture, motor skill, proprioception and motor activation to allow for proper function of core, proximal hip and LE with self-care and ADLs and functional mobility.   [] (58398) Gait Re-education- Provided training and instruction to the patient for proper LE, core and proximal hip recruitment and positioning and eccentric body weight control with ambulation re-education including up and down stairs     Home Exercise Program:    [x] (65586) Reviewed/Progressed HEP activities related to strengthening, flexibility, endurance, ROM of core, proximal hip and LE for functional self-care, mobility, lifting and ambulation/stair navigation - Updated HEP through [address] [place] [address] (see below). Pt was also issued new written handouts.  (4/27/21)  [x] (68991) Reviewed/Progressed HEP activities related to improving balance, coordination, kinesthetic sense, posture, motor skill, proprioception of core, proximal hip and LE for self-care, mobility, lifting, and ambulation/stair navigation          Access Code: [email or web address]  URL: [email or web address]: 04/20/2021Prepared by: [email or web address] SybertExercises   Long Sitting Calf Stretch with Strap - 1 x daily - 7 x weekly - 1 sets - 3 reps - 30 seconds hold   Seated Table Hamstring Stretch - 1 x daily - 7 x weekly - 1 sets - 3 reps - 30 seconds hold   Long Sitting Quad Set - 1 x daily - 7 x weekly - 1 sets - 10 reps - 10 seconds hold   Long Sitting Hip Adduction Isometric with Ball - 1 x daily - 7 x weekly - 1 sets - 10 reps - 10 seconds hold   Supine Active Straight Leg Raise - 1 x daily - 7 x weekly - 2-3 sets - 10 reps   Hooklying Isometric Clamshell - 1 x daily - 7 x weekly - 3 sets - 10 reps   Standing March with Counter Support - 1 x daily - 7 x weekly - 3 sets - 10 reps   Seated Heel Raise - 1 x daily - 7 x weekly - 3 sets - 10 reps   Sit to Stand - 1 x daily - 7 x weekly - 15 reps   Tandem Stance with Support - 1 x daily - 7 x weekly - 2 reps - 20 seconds hold        Manual Treatments:  PROM / STM / Oscillations-Mobs:  G-I, II, III, IV (PA's, Inf., Post.)  [] (43701) Provided manual therapy to mobilize LE, proximal hip and/or LS spine soft tissue/joints for the purpose of modulating pain, promoting relaxation, increasing ROM, reducing/eliminating soft tissue swelling/inflammation/restriction, improving soft tissue extensibility and allowing for proper ROM for normal function with self-care, mobility, lifting and ambulation. Modalities:   Pt declined ice   [] GAME READY (VASO)- for significant edema, swelling, pain control.      Charges:  Timed Code Treatment Minutes: 54'   Total Treatment Minutes: 54'      [] EVAL (LOW) 455 1011 (typically 20 minutes face-to-face)  [] EVAL (MOD) 19554 (typically 30 minutes face-to-face)  [] EVAL (HIGH) 15313 (typically 45 minutes face-to-face)  [] RE-EVAL     [x] IL(43337) x 2 (30')    [] IONTO  [x] NMR (24694) x 1 (10')   [] VASO  [] Manual (29319) x      [] Other:  [x] TA x 1 (15')     [] [facility] Traction (76784)  [] ES(attended) (29566)      [] ES (un) (33894):         ASSESSMENT:  Pt is doing better with the exercises and her strength and endurance are slowly improving. She was able to perform sit->stand today with no arms. Able to reintroduce step ups today with no riser but pt could only perform 15 reps due to fatigue. No change in left knee flexion ROM with stiffness present at end range of motion. Pt needs to continue increasing strength and endurance for improved function. MEDICARE CAP EXCEPTION DOCUMENTATION      I certify that this patient meets one of the below criteria necessary for becoming an exception to the Medicare cap on therapy services:    [x]  The patient has a condition identified by an ICD-9 code that has a direct and significant impact on the need for therapy. (Significantly impacts the rate of recovery.)                     []  The patient has a complexity identified by an ICD-9 code that has a direct and significant impact on the need for therapy. (Significantly impacts the rate of recovery and is associated with a primary condition.)         []  The patient has associated variables that influence the amount of treatment to include:  Social support, self-efficacy/motivation, prognosis, time since onset/acuity. []  The patient has generalized musculoskeletal conditions or a condition affecting multiple sites that will have a direct impact on the rate of recovery. [x]  The patient had a prior episode of outpatient therapy during this calendar year for a different condition. []  The patient has a mental or cognitive disorder in addition to the condition being treated that will have a direct and significant impact on the rate of recovery. GOALS: (Goals updated 6/10/21)  Patient stated goal:  \"Make my legs stronger; walk and climb stairs easier and faster. \"  [x] Progressing: [] Met: [] Not Met: [] Adjusted    Therapist goals for Patient:   Short Term Goals: To be achieved in: 2 weeks  1. adjustment due to lack of progress  [] Patient is not progressing as expected and requires additional follow up with physician  [] Other    Prognosis for POC: [x] Good [] Fair  [] Poor      Patient requires continued skilled intervention: [x] Yes  [] No    Treatment/Activity Tolerance:  [] Patient able to complete treatment  [x] Patient limited by fatigue  [] Patient limited by pain    [] Patient limited by other medical complications  [] Other:         PLAN: Continue therapy to improve ROM, strength, and balance. 1-2x/week for 4 more weeks for ROM, strengthening, balance act, HEP instruction, pt education, manual therapy prn, and modalities prn (6/10/21)  [x] Continue per plan of care [] Alter current plan   [] Plan of care initiated [] Hold pending MD visit [] Discharge      Electronically signed by:  Arabella Webb PT     Physical Therapist Adore Rangel [address] #[id number]  Physical Therapist [place] License #[id number]    Note: If patient does not return for scheduled/ recommended follow up visits, this note will serve as a discharge from care along with most recent update on progress.

## 2021-06-17 ENCOUNTER — TREATMENT (OUTPATIENT)
Dept: PHYSICAL THERAPY | Age: 71
End: 2021-06-17
Payer: MEDICARE

## 2021-06-17 DIAGNOSIS — M25.562 LEFT KNEE PAIN, UNSPECIFIED CHRONICITY: Primary | ICD-10-CM

## 2021-06-17 DIAGNOSIS — M17.12 PRIMARY OSTEOARTHRITIS OF LEFT KNEE: ICD-10-CM

## 2021-06-17 DIAGNOSIS — R29.898 WEAKNESS OF LEFT LOWER EXTREMITY: ICD-10-CM

## 2021-06-17 DIAGNOSIS — M25.662 DECREASED ROM OF LEFT KNEE: ICD-10-CM

## 2021-06-17 PROCEDURE — G8427 DOCREV CUR MEDS BY ELIG CLIN: HCPCS | Performed by: PHYSICAL THERAPIST

## 2021-06-17 PROCEDURE — 97112 NEUROMUSCULAR REEDUCATION: CPT | Performed by: PHYSICAL THERAPIST

## 2021-06-17 PROCEDURE — 97530 THERAPEUTIC ACTIVITIES: CPT | Performed by: PHYSICAL THERAPIST

## 2021-06-17 PROCEDURE — 97110 THERAPEUTIC EXERCISES: CPT | Performed by: PHYSICAL THERAPIST

## 2021-06-17 NOTE — PROGRESS NOTES
[facility]   Phone: [phone]    Fax: [phone]          Physical Therapy Treatment Note/ Progress Report:           Date:  2021    Patient Name:  Clifford Hathaway    :  1950  MRN: [id number]  Restrictions/Precautions:    Medical/Treatment Diagnosis Information:  Diagnosis: Left Knee Pain (M25.562), Primary OA of Left Knee (M17.12)   Treatment Diagnosis: Decreased Left Knee ROM (M25.662), Decreased Strength (R29.898)        Insurance/Certification information:  PT Insurance Information: Medicare - Visits based on medical necessity  Physician Information:  Referring Practitioner: Dr. Stephani Ennis  Has the plan of care been signed (Y/N):        [x]  Yes (POC signed 21)  []  No      Date of Patient follow up with Physician: As needed      Is this a Progress Report:     []  Yes  [x]  No        If Yes:  Date Range for reporting period:  Beginning 4/15/21  Ending 6/10/21    Progress report will be due (10 Rx or 30 days whichever is less):  3/4/40      Recertification will be due (POC Duration  / 90 days whichever is less):  21        Visit # Insurance Allowable Auth Required   15 Medical necessity  (pt has already used 13 visits this year for her shoulder) []  Yes [x]  No        Functional Scale:    Date assessed:  6/10/21  Functional Assessment Tool Used: Knee Outcome Survey Activities of Daily Living Scale (copy scanned into media)  Score:  44/70 = 63% (37% functional deficit)    (4/15/21)  Patient Age: 66years old  Patient Height: 5' 2\"  Patient Weight: 280 lbs  Patient BMI: 51.2      Pain (6/10/21)  [x]  Pain diagram was completed, pain was present and a follow up plan to address the pain is in the plan of care. ()   []  Pain diagram was completed and there was no pain present and therefore no follow up plan to address pain in the plan of care.  ()   []  Pain diagram was not completed and the reason for not completing the pain diagram is in the medical chart ()  []  Patient refused to participate   []  Severe mental or physical capacity, patient is in urgent emergent situation and to complete the questionnaire would jeopardize the patient's health status        Functional Questionnaire (6/10/21)  [x]  Patient completed the functional outcome questionnaire and deficiencies were addressed in the plan of care. ()   []  Patient completed the outcome questionnaire and there were no functional deficits identified and therefore no plan of care is required. ()   []  Patient did not complete the functional outcome questionnaire and the reason why is documented in the medical chart. ()  []  Patient refused to participate   []  Patient unable to complete the questionnaire   []   A functional outcome assessment has been reported on within the last 30 days        Falls (6/10/21)  [x]  The patient has had no falls or 1 fall without injury in the past year. (1101F)   []  There is no documentation of fall in the medical chart (1101F,8P)     [] The patient has had 2 or more falls or one fall with injury in the past year that is documented in the medical chart. (1100F)  []  A falls risk assessment was completed and documented in the medical chart. (4811J)   []  Falls were addressed in the plan of care. (2731I)   []  A falls risk assessment was not completed and documented in the medical chart (7277B,3Q)   []   Falls were not addressed in the plan of care and reason was documented in the plan of care. (5606W 1P)        Medication (6/10/21)     [x] A list of current medications (including prescription, over the counter, herbal supplements, vitamin supplements, mineral supplements, dietary supplements) was reviewed with the patient and documented in the medical chart. ()        Falls Risk Assessment (30 days): (6/10/21)  [x] Falls Risk assessed and no intervention required.   [] Falls Risk assessed and Patient requires intervention due to being higher risk   TUG score (>12s at risk):     [] Falls education provided, including     PQRS:   Reviewed medication list with patient. No changes reported by pt since last PT visit. (6/17/21)          Latex Allergy:  [x]NO      []YES  Preferred Language for Healthcare:   [x]English       []other:      Pain level:  0-2/10 (6/17/21)   Medication:  Celebrex      SUBJECTIVE:  Pt states all her joints feel achy today which she attributes to the weather. She was not as tired after therapy on Tuesday but still took an hour nap. She continues to report pain with bending and straightening her knee in bed and with pushing through her left leg to re-position herself in bed, rated 1-2/10. OBJECTIVE:       (6/10/21)  Flexibility L R Comment   Hamstring Mod tightness Mod tightness    Gastroc Mild tightness Mild tightness    ITB      Quad              (6/15/21)  ROM PROM AROM Overpressure Comment    L R L R L R    Flexion 115 with SP  105 110      Extension   0 0                            (6/10/21)  Strength L R Comment   Quad 4/5 4/5    Hamstring 4+/5 4+/5    Gastroc 4/5 4/5    Hip Flex 4-/5 4-/5    Hip Abd 4-/5 4-/5    Hip Add 4/5 4/5    Glut Med              (6/10/21)  Girth L R   Mid Patella 55.0 cm 58.0 cm   Suprapatellar     5cm above     15cm above       (6/10/21)  Special Test Results/Comment   Meniscal Click    Crepitus (+) PF crepitus bilaterally   Flexion Test    Valgus Laxity    Varus Laxity    Lachmans    Drop Back        Reflexes/Sensation: (4/15/21)   []Dermatomes/Myotomes intact    []Reflexes equal and normal bilaterally   [x]Other: Intact to light touch    Joint mobility: (6/10/21)   []Normal    [x]Hypo   []Hyper    Palpation: Joint line tenderness still present on the left.   Quad atrophy present on the left vs right.(6/10/21)    Functional Mobility/Transfers: Stairs are still difficult (pt must ascend/descend stairs 1 at a time using HR), she is still limited with standing and walking, she still has to use her hands to help push herself up from a chair. Unable to kneel or squat. (6/10/21)    Posture: Forward head, rounded shoulders. (6/10/21)    Bandages/Dressings/Incisions: Pt wears compression hose and wraps on both legs (6/10/21)     Gait: (include devices/WB status) Antalgic, stiff leg gait, no AD. She continues to demonstrates decreased pawel, she still demonstrates increased lateral trunk sway, very little trunk rotation, and no arm swing on the left. Step length has increased some. (6/10/21)    Orthopedic Special Tests: See above.       RESTRICTIONS/PRECAUTIONS:     Exercises/Interventions:     Therapeutic Ex (47590) Sets/sec Reps Notes/CUES   BIKE      TREADMILL            STRETCHING      Towel Pull Calf 30\" hold X 3    Inclined Calf      Hamstrings 30\" hold X 3 Seated   Quads      Hip Flexors      ITB      Adductors            ROM      Passive      Active      Weight Hangs      Sheet Pulls 10\" hold X 10    Ankle Pumps      ERMI            STRENGTHENING      Quad Sets 10\" hold X 10    Ham Sets      Hip ADD Sets BS 10\" hold X 10     SLR Flexion 3 sets X 10     SLR Abduction  Held today   SLR Prone      SLR Adduction      SLR+      Supine Hip Abduction 2 sets X 10  Black band         Standing Marches 3 sets X 10  Standing at half wall   Standing Knee Flexion 3 sets X 10  Standing at half wall         PRE Machines      Knee Extension      Knee Flexion      Leg Press            CKC      Calf Raises 3 sets X 10 Seated   Mini Squats      Wall Sits      Step ups - no riser 2 sets X 10  Using half wall  *Add 1 riser next visit   Lateral Step Ups - no riser 1 sets X 10    TKE  Held today               Manual Intervention (95847)      Patellar Mobs      Femoral Tibial mobs      STM      Scar Massage      Foam Roll            NMR re-education (27173)   CUES NEEDED   Biodex Balance      Tandem Balance 30\" hold X 2 each *Feet staggered  *Standing next to half wallSLB      Rebounder      BOSU Sit -> Stand  X 15 no arms     With cushion on chair                 Therapeutic Activity (95735)      Core Training      [place] [address]      TRX training                  Patient Education: Dx, prognosis, pt goals/expectations, role of therapy (4/15/21)  X 8'                  Therapeutic Exercise and NMR EXR  [x] (31388) Provided verbal/tactile cueing for activities related to strengthening, flexibility, endurance, ROM for improvements in LE, proximal hip, and core control with self care, mobility, lifting, ambulation. [x] (39256) Provided verbal/tactile cueing for activities related to improving balance, coordination, kinesthetic sense, posture, motor skill, proprioception to assist with LE, proximal hip, and core control in self-care, mobility, lifting, ambulation and eccentric single leg control. NMR and Therapeutic Activities:    [x] (54556 or 29483) Provided verbal/tactile cueing for activities related to improving balance, coordination, kinesthetic sense, posture, motor skill, proprioception and motor activation to allow for proper function of core, proximal hip and LE with self-care and ADLs and functional mobility.   [] (22518) Gait Re-education- Provided training and instruction to the patient for proper LE, core and proximal hip recruitment and positioning and eccentric body weight control with ambulation re-education including up and down stairs     Home Exercise Program:    [x] (33925) Reviewed/Progressed HEP activities related to strengthening, flexibility, endurance, ROM of core, proximal hip and LE for functional self-care, mobility, lifting and ambulation/stair navigation - Updated HEP through [address] [place] [address] (see below). Pt was also issued new written handouts.  (4/27/21)  [x] (69371) Reviewed/Progressed HEP activities related to improving balance, coordination, kinesthetic sense, posture, motor skill, proprioception of core, proximal hip and LE for self-care, mobility, x 1 (15')     [] [facility] (55128)  [] ES(attended) (94416)      [] ES (un) (49090):         ASSESSMENT:  Pt did well with the exercises today with fewer rest breaks needed. She was a lot more confident today performing sit->stand with no arms. She also demonstrated better control with forward step ups today but still needs to use half wall for support. Added lateral step ups today which were challenging and pt could only complete 1 set of 10 due to fatigue. Strength and endurance are slowly improving but deficits are still present limiting function. MEDICARE CAP EXCEPTION DOCUMENTATION      I certify that this patient meets one of the below criteria necessary for becoming an exception to the Medicare cap on therapy services:    [x]  The patient has a condition identified by an ICD-9 code that has a direct and significant impact on the need for therapy. (Significantly impacts the rate of recovery.)                     []  The patient has a complexity identified by an ICD-9 code that has a direct and significant impact on the need for therapy. (Significantly impacts the rate of recovery and is associated with a primary condition.)         []  The patient has associated variables that influence the amount of treatment to include:  Social support, self-efficacy/motivation, prognosis, time since onset/acuity. []  The patient has generalized musculoskeletal conditions or a condition affecting multiple sites that will have a direct impact on the rate of recovery. [x]  The patient had a prior episode of outpatient therapy during this calendar year for a different condition. []  The patient has a mental or cognitive disorder in addition to the condition being treated that will have a direct and significant impact on the rate of recovery. GOALS: (Goals updated 6/10/21)  Patient stated goal:  \"Make my legs stronger; walk and climb stairs easier and faster. \"  [x] been slowed due to co-morbidities. [] Plan just implemented, too soon to assess goals progression <30days   [] Goals require adjustment due to lack of progress  [] Patient is not progressing as expected and requires additional follow up with physician  [] Other    Prognosis for POC: [x] Good [] Fair  [] Poor      Patient requires continued skilled intervention: [x] Yes  [] No    Treatment/Activity Tolerance:  [] Patient able to complete treatment  [x] Patient limited by fatigue  [] Patient limited by pain    [] Patient limited by other medical complications  [] Other:         PLAN: Continue therapy to improve ROM, strength, and balance. 1-2x/week for 4 more weeks for ROM, strengthening, balance act, HEP instruction, pt education, manual therapy prn, and modalities prn (6/10/21)  [x] Continue per plan of care [] Alter current plan   [] Plan of care initiated [] Hold pending MD visit [] Discharge      Electronically signed by:  Ranjana Colunga PT     Physical Therapist [address] #[id number]  Physical Therapist [place] License #[id number]    Note: If patient does not return for scheduled/ recommended follow up visits, this note will serve as a discharge from care along with most recent update on progress.

## 2021-06-24 ENCOUNTER — TREATMENT (OUTPATIENT)
Dept: PHYSICAL THERAPY | Age: 71
End: 2021-06-24
Payer: MEDICARE

## 2021-06-24 DIAGNOSIS — M17.12 PRIMARY OSTEOARTHRITIS OF LEFT KNEE: ICD-10-CM

## 2021-06-24 DIAGNOSIS — M25.662 DECREASED ROM OF LEFT KNEE: ICD-10-CM

## 2021-06-24 DIAGNOSIS — R29.898 WEAKNESS OF LEFT LOWER EXTREMITY: ICD-10-CM

## 2021-06-24 DIAGNOSIS — M25.562 LEFT KNEE PAIN, UNSPECIFIED CHRONICITY: Primary | ICD-10-CM

## 2021-06-24 PROCEDURE — 97112 NEUROMUSCULAR REEDUCATION: CPT | Performed by: PHYSICAL THERAPIST

## 2021-06-24 PROCEDURE — G8427 DOCREV CUR MEDS BY ELIG CLIN: HCPCS | Performed by: PHYSICAL THERAPIST

## 2021-06-24 PROCEDURE — 97530 THERAPEUTIC ACTIVITIES: CPT | Performed by: PHYSICAL THERAPIST

## 2021-06-24 PROCEDURE — 97110 THERAPEUTIC EXERCISES: CPT | Performed by: PHYSICAL THERAPIST

## 2021-06-24 NOTE — PROGRESS NOTES
[facility]   Phone: [phone]    Fax: [phone]          Physical Therapy Treatment Note/ Progress Report:           Date:  2021    Patient Name:  Mercedes Haq    :  1950  MRN: [id number]  Restrictions/Precautions:    Medical/Treatment Diagnosis Information:  Diagnosis: Left Knee Pain (M25.562), Primary OA of Left Knee (M17.12)   Treatment Diagnosis: Decreased Left Knee ROM (M25.662), Decreased Strength (R29.898)        Insurance/Certification information:  PT Insurance Information: Medicare - Visits based on medical necessity  Physician Information:  Referring Practitioner: Dr. Sheeba Porras  Has the plan of care been signed (Y/N):        [x]  Yes (POC signed 21)  []  No      Date of Patient follow up with Physician: As needed      Is this a Progress Report:     []  Yes  [x]  No        If Yes:  Date Range for reporting period:  Beginning 4/15/21  Ending 6/10/21    Progress report will be due (10 Rx or 30 days whichever is less):  30      Recertification will be due (POC Duration  / 90 days whichever is less):  21        Visit # Insurance Allowable Auth Required   16 Medical necessity  (pt has already used 13 visits this year for her shoulder) []  Yes [x]  No        Functional Scale:    Date assessed:  6/10/21  Functional Assessment Tool Used: Knee Outcome Survey Activities of Daily Living Scale (copy scanned into media)  Score:  44/70 = 63% (37% functional deficit)    (4/15/21)  Patient Age: 66years old  Patient Height: 5' 2\"  Patient Weight: 280 lbs  Patient BMI: 51.2      Pain (6/10/21)  [x]  Pain diagram was completed, pain was present and a follow up plan to address the pain is in the plan of care. ()   []  Pain diagram was completed and there was no pain present and therefore no follow up plan to address pain in the plan of care.  ()   []  Pain diagram was not completed and the reason for not completing the pain diagram is in the medical chart ()  []  Patient refused to participate   []  Severe mental or physical capacity, patient is in urgent emergent situation and to complete the questionnaire would jeopardize the patient's health status        Functional Questionnaire (6/10/21)  [x]  Patient completed the functional outcome questionnaire and deficiencies were addressed in the plan of care. ()   []  Patient completed the outcome questionnaire and there were no functional deficits identified and therefore no plan of care is required. ()   []  Patient did not complete the functional outcome questionnaire and the reason why is documented in the medical chart. ()  []  Patient refused to participate   []  Patient unable to complete the questionnaire   []   A functional outcome assessment has been reported on within the last 30 days        Falls (6/10/21)  [x]  The patient has had no falls or 1 fall without injury in the past year. (1101F)   []  There is no documentation of fall in the medical chart (1101F,8P)     [] The patient has had 2 or more falls or one fall with injury in the past year that is documented in the medical chart. (1100F)  []  A falls risk assessment was completed and documented in the medical chart. (9458V)   []  Falls were addressed in the plan of care. (8628A)   []  A falls risk assessment was not completed and documented in the medical chart (0172P,8Y)   []   Falls were not addressed in the plan of care and reason was documented in the plan of care. (9480J 1P)        Medication (6/10/21)     [x] A list of current medications (including prescription, over the counter, herbal supplements, vitamin supplements, mineral supplements, dietary supplements) was reviewed with the patient and documented in the medical chart. ()        Falls Risk Assessment (30 days): (6/10/21)  [x] Falls Risk assessed and no intervention required.   [] Falls Risk assessed and Patient requires intervention due to being Mobility/Transfers: Stairs are still difficult (pt must ascend/descend stairs 1 at a time using HR), she is still limited with standing and walking, she still has to use her hands to help push herself up from a chair. Unable to kneel or squat. (6/10/21)    Posture: Forward head, rounded shoulders. (6/10/21)    Bandages/Dressings/Incisions: Pt wears compression hose and wraps on both legs (6/10/21)     Gait: (include devices/WB status) Antalgic, stiff leg gait, no AD. She continues to demonstrates decreased pawel, she still demonstrates increased lateral trunk sway, very little trunk rotation, and no arm swing on the left. Step length has increased some. (6/10/21)    Orthopedic Special Tests: See above.       RESTRICTIONS/PRECAUTIONS:     Exercises/Interventions:     Therapeutic Ex (00757) Sets/sec Reps Notes/CUES   BIKE      TREADMILL            STRETCHING      Towel Pull Calf 30\" hold X 3    Inclined Calf      Hamstrings 30\" hold X 3 Seated   Quads      Hip Flexors      ITB      Adductors            ROM      Passive      Active      Weight Hangs      Sheet Pulls 10\" hold X 10    Ankle Pumps      ERMI            STRENGTHENING      Quad Sets 10\" hold X 10    Ham Sets      Hip ADD Sets BS 10\" hold X 10     SLR Flexion 3 sets X 10     SLR Abduction  Held today   SLR Prone      SLR Adduction      SLR+      Supine Hip Abduction 2 sets X 10  Black band         Standing Marches 3 sets X 10  Standing at half wall   Standing Knee Flexion 3 sets X 10  Standing at half wall         PRE Machines      Knee Extension      Knee Flexion      Leg Press            CKC      Calf Raises 3 sets X 10 Seated   Mini Squats      Wall Sits      Step ups - 1 riser 3 sets X 10  Using half wall     Lateral Step Ups - no riser 2 sets X 10    TKE  Held today               Manual Intervention (96879)      Patellar Mobs      Femoral Tibial mobs      STM      Scar Massage      Foam Roll            NMR re-education (69955)   CUES NEEDED Biodex Balance      Tandem Balance 30\" hold X 2 each *Feet staggered  *Standing next to half wallSLB      Rebounder      BOSU            Sit -> Stand  X 15 no arms     With cushion on chair                 Therapeutic Activity (01436)      Core Training      Swiss Shawna Herb Activities      Monster Walks      TRX training                  Patient Education: Dx, prognosis, pt goals/expectations, role of therapy (4/15/21)  X 8'                  Therapeutic Exercise and NMR EXR  [x] (76341) Provided verbal/tactile cueing for activities related to strengthening, flexibility, endurance, ROM for improvements in LE, proximal hip, and core control with self care, mobility, lifting, ambulation. [x] (47921) Provided verbal/tactile cueing for activities related to improving balance, coordination, kinesthetic sense, posture, motor skill, proprioception to assist with LE, proximal hip, and core control in self-care, mobility, lifting, ambulation and eccentric single leg control. NMR and Therapeutic Activities:    [x] (77418 or 98411) Provided verbal/tactile cueing for activities related to improving balance, coordination, kinesthetic sense, posture, motor skill, proprioception and motor activation to allow for proper function of core, proximal hip and LE with self-care and ADLs and functional mobility.   [] (30902) Gait Re-education- Provided training and instruction to the patient for proper LE, core and proximal hip recruitment and positioning and eccentric body weight control with ambulation re-education including up and down stairs     Home Exercise Program:    [x] (08037) Reviewed/Progressed HEP activities related to strengthening, flexibility, endurance, ROM of core, proximal hip and LE for functional self-care, mobility, lifting and ambulation/stair navigation - Updated HEP through [address] [place] [address] (see below). Pt was also issued new written handouts.  (4/27/21)  [x] (49228) Reviewed/Progressed HEP activities related to improving balance, coordination, kinesthetic sense, posture, motor skill, proprioception of core, proximal hip and LE for self-care, mobility, lifting, and ambulation/stair navigation          Access Code: [email or web address]  URL: [email or web address]/Date: 04/20/2021Prepared by: MIN SybertExercises   Long Sitting Calf Stretch with Strap - 1 x daily - 7 x weekly - 1 sets - 3 reps - 30 seconds hold   Seated Table Hamstring Stretch - 1 x daily - 7 x weekly - 1 sets - 3 reps - 30 seconds hold   Long Sitting Quad Set - 1 x daily - 7 x weekly - 1 sets - 10 reps - 10 seconds hold   Long Sitting Hip Adduction Isometric with Ball - 1 x daily - 7 x weekly - 1 sets - 10 reps - 10 seconds hold   Supine Active Straight Leg Raise - 1 x daily - 7 x weekly - 2-3 sets - 10 reps   Hooklying Isometric Clamshell - 1 x daily - 7 x weekly - 3 sets - 10 reps   Standing March with Counter Support - 1 x daily - 7 x weekly - 3 sets - 10 reps   Seated Heel Raise - 1 x daily - 7 x weekly - 3 sets - 10 reps   Sit to Stand - 1 x daily - 7 x weekly - 15 reps   Tandem Stance with Support - 1 x daily - 7 x weekly - 2 reps - 20 seconds hold        Manual Treatments:  PROM / STM / Oscillations-Mobs:  G-I, II, III, IV (PA's, Inf., Post.)  [] (63702) Provided manual therapy to mobilize LE, proximal hip and/or LS spine soft tissue/joints for the purpose of modulating pain, promoting relaxation, increasing ROM, reducing/eliminating soft tissue swelling/inflammation/restriction, improving soft tissue extensibility and allowing for proper ROM for normal function with self-care, mobility, lifting and ambulation. Modalities:   Pt declined ice   [] GAME READY (VASO)- for significant edema, swelling, pain control.      Charges:  Timed Code Treatment Minutes: 54'   Total Treatment Minutes: 54'      [] EVAL (LOW) 455 [id number] (typically 20 minutes face-to-face)  [] EVAL (MOD) 93282 (typically 30 minutes face-to-face)  [] EVAL (HIGH) 25345 (typically 45 minutes has a mental or cognitive disorder in addition to the condition being treated that will have a direct and significant impact on the rate of recovery. GOALS: (Goals updated 6/10/21)  Patient stated goal:  \"Make my legs stronger; walk and climb stairs easier and faster. \"  [x] Progressing: [] Met: [] Not Met: [] Adjusted    Therapist goals for Patient:   Short Term Goals: To be achieved in: 2 weeks  1. Independent in HEP and progression per patient tolerance, in order to prevent re-injury. [] Progressing: [x] Met: [] Not Met: [] Adjusted  2. Patient will have a decrease in left knee pain to 1-2/10 to facilitate improvement in movement, function, and ADLs as indicated by Functional Deficits. [] Progressing: [x] Met: [] Not Met: [] Adjusted    Long Term Goals: To be achieved in: 4-6 weeks  1. Disability index score of 20% or less for the Knee Outcome Survey Activities of Daily Living Scale to assist with reaching prior level of function. [x] Progressing: [] Met: [] Not Met: [] Adjusted  2. Patient will demonstrate an increase in left knee flexion AROM to at least 115 degrees to allow for proper joint functioning as indicated by patients Functional Deficits. [x] Progressing: [] Met: [] Not Met: [] Adjusted  3. Patient will demonstrate an increase in left hip and knee strength to at least 4+/5 to allow for proper functional mobility as indicated by patients Functional Deficits. [x] Progressing: [] Met: [] Not Met: [] Adjusted  4. Patient will be able to walk community distances, stand for at least 30 minutes, and be able to get up from a chair with little to no UE assistance needed for improved function. [x] Progressing: [] Met: [] Not Met: [] Adjusted  5. Patient will be able to ascend/descend stairs reciprocally with HR (patient specific functional goal)    [x] Progressing: [] Met: [] Not Met: [] Adjusted   6. Patient will have a decrease in left knee pain to 0-1/10 with daily act. [x] Progressing: [] Met: [] Not Met: [] Adjusted          Overall Progression Towards Functional goals/ Treatment Progress Update:  [x] Patient is progressing as expected towards functional goals listed. [] Progression is slowed due to complexities/Impairments listed. [] Progression has been slowed due to co-morbidities. [] Plan just implemented, too soon to assess goals progression <30days   [] Goals require adjustment due to lack of progress  [] Patient is not progressing as expected and requires additional follow up with physician  [] Other    Prognosis for POC: [x] Good [] Fair  [] Poor      Patient requires continued skilled intervention: [x] Yes  [] No    Treatment/Activity Tolerance:  [] Patient able to complete treatment  [x] Patient limited by fatigue  [] Patient limited by pain    [] Patient limited by other medical complications  [] Other:         PLAN: Continue therapy to improve ROM, strength, and balance. 1-2x/week for 4 more weeks for ROM, strengthening, balance act, HEP instruction, pt education, manual therapy prn, and modalities prn (6/10/21)  [x] Continue per plan of care [] Alter current plan   [] Plan of care initiated [] Hold pending MD visit [] Discharge      Electronically signed by:  Lavon Lundberg PT     Physical Therapist Adore Rangel 421 #[id number]  Physical Therapist [place] License #[id number]    Note: If patient does not return for scheduled/ recommended follow up visits, this note will serve as a discharge from care along with most recent update on progress.

## 2021-06-29 ENCOUNTER — TREATMENT (OUTPATIENT)
Dept: PHYSICAL THERAPY | Age: 71
End: 2021-06-29
Payer: MEDICARE

## 2021-06-29 DIAGNOSIS — M25.562 LEFT KNEE PAIN, UNSPECIFIED CHRONICITY: Primary | ICD-10-CM

## 2021-06-29 DIAGNOSIS — R29.898 WEAKNESS OF LEFT LOWER EXTREMITY: ICD-10-CM

## 2021-06-29 DIAGNOSIS — M25.662 DECREASED ROM OF LEFT KNEE: ICD-10-CM

## 2021-06-29 DIAGNOSIS — M17.12 PRIMARY OSTEOARTHRITIS OF LEFT KNEE: ICD-10-CM

## 2021-06-29 PROCEDURE — 97530 THERAPEUTIC ACTIVITIES: CPT | Performed by: PHYSICAL THERAPIST

## 2021-06-29 PROCEDURE — 97112 NEUROMUSCULAR REEDUCATION: CPT | Performed by: PHYSICAL THERAPIST

## 2021-06-29 PROCEDURE — G8427 DOCREV CUR MEDS BY ELIG CLIN: HCPCS | Performed by: PHYSICAL THERAPIST

## 2021-06-29 PROCEDURE — 97110 THERAPEUTIC EXERCISES: CPT | Performed by: PHYSICAL THERAPIST

## 2021-06-29 NOTE — PROGRESS NOTES
[facility]   Phone: [phone]    Fax: [phone]          Physical Therapy Treatment Note/ Progress Report:           Date:  2021    Patient Name:  Wilian Conroy    :  1950  MRN: [id number]  Restrictions/Precautions:    Medical/Treatment Diagnosis Information:  Diagnosis: Left Knee Pain (M25.562), Primary OA of Left Knee (M17.12)   Treatment Diagnosis: Decreased Left Knee ROM (M25.662), Decreased Strength (R29.898)        Insurance/Certification information:  PT Insurance Information: Medicare - Visits based on medical necessity  Physician Information:  Referring Practitioner: Dr. Theodora Chaudhary  Has the plan of care been signed (Y/N):        [x]  Yes (POC signed 21)  []  No      Date of Patient follow up with Physician: As needed      Is this a Progress Report:     []  Yes  [x]  No        If Yes:  Date Range for reporting period:  Beginning 4/15/21  Ending 6/10/21    Progress report will be due (10 Rx or 30 days whichever is less):        Recertification will be due (POC Duration  / 90 days whichever is less):  21        Visit # Insurance Allowable Auth Required   17 Medical necessity  (pt has already used 13 visits this year for her shoulder) []  Yes [x]  No        Functional Scale:    Date assessed:  6/10/21  Functional Assessment Tool Used: Knee Outcome Survey Activities of Daily Living Scale (copy scanned into media)  Score:  44/70 = 63% (37% functional deficit)    (4/15/21)  Patient Age: 66years old  Patient Height: 5' 2\"  Patient Weight: 280 lbs  Patient BMI: 51.2      Pain (6/10/21)  [x]  Pain diagram was completed, pain was present and a follow up plan to address the pain is in the plan of care. ()   []  Pain diagram was completed and there was no pain present and therefore no follow up plan to address pain in the plan of care.  ()   []  Pain diagram was not completed and the reason for not completing the pain diagram is in the medical chart ()  []  Patient refused to participate   []  Severe mental or physical capacity, patient is in urgent emergent situation and to complete the questionnaire would jeopardize the patient's health status        Functional Questionnaire (6/10/21)  [x]  Patient completed the functional outcome questionnaire and deficiencies were addressed in the plan of care. ()   []  Patient completed the outcome questionnaire and there were no functional deficits identified and therefore no plan of care is required. ()   []  Patient did not complete the functional outcome questionnaire and the reason why is documented in the medical chart. ()  []  Patient refused to participate   []  Patient unable to complete the questionnaire   []   A functional outcome assessment has been reported on within the last 30 days        Falls (6/10/21)  [x]  The patient has had no falls or 1 fall without injury in the past year. (1101F)   []  There is no documentation of fall in the medical chart (1101F,8P)     [] The patient has had 2 or more falls or one fall with injury in the past year that is documented in the medical chart. (1100F)  []  A falls risk assessment was completed and documented in the medical chart. (7883Q)   []  Falls were addressed in the plan of care. (3423W)   []  A falls risk assessment was not completed and documented in the medical chart (7260D,1U)   []   Falls were not addressed in the plan of care and reason was documented in the plan of care. (0082K 1P)        Medication (6/10/21)     [x] A list of current medications (including prescription, over the counter, herbal supplements, vitamin supplements, mineral supplements, dietary supplements) was reviewed with the patient and documented in the medical chart. ()        Falls Risk Assessment (30 days): (6/10/21)  [x] Falls Risk assessed and no intervention required.   [] Falls Risk assessed and Patient requires intervention due to being higher risk   TUG score (>12s at risk):     [] Falls education provided, including     PQRS:   Reviewed medication list with patient. No changes reported by pt since last PT visit. (6/29/21)          Latex Allergy:  [x]NO      []YES  Preferred Language for Healthcare:   [x]English       []other:      Pain level:  0-1/10 (6/29/21)   Medication:  Celebrex      SUBJECTIVE:  Pt states her left knee is doing well. She still has some pain with bending and straightening her left knee in bed (rated 1/10). She also has some pain with getting in the car (rated 1/10). She is doing much better getting up from a chair but both knees still grind. Stairs are still really difficult for her. OBJECTIVE:       (6/10/21)  Flexibility L R Comment   Hamstring Mod tightness Mod tightness    Gastroc Mild tightness Mild tightness    ITB      Quad              (6/29/21)  ROM PROM AROM Overpressure Comment    L R L R L R    Flexion 120 with SP  105 114      Extension   0 0                            (6/10/21)  Strength L R Comment   Quad 4/5 4/5    Hamstring 4+/5 4+/5    Gastroc 4/5 4/5    Hip Flex 4-/5 4-/5    Hip Abd 4-/5 4-/5    Hip Add 4/5 4/5    Glut Med              (6/10/21)  Girth L R   Mid Patella 55.0 cm 58.0 cm   Suprapatellar     5cm above     15cm above       (6/10/21)  Special Test Results/Comment   Meniscal Click    Crepitus (+) PF crepitus bilaterally   Flexion Test    Valgus Laxity    Varus Laxity    Lachmans    Drop Back        Reflexes/Sensation: (4/15/21)   []Dermatomes/Myotomes intact    []Reflexes equal and normal bilaterally   [x]Other: Intact to light touch    Joint mobility: (6/10/21)   []Normal    [x]Hypo   []Hyper    Palpation: Joint line tenderness still present on the left.   Quad atrophy present on the left vs right.(6/10/21)    Functional Mobility/Transfers: Stairs are still difficult (pt must ascend/descend stairs 1 at a time using HR), she is still limited with standing and walking, she still has to use her hands to help push herself up from a chair. Unable to kneel or squat. (6/10/21)    Posture: Forward head, rounded shoulders. (6/10/21)    Bandages/Dressings/Incisions: Pt wears compression hose and wraps on both legs (6/10/21)     Gait: (include devices/WB status) Antalgic, stiff leg gait, no AD. She continues to demonstrates decreased pawel, she still demonstrates increased lateral trunk sway, very little trunk rotation, and no arm swing on the left. Step length has increased some. (6/10/21)    Orthopedic Special Tests: See above.       RESTRICTIONS/PRECAUTIONS:     Exercises/Interventions:     Therapeutic Ex (76868) Sets/sec Reps Notes/CUES   BIKE      TREADMILL            STRETCHING      Towel Pull Calf 30\" hold X 3    Inclined Calf      Hamstrings 30\" hold X 3 Seated   Quads      Hip Flexors      ITB      Adductors            ROM      Passive      Active      Weight Hangs      Sheet Pulls 10\" hold X 10    Ankle Pumps      ERMI            STRENGTHENING      Quad Sets 10\" hold X 10    Ham Sets      Hip ADD Sets BS 10\" hold X 10     SLR Flexion 3 sets X 10     SLR Abduction  Held today   SLR Prone      SLR Adduction      SLR+      Supine Hip Abduction 2 sets X 10  Black band         Standing Marches 3 sets X 10  Standing at half wall   Standing Knee Flexion 3 sets X 10  Standing at half wall         PRE Machines      Knee Extension      Knee Flexion      Leg Press            CKC      Calf Raises 3 sets X 10 Seated   Mini Squats      Wall Sits      Step ups - 1 riser 3 sets X 10  Using half wall     Lateral Step Ups - no riser 2 sets X 10    TKE  Held today               Manual Intervention (53147)      Patellar Mobs      Femoral Tibial mobs      STM      Scar Massage      Foam Roll            NMR re-education (31065)   CUES NEEDED   Biodex Balance      Tandem Balance 30\" hold X 2 each *Feet staggered  *Standing next to half wallSLB      Rebounder      BOSU            Sit -> Stand  X 20 no arms     With cushion on chair                 Therapeutic Activity (82802)      Core Training      [place] [address]      TRX training                  Patient Education: Dx, prognosis, pt goals/expectations, role of therapy (4/15/21)  X 8'                  Therapeutic Exercise and NMR EXR  [x] (06254) Provided verbal/tactile cueing for activities related to strengthening, flexibility, endurance, ROM for improvements in LE, proximal hip, and core control with self care, mobility, lifting, ambulation. [x] (88923) Provided verbal/tactile cueing for activities related to improving balance, coordination, kinesthetic sense, posture, motor skill, proprioception to assist with LE, proximal hip, and core control in self-care, mobility, lifting, ambulation and eccentric single leg control. NMR and Therapeutic Activities:    [x] (33005 or 68691) Provided verbal/tactile cueing for activities related to improving balance, coordination, kinesthetic sense, posture, motor skill, proprioception and motor activation to allow for proper function of core, proximal hip and LE with self-care and ADLs and functional mobility.   [] (11007) Gait Re-education- Provided training and instruction to the patient for proper LE, core and proximal hip recruitment and positioning and eccentric body weight control with ambulation re-education including up and down stairs     Home Exercise Program:    [x] (49231) Reviewed/Progressed HEP activities related to strengthening, flexibility, endurance, ROM of core, proximal hip and LE for functional self-care, mobility, lifting and ambulation/stair navigation - Updated HEP through [address] [place] [address] (see below). Pt was also issued new written handouts.  (4/27/21)  [x] (07940) Reviewed/Progressed HEP activities related to improving balance, coordination, kinesthetic sense, posture, motor skill, proprioception of core, proximal hip and LE for self-care, mobility, lifting, and [] [facility] Traction (47392)  [] ES(attended) (72792)      [] ES (un) (10071):         ASSESSMENT:  Pt was able to perform 20 sit->stands today with no arms. Forward and lateral step ups remain challenging and she still has to use the half wall for support. She did well with tandem stance today with only one loss of balance. Her endurance is getting better with fewer rest breaks needed during her program today. Pt needs to continue increasing strength. MEDICARE CAP EXCEPTION DOCUMENTATION      I certify that this patient meets one of the below criteria necessary for becoming an exception to the Medicare cap on therapy services:    [x]  The patient has a condition identified by an ICD-9 code that has a direct and significant impact on the need for therapy. (Significantly impacts the rate of recovery.)                     []  The patient has a complexity identified by an ICD-9 code that has a direct and significant impact on the need for therapy. (Significantly impacts the rate of recovery and is associated with a primary condition.)         []  The patient has associated variables that influence the amount of treatment to include:  Social support, self-efficacy/motivation, prognosis, time since onset/acuity. []  The patient has generalized musculoskeletal conditions or a condition affecting multiple sites that will have a direct impact on the rate of recovery. [x]  The patient had a prior episode of outpatient therapy during this calendar year for a different condition. []  The patient has a mental or cognitive disorder in addition to the condition being treated that will have a direct and significant impact on the rate of recovery. GOALS: (Goals updated 6/10/21)  Patient stated goal:  \"Make my legs stronger; walk and climb stairs easier and faster. \"  [x] Progressing: [] Met: [] Not Met: [] Adjusted    Therapist goals for Patient:   Short Term Goals:  To be achieved in: 2 weeks  1. Independent in HEP and progression per patient tolerance, in order to prevent re-injury. [] Progressing: [x] Met: [] Not Met: [] Adjusted  2. Patient will have a decrease in left knee pain to 1-2/10 to facilitate improvement in movement, function, and ADLs as indicated by Functional Deficits. [] Progressing: [x] Met: [] Not Met: [] Adjusted    Long Term Goals: To be achieved in: 4-6 weeks  1. Disability index score of 20% or less for the Knee Outcome Survey Activities of Daily Living Scale to assist with reaching prior level of function. [x] Progressing: [] Met: [] Not Met: [] Adjusted  2. Patient will demonstrate an increase in left knee flexion AROM to at least 115 degrees to allow for proper joint functioning as indicated by patients Functional Deficits. [x] Progressing: [] Met: [] Not Met: [] Adjusted  3. Patient will demonstrate an increase in left hip and knee strength to at least 4+/5 to allow for proper functional mobility as indicated by patients Functional Deficits. [x] Progressing: [] Met: [] Not Met: [] Adjusted  4. Patient will be able to walk community distances, stand for at least 30 minutes, and be able to get up from a chair with little to no UE assistance needed for improved function. [x] Progressing: [] Met: [] Not Met: [] Adjusted  5. Patient will be able to ascend/descend stairs reciprocally with HR (patient specific functional goal)    [x] Progressing: [] Met: [] Not Met: [] Adjusted   6. Patient will have a decrease in left knee pain to 0-1/10 with daily act. [x] Progressing: [] Met: [] Not Met: [] Adjusted          Overall Progression Towards Functional goals/ Treatment Progress Update:  [x] Patient is progressing as expected towards functional goals listed. [] Progression is slowed due to complexities/Impairments listed. [] Progression has been slowed due to co-morbidities.   [] Plan just implemented, too soon to assess goals progression

## 2021-07-01 ENCOUNTER — TREATMENT (OUTPATIENT)
Dept: PHYSICAL THERAPY | Age: 71
End: 2021-07-01
Payer: MEDICARE

## 2021-07-01 DIAGNOSIS — M25.662 DECREASED ROM OF LEFT KNEE: ICD-10-CM

## 2021-07-01 DIAGNOSIS — M25.562 LEFT KNEE PAIN, UNSPECIFIED CHRONICITY: Primary | ICD-10-CM

## 2021-07-01 DIAGNOSIS — M17.12 PRIMARY OSTEOARTHRITIS OF LEFT KNEE: ICD-10-CM

## 2021-07-01 DIAGNOSIS — R29.898 WEAKNESS OF LEFT LOWER EXTREMITY: ICD-10-CM

## 2021-07-01 PROCEDURE — 97530 THERAPEUTIC ACTIVITIES: CPT | Performed by: PHYSICAL THERAPIST

## 2021-07-01 PROCEDURE — G8427 DOCREV CUR MEDS BY ELIG CLIN: HCPCS | Performed by: PHYSICAL THERAPIST

## 2021-07-01 PROCEDURE — 97112 NEUROMUSCULAR REEDUCATION: CPT | Performed by: PHYSICAL THERAPIST

## 2021-07-01 PROCEDURE — 97110 THERAPEUTIC EXERCISES: CPT | Performed by: PHYSICAL THERAPIST

## 2021-07-01 NOTE — PROGRESS NOTES
[facility]   Phone: [phone]    Fax: [phone]          Physical Therapy Treatment Note/ Progress Report:           Date:  2021    Patient Name:  Ailyn Rasmussen    :  1950  MRN: [id number]  Restrictions/Precautions:    Medical/Treatment Diagnosis Information:  Diagnosis: Left Knee Pain (M25.562), Primary OA of Left Knee (M17.12)   Treatment Diagnosis: Decreased Left Knee ROM (M25.662), Decreased Strength (R29.898)        Insurance/Certification information:  PT Insurance Information: Medicare - Visits based on medical necessity  Physician Information:  Referring Practitioner: Dr. Sergio Weaver  Has the plan of care been signed (Y/N):        [x]  Yes (POC signed 21)  []  No      Date of Patient follow up with Physician: As needed      Is this a Progress Report:     []  Yes  [x]  No        If Yes:  Date Range for reporting period:  Beginning 4/15/21  Ending 6/10/21    Progress report will be due (10 Rx or 30 days whichever is less):  71      Recertification will be due (POC Duration  / 90 days whichever is less):  21        Visit # Insurance Allowable Auth Required   18 Medical necessity  (pt has already used 13 visits this year for her shoulder) []  Yes [x]  No        Functional Scale:    Date assessed:  6/10/21  Functional Assessment Tool Used: Knee Outcome Survey Activities of Daily Living Scale (copy scanned into media)  Score:  44/70 = 63% (37% functional deficit)    (4/15/21)  Patient Age: 66years old  Patient Height: 5' 2\"  Patient Weight: 280 lbs  Patient BMI: 51.2      Pain (6/10/21)  [x]  Pain diagram was completed, pain was present and a follow up plan to address the pain is in the plan of care. ()   []  Pain diagram was completed and there was no pain present and therefore no follow up plan to address pain in the plan of care.  ()   []  Pain diagram was not completed and the reason for not completing the pain diagram is in the medical chart ()  []  Patient refused to participate   []  Severe mental or physical capacity, patient is in urgent emergent situation and to complete the questionnaire would jeopardize the patient's health status        Functional Questionnaire (6/10/21)  [x]  Patient completed the functional outcome questionnaire and deficiencies were addressed in the plan of care. ()   []  Patient completed the outcome questionnaire and there were no functional deficits identified and therefore no plan of care is required. ()   []  Patient did not complete the functional outcome questionnaire and the reason why is documented in the medical chart. ()  []  Patient refused to participate   []  Patient unable to complete the questionnaire   []   A functional outcome assessment has been reported on within the last 30 days        Falls (6/10/21)  [x]  The patient has had no falls or 1 fall without injury in the past year. (1101F)   []  There is no documentation of fall in the medical chart (1101F,8P)     [] The patient has had 2 or more falls or one fall with injury in the past year that is documented in the medical chart. (1100F)  []  A falls risk assessment was completed and documented in the medical chart. (6281F)   []  Falls were addressed in the plan of care. (7902F)   []  A falls risk assessment was not completed and documented in the medical chart (7307E,2P)   []   Falls were not addressed in the plan of care and reason was documented in the plan of care. (5647Y 1P)        Medication (6/10/21)     [x] A list of current medications (including prescription, over the counter, herbal supplements, vitamin supplements, mineral supplements, dietary supplements) was reviewed with the patient and documented in the medical chart. ()        Falls Risk Assessment (30 days): (6/10/21)  [x] Falls Risk assessed and no intervention required.   [] Falls Risk assessed and Patient requires intervention due to being higher risk   TUG score (>12s at risk):     [] Falls education provided, including     PQRS:   Reviewed medication list with patient. No changes reported by pt since last PT visit.  (7/1/21)          Latex Allergy:  [x]NO      []YES  Preferred Language for Healthcare:   [x]English       []other:      Pain level:  0-1/10 (7/1/21)   Medication:  Celebrex      SUBJECTIVE:  Pt states all her joints ached yesterday from the weather but she feels much better today. She felt less pain this morning straightening and bending her left knee is bed. She felt some discomfort in her left knee getting in the car to come to therapy today. Stairs are still difficult. OBJECTIVE:       (6/10/21)  Flexibility L R Comment   Hamstring Mod tightness Mod tightness    Gastroc Mild tightness Mild tightness    ITB      Quad              (6/29/21)  ROM PROM AROM Overpressure Comment    L R L R L R    Flexion 120 with SP  105 114      Extension   0 0                            (6/10/21)  Strength L R Comment   Quad 4/5 4/5    Hamstring 4+/5 4+/5    Gastroc 4/5 4/5    Hip Flex 4-/5 4-/5    Hip Abd 4-/5 4-/5    Hip Add 4/5 4/5    Glut Med              (6/10/21)  Girth L R   Mid Patella 55.0 cm 58.0 cm   Suprapatellar     5cm above     15cm above       (6/10/21)  Special Test Results/Comment   Meniscal Click    Crepitus (+) PF crepitus bilaterally   Flexion Test    Valgus Laxity    Varus Laxity    Lachmans    Drop Back        Reflexes/Sensation: (4/15/21)   []Dermatomes/Myotomes intact    []Reflexes equal and normal bilaterally   [x]Other: Intact to light touch    Joint mobility: (6/10/21)   []Normal    [x]Hypo   []Hyper    Palpation: Joint line tenderness still present on the left.   Quad atrophy present on the left vs right.(6/10/21)    Functional Mobility/Transfers: Stairs are still difficult (pt must ascend/descend stairs 1 at a time using HR), she is still limited with standing and walking, she still has to use her hands to help push herself up from a chair. Unable to kneel or squat. (6/10/21)    Posture: Forward head, rounded shoulders. (6/10/21)    Bandages/Dressings/Incisions: Pt wears compression hose and wraps on both legs (6/10/21)     Gait: (include devices/WB status) Antalgic, stiff leg gait, no AD. She continues to demonstrates decreased pawel, she still demonstrates increased lateral trunk sway, very little trunk rotation, and no arm swing on the left. Step length has increased some. (6/10/21)    Orthopedic Special Tests: See above.       RESTRICTIONS/PRECAUTIONS:     Exercises/Interventions:     Therapeutic Ex (97679) Sets/sec Reps Notes/CUES   BIKE      TREADMILL            STRETCHING      Towel Pull Calf 30\" hold X 3    Inclined Calf      Hamstrings 30\" hold X 3 Seated   Quads      Hip Flexors      ITB      Adductors            ROM      Passive      Active      Weight Hangs      Sheet Pulls 10\" hold X 10    Ankle Pumps      ERMI            STRENGTHENING      Quad Sets 10\" hold X 10    Ham Sets      Hip ADD Sets BS 10\" hold X 10     SLR Flexion 3 sets X 10     SLR Abduction  HEP   SLR Prone      SLR Adduction      SLR+      Supine Hip Abduction 2 sets X 10  Black band         Standing Marches 3 sets X 10  Standing at half wall   Standing Knee Flexion 3 sets X 10  Standing at half wall         PRE Machines      Knee Extension      Knee Flexion      Leg Press            CKC      Calf Raises 3 sets X 10 Seated   Mini Squats      Wall Sits      Step ups - 1 riser 3 sets X 10  Using half wall     Lateral Step Ups - no riser 2 sets X 10    TKE  Held today               Manual Intervention (37950)      Patellar Mobs      Femoral Tibial mobs      STM      Scar Massage      Foam Roll            NMR re-education (60498)   CUES NEEDED   Biodex Balance      Tandem Balance 30\" hold X 2 each *Feet staggered  *Standing next to half wallSLB      Rebounder      BOSU            Sit -> Stand  X 20 no arms     With cushion on chair Therapeutic Activity (70979)      Core Training      [place] Activities      Monster Walks      TRX training                  Patient Education: Dx, prognosis, pt goals/expectations, role of therapy (4/15/21)  X 8'                  Therapeutic Exercise and NMR EXR  [x] (65653) Provided verbal/tactile cueing for activities related to strengthening, flexibility, endurance, ROM for improvements in LE, proximal hip, and core control with self care, mobility, lifting, ambulation. [x] (76307) Provided verbal/tactile cueing for activities related to improving balance, coordination, kinesthetic sense, posture, motor skill, proprioception to assist with LE, proximal hip, and core control in self-care, mobility, lifting, ambulation and eccentric single leg control. NMR and Therapeutic Activities:    [x] (56881 or 93520) Provided verbal/tactile cueing for activities related to improving balance, coordination, kinesthetic sense, posture, motor skill, proprioception and motor activation to allow for proper function of core, proximal hip and LE with self-care and ADLs and functional mobility.   [] (33274) Gait Re-education- Provided training and instruction to the patient for proper LE, core and proximal hip recruitment and positioning and eccentric body weight control with ambulation re-education including up and down stairs     Home Exercise Program:    [x] (67485) Reviewed/Progressed HEP activities related to strengthening, flexibility, endurance, ROM of core, proximal hip and LE for functional self-care, mobility, lifting and ambulation/stair navigation - Updated HEP through [employer] (see below). Pt was also issued new written handouts.  (4/27/21)  [x] (32504) Reviewed/Progressed HEP activities related to improving balance, coordination, kinesthetic sense, posture, motor skill, proprioception of core, proximal hip and LE for self-care, mobility, lifting, and ambulation/stair navigation          Access Code: [email or web address]  URL: [email or web address]: 04/20/2021Prepared by: [email or web address] SybertExercises   Long Sitting Calf Stretch with Strap - 1 x daily - 7 x weekly - 1 sets - 3 reps - 30 seconds hold   Seated Table Hamstring Stretch - 1 x daily - 7 x weekly - 1 sets - 3 reps - 30 seconds hold   Long Sitting Quad Set - 1 x daily - 7 x weekly - 1 sets - 10 reps - 10 seconds hold   Long Sitting Hip Adduction Isometric with Ball - 1 x daily - 7 x weekly - 1 sets - 10 reps - 10 seconds hold   Supine Active Straight Leg Raise - 1 x daily - 7 x weekly - 2-3 sets - 10 reps   Hooklying Isometric Clamshell - 1 x daily - 7 x weekly - 3 sets - 10 reps   Standing March with Counter Support - 1 x daily - 7 x weekly - 3 sets - 10 reps   Seated Heel Raise - 1 x daily - 7 x weekly - 3 sets - 10 reps   Sit to Stand - 1 x daily - 7 x weekly - 15 reps   Tandem Stance with Support - 1 x daily - 7 x weekly - 2 reps - 20 seconds hold        Manual Treatments:  PROM / STM / Oscillations-Mobs:  G-I, II, III, IV (PA's, Inf., Post.)  [] (97639) Provided manual therapy to mobilize LE, proximal hip and/or LS spine soft tissue/joints for the purpose of modulating pain, promoting relaxation, increasing ROM, reducing/eliminating soft tissue swelling/inflammation/restriction, improving soft tissue extensibility and allowing for proper ROM for normal function with self-care, mobility, lifting and ambulation. Modalities:   Pt declined ice   [] GAME READY (VASO)- for significant edema, swelling, pain control.      Charges:  Timed Code Treatment Minutes: 54'   Total Treatment Minutes: 54'      [] EVAL (LOW) 455 [id number] (typically 20 minutes face-to-face)  [] EVAL (MOD) 53328 (typically 30 minutes face-to-face)  [] EVAL (HIGH) 42596 (typically 45 minutes face-to-face)  [] RE-EVAL     [x] OV(03582) x 2 (30')    [] IONTO  [x] NMR (35271) x 1 (10')   [] VASO  [] Manual (68161) x      [] Other:  [x] TA x 1 (15')     [] [facility] Traction (42166)  [] ES(attended) (91274) [] ES (un) (66420):         ASSESSMENT:  Pt tolerated the exercises well today and she demonstrates good understanding of her program.  Sit->stands have gotten a lot easier with no arms needed. Tandem stance was challenging with left leg behind with one loss of balance during each set. Step ups continue to be challenging with pt needing to use the half wall for support. Pt was fatigued with her program today with rest breaks needed with standing exercises. Pt needs to continue increasing strength and endurance. MEDICARE CAP EXCEPTION DOCUMENTATION      I certify that this patient meets one of the below criteria necessary for becoming an exception to the Medicare cap on therapy services:    [x]  The patient has a condition identified by an ICD-9 code that has a direct and significant impact on the need for therapy. (Significantly impacts the rate of recovery.)                     []  The patient has a complexity identified by an ICD-9 code that has a direct and significant impact on the need for therapy. (Significantly impacts the rate of recovery and is associated with a primary condition.)         []  The patient has associated variables that influence the amount of treatment to include:  Social support, self-efficacy/motivation, prognosis, time since onset/acuity. []  The patient has generalized musculoskeletal conditions or a condition affecting multiple sites that will have a direct impact on the rate of recovery. [x]  The patient had a prior episode of outpatient therapy during this calendar year for a different condition. []  The patient has a mental or cognitive disorder in addition to the condition being treated that will have a direct and significant impact on the rate of recovery. GOALS: (Goals updated 6/10/21)  Patient stated goal:  \"Make my legs stronger; walk and climb stairs easier and faster. \"  [x] Progressing: [] Met: [] Not Met: [] Adjusted    Therapist goals for Patient:   Short Term Goals: To be achieved in: 2 weeks  1. Independent in HEP and progression per patient tolerance, in order to prevent re-injury. [] Progressing: [x] Met: [] Not Met: [] Adjusted  2. Patient will have a decrease in left knee pain to 1-2/10 to facilitate improvement in movement, function, and ADLs as indicated by Functional Deficits. [] Progressing: [x] Met: [] Not Met: [] Adjusted    Long Term Goals: To be achieved in: 4-6 weeks  1. Disability index score of 20% or less for the Knee Outcome Survey Activities of Daily Living Scale to assist with reaching prior level of function. [x] Progressing: [] Met: [] Not Met: [] Adjusted  2. Patient will demonstrate an increase in left knee flexion AROM to at least 115 degrees to allow for proper joint functioning as indicated by patients Functional Deficits. [x] Progressing: [] Met: [] Not Met: [] Adjusted  3. Patient will demonstrate an increase in left hip and knee strength to at least 4+/5 to allow for proper functional mobility as indicated by patients Functional Deficits. [x] Progressing: [] Met: [] Not Met: [] Adjusted  4. Patient will be able to walk community distances, stand for at least 30 minutes, and be able to get up from a chair with little to no UE assistance needed for improved function. [x] Progressing: [] Met: [] Not Met: [] Adjusted  5. Patient will be able to ascend/descend stairs reciprocally with HR (patient specific functional goal)    [x] Progressing: [] Met: [] Not Met: [] Adjusted   6. Patient will have a decrease in left knee pain to 0-1/10 with daily act. [x] Progressing: [] Met: [] Not Met: [] Adjusted          Overall Progression Towards Functional goals/ Treatment Progress Update:  [x] Patient is progressing as expected towards functional goals listed. [] Progression is slowed due to complexities/Impairments listed.   [] Progression has been slowed due to co-morbidities. [] Plan just implemented, too soon to assess goals progression <30days   [] Goals require adjustment due to lack of progress  [] Patient is not progressing as expected and requires additional follow up with physician  [] Other    Prognosis for POC: [x] Good [] Fair  [] Poor      Patient requires continued skilled intervention: [x] Yes  [] No    Treatment/Activity Tolerance:  [] Patient able to complete treatment  [x] Patient limited by fatigue  [] Patient limited by pain    [] Patient limited by other medical complications  [] Other:         PLAN: Continue therapy to improve ROM, strength, and balance. Re-assess next week. 1-2x/week for 4 more weeks for ROM, strengthening, balance act, HEP instruction, pt education, manual therapy prn, and modalities prn (6/10/21)  [x] Continue per plan of care [] Alter current plan   [] Plan of care initiated [] Hold pending MD visit [] Discharge      Electronically signed by:  Ed Muprhy PT     Physical Therapist Adore Rangel [address] #[id number]  Physical Therapist [place] License #[id number]    Note: If patient does not return for scheduled/ recommended follow up visits, this note will serve as a discharge from care along with most recent update on progress.

## 2021-07-06 ENCOUNTER — TREATMENT (OUTPATIENT)
Dept: PHYSICAL THERAPY | Age: 71
End: 2021-07-06
Payer: MEDICARE

## 2021-07-06 DIAGNOSIS — M25.562 LEFT KNEE PAIN, UNSPECIFIED CHRONICITY: Primary | ICD-10-CM

## 2021-07-06 DIAGNOSIS — M17.12 PRIMARY OSTEOARTHRITIS OF LEFT KNEE: ICD-10-CM

## 2021-07-06 DIAGNOSIS — R29.898 WEAKNESS OF LEFT LOWER EXTREMITY: ICD-10-CM

## 2021-07-06 DIAGNOSIS — M25.662 DECREASED ROM OF LEFT KNEE: ICD-10-CM

## 2021-07-06 PROCEDURE — 97112 NEUROMUSCULAR REEDUCATION: CPT | Performed by: PHYSICAL THERAPIST

## 2021-07-06 PROCEDURE — 97110 THERAPEUTIC EXERCISES: CPT | Performed by: PHYSICAL THERAPIST

## 2021-07-06 PROCEDURE — G8427 DOCREV CUR MEDS BY ELIG CLIN: HCPCS | Performed by: PHYSICAL THERAPIST

## 2021-07-06 PROCEDURE — 97530 THERAPEUTIC ACTIVITIES: CPT | Performed by: PHYSICAL THERAPIST

## 2021-07-06 NOTE — PROGRESS NOTES
[facility]   Phone: [phone]    Fax: [phone]          Physical Therapy Treatment Note/ Progress Report:           Date:  2021    Patient Name:  Paulina Dave    :  1950  MRN: [id number]  Restrictions/Precautions:    Medical/Treatment Diagnosis Information:  Diagnosis: Left Knee Pain (M25.562), Primary OA of Left Knee (M17.12)   Treatment Diagnosis: Decreased Left Knee ROM (M25.662), Decreased Strength (R29.898)        Insurance/Certification information:  PT Insurance Information: Medicare - Visits based on medical necessity  Physician Information:  Referring Practitioner: Dr. Rosanna Russell  Has the plan of care been signed (Y/N):        [x]  Yes (POC signed 21)  []  No      Date of Patient follow up with Physician: As needed      Is this a Progress Report:     []  Yes  [x]  No        If Yes:  Date Range for reporting period:  Beginning 4/15/21  Ending 6/10/21    Progress report will be due (10 Rx or 30 days whichever is less):  32      Recertification will be due (POC Duration  / 90 days whichever is less):  21        Visit # Insurance Allowable Auth Required   19 Medical necessity  (pt has already used 13 visits this year for her shoulder) []  Yes [x]  No        Functional Scale:    Date assessed:  6/10/21  Functional Assessment Tool Used: Knee Outcome Survey Activities of Daily Living Scale (copy scanned into media)  Score:  44/70 = 63% (37% functional deficit)    (4/15/21)  Patient Age: 66years old  Patient Height: 5' 2\"  Patient Weight: 280 lbs  Patient BMI: 51.2      Pain (6/10/21)  [x]  Pain diagram was completed, pain was present and a follow up plan to address the pain is in the plan of care. ()   []  Pain diagram was completed and there was no pain present and therefore no follow up plan to address pain in the plan of care.  ()   []  Pain diagram was not completed and the reason for not completing the pain diagram is in the medical chart ()  []  Patient refused to participate   []  Severe mental or physical capacity, patient is in urgent emergent situation and to complete the questionnaire would jeopardize the patient's health status        Functional Questionnaire (6/10/21)  [x]  Patient completed the functional outcome questionnaire and deficiencies were addressed in the plan of care. ()   []  Patient completed the outcome questionnaire and there were no functional deficits identified and therefore no plan of care is required. ()   []  Patient did not complete the functional outcome questionnaire and the reason why is documented in the medical chart. ()  []  Patient refused to participate   []  Patient unable to complete the questionnaire   []   A functional outcome assessment has been reported on within the last 30 days        Falls (6/10/21)  [x]  The patient has had no falls or 1 fall without injury in the past year. (1101F)   []  There is no documentation of fall in the medical chart (1101F,8P)     [] The patient has had 2 or more falls or one fall with injury in the past year that is documented in the medical chart. (1100F)  []  A falls risk assessment was completed and documented in the medical chart. (2356B)   []  Falls were addressed in the plan of care. (9471C)   []  A falls risk assessment was not completed and documented in the medical chart (2542Y,7N)   []   Falls were not addressed in the plan of care and reason was documented in the plan of care. (8389N 1P)        Medication (6/10/21)     [x] A list of current medications (including prescription, over the counter, herbal supplements, vitamin supplements, mineral supplements, dietary supplements) was reviewed with the patient and documented in the medical chart. ()        Falls Risk Assessment (30 days): (6/10/21)  [x] Falls Risk assessed and no intervention required.   [] Falls Risk assessed and Patient requires intervention due to being higher risk   TUG score (>12s at risk):     [] Falls education provided, including     PQRS:   Reviewed medication list with patient. No changes reported by pt since last PT visit.  (7/6/21)          Latex Allergy:  [x]NO      []YES  Preferred Language for Healthcare:   [x]English       []other:      Pain level:  0-1/10 (7/6/21)   Medication:  Celebrex      SUBJECTIVE:   Pt states she was able to walk up 5 steps to get into [employer] Saturday night using HR. \"If there wasn't a HR I don't think I could have done it. \"  She still feels some discomfort in her left knee with bending and straightening her knee in bed but it's very minimal, rated 1/10. She has no problem getting up from a chair. OBJECTIVE:       (6/10/21)  Flexibility L R Comment   Hamstring Mod tightness Mod tightness    Gastroc Mild tightness Mild tightness    ITB      Quad              (6/29/21)  ROM PROM AROM Overpressure Comment    L R L R L R    Flexion 120 with SP  105 114      Extension   0 0                            (6/10/21)  Strength L R Comment   Quad 4/5 4/5    Hamstring 4+/5 4+/5    Gastroc 4/5 4/5    Hip Flex 4-/5 4-/5    Hip Abd 4-/5 4-/5    Hip Add 4/5 4/5    Glut Med              (6/10/21)  Girth L R   Mid Patella 55.0 cm 58.0 cm   Suprapatellar     5cm above     15cm above       (6/10/21)  Special Test Results/Comment   Meniscal Click    Crepitus (+) PF crepitus bilaterally   Flexion Test    Valgus Laxity    Varus Laxity    Lachmans    Drop Back        Reflexes/Sensation: (4/15/21)   []Dermatomes/Myotomes intact    []Reflexes equal and normal bilaterally   [x]Other: Intact to light touch    Joint mobility: (6/10/21)   []Normal    [x]Hypo   []Hyper    Palpation: Joint line tenderness still present on the left.   Quad atrophy present on the left vs right.(6/10/21)    Functional Mobility/Transfers: Stairs are still difficult (pt must ascend/descend stairs 1 at a time using HR), she is still limited with standing and walking, she still has to use her hands to help push herself up from a chair. Unable to kneel or squat. (6/10/21)    Posture: Forward head, rounded shoulders. (6/10/21)    Bandages/Dressings/Incisions: Pt wears compression hose and wraps on both legs (6/10/21)     Gait: (include devices/WB status) Antalgic, stiff leg gait, no AD. She continues to demonstrates decreased pawel, she still demonstrates increased lateral trunk sway, very little trunk rotation, and no arm swing on the left. Step length has increased some. (6/10/21)    Orthopedic Special Tests: See above.       RESTRICTIONS/PRECAUTIONS:     Exercises/Interventions:     Therapeutic Ex (86630) Sets/sec Reps Notes/CUES   BIKE      TREADMILL            STRETCHING      Towel Pull Calf 30\" hold X 3    Inclined Calf      Hamstrings 30\" hold X 3 Seated   Quads      Hip Flexors      ITB      Adductors            ROM      Passive      Active      Weight Hangs      Sheet Pulls 10\" hold X 10    Ankle Pumps      ERMI            STRENGTHENING      Quad Sets 10\" hold X 10    Ham Sets      Hip ADD Sets BS 10\" hold X 10     SLR Flexion 3 sets X 10     SLR Abduction  HEP   SLR Prone      SLR Adduction      SLR+      Supine Hip Abduction 2 sets X 10  Black band         Standing Marches 3 sets X 10  Standing at half wall   Standing Knee Flexion 3 sets X 10  Standing at half wall         PRE Machines      Knee Extension      Knee Flexion      Leg Press            CKC      Calf Raises 3 sets X 10 Seated   Mini Squats      Wall Sits      Step ups - 1 riser 3 sets X 10  Using half wall     Lateral Step Ups - no riser 2 sets X 10    TKE  Held today               Manual Intervention (34484)      Patellar Mobs      Femoral Tibial mobs      STM      Scar Massage      Foam Roll            NMR re-education (17131)   CUES NEEDED   Biodex Balance      Tandem Balance 30\" hold X 2 each *Feet staggered  *Standing next to half wallSLB      Rebounder      BOSU            Sit -> Stand  X 20 no arms     With cushion on chair                 Therapeutic Activity (95743)      Core Training      Mack [place]lew 30      TRX training                  Patient Education: Dx, prognosis, pt goals/expectations, role of therapy (4/15/21)  X 8'                  Therapeutic Exercise and NMR EXR  [x] (66404) Provided verbal/tactile cueing for activities related to strengthening, flexibility, endurance, ROM for improvements in LE, proximal hip, and core control with self care, mobility, lifting, ambulation. [x] (86557) Provided verbal/tactile cueing for activities related to improving balance, coordination, kinesthetic sense, posture, motor skill, proprioception to assist with LE, proximal hip, and core control in self-care, mobility, lifting, ambulation and eccentric single leg control. NMR and Therapeutic Activities:    [x] (08919 or 84990) Provided verbal/tactile cueing for activities related to improving balance, coordination, kinesthetic sense, posture, motor skill, proprioception and motor activation to allow for proper function of core, proximal hip and LE with self-care and ADLs and functional mobility.   [] (60143) Gait Re-education- Provided training and instruction to the patient for proper LE, core and proximal hip recruitment and positioning and eccentric body weight control with ambulation re-education including up and down stairs     Home Exercise Program:    [x] (98882) Reviewed/Progressed HEP activities related to strengthening, flexibility, endurance, ROM of core, proximal hip and LE for functional self-care, mobility, lifting and ambulation/stair navigation - Updated HEP through [address] [place] [address] (see below). Pt was also issued new written handouts.  (4/27/21)  [x] (82316) Reviewed/Progressed HEP activities related to improving balance, coordination, kinesthetic sense, posture, motor skill, proprioception of core, proximal hip and LE for self-care, mobility, lifting, and ambulation/stair navigation          Access Code: [email or web address]  URL: [email or web address]: 04/20/2021Prepared by: [email or web address] SybertExercises   Long Sitting Calf Stretch with Strap - 1 x daily - 7 x weekly - 1 sets - 3 reps - 30 seconds hold   Seated Table Hamstring Stretch - 1 x daily - 7 x weekly - 1 sets - 3 reps - 30 seconds hold   Long Sitting Quad Set - 1 x daily - 7 x weekly - 1 sets - 10 reps - 10 seconds hold   Long Sitting Hip Adduction Isometric with Ball - 1 x daily - 7 x weekly - 1 sets - 10 reps - 10 seconds hold   Supine Active Straight Leg Raise - 1 x daily - 7 x weekly - 2-3 sets - 10 reps   Hooklying Isometric Clamshell - 1 x daily - 7 x weekly - 3 sets - 10 reps   Standing March with Counter Support - 1 x daily - 7 x weekly - 3 sets - 10 reps   Seated Heel Raise - 1 x daily - 7 x weekly - 3 sets - 10 reps   Sit to Stand - 1 x daily - 7 x weekly - 15 reps   Tandem Stance with Support - 1 x daily - 7 x weekly - 2 reps - 20 seconds hold        Manual Treatments:  PROM / STM / Oscillations-Mobs:  G-I, II, III, IV (PA's, Inf., Post.)  [] (39312) Provided manual therapy to mobilize LE, proximal hip and/or LS spine soft tissue/joints for the purpose of modulating pain, promoting relaxation, increasing ROM, reducing/eliminating soft tissue swelling/inflammation/restriction, improving soft tissue extensibility and allowing for proper ROM for normal function with self-care, mobility, lifting and ambulation. Modalities:   Pt declined ice   [] GAME READY (VASO)- for significant edema, swelling, pain control.      Charges:  Timed Code Treatment Minutes: 54'   Total Treatment Minutes: 54'      [] EVAL (LOW) 03018 (typically 20 minutes face-to-face)  [] EVAL (MOD) 90778 (typically 30 minutes face-to-face)  [] EVAL (HIGH) 25974 (typically 45 minutes face-to-face)  [] RE-EVAL     [x] HU(01658) x 2 (30')    [] IONTO  [x] NMR (68545) x 1 (10')   [] VASO  [] Manual (36871) x      [] Other:  [x] TA x 1 (15')     [] [facility] Traction (05727)  [] ES(attended) (06950)      [] ES (un) (49061):         ASSESSMENT:  Pt did well with her program today but rest breaks are still needed with standing exercises. Her strength has improved over the last several weeks. She is doing better with stairs but still requires the use of a HR and must go up/down stairs 1 at a time. She is able to get up from a chair with no arms needed. She is walking a lot better but her endurance is still limited and pt knows she needs to start walking more at home to build this up. MEDICARE CAP EXCEPTION DOCUMENTATION      I certify that this patient meets one of the below criteria necessary for becoming an exception to the Medicare cap on therapy services:    [x]  The patient has a condition identified by an ICD-9 code that has a direct and significant impact on the need for therapy. (Significantly impacts the rate of recovery.)                     []  The patient has a complexity identified by an ICD-9 code that has a direct and significant impact on the need for therapy. (Significantly impacts the rate of recovery and is associated with a primary condition.)         []  The patient has associated variables that influence the amount of treatment to include:  Social support, self-efficacy/motivation, prognosis, time since onset/acuity. []  The patient has generalized musculoskeletal conditions or a condition affecting multiple sites that will have a direct impact on the rate of recovery. [x]  The patient had a prior episode of outpatient therapy during this calendar year for a different condition. []  The patient has a mental or cognitive disorder in addition to the condition being treated that will have a direct and significant impact on the rate of recovery. GOALS: (Goals updated 6/10/21)  Patient stated goal:  \"Make my legs stronger; walk and climb stairs easier and faster. \"  [x] Progressing: [] Met: [] Not Met: [] Adjusted    Therapist goals for Patient:   Short Term Goals: To be achieved in: 2 weeks  1. Independent in HEP and progression per patient tolerance, in order to prevent re-injury. [] Progressing: [x] Met: [] Not Met: [] Adjusted  2. Patient will have a decrease in left knee pain to 1-2/10 to facilitate improvement in movement, function, and ADLs as indicated by Functional Deficits. [] Progressing: [x] Met: [] Not Met: [] Adjusted    Long Term Goals: To be achieved in: 4-6 weeks  1. Disability index score of 20% or less for the Knee Outcome Survey Activities of Daily Living Scale to assist with reaching prior level of function. [x] Progressing: [] Met: [] Not Met: [] Adjusted  2. Patient will demonstrate an increase in left knee flexion AROM to at least 115 degrees to allow for proper joint functioning as indicated by patients Functional Deficits. [x] Progressing: [] Met: [] Not Met: [] Adjusted  3. Patient will demonstrate an increase in left hip and knee strength to at least 4+/5 to allow for proper functional mobility as indicated by patients Functional Deficits. [x] Progressing: [] Met: [] Not Met: [] Adjusted  4. Patient will be able to walk community distances, stand for at least 30 minutes, and be able to get up from a chair with little to no UE assistance needed for improved function. [x] Progressing: [] Met: [] Not Met: [] Adjusted  5. Patient will be able to ascend/descend stairs reciprocally with HR (patient specific functional goal)    [x] Progressing: [] Met: [] Not Met: [] Adjusted   6. Patient will have a decrease in left knee pain to 0-1/10 with daily act. [x] Progressing: [] Met: [] Not Met: [] Adjusted          Overall Progression Towards Functional goals/ Treatment Progress Update:  [x] Patient is progressing as expected towards functional goals listed. [] Progression is slowed due to complexities/Impairments listed.   [] Progression has been slowed due to co-morbidities. [] Plan just implemented, too soon to assess goals progression <30days   [] Goals require adjustment due to lack of progress  [] Patient is not progressing as expected and requires additional follow up with physician  [] Other    Prognosis for POC: [x] Good [] Fair  [] Poor      Patient requires continued skilled intervention: [x] Yes  [] No    Treatment/Activity Tolerance:  [] Patient able to complete treatment  [x] Patient limited by fatigue  [] Patient limited by pain    [] Patient limited by other medical complications  [] Other:         PLAN: Continue therapy to improve ROM, strength, and balance. Re-assess next visit. 1-2x/week for 4 more weeks for ROM, strengthening, balance act, HEP instruction, pt education, manual therapy prn, and modalities prn (6/10/21)  [x] Continue per plan of care [] Alter current plan   [] Plan of care initiated [] Hold pending MD visit [] Discharge      Electronically signed by:  Bro Ash, PT     Physical Therapist Adore Rangel [address] #[id number]  Physical Therapist [place] License #[id number]    Note: If patient does not return for scheduled/ recommended follow up visits, this note will serve as a discharge from care along with most recent update on progress.

## 2021-07-08 ENCOUNTER — TREATMENT (OUTPATIENT)
Dept: PHYSICAL THERAPY | Age: 71
End: 2021-07-08
Payer: MEDICARE

## 2021-07-08 DIAGNOSIS — M25.562 LEFT KNEE PAIN, UNSPECIFIED CHRONICITY: Primary | ICD-10-CM

## 2021-07-08 DIAGNOSIS — M17.12 PRIMARY OSTEOARTHRITIS OF LEFT KNEE: ICD-10-CM

## 2021-07-08 DIAGNOSIS — R29.898 WEAKNESS OF LEFT LOWER EXTREMITY: ICD-10-CM

## 2021-07-08 DIAGNOSIS — M25.662 DECREASED ROM OF LEFT KNEE: ICD-10-CM

## 2021-07-08 PROCEDURE — 1101F PT FALLS ASSESS-DOCD LE1/YR: CPT | Performed by: PHYSICAL THERAPIST

## 2021-07-08 PROCEDURE — G8539 DOC FUNCT AND CARE PLAN: HCPCS | Performed by: PHYSICAL THERAPIST

## 2021-07-08 PROCEDURE — 97110 THERAPEUTIC EXERCISES: CPT | Performed by: PHYSICAL THERAPIST

## 2021-07-08 PROCEDURE — 97530 THERAPEUTIC ACTIVITIES: CPT | Performed by: PHYSICAL THERAPIST

## 2021-07-08 PROCEDURE — 97112 NEUROMUSCULAR REEDUCATION: CPT | Performed by: PHYSICAL THERAPIST

## 2021-07-08 PROCEDURE — G8427 DOCREV CUR MEDS BY ELIG CLIN: HCPCS | Performed by: PHYSICAL THERAPIST

## 2021-07-08 NOTE — PROGRESS NOTES
[facility]   Phone: [phone]    Fax: [phone]      Physical Therapy Re-Certification Plan of Care    Dear  Dr. Jaclyn Grider,    We had the pleasure of treating the following patient for physical therapy services at [address] [place] [address]. A summary of our findings can be found in the updated assessment below. This includes our plan of care. If you have any questions or concerns regarding these findings, please do not hesitate to contact me at the office phone number checked above.   Thank you for the referral.     Physician Signature:________________________________Date:__________________  By signing above (or electronic signature), therapists plan is approved by physician      Overall Response to Treatment:   [x]Patient is responding well to treatment and improvement is noted with regards to goals   []Patient should continue to improve in reasonable time if they continue HEP   []Patient has plateaued and is no longer responding to skilled PT intervention    []Patient is getting worse and would benefit from return to referring MD   []Patient unable to adhere to initial POC   []Other:        Physical Therapy Treatment Note/ Progress Report:           Date:  2021    Patient Name:  Noble Dalton    :  1950  MRN: [id number]  Restrictions/Precautions:    Medical/Treatment Diagnosis Information:  Diagnosis: Left Knee Pain (M25.562), Primary OA of Left Knee (M17.12)   Treatment Diagnosis: Decreased Left Knee ROM (M25.662), Decreased Strength (R29.898)        Insurance/Certification information:  PT Insurance Information: Medicare - Visits based on medical necessity  Physician Information:  Referring Practitioner: Dr. Jaclyn Grider  Has the plan of care been signed (Y/N):        [x]  Yes (POC signed 21)  []  No      Date of Patient follow up with Physician: As needed      Is this a Progress Report:     [x]  Yes  []  No        If Yes:  Date Range for reporting period:  Beginning 4/15/21  Ending 7/8/21    Progress report will be due (10 Rx or 30 days whichever is less):  4/3/41      Recertification will be due (POC Duration  / 90 days whichever is less):  8/5/21        Visit # Insurance Allowable Auth Required   20 Medical necessity  (pt has already used 13 visits this year for her shoulder) []  Yes [x]  No        Functional Scale:    Date assessed:  7/8/21  Functional Assessment Tool Used: Knee Outcome Survey Activities of Daily Living Scale (copy scanned into media)  Score:  50/70 = 71% (29% functional deficit)    (4/15/21)  Patient Age: 66years old  Patient Height: 5' 2\"  Patient Weight: 280 lbs  Patient BMI: 51.2      Pain (7/8/21)  [x]  Pain diagram was completed, pain was present and a follow up plan to address the pain is in the plan of care. ()   []  Pain diagram was completed and there was no pain present and therefore no follow up plan to address pain in the plan of care. ()   []  Pain diagram was not completed and the reason for not completing the pain diagram is in the medical chart ()  []  Patient refused to participate   []  Severe mental or physical capacity, patient is in urgent emergent situation and to complete the questionnaire would jeopardize the patient's health status        Functional Questionnaire (7/8/21)  [x]  Patient completed the functional outcome questionnaire and deficiencies were addressed in the plan of care. ()   []  Patient completed the outcome questionnaire and there were no functional deficits identified and therefore no plan of care is required. ()   []  Patient did not complete the functional outcome questionnaire and the reason why is documented in the medical chart. ()  []  Patient refused to participate   []  Patient unable to complete the questionnaire   []   A functional outcome assessment has been reported on within the last 30 days        Falls (7/8/21)  [x]  The patient has had no falls or 1 fall without injury in the past year. (1101F)   []  There is no documentation of fall in the medical chart (1101F,8P)     [] The patient has had 2 or more falls or one fall with injury in the past year that is documented in the medical chart. (1100F)  []  A falls risk assessment was completed and documented in the medical chart. (2460O)   []  Falls were addressed in the plan of care. (9734F)   []  A falls risk assessment was not completed and documented in the medical chart (8853G,5P)   []   Falls were not addressed in the plan of care and reason was documented in the plan of care. (7834S 1P)        Medication (7/8/21)     [x] A list of current medications (including prescription, over the counter, herbal supplements, vitamin supplements, mineral supplements, dietary supplements) was reviewed with the patient and documented in the medical chart. ()        Falls Risk Assessment (30 days): (7/8/21)  [x] Falls Risk assessed and no intervention required. [] Falls Risk assessed and Patient requires intervention due to being higher risk   TUG score (>12s at risk):     [] Falls education provided, including     PQRS:   Reviewed medication list with patient. No changes reported by pt since last PT visit.  (7/8/21)          Latex Allergy:  [x]NO      []YES  Preferred Language for Healthcare:   [x]English       []other:      Pain level:  0-1/10 (7/8/21)   Medication:  Celebrex      SUBJECTIVE:   Pt states her left knee feels a lot stronger. She has no problem getting up from a chair or in/out of the car now. She still has some discomfort bending and straightening her left knee in bed but it's very mild, rated 1/10. Stairs are still difficult but pt feels more confident going up/down stairs as long as there is a HR. She is still very hesitant to go up/down her basement stairs due to only having 1 HR and the stairs being very steep.         OBJECTIVE:       (7/8/21)  Flexibility L R Comment   Hamstring Mild tightness Mild tightness    Gastroc Mild tightness Mild tightness    ITB      Quad              (7/8/21)  ROM PROM AROM Overpressure Comment    L R L R L R    Flexion 120 with SP  115 115      Extension   0 0                            (7/8/21)  Strength L R Comment   Quad 4+/5 4+/5    Hamstring 4+/5 4+/5    Gastroc 4+/5 4+/5    Hip Flex 4/5 4/5    Hip Abd 4/5 4/5    Hip Add 4+/5 4+/5    Glut Med              (7/8/21)  Girth L R   Mid Patella 56.5 cm 58.0 cm   Suprapatellar     5cm above     15cm above       (7/8/21)  Special Test Results/Comment   Meniscal Click    Crepitus (+) PF crepitus bilaterally   Flexion Test    Valgus Laxity    Varus Laxity    Lachmans    Drop Back        Reflexes/Sensation: (4/15/21)   []Dermatomes/Myotomes intact    []Reflexes equal and normal bilaterally   [x]Other: Intact to light touch    Joint mobility: (7/8/21)   []Normal    [x]Hypo   []Hyper    Palpation: Lateral joint line tenderness still present on the left. Quad atrophy still present on the left vs right.(7/8/21)    Functional Mobility/Transfers: Stairs are still difficult (pt must ascend/descend stairs 1 at a time using HR), she is still limited with standing and walking, she is unable to kneel or squat.  (7/8/21)    Posture: Forward head, rounded shoulders. (7/8/21)    Bandages/Dressings/Incisions: Pt wears compression hose and wraps on both legs (7/8/21)     Gait: (include devices/WB status) Gait is still mildly antalgic, no AD. Lola and step length have improved. She also demonstrates improved trunk rotation and arm swing on the left. She still demonstrates increased lateral trunk sway.   (7/8/21)    Orthopedic Special Tests: See above.        RESTRICTIONS/PRECAUTIONS:     Exercises/Interventions:     Therapeutic Ex (48953) Sets/sec Reps Notes/CUES   BIKE      TREADMILL            STRETCHING      Towel Pull Calf 30\" hold X 5    Inclined Calf      Hamstrings 30\" hold X 5 Seated   Quads      Hip Flexors      ITB Adductors            ROM      Passive      Active      Weight Hangs      Sheet Pulls 10\" hold X 10    Ankle Pumps      ERMI            STRENGTHENING      Quad Sets 10\" hold X 10    Ham Sets      Hip ADD Sets BS 10\" hold X 10     SLR Flexion 3 sets X 10     SLR Abduction  HEP   SLR Prone      SLR Adduction      SLR+      Supine Hip Abduction 2 sets X 10  Black band         Standing Marches 3 sets X 10  Standing at half wall   Standing Knee Flexion 3 sets X 10  Standing at half wall         PRE Machines      Knee Extension      Knee Flexion      Leg Press            CKC      Calf Raises 3 sets X 10 Seated   Mini Squats      Wall Sits      Step ups - 1 riser 3 sets X 10  Using half wall     Lateral Step Ups - no riser 2 sets X 10    TKE  Held today               Manual Intervention (38226)      Patellar Mobs      Femoral Tibial mobs      STM      Scar Massage      Foam Roll            NMR re-education (17212)   CUES NEEDED   Biodex Balance      Tandem Balance 30\" hold X 2 each *Feet staggered  *Standing next to half wallSLB      Rebounder      BOSU            Sit -> Stand  X 20 no arms     With cushion on chair     Up/Down 1 Flight of Stairs in the [place]air[place]  X 1 Using bilateral HR         Therapeutic Activity (93171)      Core Training      [employer] Activities      Monster Walks      TRX training                  Patient Education: Dx, prognosis, pt goals/expectations, role of therapy (4/15/21)  X 8'                  Therapeutic Exercise and NMR EXR  [x] (59809) Provided verbal/tactile cueing for activities related to strengthening, flexibility, endurance, ROM for improvements in LE, proximal hip, and core control with self care, mobility, lifting, ambulation.   [x] (20631) Provided verbal/tactile cueing for activities related to improving balance, coordination, kinesthetic sense, posture, motor skill, proprioception to assist with LE, proximal hip, and core control in self-care, mobility, lifting, ambulation and eccentric single leg control. NMR and Therapeutic Activities:    [x] (14630 or 68477) Provided verbal/tactile cueing for activities related to improving balance, coordination, kinesthetic sense, posture, motor skill, proprioception and motor activation to allow for proper function of core, proximal hip and LE with self-care and ADLs and functional mobility.   [] (48198) Gait Re-education- Provided training and instruction to the patient for proper LE, core and proximal hip recruitment and positioning and eccentric body weight control with ambulation re-education including up and down stairs     Home Exercise Program:    [x] (20006) Reviewed/Progressed HEP activities related to strengthening, flexibility, endurance, ROM of core, proximal hip and LE for functional self-care, mobility, lifting and ambulation/stair navigation - Updated HEP through [address] [place] [address] (see below). Pt was also issued new written handouts. (4/27/21)  [x] (95377) Reviewed/Progressed HEP activities related to improving balance, coordination, kinesthetic sense, posture, motor skill, proprioception of core, proximal hip and LE for self-care, mobility, lifting, and ambulation/stair navigation          Access Code: [email or web address]  URL: "[employer][email or web address]: 04/20/2021Prepared by: [email or web address] SybertExercises   Long Sitting Calf Stretch with Strap - 1 x daily - 7 x weekly - 1 sets - 3 reps - 30 seconds hold   Seated Table Hamstring Stretch - 1 x daily - 7 x weekly - 1 sets - 3 reps - 30 seconds hold   Long Sitting Quad Set - 1 x daily - 7 x weekly - 1 sets - 10 reps - 10 seconds hold   Long Sitting Hip Adduction Isometric with Ball - 1 x daily - 7 x weekly - 1 sets - 10 reps - 10 seconds hold   Supine Active Straight Leg Raise - 1 x daily - 7 x weekly - 2-3 sets - 10 reps   Hooklying Isometric Clamshell - 1 x daily - 7 x weekly - 3 sets - 10 reps   Standing March with Counter Support - 1 x daily - 7 x weekly - 3 sets - 10 reps   Seated Heel Raise - 1 x daily - 7 x weekly - 3 sets - 10 reps   Sit to Stand - 1 x daily - 7 x weekly - 15 reps   Tandem Stance with Support - 1 x daily - 7 x weekly - 2 reps - 20 seconds hold        Manual Treatments:  PROM / STM / Oscillations-Mobs:  G-I, II, III, IV (PA's, Inf., Post.)  [] (73693) Provided manual therapy to mobilize LE, proximal hip and/or LS spine soft tissue/joints for the purpose of modulating pain, promoting relaxation, increasing ROM, reducing/eliminating soft tissue swelling/inflammation/restriction, improving soft tissue extensibility and allowing for proper ROM for normal function with self-care, mobility, lifting and ambulation. Modalities:   Pt declined ice   [] GAME READY (VASO)- for significant edema, swelling, pain control. Charges:  Timed Code Treatment Minutes: 54'   Total Treatment Minutes: 54'      [] EVAL (LOW) 83710 (typically 20 minutes face-to-face)  [] EVAL (MOD) 36812 (typically 30 minutes face-to-face)  [] EVAL (HIGH) 41603 (typically 45 minutes face-to-face)  [] RE-EVAL     [x] UN(05622) x 2 (30')    [] IONTO  [x] NMR (65883) x 1 (10')   [] VASO  [] Manual (65783) x      [] Other:  [x] TA x 1 (15')     [] Mech Traction (50202)  [] ES(attended) (79981)      [] ES (un) (36897):         ASSESSMENT:  Upon re-assessment today, pt showed improvement in left LE strength and flexibility. Left knee flexion AROM increased to 115 degrees. Endurance has also improved with only a few rest breaks needed with her program today. Stairs remain challenging and she still has to ascend/descend stairs 1 at a time using HR (pt is hesitant to go up/down her basement stairs due to only having 1 HR and the stairs being very steep). Her walking has improved but she still can't walk long distances. Recommended she start a walking program at home to help build up her strength and endurance.   Pt would continue to benefit from skilled therapy to increase strength and endurance for ascending/descending stairs and to improve walking tolerance. MEDICARE CAP EXCEPTION DOCUMENTATION      I certify that this patient meets one of the below criteria necessary for becoming an exception to the Medicare cap on therapy services:    [x]  The patient has a condition identified by an ICD-9 code that has a direct and significant impact on the need for therapy. (Significantly impacts the rate of recovery.)                     []  The patient has a complexity identified by an ICD-9 code that has a direct and significant impact on the need for therapy. (Significantly impacts the rate of recovery and is associated with a primary condition.)         []  The patient has associated variables that influence the amount of treatment to include:  Social support, self-efficacy/motivation, prognosis, time since onset/acuity. []  The patient has generalized musculoskeletal conditions or a condition affecting multiple sites that will have a direct impact on the rate of recovery. [x]  The patient had a prior episode of outpatient therapy during this calendar year for a different condition. []  The patient has a mental or cognitive disorder in addition to the condition being treated that will have a direct and significant impact on the rate of recovery. GOALS: (Goals updated 7/8/21)  Patient stated goal:  \"Make my legs stronger; walk and climb stairs easier and faster. \"  [x] Progressing: [] Met: [] Not Met: [] Adjusted    Therapist goals for Patient:   Short Term Goals: To be achieved in: 2 weeks  1. Independent in HEP and progression per patient tolerance, in order to prevent re-injury. [] Progressing: [x] Met: [] Not Met: [] Adjusted  2. Patient will have a decrease in left knee pain to 1-2/10 to facilitate improvement in movement, function, and ADLs as indicated by Functional Deficits. [] Progressing: [x] Met: [] Not Met: [] Adjusted    Long Term Goals: To be achieved in: 4-6 weeks  1. Disability index score of 20% or less for the Knee Outcome Survey Activities of Daily Living Scale to assist with reaching prior level of function. [x] Progressing: [] Met: [] Not Met: [] Adjusted  2. Patient will demonstrate an increase in left knee flexion AROM to at least 115 degrees to allow for proper joint functioning as indicated by patients Functional Deficits. [] Progressing: [x] Met: [] Not Met: [] Adjusted  3. Patient will demonstrate an increase in left hip and knee strength to at least 4+/5 to allow for proper functional mobility as indicated by patients Functional Deficits. [x] Progressing: [] Met: [] Not Met: [] Adjusted  4. Patient will be able to walk community distances, stand for at least 30 minutes, and be able to get up from a chair with little to no UE assistance needed for improved function. [x] Progressing: [] Met: [] Not Met: [] Adjusted  5. Patient will be able to ascend/descend stairs reciprocally with HR (patient specific functional goal)    [x] Progressing: [] Met: [] Not Met: [] Adjusted   6. Patient will have a decrease in left knee pain to 0-1/10 with daily act. [] Progressing: [x] Met: [] Not Met: [] Adjusted          Overall Progression Towards Functional goals/ Treatment Progress Update:  [x] Patient is progressing as expected towards functional goals listed. [] Progression is slowed due to complexities/Impairments listed. [] Progression has been slowed due to co-morbidities.   [] Plan just implemented, too soon to assess goals progression <30days   [] Goals require adjustment due to lack of progress  [] Patient is not progressing as expected and requires additional follow up with physician  [] Other    Prognosis for POC: [x] Good [] Fair  [] Poor      Patient requires continued skilled intervention: [x] Yes  [] No    Treatment/Activity Tolerance:  [] Patient able to complete treatment  [x] Patient limited by fatigue  [] Patient limited by pain    [] Patient limited by other medical complications  [] Other:         PLAN: Continue therapy to improve ROM, strength, and balance. 1-2x/week for 4 more weeks for ROM, strengthening, balance act, HEP instruction, pt education, manual therapy prn, and modalities prn (7/8/21)  [x] Continue per plan of care [] Alter current plan   [] Plan of care initiated [] Hold pending MD visit [] Discharge      Electronically signed by:  Jorge Brand PT     Physical Therapist Adore Rangel 421 #[id number]  Physical Therapist [place] License #[id number]    Note: If patient does not return for scheduled/ recommended follow up visits, this note will serve as a discharge from care along with most recent update on progress.

## 2021-07-13 ENCOUNTER — OFFICE VISIT (OUTPATIENT)
Dept: ORTHOPEDIC SURGERY | Age: 71
End: 2021-07-13
Payer: MEDICARE

## 2021-07-13 VITALS — BODY MASS INDEX: 47.48 KG/M2 | WEIGHT: 268 LBS | HEIGHT: 63 IN

## 2021-07-13 DIAGNOSIS — M25.512 CHRONIC LEFT SHOULDER PAIN: ICD-10-CM

## 2021-07-13 DIAGNOSIS — G89.29 CHRONIC LEFT SHOULDER PAIN: ICD-10-CM

## 2021-07-13 DIAGNOSIS — Z96.612 S/P REVERSE TOTAL SHOULDER ARTHROPLASTY, LEFT: Primary | ICD-10-CM

## 2021-07-13 DIAGNOSIS — G56.03 CARPAL TUNNEL SYNDROME ON BOTH SIDES: ICD-10-CM

## 2021-07-13 PROBLEM — K59.00 CONSTIPATION: Status: ACTIVE | Noted: 2020-10-14

## 2021-07-13 PROBLEM — I87.333 IDIOPATHIC CHRONIC VENOUS HTN OF BOTH LEGS WITH ULCER AND INFLAMMATION (HCC): Status: ACTIVE | Noted: 2020-11-03

## 2021-07-13 PROBLEM — L97.929 IDIOPATHIC CHRONIC VENOUS HTN OF BOTH LEGS WITH ULCER AND INFLAMMATION (HCC): Status: ACTIVE | Noted: 2020-11-03

## 2021-07-13 PROBLEM — R19.5 POSITIVE COLORECTAL CANCER SCREENING USING COLOGUARD TEST: Status: ACTIVE | Noted: 2020-06-26

## 2021-07-13 PROBLEM — I89.0 LYMPHEDEMA OF BOTH LOWER EXTREMITIES: Status: ACTIVE | Noted: 2020-10-12

## 2021-07-13 PROBLEM — D72.829 LEUKOCYTOSIS: Status: ACTIVE | Noted: 2020-10-12

## 2021-07-13 PROBLEM — L97.929 ULCERS OF BOTH LOWER LEGS (HCC): Status: ACTIVE | Noted: 2020-10-12

## 2021-07-13 PROBLEM — L97.919 ULCERS OF BOTH LOWER LEGS (HCC): Status: ACTIVE | Noted: 2020-10-12

## 2021-07-13 PROBLEM — L97.919 IDIOPATHIC CHRONIC VENOUS HTN OF BOTH LEGS WITH ULCER AND INFLAMMATION (HCC): Status: ACTIVE | Noted: 2020-11-03

## 2021-07-13 PROBLEM — E66.01 MORBID OBESITY WITH BMI OF 50.0-59.9, ADULT (HCC): Status: ACTIVE | Noted: 2020-10-12

## 2021-07-13 PROCEDURE — 1090F PRES/ABSN URINE INCON ASSESS: CPT | Performed by: ORTHOPAEDIC SURGERY

## 2021-07-13 PROCEDURE — 3017F COLORECTAL CA SCREEN DOC REV: CPT | Performed by: ORTHOPAEDIC SURGERY

## 2021-07-13 PROCEDURE — 1123F ACP DISCUSS/DSCN MKR DOCD: CPT | Performed by: ORTHOPAEDIC SURGERY

## 2021-07-13 PROCEDURE — 4040F PNEUMOC VAC/ADMIN/RCVD: CPT | Performed by: ORTHOPAEDIC SURGERY

## 2021-07-13 PROCEDURE — 1036F TOBACCO NON-USER: CPT | Performed by: ORTHOPAEDIC SURGERY

## 2021-07-13 PROCEDURE — G8417 CALC BMI ABV UP PARAM F/U: HCPCS | Performed by: ORTHOPAEDIC SURGERY

## 2021-07-13 PROCEDURE — G8427 DOCREV CUR MEDS BY ELIG CLIN: HCPCS | Performed by: ORTHOPAEDIC SURGERY

## 2021-07-13 PROCEDURE — 99214 OFFICE O/P EST MOD 30 MIN: CPT | Performed by: ORTHOPAEDIC SURGERY

## 2021-07-13 PROCEDURE — G8400 PT W/DXA NO RESULTS DOC: HCPCS | Performed by: ORTHOPAEDIC SURGERY

## 2021-07-13 RX ORDER — CITALOPRAM 10 MG/1
TABLET ORAL
COMMUNITY
Start: 2021-04-07

## 2021-07-13 RX ORDER — BIOTIN 10000 MCG
1 CAPSULE ORAL 2 TIMES DAILY
COMMUNITY
End: 2021-09-16

## 2021-07-13 NOTE — PROGRESS NOTES
Chief Complaint  Chief Complaint   Patient presents with    Shoulder Pain     F/U LEFT SHOUDLER       History of Present Illness: Carlito Jimenez is a 66 y.o. female here for her 1 year follow-up of left reverse total shoulder arthroplasty. She is doing really well and has no complaints today. She does mention that she quit doing her home exercise program and has noticed some mild stiffening of that shoulder and loss of range of motion. She is motivated currently to get back to doing her home exercise program.  She also complains of bilateral carpal tunnel syndrome which she has been seen for by another surgeon    Medical History:  Patient's medications, allergies, past medical, surgical, social and family histories were reviewed and updated as appropriate. Pertinent items are noted in HPI  Review of systems reviewed from Patient History Form dated on 7/13/2021 and available in the patient's chart under the Media tab. Vital Signs: There were no vitals filed for this visit. Neuro: Alert & oriented x 3,  normal,  no focal deficits noted. Normal affect. Eyes: sclera clear  Ears: Normal external ear  Mouth:  No perioral lesions  Pulm: Respirations unlabored and regular  Pulse: Regular rate and rhythm   Skin: Warm, well perfused      Constitutional: The physical examination finds the patient to be well-developed and well-nourished. The patient is alert and oriented x3 and was cooperative throughout the visit. Left shoulder exam    Inspection: Incision is well-healed. Held in a normal posture. Normal contour at the acromioclavicular joint. No swelling, ecchymosis, or erythema about the shoulder. No atrophy appreciated. No scapular winging. Palpation:  No subacromial crepitus. No tenderness of the AC joint. No greater tuberosity tenderness. No tenderness in the bicipital groove.     Range of Motion: Active motion to 110/100/30/L1    Strength: 4+/5 supraspinatus, infraspinatus, and 5/5 subscapularis muscle strength. Stability: No anterior instability. No posterior instability. Special Tests: Deferred    Other findings: The skin is warm dry and well perfused. 2+ radial pulse. Sensation is intact to light touch over the deltoid. Right comparison shoulder exam    Inspection: Incision is well-healed. Held in a normal posture. Normal contour at the acromioclavicular joint. No swelling, ecchymosis, or erythema about the shoulder. No atrophy appreciated. No scapular winging. Palpation:  No subacromial crepitus. No tenderness of the AC joint. No greater tuberosity tenderness. No tenderness in the bicipital groove. Range of Motion: Active motion to 150/100/30/L1    Strength: 5-/5 supraspinatus, infraspinatus, and subscapularis muscle strength. Stability: No anterior instability. No posterior instability. Special Tests: Deferred    Other findings: The skin is warm dry and well perfused. 2+ radial pulse. Sensation is intact to light touch over the deltoid. Radiology:     Plain radiographs of the left shoulder comprising 3 views: Radiographs were obtained and reviewed in the office:     Impression: Well-positioned reverse shoulder arthroplasty without signs of fracture, dislocation or loosening         Assessment :  66 y.o. female 1 year status post left reverse total shoulder arthroplasty, doing very well. She also has severe carpal tunnel syndrome bilaterally and is hoping to proceed with carpal tunnel release. Impression:  Encounter Diagnoses   Name Primary?  Chronic left shoulder pain     S/P reverse total shoulder arthroplasty, left Yes    Carpal tunnel syndrome on both sides        Office Procedures:  Orders Placed This Encounter   Procedures    XR SHOULDER LEFT (MIN 2 VIEWS)     Standing Status:   Future     Number of Occurrences:   1     Standing Expiration Date:   7/13/2022     Order Specific Question:   Reason for exam:     Answer:   pain       Plan:  We had a great discussion with Iram Settles today regarding her left shoulder. She continues to do well though she has relaxed on her home exercise program.  We encouraged her to continue to get some of her range of motion back. We also discussed her bilateral carpal tunnel syndrome and are happy to take care of that issue for her and to get her scheduled for carpal tunnel release. We discussed the pros and cons of the procedure and she elected to proceed. Vidhi Issa is in agreement with this plan. All questions were answered to patient's satisfaction and was encouraged to call with any further questions. Марина Benjamin, [address] [place] [address] Sports Medicine and [facility]     The encounter with Tello Morris was supervised by Dr Elif Jarrell who personally examined the patient and reviewed the plan. This dictation was performed with a verbal recognition program (DRAGON) and it was checked for errors. It is possible that there are still dictated errors within this office note. If so, please bring any errors to my attention for an addendum. All efforts were made to ensure that this office note is accurate. 07/13/21   _________________  I was physically present and personally supervised the Orthopaedic Sports Medicine Fellow in the evaluation and development of a treatment plan for this patient. I personally interviewed the patient and performed a physical examination. In addition, I discussed the patient's condition and treatment options with them. I have also reviewed and agree with the past medical, family and social history unless otherwise noted. All of the patient's questions were answered. Janel Jarrell MD, PhD  7/13/2021

## 2021-07-16 ENCOUNTER — TELEPHONE (OUTPATIENT)
Dept: ORTHOPEDIC SURGERY | Age: 71
End: 2021-07-16

## 2021-07-16 NOTE — TELEPHONE ENCOUNTER
Other PATIENT WOULD LIKE TO [facility] REGARDING CTS [facility]

## 2021-07-22 ENCOUNTER — TREATMENT (OUTPATIENT)
Dept: PHYSICAL THERAPY | Age: 71
End: 2021-07-22
Payer: MEDICARE

## 2021-07-22 DIAGNOSIS — M25.562 LEFT KNEE PAIN, UNSPECIFIED CHRONICITY: Primary | ICD-10-CM

## 2021-07-22 DIAGNOSIS — M17.12 PRIMARY OSTEOARTHRITIS OF LEFT KNEE: ICD-10-CM

## 2021-07-22 DIAGNOSIS — M25.662 DECREASED ROM OF LEFT KNEE: ICD-10-CM

## 2021-07-22 DIAGNOSIS — R29.898 WEAKNESS OF LEFT LOWER EXTREMITY: ICD-10-CM

## 2021-07-22 PROCEDURE — 97530 THERAPEUTIC ACTIVITIES: CPT | Performed by: PHYSICAL THERAPIST

## 2021-07-22 PROCEDURE — 97110 THERAPEUTIC EXERCISES: CPT | Performed by: PHYSICAL THERAPIST

## 2021-07-22 PROCEDURE — G8427 DOCREV CUR MEDS BY ELIG CLIN: HCPCS | Performed by: PHYSICAL THERAPIST

## 2021-07-22 PROCEDURE — 97112 NEUROMUSCULAR REEDUCATION: CPT | Performed by: PHYSICAL THERAPIST

## 2021-07-22 NOTE — PROGRESS NOTES
[facility]   Phone: [phone]    Fax: [phone]         Physical Therapy Treatment Note/ Progress Report:           Date:  2021    Patient Name:  Cam Pereira    :  1950  MRN: [id number]  Restrictions/Precautions:    Medical/Treatment Diagnosis Information:  Diagnosis: Left Knee Pain (M25.562), Primary OA of Left Knee (M17.12)   Treatment Diagnosis: Decreased Left Knee ROM (M25.662), Decreased Strength (R29.898)        Insurance/Certification information:  PT Insurance Information: Medicare - Visits based on medical necessity  Physician Information:  Referring Practitioner: Dr. Rica Hurd  Has the plan of care been signed (Y/N):        [x]  Yes (POC signed 21)  []  No      Date of Patient follow up with Physician: As needed      Is this a Progress Report:     []  Yes  [x]  No        If Yes:  Date Range for reporting period:  Beginning 4/15/21  Ending 21    Progress report will be due (10 Rx or 30 days whichever is less):  23      Recertification will be due (POC Duration  / 90 days whichever is less):  21        Visit # Insurance Allowable Auth Required   21 Medical necessity  (pt has already used 13 visits this year for her shoulder) []  Yes [x]  No        Functional Scale:    Date assessed:  21  Functional Assessment Tool Used: Knee Outcome Survey Activities of Daily Living Scale (copy scanned into media)  Score:  50/70 = 71% (29% functional deficit)    (4/15/21)  Patient Age: 66years old  Patient Height: 5' 2\"  Patient Weight: 280 lbs  Patient BMI: 51.2      Pain (21)  [x]  Pain diagram was completed, pain was present and a follow up plan to address the pain is in the plan of care. ()   []  Pain diagram was completed and there was no pain present and therefore no follow up plan to address pain in the plan of care.  ()   []  Pain diagram was not completed and the reason for not completing the pain diagram is in the medical chart ()  []  Patient refused to participate   []  Severe mental or physical capacity, patient is in urgent emergent situation and to complete the questionnaire would jeopardize the patient's health status        Functional Questionnaire (7/8/21)  [x]  Patient completed the functional outcome questionnaire and deficiencies were addressed in the plan of care. ()   []  Patient completed the outcome questionnaire and there were no functional deficits identified and therefore no plan of care is required. ()   []  Patient did not complete the functional outcome questionnaire and the reason why is documented in the medical chart. ()  []  Patient refused to participate   []  Patient unable to complete the questionnaire   []   A functional outcome assessment has been reported on within the last 30 days        Falls (7/8/21)  [x]  The patient has had no falls or 1 fall without injury in the past year. (1101F)   []  There is no documentation of fall in the medical chart (1101F,8P)     [] The patient has had 2 or more falls or one fall with injury in the past year that is documented in the medical chart. (1100F)  []  A falls risk assessment was completed and documented in the medical chart. (9408N)   []  Falls were addressed in the plan of care. (8027Y)   []  A falls risk assessment was not completed and documented in the medical chart (7198V,1Z)   []   Falls were not addressed in the plan of care and reason was documented in the plan of care. (5534V 1P)        Medication (7/8/21)     [x] A list of current medications (including prescription, over the counter, herbal supplements, vitamin supplements, mineral supplements, dietary supplements) was reviewed with the patient and documented in the medical chart. ()        Falls Risk Assessment (30 days): (7/8/21)  [x] Falls Risk assessed and no intervention required.   [] Falls Risk assessed and Patient requires intervention due to being higher risk TUG score (>12s at risk):     [] Falls education provided, including     PQRS:   Reviewed medication list with patient. No changes reported by pt since last PT visit.  (7/22/21)          Latex Allergy:  [x]NO      []YES  Preferred Language for Healthcare:   [x]English       []other:      Pain level:  0-1/10 (7/22/21)   Medication:  Celebrex      SUBJECTIVE:   Pt states she still has some discomfort bending and straightening her left knee in bed but it's mild, rated 1/10 at most.  Her left knee feels stiff in the morning but loosens up after she does her exercises. She feels more confident going down stairs than up stairs and still needs 2 HR to go up stairs. She is having carpal tunnel surgery on 8/16/21 (left) and 8/20/21 (right). OBJECTIVE:       (7/8/21)  Flexibility L R Comment   Hamstring Mild tightness Mild tightness    Gastroc Mild tightness Mild tightness    ITB      Quad              (7/22/21)  ROM PROM AROM Overpressure Comment    L R L R L R    Flexion 120 with SP  115 115      Extension   0 0                            (7/8/21)  Strength L R Comment   Quad 4+/5 4+/5    Hamstring 4+/5 4+/5    Gastroc 4+/5 4+/5    Hip Flex 4/5 4/5    Hip Abd 4/5 4/5    Hip Add 4+/5 4+/5    Glut Med              (7/8/21)  Girth L R   Mid Patella 56.5 cm 58.0 cm   Suprapatellar     5cm above     15cm above       (7/8/21)  Special Test Results/Comment   Meniscal Click    Crepitus (+) PF crepitus bilaterally   Flexion Test    Valgus Laxity    Varus Laxity    Lachmans    Drop Back        Reflexes/Sensation: (4/15/21)   []Dermatomes/Myotomes intact    []Reflexes equal and normal bilaterally   [x]Other: Intact to light touch    Joint mobility: (7/8/21)   []Normal    [x]Hypo   []Hyper    Palpation: Lateral joint line tenderness still present on the left.   Quad atrophy still present on the left vs right.(7/8/21)    Functional Mobility/Transfers: Stairs are still difficult (pt must ascend/descend stairs 1 at a time using HR), she is still limited with standing and walking, she is unable to kneel or squat.  (7/8/21)    Posture: Forward head, rounded shoulders. (7/8/21)    Bandages/Dressings/Incisions: Pt wears compression hose and wraps on both legs (7/8/21)     Gait: (include devices/WB status) Gait is still mildly antalgic, no AD. Lola and step length have improved. She also demonstrates improved trunk rotation and arm swing on the left. She still demonstrates increased lateral trunk sway.   (7/8/21)    Orthopedic Special Tests: See above.        RESTRICTIONS/PRECAUTIONS:     Exercises/Interventions:     Therapeutic Ex (63275) Sets/sec Reps Notes/CUES   BIKE      TREADMILL            STRETCHING      Towel Pull Calf 30\" hold X 5    Inclined Calf      Hamstrings 30\" hold X 5 Seated   Quads      Hip Flexors      ITB      Adductors            ROM      Passive      Active      Weight Hangs      Sheet Pulls 10\" hold X 10    Ankle Pumps      ERMI            STRENGTHENING      Quad Sets 10\" hold X 10    Ham Sets      Hip ADD Sets BS 10\" hold X 10     SLR Flexion 3 sets X 10     SLR Abduction  HEP   SLR Prone      SLR Adduction      SLR+      Supine Hip Abduction 2 sets X 10  Black band         Standing Marches 3 sets X 10  Standing at half wall   Standing Knee Flexion 3 sets X 10  Standing at half wall         PRE Machines      Knee Extension      Knee Flexion      Leg Press            CKC      Calf Raises 3 sets X 10 Seated   Mini Squats      Wall Sits      Step ups - 1 riser 3 sets X 10  Using half wall     Lateral Step Ups - no riser 2 sets X 10    TKE  Held today               Manual Intervention (87986)      Patellar Mobs      Femoral Tibial mobs      STM      Scar Massage      Foam Roll            NMR re-education (10144)   CUES NEEDED   Biodex Balance      Tandem Balance 30\" hold X 2 each *Feet staggered  *Standing next to half wallSLB      Rebounder      BOSU            Sit -> Stand  X 20 no arms     With cushion on chair Up/Down 1 Flight of Stairs in the [place]  X 1 Using bilateral HR         Therapeutic Activity (05910)      Core Training      [facility]      TRX training                  Patient Education: Dx, prognosis, pt goals/expectations, role of therapy (4/15/21)  X 8'                  Therapeutic Exercise and NMR EXR  [x] (40507) Provided verbal/tactile cueing for activities related to strengthening, flexibility, endurance, ROM for improvements in LE, proximal hip, and core control with self care, mobility, lifting, ambulation. [x] (73350) Provided verbal/tactile cueing for activities related to improving balance, coordination, kinesthetic sense, posture, motor skill, proprioception to assist with LE, proximal hip, and core control in self-care, mobility, lifting, ambulation and eccentric single leg control. NMR and Therapeutic Activities:    [x] (60697 or 00857) Provided verbal/tactile cueing for activities related to improving balance, coordination, kinesthetic sense, posture, motor skill, proprioception and motor activation to allow for proper function of core, proximal hip and LE with self-care and ADLs and functional mobility.   [] (28303) Gait Re-education- Provided training and instruction to the patient for proper LE, core and proximal hip recruitment and positioning and eccentric body weight control with ambulation re-education including up and down stairs     Home Exercise Program:    [x] (31961) Reviewed/Progressed HEP activities related to strengthening, flexibility, endurance, ROM of core, proximal hip and LE for functional self-care, mobility, lifting and ambulation/stair navigation - Updated HEP through [address] [place] [address] (see below). Pt was also issued new written handouts.  (4/27/21)  [x] (23890) Reviewed/Progressed HEP activities related to improving balance, coordination, kinesthetic sense, posture, motor skill, proprioception of core, proximal hip and LE for self-care, mobility, lifting, and ambulation/stair navigation          Access Code: [email or web address]  URL: [email or web address]: 04/20/2021Prepared by: RADHA SybertExercises   Long Sitting Calf Stretch with Strap - 1 x daily - 7 x weekly - 1 sets - 3 reps - 30 seconds hold   Seated Table Hamstring Stretch - 1 x daily - 7 x weekly - 1 sets - 3 reps - 30 seconds hold   Long Sitting Quad Set - 1 x daily - 7 x weekly - 1 sets - 10 reps - 10 seconds hold   Long Sitting Hip Adduction Isometric with Ball - 1 x daily - 7 x weekly - 1 sets - 10 reps - 10 seconds hold   Supine Active Straight Leg Raise - 1 x daily - 7 x weekly - 2-3 sets - 10 reps   Hooklying Isometric Clamshell - 1 x daily - 7 x weekly - 3 sets - 10 reps   Standing March with Counter Support - 1 x daily - 7 x weekly - 3 sets - 10 reps   Seated Heel Raise - 1 x daily - 7 x weekly - 3 sets - 10 reps   Sit to Stand - 1 x daily - 7 x weekly - 15 reps   Tandem Stance with Support - 1 x daily - 7 x weekly - 2 reps - 20 seconds hold        Manual Treatments:  PROM / STM / Oscillations-Mobs:  G-I, II, III, IV (PA's, Inf., Post.)  [] (26314) Provided manual therapy to mobilize LE, proximal hip and/or LS spine soft tissue/joints for the purpose of modulating pain, promoting relaxation, increasing ROM, reducing/eliminating soft tissue swelling/inflammation/restriction, improving soft tissue extensibility and allowing for proper ROM for normal function with self-care, mobility, lifting and ambulation. Modalities:  ICE x 10' for the left knee    [] GAME READY (VASO)- for significant edema, swelling, pain control.      Charges:  Timed Code Treatment Minutes: 54'   Total Treatment Minutes: 72'      [] EVAL (LOW) 455 [id number] (typically 20 minutes face-to-face)  [] EVAL (MOD) 52317 (typically 30 minutes face-to-face)  [] EVAL (HIGH) 07076 (typically 45 minutes face-to-face)  [] RE-EVAL     [x] ESTHER(10203) x 2 (30')    [] IONTO  [x] NMR (34460) x 1 (10')   [] VASO  [] Manual (80263) x [] Other:  [x] TA x 1 (15')     [] [facility] (81505)  [] ES(attended) (35080)      [] ES (un) (76927):         ASSESSMENT:  Pt tolerated the exercises well today. She is doing better with step ups but still needs to use the half wall for support. She did well with tandem stance with no loss of balance; may consider adding Airex next visit. Endurance is improving and she requires fewer and shorter rest breaks with her exercise program.  Pt needs to continue increasing strength and endurance for ascending/descending stairs, walking community distances, and improved function. MEDICARE CAP EXCEPTION DOCUMENTATION      I certify that this patient meets one of the below criteria necessary for becoming an exception to the Medicare cap on therapy services:    [x]  The patient has a condition identified by an ICD-9 code that has a direct and significant impact on the need for therapy. (Significantly impacts the rate of recovery.)                     []  The patient has a complexity identified by an ICD-9 code that has a direct and significant impact on the need for therapy. (Significantly impacts the rate of recovery and is associated with a primary condition.)         []  The patient has associated variables that influence the amount of treatment to include:  Social support, self-efficacy/motivation, prognosis, time since onset/acuity. []  The patient has generalized musculoskeletal conditions or a condition affecting multiple sites that will have a direct impact on the rate of recovery. [x]  The patient had a prior episode of outpatient therapy during this calendar year for a different condition. []  The patient has a mental or cognitive disorder in addition to the condition being treated that will have a direct and significant impact on the rate of recovery.                 GOALS: (Goals updated 7/8/21)  Patient stated goal:  \"Make my legs stronger; walk and climb stairs easier and faster. \"  [x] Progressing: [] Met: [] Not Met: [] Adjusted    Therapist goals for Patient:   Short Term Goals: To be achieved in: 2 weeks  1. Independent in HEP and progression per patient tolerance, in order to prevent re-injury. [] Progressing: [x] Met: [] Not Met: [] Adjusted  2. Patient will have a decrease in left knee pain to 1-2/10 to facilitate improvement in movement, function, and ADLs as indicated by Functional Deficits. [] Progressing: [x] Met: [] Not Met: [] Adjusted    Long Term Goals: To be achieved in: 4-6 weeks  1. Disability index score of 20% or less for the Knee Outcome Survey Activities of Daily Living Scale to assist with reaching prior level of function. [x] Progressing: [] Met: [] Not Met: [] Adjusted  2. Patient will demonstrate an increase in left knee flexion AROM to at least 115 degrees to allow for proper joint functioning as indicated by patients Functional Deficits. [] Progressing: [x] Met: [] Not Met: [] Adjusted  3. Patient will demonstrate an increase in left hip and knee strength to at least 4+/5 to allow for proper functional mobility as indicated by patients Functional Deficits. [x] Progressing: [] Met: [] Not Met: [] Adjusted  4. Patient will be able to walk community distances, stand for at least 30 minutes, and be able to get up from a chair with little to no UE assistance needed for improved function. [x] Progressing: [] Met: [] Not Met: [] Adjusted  5. Patient will be able to ascend/descend stairs reciprocally with HR (patient specific functional goal)    [x] Progressing: [] Met: [] Not Met: [] Adjusted   6. Patient will have a decrease in left knee pain to 0-1/10 with daily act. [] Progressing: [x] Met: [] Not Met: [] Adjusted          Overall Progression Towards Functional goals/ Treatment Progress Update:  [x] Patient is progressing as expected towards functional goals listed.     [] Progression is slowed due to complexities/Impairments listed. [] Progression has been slowed due to co-morbidities. [] Plan just implemented, too soon to assess goals progression <30days   [] Goals require adjustment due to lack of progress  [] Patient is not progressing as expected and requires additional follow up with physician  [] Other    Prognosis for POC: [x] Good [] Fair  [] Poor      Patient requires continued skilled intervention: [x] Yes  [] No    Treatment/Activity Tolerance:  [] Patient able to complete treatment  [x] Patient limited by fatigue  [] Patient limited by pain    [] Patient limited by other medical complications  [] Other:         PLAN: Continue therapy to improve ROM, strength, and balance. 1-2x/week for 4 more weeks for ROM, strengthening, balance act, HEP instruction, pt education, manual therapy prn, and modalities prn (7/8/21)  [x] Continue per plan of care [] Alter current plan   [] Plan of care initiated [] Hold pending MD visit [] Discharge      Electronically signed by:  Terrance Temple PT     Physical Therapist Adore Rangel [address] #[id number]  Physical Therapist [place] License #[id number]    Note: If patient does not return for scheduled/ recommended follow up visits, this note will serve as a discharge from care along with most recent update on progress.

## 2021-07-27 ENCOUNTER — TREATMENT (OUTPATIENT)
Dept: PHYSICAL THERAPY | Age: 71
End: 2021-07-27
Payer: MEDICARE

## 2021-07-27 DIAGNOSIS — R29.898 WEAKNESS OF LEFT LOWER EXTREMITY: ICD-10-CM

## 2021-07-27 DIAGNOSIS — M25.662 DECREASED ROM OF LEFT KNEE: ICD-10-CM

## 2021-07-27 DIAGNOSIS — M17.12 PRIMARY OSTEOARTHRITIS OF LEFT KNEE: ICD-10-CM

## 2021-07-27 DIAGNOSIS — M25.562 LEFT KNEE PAIN, UNSPECIFIED CHRONICITY: Primary | ICD-10-CM

## 2021-07-27 PROCEDURE — 97110 THERAPEUTIC EXERCISES: CPT | Performed by: PHYSICAL THERAPIST

## 2021-07-27 PROCEDURE — 97530 THERAPEUTIC ACTIVITIES: CPT | Performed by: PHYSICAL THERAPIST

## 2021-07-27 PROCEDURE — G8427 DOCREV CUR MEDS BY ELIG CLIN: HCPCS | Performed by: PHYSICAL THERAPIST

## 2021-07-27 PROCEDURE — 97112 NEUROMUSCULAR REEDUCATION: CPT | Performed by: PHYSICAL THERAPIST

## 2021-07-27 NOTE — PROGRESS NOTES
medical chart ()  []  Patient refused to participate   []  Severe mental or physical capacity, patient is in urgent emergent situation and to complete the questionnaire would jeopardize the patient's health status        Functional Questionnaire (7/8/21)  [x]  Patient completed the functional outcome questionnaire and deficiencies were addressed in the plan of care. ()   []  Patient completed the outcome questionnaire and there were no functional deficits identified and therefore no plan of care is required. ()   []  Patient did not complete the functional outcome questionnaire and the reason why is documented in the medical chart. ()  []  Patient refused to participate   []  Patient unable to complete the questionnaire   []   A functional outcome assessment has been reported on within the last 30 days        Falls (7/8/21)  [x]  The patient has had no falls or 1 fall without injury in the past year. (1101F)   []  There is no documentation of fall in the medical chart (1101F,8P)     [] The patient has had 2 or more falls or one fall with injury in the past year that is documented in the medical chart. (1100F)  []  A falls risk assessment was completed and documented in the medical chart. (5679L)   []  Falls were addressed in the plan of care. (2849L)   []  A falls risk assessment was not completed and documented in the medical chart (6735O,0Y)   []   Falls were not addressed in the plan of care and reason was documented in the plan of care. (2143M 1P)        Medication (7/8/21)     [x] A list of current medications (including prescription, over the counter, herbal supplements, vitamin supplements, mineral supplements, dietary supplements) was reviewed with the patient and documented in the medical chart. ()        Falls Risk Assessment (30 days): (7/8/21)  [x] Falls Risk assessed and no intervention required.   [] Falls Risk assessed and Patient requires intervention due to being higher risk TUG score (>12s at risk):     [] Falls education provided, including     PQRS:   Reviewed medication list with patient. No changes reported by pt since last PT visit. (7/27/21)          Latex Allergy:  [x]NO      []YES  Preferred Language for Healthcare:   [x]English       []other:      Pain level:  0-1/10 (7/27/21)   Medication:  Celebrex      SUBJECTIVE:   Pt states her left knee is doing well. She has not tried going up/down her basement stairs yet because she wants someone to be there with her in case she has any issues. She only has 1 HR on her basement stairs which makes her a little nervous. She is trying to walk more inside the house. She would like to start walking more outside but hasn't been able to due to the hot weather. OBJECTIVE:       (7/8/21)  Flexibility L R Comment   Hamstring Mild tightness Mild tightness    Gastroc Mild tightness Mild tightness    ITB      Quad              (7/27/21)  ROM PROM AROM Overpressure Comment    L R L R L R    Flexion 120 with SP  115 115      Extension   0 0                            (7/8/21)  Strength L R Comment   Quad 4+/5 4+/5    Hamstring 4+/5 4+/5    Gastroc 4+/5 4+/5    Hip Flex 4/5 4/5    Hip Abd 4/5 4/5    Hip Add 4+/5 4+/5    Glut Med              (7/8/21)  Girth L R   Mid Patella 56.5 cm 58.0 cm   Suprapatellar     5cm above     15cm above       (7/8/21)  Special Test Results/Comment   Meniscal Click    Crepitus (+) PF crepitus bilaterally   Flexion Test    Valgus Laxity    Varus Laxity    Lachmans    Drop Back        Reflexes/Sensation: (4/15/21)   []Dermatomes/Myotomes intact    []Reflexes equal and normal bilaterally   [x]Other: Intact to light touch    Joint mobility: (7/8/21)   []Normal    [x]Hypo   []Hyper    Palpation: Lateral joint line tenderness still present on the left.   Quad atrophy still present on the left vs right.(7/8/21)    Functional Mobility/Transfers: Stairs are still difficult (pt must ascend/descend stairs 1 at a time using HR), she is still limited with standing and walking, she is unable to kneel or squat.  (7/8/21)    Posture: Forward head, rounded shoulders. (7/8/21)    Bandages/Dressings/Incisions: Pt wears compression hose and wraps on both legs (7/8/21)     Gait: (include devices/WB status) Gait is still mildly antalgic, no AD. Lola and step length have improved. She also demonstrates improved trunk rotation and arm swing on the left. She still demonstrates increased lateral trunk sway.   (7/8/21)    Orthopedic Special Tests: See above.        RESTRICTIONS/PRECAUTIONS:     Exercises/Interventions:     Therapeutic Ex (74163) Sets/sec Reps Notes/CUES   BIKE      TREADMILL            STRETCHING      Towel Pull Calf 30\" hold X 5    Inclined Calf      Hamstrings 30\" hold X 5 Seated   Quads      Hip Flexors      ITB      Adductors            ROM      Passive      Active      Weight Hangs      Sheet Pulls 10\" hold X 10    Ankle Pumps      ERMI            STRENGTHENING      Quad Sets 10\" hold X 10    Ham Sets      Hip ADD Sets BS 10\" hold X 10     SLR Flexion 3 sets X 10 with 1#     SLR Abduction  HEP   SLR Prone      SLR Adduction      SLR+      Supine Hip Abduction 2 sets X 10  Black band         Standing Marches 3 sets X 10  Standing at half wall   Standing Knee Flexion 3 sets X 10  Standing at half wall         PRE Machines      Knee Extension      Knee Flexion      Leg Press            CKC      Calf Raises 3 sets X 10 Seated   Mini Squats      Wall Sits      Step ups - 1 riser 3 sets X 10  Using half wall     Lateral Step Ups - no riser 2 sets X 10    TKE  Held today               Manual Intervention (01814)      Patellar Mobs      Femoral Tibial mobs      STM      Scar Massage      Foam Roll            NMR re-education (39625)   CUES NEEDED   Biodex Balance      Tandem Balance 30\" hold X 2 each on Airex *Feet staggered  *Standing next to half wallSLB      Rebounder      BOSU            Sit -> Stand  X 20 no arms     With cushion on chair     Up/Down 1 Flight of Stairs in the [address] Double R [place]  X 2 Using bilateral HR         Therapeutic Activity (39803)      Core Training      [place] Willy 30      TRX training                  Patient Education: Dx, prognosis, pt goals/expectations, role of therapy (4/15/21)  X 8'                  Therapeutic Exercise and NMR EXR  [x] (47628) Provided verbal/tactile cueing for activities related to strengthening, flexibility, endurance, ROM for improvements in LE, proximal hip, and core control with self care, mobility, lifting, ambulation. [x] (31963) Provided verbal/tactile cueing for activities related to improving balance, coordination, kinesthetic sense, posture, motor skill, proprioception to assist with LE, proximal hip, and core control in self-care, mobility, lifting, ambulation and eccentric single leg control. NMR and Therapeutic Activities:    [x] (58634 or 89888) Provided verbal/tactile cueing for activities related to improving balance, coordination, kinesthetic sense, posture, motor skill, proprioception and motor activation to allow for proper function of core, proximal hip and LE with self-care and ADLs and functional mobility.   [] (99457) Gait Re-education- Provided training and instruction to the patient for proper LE, core and proximal hip recruitment and positioning and eccentric body weight control with ambulation re-education including up and down stairs     Home Exercise Program:    [x] (99963) Reviewed/Progressed HEP activities related to strengthening, flexibility, endurance, ROM of core, proximal hip and LE for functional self-care, mobility, lifting and ambulation/stair navigation - Updated HEP through [address] [place] [address] (see below). Pt was also issued new written handouts.  (4/27/21)  [x] (61607) Reviewed/Progressed HEP activities related to improving balance, coordination, kinesthetic sense, posture, motor skill, proprioception of core, proximal hip and LE for self-care, mobility, lifting, and ambulation/stair navigation          Access Code: [email or web address]  URL: [email or web address] com/[email or web address]: 04/20/2021Prepared by: JOI SybertExercises   Long Sitting Calf Stretch with Strap - 1 x daily - 7 x weekly - 1 sets - 3 reps - 30 seconds hold   Seated Table Hamstring Stretch - 1 x daily - 7 x weekly - 1 sets - 3 reps - 30 seconds hold   Long Sitting Quad Set - 1 x daily - 7 x weekly - 1 sets - 10 reps - 10 seconds hold   Long Sitting Hip Adduction Isometric with Ball - 1 x daily - 7 x weekly - 1 sets - 10 reps - 10 seconds hold   Supine Active Straight Leg Raise - 1 x daily - 7 x weekly - 2-3 sets - 10 reps   Hooklying Isometric Clamshell - 1 x daily - 7 x weekly - 3 sets - 10 reps   Standing March with Counter Support - 1 x daily - 7 x weekly - 3 sets - 10 reps   Seated Heel Raise - 1 x daily - 7 x weekly - 3 sets - 10 reps   Sit to Stand - 1 x daily - 7 x weekly - 15 reps   Tandem Stance with Support - 1 x daily - 7 x weekly - 2 reps - 20 seconds hold        Manual Treatments:  PROM / STM / Oscillations-Mobs:  G-I, II, III, IV (PA's, Inf., Post.)  [] (09346) Provided manual therapy to mobilize LE, proximal hip and/or LS spine soft tissue/joints for the purpose of modulating pain, promoting relaxation, increasing ROM, reducing/eliminating soft tissue swelling/inflammation/restriction, improving soft tissue extensibility and allowing for proper ROM for normal function with self-care, mobility, lifting and ambulation. Modalities:   Pt declined ice today   [] GAME READY (VASO)- for significant edema, swelling, pain control.      Charges:  Timed Code Treatment Minutes: 54'   Total Treatment Minutes: 54'      [] EVAL (LOW) 455 1011 (typically 20 minutes face-to-face)  [] EVAL (MOD) 69092 (typically 30 minutes face-to-face)  [] EVAL (HIGH) 40062 (typically 45 minutes face-to-face)  [] RE-EVAL     [x] HQ(15327) x 2 (30')    [] IONTO  [x] NMR (85524) x 1 (10') [] VASO  [] Manual (68312) x      [] Other:  [x] TA x 1 (15')     [] Mech Traction (77738)  [] ES(attended) (04105)      [] ES (un) (87337):         ASSESSMENT:  Pt did very well with the exercises today. She was able to ascend/descend 2 flights of stairs in the stairwell, 1 step at a time, using bilateral HR. Re-introduced 1# wt with flexion SLR today which was challenging but she was able to complete desired number of reps resting between sets. Also added tandem stance on Airex today which was challenging but pt did well with only a few UE touches needed. MEDICARE CAP EXCEPTION DOCUMENTATION      I certify that this patient meets one of the below criteria necessary for becoming an exception to the Medicare cap on therapy services:    [x]  The patient has a condition identified by an ICD-9 code that has a direct and significant impact on the need for therapy. (Significantly impacts the rate of recovery.)                     []  The patient has a complexity identified by an ICD-9 code that has a direct and significant impact on the need for therapy. (Significantly impacts the rate of recovery and is associated with a primary condition.)         []  The patient has associated variables that influence the amount of treatment to include:  Social support, self-efficacy/motivation, prognosis, time since onset/acuity. []  The patient has generalized musculoskeletal conditions or a condition affecting multiple sites that will have a direct impact on the rate of recovery. [x]  The patient had a prior episode of outpatient therapy during this calendar year for a different condition. []  The patient has a mental or cognitive disorder in addition to the condition being treated that will have a direct and significant impact on the rate of recovery. GOALS: (Goals updated 7/8/21)  Patient stated goal:  \"Make my legs stronger; walk and climb stairs easier and faster. \"  [x] Progressing: [] Met: [] Not Met: [] Adjusted    Therapist goals for Patient:   Short Term Goals: To be achieved in: 2 weeks  1. Independent in HEP and progression per patient tolerance, in order to prevent re-injury. [] Progressing: [x] Met: [] Not Met: [] Adjusted  2. Patient will have a decrease in left knee pain to 1-2/10 to facilitate improvement in movement, function, and ADLs as indicated by Functional Deficits. [] Progressing: [x] Met: [] Not Met: [] Adjusted    Long Term Goals: To be achieved in: 4-6 weeks  1. Disability index score of 20% or less for the Knee Outcome Survey Activities of Daily Living Scale to assist with reaching prior level of function. [x] Progressing: [] Met: [] Not Met: [] Adjusted  2. Patient will demonstrate an increase in left knee flexion AROM to at least 115 degrees to allow for proper joint functioning as indicated by patients Functional Deficits. [] Progressing: [x] Met: [] Not Met: [] Adjusted  3. Patient will demonstrate an increase in left hip and knee strength to at least 4+/5 to allow for proper functional mobility as indicated by patients Functional Deficits. [x] Progressing: [] Met: [] Not Met: [] Adjusted  4. Patient will be able to walk community distances, stand for at least 30 minutes, and be able to get up from a chair with little to no UE assistance needed for improved function. [x] Progressing: [] Met: [] Not Met: [] Adjusted  5. Patient will be able to ascend/descend stairs reciprocally with HR (patient specific functional goal)    [x] Progressing: [] Met: [] Not Met: [] Adjusted   6. Patient will have a decrease in left knee pain to 0-1/10 with daily act. [] Progressing: [x] Met: [] Not Met: [] Adjusted          Overall Progression Towards Functional goals/ Treatment Progress Update:  [x] Patient is progressing as expected towards functional goals listed. [] Progression is slowed due to complexities/Impairments listed.   [] Progression has been slowed due to co-morbidities. [] Plan just implemented, too soon to assess goals progression <30days   [] Goals require adjustment due to lack of progress  [] Patient is not progressing as expected and requires additional follow up with physician  [] Other    Prognosis for POC: [x] Good [] Fair  [] Poor      Patient requires continued skilled intervention: [x] Yes  [] No    Treatment/Activity Tolerance:  [] Patient able to complete treatment  [x] Patient limited by fatigue  [] Patient limited by pain    [] Patient limited by other medical complications  [] Other:         PLAN: Continue therapy to improve ROM, strength, and balance. 1-2x/week for 4 more weeks for ROM, strengthening, balance act, HEP instruction, pt education, manual therapy prn, and modalities prn (7/8/21)  [x] Continue per plan of care [] Alter current plan   [] Plan of care initiated [] Hold pending MD visit [] Discharge      Electronically signed by:  Kevin Jaquez, PT     Physical Therapist [address] [address] #[id number]  Physical Therapist [place] License #[id number]    Note: If patient does not return for scheduled/ recommended follow up visits, this note will serve as a discharge from care along with most recent update on progress.

## 2021-07-29 ENCOUNTER — TREATMENT (OUTPATIENT)
Dept: PHYSICAL THERAPY | Age: 71
End: 2021-07-29
Payer: MEDICARE

## 2021-07-29 DIAGNOSIS — M25.562 LEFT KNEE PAIN, UNSPECIFIED CHRONICITY: Primary | ICD-10-CM

## 2021-07-29 DIAGNOSIS — R29.898 WEAKNESS OF LEFT LOWER EXTREMITY: ICD-10-CM

## 2021-07-29 DIAGNOSIS — M17.12 PRIMARY OSTEOARTHRITIS OF LEFT KNEE: ICD-10-CM

## 2021-07-29 DIAGNOSIS — M25.662 DECREASED ROM OF LEFT KNEE: ICD-10-CM

## 2021-07-29 PROCEDURE — 97530 THERAPEUTIC ACTIVITIES: CPT | Performed by: PHYSICAL THERAPIST

## 2021-07-29 PROCEDURE — G8427 DOCREV CUR MEDS BY ELIG CLIN: HCPCS | Performed by: PHYSICAL THERAPIST

## 2021-07-29 PROCEDURE — 97110 THERAPEUTIC EXERCISES: CPT | Performed by: PHYSICAL THERAPIST

## 2021-07-29 PROCEDURE — 97112 NEUROMUSCULAR REEDUCATION: CPT | Performed by: PHYSICAL THERAPIST

## 2021-07-29 NOTE — PROGRESS NOTES
[facility]   Phone: [phone]    Fax: [phone]         Physical Therapy Treatment Note/ Progress Report:           Date:  2021    Patient Name:  Micah Diamond    :  1950  MRN: [id number]  Restrictions/Precautions:    Medical/Treatment Diagnosis Information:  Diagnosis: Left Knee Pain (M25.562), Primary OA of Left Knee (M17.12)   Treatment Diagnosis: Decreased Left Knee ROM (M25.662), Decreased Strength (R29.898)        Insurance/Certification information:  PT Insurance Information: Medicare - Visits based on medical necessity  Physician Information:  Referring Practitioner: Dr. Glory Jacques  Has the plan of care been signed (Y/N):        [x]  Yes (POC signed 21)  []  No      Date of Patient follow up with Physician: As needed      Is this a Progress Report:     []  Yes  [x]  No        If Yes:  Date Range for reporting period:  Beginning 4/15/21  Ending 21    Progress report will be due (10 Rx or 30 days whichever is less):  11      Recertification will be due (POC Duration  / 90 days whichever is less):  21        Visit # Insurance Allowable Auth Required   23 Medical necessity  (pt has already used 13 visits this year for her shoulder) []  Yes [x]  No        Functional Scale:    Date assessed:  21  Functional Assessment Tool Used: Knee Outcome Survey Activities of Daily Living Scale (copy scanned into media)  Score:  50/70 = 71% (29% functional deficit)    (4/15/21)  Patient Age: 66years old  Patient Height: 5' 2\"  Patient Weight: 280 lbs  Patient BMI: 51.2      Pain (21)  [x]  Pain diagram was completed, pain was present and a follow up plan to address the pain is in the plan of care. ()   []  Pain diagram was completed and there was no pain present and therefore no follow up plan to address pain in the plan of care.  ()   []  Pain diagram was not completed and the reason for not completing the pain diagram is in the medical chart ()  []  Patient refused to participate   []  Severe mental or physical capacity, patient is in urgent emergent situation and to complete the questionnaire would jeopardize the patient's health status        Functional Questionnaire (7/8/21)  [x]  Patient completed the functional outcome questionnaire and deficiencies were addressed in the plan of care. ()   []  Patient completed the outcome questionnaire and there were no functional deficits identified and therefore no plan of care is required. ()   []  Patient did not complete the functional outcome questionnaire and the reason why is documented in the medical chart. ()  []  Patient refused to participate   []  Patient unable to complete the questionnaire   []   A functional outcome assessment has been reported on within the last 30 days        Falls (7/8/21)  [x]  The patient has had no falls or 1 fall without injury in the past year. (1101F)   []  There is no documentation of fall in the medical chart (1101F,8P)     [] The patient has had 2 or more falls or one fall with injury in the past year that is documented in the medical chart. (1100F)  []  A falls risk assessment was completed and documented in the medical chart. (2148O)   []  Falls were addressed in the plan of care. (9049X)   []  A falls risk assessment was not completed and documented in the medical chart (4744Y,6R)   []   Falls were not addressed in the plan of care and reason was documented in the plan of care. (3354B 1P)        Medication (7/8/21)     [x] A list of current medications (including prescription, over the counter, herbal supplements, vitamin supplements, mineral supplements, dietary supplements) was reviewed with the patient and documented in the medical chart. ()        Falls Risk Assessment (30 days): (7/8/21)  [x] Falls Risk assessed and no intervention required.   [] Falls Risk assessed and Patient requires intervention due to being higher risk TUG score (>12s at risk):     [] Falls education provided, including     PQRS:   Reviewed medication list with patient. No changes reported by pt since last PT visit.  (7/29/21)          Latex Allergy:  [x]NO      []YES  Preferred Language for Healthcare:   [x]English       []other:      Pain level:  0-1/10 (7/29/21)   Medication:  Celebrex      SUBJECTIVE:   Pt states she was very tired after therapy on Tuesday and came home and took a nap. She still feels mild discomfort bending and straightening her left knee in bed but it doesn't limit her. Her left knee feels a lot stronger but stairs are still difficult. OBJECTIVE:       (7/8/21)  Flexibility L R Comment   Hamstring Mild tightness Mild tightness    Gastroc Mild tightness Mild tightness    ITB      Quad              (7/27/21)  ROM PROM AROM Overpressure Comment    L R L R L R    Flexion 120 with SP  115 115      Extension   0 0                            (7/8/21)  Strength L R Comment   Quad 4+/5 4+/5    Hamstring 4+/5 4+/5    Gastroc 4+/5 4+/5    Hip Flex 4/5 4/5    Hip Abd 4/5 4/5    Hip Add 4+/5 4+/5    Glut Med              (7/8/21)  Girth L R   Mid Patella 56.5 cm 58.0 cm   Suprapatellar     5cm above     15cm above       (7/8/21)  Special Test Results/Comment   Meniscal Click    Crepitus (+) PF crepitus bilaterally   Flexion Test    Valgus Laxity    Varus Laxity    Lachmans    Drop Back        Reflexes/Sensation: (4/15/21)   []Dermatomes/Myotomes intact    []Reflexes equal and normal bilaterally   [x]Other: Intact to light touch    Joint mobility: (7/8/21)   []Normal    [x]Hypo   []Hyper    Palpation: Lateral joint line tenderness still present on the left. Quad atrophy still present on the left vs right.(7/8/21)    Functional Mobility/Transfers: Stairs are still difficult (pt must ascend/descend stairs 1 at a time using HR), she is still limited with standing and walking, she is unable to kneel or squat.  (7/8/21)    Posture:  Forward head, rounded shoulders. (7/8/21)    Bandages/Dressings/Incisions: Pt wears compression hose and wraps on both legs (7/8/21)     Gait: (include devices/WB status) Gait is still mildly antalgic, no AD. Lola and step length have improved. She also demonstrates improved trunk rotation and arm swing on the left. She still demonstrates increased lateral trunk sway.   (7/8/21)    Orthopedic Special Tests: See above.        RESTRICTIONS/PRECAUTIONS:     Exercises/Interventions:     Therapeutic Ex (70484) Sets/sec Reps Notes/CUES   BIKE      TREADMILL            STRETCHING      Towel Pull Calf 30\" hold X 5    Inclined Calf      Hamstrings 30\" hold X 5 Seated   Quads      Hip Flexors      ITB      Adductors            ROM      Passive      Active      Weight Hangs      Sheet Pulls 10\" hold X 10    Ankle Pumps      ERMI            STRENGTHENING      Quad Sets 10\" hold X 10    Ham Sets      Hip ADD Sets BS 10\" hold X 10     SLR Flexion 3 sets X 10 with 1#     SLR Abduction  HEP   SLR Prone      SLR Adduction      SLR+      Supine Hip Abduction 2 sets X 10  Black band         Standing Marches 3 sets X 10  Standing at half wall   Standing Knee Flexion 3 sets X 10  Standing at half wall         PRE Machines      Knee Extension      Knee Flexion      Leg Press            CKC      Calf Raises 3 sets X 10 Seated   Mini Squats      Wall Sits      Step ups - 1 riser 3 sets X 10  Using half wall     Lateral Step Ups - 1 riser 2 sets X 10    TKE  Held today               Manual Intervention (31950)      Patellar Mobs      Femoral Tibial mobs      STM      Scar Massage      Foam Roll            NMR re-education (91542)   CUES NEEDED   Biodex Balance      Tandem Balance 30\" hold X 2 each on Airex *Feet staggered  *Standing next to half wallSLB      Rebounder      BOSU            Sit -> Stand  X 20 no arms     With cushion on chair     Up/Down 1 Flight of Stairs in the Stairwell  X 2 Using bilateral HR         Therapeutic Activity (61954)      Core Training      [place] Activities      Monster Walks      TRX training                  Patient Education: Dx, prognosis, pt goals/expectations, role of therapy (4/15/21)  X 8'                  Therapeutic Exercise and NMR EXR  [x] (62072) Provided verbal/tactile cueing for activities related to strengthening, flexibility, endurance, ROM for improvements in LE, proximal hip, and core control with self care, mobility, lifting, ambulation. [x] (43567) Provided verbal/tactile cueing for activities related to improving balance, coordination, kinesthetic sense, posture, motor skill, proprioception to assist with LE, proximal hip, and core control in self-care, mobility, lifting, ambulation and eccentric single leg control. NMR and Therapeutic Activities:    [x] (73175 or 43767) Provided verbal/tactile cueing for activities related to improving balance, coordination, kinesthetic sense, posture, motor skill, proprioception and motor activation to allow for proper function of core, proximal hip and LE with self-care and ADLs and functional mobility.   [] (31504) Gait Re-education- Provided training and instruction to the patient for proper LE, core and proximal hip recruitment and positioning and eccentric body weight control with ambulation re-education including up and down stairs     Home Exercise Program:    [x] (27213) Reviewed/Progressed HEP activities related to strengthening, flexibility, endurance, ROM of core, proximal hip and LE for functional self-care, mobility, lifting and ambulation/stair navigation - Updated HEP through [address] [place] [address] (see below). Pt was also issued new written handouts.  (4/27/21)  [x] (39095) Reviewed/Progressed HEP activities related to improving balance, coordination, kinesthetic sense, posture, motor skill, proprioception of core, proximal hip and LE for self-care, mobility, lifting, and ambulation/stair navigation          Access Code: [email or web address]  URL: [email or web address]: 04/20/2021Prepared by: [email or web address] SybertExercises   Long Sitting Calf Stretch with Strap - 1 x daily - 7 x weekly - 1 sets - 3 reps - 30 seconds hold   Seated Table Hamstring Stretch - 1 x daily - 7 x weekly - 1 sets - 3 reps - 30 seconds hold   Long Sitting Quad Set - 1 x daily - 7 x weekly - 1 sets - 10 reps - 10 seconds hold   Long Sitting Hip Adduction Isometric with Ball - 1 x daily - 7 x weekly - 1 sets - 10 reps - 10 seconds hold   Supine Active Straight Leg Raise - 1 x daily - 7 x weekly - 2-3 sets - 10 reps   Hooklying Isometric Clamshell - 1 x daily - 7 x weekly - 3 sets - 10 reps   Standing March with Counter Support - 1 x daily - 7 x weekly - 3 sets - 10 reps   Seated Heel Raise - 1 x daily - 7 x weekly - 3 sets - 10 reps   Sit to Stand - 1 x daily - 7 x weekly - 15 reps   Tandem Stance with Support - 1 x daily - 7 x weekly - 2 reps - 20 seconds hold        Manual Treatments:  PROM / STM / Oscillations-Mobs:  G-I, II, III, IV (PA's, Inf., Post.)  [] (75448) Provided manual therapy to mobilize LE, proximal hip and/or LS spine soft tissue/joints for the purpose of modulating pain, promoting relaxation, increasing ROM, reducing/eliminating soft tissue swelling/inflammation/restriction, improving soft tissue extensibility and allowing for proper ROM for normal function with self-care, mobility, lifting and ambulation. Modalities:   Pt declined ice today   [] GAME READY (VASO)- for significant edema, swelling, pain control.      Charges:  Timed Code Treatment Minutes: 54'   Total Treatment Minutes: 54'      [] EVAL (LOW) 455 1011 (typically 20 minutes face-to-face)  [] EVAL (MOD) 20899 (typically 30 minutes face-to-face)  [] EVAL (HIGH) 10345 (typically 45 minutes face-to-face)  [] RE-EVAL     [x] YS(66298) x 2 (30')    [] IONTO  [x] NMR (67111) x 1 (10')   [] VASO  [] Manual (89015) x      [] Other:  [x] TA x 1 (15')     [] [facility] Traction (01217)  [] ES(attended) (35511)      [] ES () (43744):         ASSESSMENT:  Pt tolerated therapy well. Able to add 1 riser to lateral step ups today but pt still needs to use the half wall for support. She did really well with tandem stance on airex today with no UE touches needed. She was very fatigued after ascending/descending 2 flights of stairs in the stairwell. Flexion SLR with 1# remains challenging with rest breaks needed between sets. Pt needs to continue increasing strength and endurance. MEDICARE CAP EXCEPTION DOCUMENTATION      I certify that this patient meets one of the below criteria necessary for becoming an exception to the Medicare cap on therapy services:    [x]  The patient has a condition identified by an ICD-9 code that has a direct and significant impact on the need for therapy. (Significantly impacts the rate of recovery.)                     []  The patient has a complexity identified by an ICD-9 code that has a direct and significant impact on the need for therapy. (Significantly impacts the rate of recovery and is associated with a primary condition.)         []  The patient has associated variables that influence the amount of treatment to include:  Social support, self-efficacy/motivation, prognosis, time since onset/acuity. []  The patient has generalized musculoskeletal conditions or a condition affecting multiple sites that will have a direct impact on the rate of recovery. [x]  The patient had a prior episode of outpatient therapy during this calendar year for a different condition. []  The patient has a mental or cognitive disorder in addition to the condition being treated that will have a direct and significant impact on the rate of recovery. GOALS: (Goals updated 7/8/21)  Patient stated goal:  \"Make my legs stronger; walk and climb stairs easier and faster. \"  [x] Progressing: [] Met: [] Not Met: [] Adjusted    Therapist goals for Patient:   Short Term Goals: To be achieved in: 2 weeks  1. Independent in HEP and progression per patient tolerance, in order to prevent re-injury. [] Progressing: [x] Met: [] Not Met: [] Adjusted  2. Patient will have a decrease in left knee pain to 1-2/10 to facilitate improvement in movement, function, and ADLs as indicated by Functional Deficits. [] Progressing: [x] Met: [] Not Met: [] Adjusted    Long Term Goals: To be achieved in: 4-6 weeks  1. Disability index score of 20% or less for the Knee Outcome Survey Activities of Daily Living Scale to assist with reaching prior level of function. [x] Progressing: [] Met: [] Not Met: [] Adjusted  2. Patient will demonstrate an increase in left knee flexion AROM to at least 115 degrees to allow for proper joint functioning as indicated by patients Functional Deficits. [] Progressing: [x] Met: [] Not Met: [] Adjusted  3. Patient will demonstrate an increase in left hip and knee strength to at least 4+/5 to allow for proper functional mobility as indicated by patients Functional Deficits. [x] Progressing: [] Met: [] Not Met: [] Adjusted  4. Patient will be able to walk community distances, stand for at least 30 minutes, and be able to get up from a chair with little to no UE assistance needed for improved function. [x] Progressing: [] Met: [] Not Met: [] Adjusted  5. Patient will be able to ascend/descend stairs reciprocally with HR (patient specific functional goal)    [x] Progressing: [] Met: [] Not Met: [] Adjusted   6. Patient will have a decrease in left knee pain to 0-1/10 with daily act. [] Progressing: [x] Met: [] Not Met: [] Adjusted          Overall Progression Towards Functional goals/ Treatment Progress Update:  [x] Patient is progressing as expected towards functional goals listed. [] Progression is slowed due to complexities/Impairments listed. [] Progression has been slowed due to co-morbidities.   [] Plan just implemented, too soon to assess goals progression <30days   [] Goals require adjustment due to lack of progress  [] Patient is not progressing as expected and requires additional follow up with physician  [] Other    Prognosis for POC: [x] Good [] Fair  [] Poor      Patient requires continued skilled intervention: [x] Yes  [] No    Treatment/Activity Tolerance:  [] Patient able to complete treatment  [x] Patient limited by fatigue  [] Patient limited by pain    [] Patient limited by other medical complications  [] Other:         PLAN: Continue therapy to improve ROM, strength, and balance. Re-assess next week. 1-2x/week for 4 more weeks for ROM, strengthening, balance act, HEP instruction, pt education, manual therapy prn, and modalities prn (7/8/21)  [x] Continue per plan of care [] Alter current plan   [] Plan of care initiated [] Hold pending MD visit [] Discharge      Electronically signed by:  Ed Murphy PT     Physical Therapist Adore [address]angel [address] #[id number]  Physical Therapist [place] License #[id number]    Note: If patient does not return for scheduled/ recommended follow up visits, this note will serve as a discharge from care along with most recent update on progress.

## 2021-08-03 ENCOUNTER — TREATMENT (OUTPATIENT)
Dept: PHYSICAL THERAPY | Age: 71
End: 2021-08-03
Payer: MEDICARE

## 2021-08-03 DIAGNOSIS — M25.662 DECREASED ROM OF LEFT KNEE: ICD-10-CM

## 2021-08-03 DIAGNOSIS — M17.12 PRIMARY OSTEOARTHRITIS OF LEFT KNEE: ICD-10-CM

## 2021-08-03 DIAGNOSIS — R29.898 WEAKNESS OF LEFT LOWER EXTREMITY: ICD-10-CM

## 2021-08-03 DIAGNOSIS — M25.562 LEFT KNEE PAIN, UNSPECIFIED CHRONICITY: Primary | ICD-10-CM

## 2021-08-03 PROCEDURE — 97110 THERAPEUTIC EXERCISES: CPT | Performed by: PHYSICAL THERAPIST

## 2021-08-03 PROCEDURE — 1101F PT FALLS ASSESS-DOCD LE1/YR: CPT | Performed by: PHYSICAL THERAPIST

## 2021-08-03 PROCEDURE — 97530 THERAPEUTIC ACTIVITIES: CPT | Performed by: PHYSICAL THERAPIST

## 2021-08-03 PROCEDURE — 97112 NEUROMUSCULAR REEDUCATION: CPT | Performed by: PHYSICAL THERAPIST

## 2021-08-03 PROCEDURE — G8539 DOC FUNCT AND CARE PLAN: HCPCS | Performed by: PHYSICAL THERAPIST

## 2021-08-03 PROCEDURE — G8427 DOCREV CUR MEDS BY ELIG CLIN: HCPCS | Performed by: PHYSICAL THERAPIST

## 2021-08-03 NOTE — PROGRESS NOTES
had no falls or 1 fall without injury in the past year. (1101F)   []  There is no documentation of fall in the medical chart (1101F,8P)     [] The patient has had 2 or more falls or one fall with injury in the past year that is documented in the medical chart. (1100F)  []  A falls risk assessment was completed and documented in the medical chart. (7706X)   []  Falls were addressed in the plan of care. (5014Z)   []  A falls risk assessment was not completed and documented in the medical chart (8369K,3C)   []   Falls were not addressed in the plan of care and reason was documented in the plan of care. (4836F 1P)        Medication (8/3/21)     [x] A list of current medications (including prescription, over the counter, herbal supplements, vitamin supplements, mineral supplements, dietary supplements) was reviewed with the patient and documented in the medical chart. ()        Falls Risk Assessment (30 days): (8/3/21)  [x] Falls Risk assessed and no intervention required. [] Falls Risk assessed and Patient requires intervention due to being higher risk   TUG score (>12s at risk):     [] Falls education provided, including     PQRS:   Reviewed medication list with patient. No changes reported by pt since last PT visit.  (8/3/21)          Latex Allergy:  [x]NO      []YES  Preferred Language for Healthcare:   [x]English       []other:      Pain level:  0-1/10 (8/3/21)   Medication:  Celebrex      SUBJECTIVE:   Pt worked out in the yard yesterday for an hour and her knee felt good but she was really tired afterward and took a nap. She has been trying to do more walking inside the house. She has not been able to walk outside due to the heat. Her left knee feels a lot stronger getting up from a chair, getting out of bed, and with walking but stairs are still difficult.            OBJECTIVE:       (8/3/21)  Flexibility L R Comment   Hamstring Mild tightness Mild tightness    Gastroc Mild tightness Mild tightness    ITB Quad              (8/3/21)  ROM PROM AROM Overpressure Comment    L R L R L R    Flexion 125 with SP  120 115      Extension   0 0                            (8/3/21)  Strength L R Comment   Quad 4+/5 4+/5    Hamstring 5/5 5/5    Gastroc 4+/5 4+/5    Hip Flex 4+/5 4+/5    Hip Abd 4+/5 4+/5    Hip Add 4+/5 4+/5    Glut Med              (8/3/21)  Girth L R   Mid Patella 57.0 cm 58.0 cm   Suprapatellar     5cm above     15cm above       (8/3/21)  Special Test Results/Comment   Meniscal Click    Crepitus (+) PF crepitus bilaterally   Flexion Test    Valgus Laxity    Varus Laxity    Lachmans    Drop Back        Reflexes/Sensation: (4/15/21)   []Dermatomes/Myotomes intact    []Reflexes equal and normal bilaterally   [x]Other: Intact to light touch    Joint mobility: (8/3/21)   []Normal    [x]Hypo   []Hyper    Palpation: No tenderness. Mild quad atrophy still present on the left vs right.(8/3/21)    Functional Mobility/Transfers: Stairs are still difficult (pt must ascend/descend stairs 1 at a time using HR), she is able to stand and walk for longer periods of time, she is unable to squat.  (8/3/21)    Posture: Forward head, rounded shoulders. (8/3/21)    Bandages/Dressings/Incisions: Pt wears compression hose and wraps on both legs (8/3/21)     Gait: (include devices/WB status) Gait is still mildly antalgic, no AD. Lola and step length have improved. She also demonstrates improved trunk rotation and arm swing on the left. She still demonstrates increased lateral trunk sway.   (8/3/21)    Orthopedic Special Tests: See above.        RESTRICTIONS/PRECAUTIONS:     Exercises/Interventions:     Therapeutic Ex (53772) Sets/sec Reps Notes/CUES   BIKE      TREADMILL            STRETCHING      Towel Pull Calf 30\" hold X 5    Inclined Calf      Hamstrings 30\" hold X 5 Seated   Quads      Hip Flexors      ITB      Adductors            ROM      Passive      Active      Weight Hangs      Sheet Pulls 10\" hold X 10    Ankle Pumps      ERMI            STRENGTHENING      Quad Sets 10\" hold X 10    Ham Sets      Hip ADD Sets BS 10\" hold X 10     SLR Flexion 3 sets X 10 with 1#     SLR Abduction  HEP   SLR Prone      SLR Adduction      SLR+      Supine Hip Abduction 2 sets X 10  Black band         Standing Marches 3 sets X 10 on Airex Standing at half wall   Standing Knee Flexion 3 sets X 10  Standing at half wall         PRE Machines      Knee Extension      Knee Flexion      Leg Press            CKC      Calf Raises 3 sets X 10 Seated   Mini Squats      Wall Sits      Step ups - 1 riser 3 sets X 10  Using half wall     Lateral Step Ups - 1 riser 2 sets X 10    TKE  Held today               Manual Intervention (52177)      Patellar Mobs      Femoral Tibial mobs      STM      Scar Massage      Foam Roll            NMR re-education (16998)   CUES NEEDED   Biodex Balance      Tandem Balance 30\" hold X 2 each on Airex *Feet staggered  *Standing next to half wallSLB      Rebounder      BOSU            Sit -> Stand  X 20 no arms     With cushion on chair     Up/Down 1 Flight of Stairs in the Stairwell  X 2 Using bilateral HR         Therapeutic Activity (84685)      Core Training      Aditive Activities      Monster Walks      TRX training                  Patient Education: Dx, prognosis, pt goals/expectations, role of therapy (4/15/21)  X 8'                  Therapeutic Exercise and NMR EXR  [x] (33831) Provided verbal/tactile cueing for activities related to strengthening, flexibility, endurance, ROM for improvements in LE, proximal hip, and core control with self care, mobility, lifting, ambulation. [x] (59348) Provided verbal/tactile cueing for activities related to improving balance, coordination, kinesthetic sense, posture, motor skill, proprioception to assist with LE, proximal hip, and core control in self-care, mobility, lifting, ambulation and eccentric single leg control.      NMR and Therapeutic Activities:    [x] (46456 or 72776) Provided verbal/tactile cueing for activities related to improving balance, coordination, kinesthetic sense, posture, motor skill, proprioception and motor activation to allow for proper function of core, proximal hip and LE with self-care and ADLs and functional mobility.   [] (30339) Gait Re-education- Provided training and instruction to the patient for proper LE, core and proximal hip recruitment and positioning and eccentric body weight control with ambulation re-education including up and down stairs     Home Exercise Program:    [x] (11216) Reviewed/Progressed HEP activities related to strengthening, flexibility, endurance, ROM of core, proximal hip and LE for functional self-care, mobility, lifting and ambulation/stair navigation - Updated HEP through [address] [place] [address] (see below). Pt was also issued new written handouts. (4/27/21)  [x] (34901) Reviewed/Progressed HEP activities related to improving balance, coordination, kinesthetic sense, posture, motor skill, proprioception of core, proximal hip and LE for self-care, mobility, lifting, and ambulation/stair navigation          Access Code: [email or web address]  URL: [email or web address] com/[email or web address]: 04/20/2021Prepared by: BEIT SybertExercises   Long Sitting Calf Stretch with Strap - 1 x daily - 7 x weekly - 1 sets - 3 reps - 30 seconds hold   Seated Table Hamstring Stretch - 1 x daily - 7 x weekly - 1 sets - 3 reps - 30 seconds hold   Long Sitting Quad Set - 1 x daily - 7 x weekly - 1 sets - 10 reps - 10 seconds hold   Long Sitting Hip Adduction Isometric with Ball - 1 x daily - 7 x weekly - 1 sets - 10 reps - 10 seconds hold   Supine Active Straight Leg Raise - 1 x daily - 7 x weekly - 2-3 sets - 10 reps   Hooklying Isometric Clamshell - 1 x daily - 7 x weekly - 3 sets - 10 reps   Standing March with Counter Support - 1 x daily - 7 x weekly - 3 sets - 10 reps   Seated Heel Raise - 1 x daily - 7 x weekly - 3 sets - 10 reps   Sit to Stand - 1 x daily - 7 x weekly - 15 reps   Tandem Stance with Support - 1 x daily - 7 x weekly - 2 reps - 20 seconds hold        Manual Treatments:  PROM / STM / Oscillations-Mobs:  G-I, II, III, IV (PA's, Inf., Post.)  [] (06359) Provided manual therapy to mobilize LE, proximal hip and/or LS spine soft tissue/joints for the purpose of modulating pain, promoting relaxation, increasing ROM, reducing/eliminating soft tissue swelling/inflammation/restriction, improving soft tissue extensibility and allowing for proper ROM for normal function with self-care, mobility, lifting and ambulation. Modalities:   Pt declined ice today   [] GAME READY (VASO)- for significant edema, swelling, pain control. Charges:  Timed Code Treatment Minutes: 54'   Total Treatment Minutes: 54'      [] EVAL (LOW) 22432 (typically 20 minutes face-to-face)  [] EVAL (MOD) 05941 (typically 30 minutes face-to-face)  [] EVAL (HIGH) 12192 (typically 45 minutes face-to-face)  [] RE-EVAL     [x] ZR(39874) x 2 (30')    [] IONTO  [x] NMR (94550) x 1 (10')   [] VASO  [] Manual (70724) x      [] Other:  [x] TA x 1 (15')     [] Mech Traction (47374)  [] ES(attended) (23753)      [] ES (un) (99129):         ASSESSMENT:  Pt showed improvement in left LE strength and ROM upon re-assessment today. She is doing better with step ups but still needs to use the half wall for support. She still has to ascend/descend stairs 1 at a time using bilateral HR's in the stairwell. Her balance has gotten much better and she was able to perform tandem stance and marches on the Airex today with no LOB. Flexion SLR's are getting easier with shorter rest breaks needed. Pt continues to make progress and would benefit from a few more visits of skilled therapy to increase strength and endurance for ascending/descending stairs and increased function.               MEDICARE CAP EXCEPTION DOCUMENTATION      I certify that this patient meets one of the below criteria necessary for becoming an exception to Adjusted  2. Patient will demonstrate an increase in left knee flexion AROM to at least 115 degrees to allow for proper joint functioning as indicated by patients Functional Deficits. [] Progressing: [x] Met: [] Not Met: [] Adjusted  3. Patient will demonstrate an increase in left hip and knee strength to at least 4+/5 to allow for proper functional mobility as indicated by patients Functional Deficits. [] Progressing: [x] Met: [] Not Met: [] Adjusted  4. Patient will be able to walk community distances, stand for at least 30 minutes, and be able to get up from a chair with little to no UE assistance needed for improved function. [x] Progressing: [] Met: [] Not Met: [] Adjusted  5. Patient will be able to ascend/descend stairs reciprocally with HR (patient specific functional goal)    [x] Progressing: [] Met: [] Not Met: [] Adjusted   6. Patient will have a decrease in left knee pain to 0-1/10 with daily act. [] Progressing: [x] Met: [] Not Met: [] Adjusted      New Goal: To be achieved in: 4 weeks  1. Patient will demonstrate an increase in left hip and knee strength to at least 5/5 to allow for proper functional mobility as indicated by patients Functional Deficits. [] Progressing: [] Met: [] Not Met: [] Adjusted        Overall Progression Towards Functional goals/ Treatment Progress Update:  [x] Patient is progressing as expected towards functional goals listed. [] Progression is slowed due to complexities/Impairments listed. [] Progression has been slowed due to co-morbidities.   [] Plan just implemented, too soon to assess goals progression <30days   [] Goals require adjustment due to lack of progress  [] Patient is not progressing as expected and requires additional follow up with physician  [] Other    Prognosis for POC: [x] Good [] Fair  [] Poor      Patient requires continued skilled intervention: [x] Yes  [] No    Treatment/Activity Tolerance:  [] Patient able to complete treatment [x] Patient limited by fatigue  [] Patient limited by pain    [] Patient limited by other medical complications  [] Other:         PLAN: Continue therapy 1x/week for ROM, strength, endurance, and balance. 1x/week for up to 4 more weeks for ROM, strengthening, balance act, HEP instruction, pt education, manual therapy prn, and modalities prn (8/3/21)  [x] Continue per plan of care [] [facility] [place] current plan   [] Plan of care initiated [] Hold pending MD visit [] Discharge      Electronically signed by:  Payton Caldera, PT     Physical Therapist [facility] [address] #[id number]  Physical Therapist [place] License #[id number]    Note: If patient does not return for scheduled/ recommended follow up visits, this note will serve as a discharge from care along with most recent update on progress.

## 2021-08-10 ENCOUNTER — TREATMENT (OUTPATIENT)
Dept: PHYSICAL THERAPY | Age: 71
End: 2021-08-10
Payer: MEDICARE

## 2021-08-10 DIAGNOSIS — M25.662 DECREASED ROM OF LEFT KNEE: ICD-10-CM

## 2021-08-10 DIAGNOSIS — M17.12 PRIMARY OSTEOARTHRITIS OF LEFT KNEE: ICD-10-CM

## 2021-08-10 DIAGNOSIS — M25.562 LEFT KNEE PAIN, UNSPECIFIED CHRONICITY: Primary | ICD-10-CM

## 2021-08-10 DIAGNOSIS — R29.898 WEAKNESS OF LEFT LOWER EXTREMITY: ICD-10-CM

## 2021-08-10 PROCEDURE — 97112 NEUROMUSCULAR REEDUCATION: CPT | Performed by: PHYSICAL THERAPIST

## 2021-08-10 PROCEDURE — G8427 DOCREV CUR MEDS BY ELIG CLIN: HCPCS | Performed by: PHYSICAL THERAPIST

## 2021-08-10 PROCEDURE — 97530 THERAPEUTIC ACTIVITIES: CPT | Performed by: PHYSICAL THERAPIST

## 2021-08-10 PROCEDURE — 97110 THERAPEUTIC EXERCISES: CPT | Performed by: PHYSICAL THERAPIST

## 2021-08-10 NOTE — PROGRESS NOTES
[facility]   Phone: [phone]    Fax: [phone]            Physical Therapy Treatment Note/ Progress Report:           Date:  8/10/2021    Patient Name:  Scott Ndiaye    :  1950  MRN: [id number]  Restrictions/Precautions:    Medical/Treatment Diagnosis Information:  Diagnosis: Left Knee Pain (M25.562), Primary OA of Left Knee (M17.12)   Treatment Diagnosis: Decreased Left Knee ROM (M25.662), Decreased Strength (R29.898)        Insurance/Certification information:  PT Insurance Information: Medicare - Visits based on medical necessity  Physician Information:  Referring Practitioner: Dr. Ya Arreola  Has the plan of care been signed (Y/N):        [x]  Yes (POC signed 21)  []  No      Date of Patient follow up with Physician: As needed      Is this a Progress Report:     []  Yes  [x]  No        If Yes:  Date Range for reporting period:  Beginning 4/15/21  Ending 8/3/21    Progress report will be due (10 Rx or 30 days whichever is less):  3/15/82      Recertification will be due (POC Duration  / 90 days whichever is less):  21        Visit # Insurance Allowable Auth Required   25 Medical necessity  (pt has already used 13 visits this year for her shoulder) []  Yes [x]  No        Functional Scale:    Date assessed:  8/3/21  Functional Assessment Tool Used: Knee Outcome Survey Activities of Daily Living Scale (copy scanned into media)  Score:  53/70 = 76% (24% functional deficit)    (4/15/21)  Patient Age: 66years old  Patient Height: 5' 2\"  Patient Weight: 280 lbs  Patient BMI: 51.2      Pain (8/3/21)  [x]  Pain diagram was completed, pain was present and a follow up plan to address the pain is in the plan of care. ()   []  Pain diagram was completed and there was no pain present and therefore no follow up plan to address pain in the plan of care.  ()   []  Pain diagram was not completed and the reason for not completing the pain diagram is in the medical chart ()  []  Patient refused to participate   []  Severe mental or physical capacity, patient is in urgent emergent situation and to complete the questionnaire would jeopardize the patient's health status        Functional Questionnaire (8/3/21)  [x]  Patient completed the functional outcome questionnaire and deficiencies were addressed in the plan of care. ()   []  Patient completed the outcome questionnaire and there were no functional deficits identified and therefore no plan of care is required. ()   []  Patient did not complete the functional outcome questionnaire and the reason why is documented in the medical chart. ()  []  Patient refused to participate   []  Patient unable to complete the questionnaire   []   A functional outcome assessment has been reported on within the last 30 days        Falls (8/3/21)  [x]  The patient has had no falls or 1 fall without injury in the past year. (1101F)   []  There is no documentation of fall in the medical chart (1101F,8P)     [] The patient has had 2 or more falls or one fall with injury in the past year that is documented in the medical chart. (1100F)  []  A falls risk assessment was completed and documented in the medical chart. (9382J)   []  Falls were addressed in the plan of care. (9365E)   []  A falls risk assessment was not completed and documented in the medical chart (3283M,7J)   []   Falls were not addressed in the plan of care and reason was documented in the plan of care. (2531E 1P)        Medication (8/3/21)     [x] A list of current medications (including prescription, over the counter, herbal supplements, vitamin supplements, mineral supplements, dietary supplements) was reviewed with the patient and documented in the medical chart. ()        Falls Risk Assessment (30 days): (8/3/21)  [x] Falls Risk assessed and no intervention required.   [] Falls Risk assessed and Patient requires intervention due to being higher risk   TUG score (>12s at risk):     [] Falls education provided, including     PQRS:   Reviewed medication list with patient. No changes reported by pt since last PT visit. (8/10/21)          Latex Allergy:  [x]NO      []YES  Preferred Language for Healthcare:   [x]English       []other:      Pain level:  0-1/10 (8/10/21)   Medication:  Celebrex      SUBJECTIVE:   Pt was at a continuing education course on Friday. The class was held on the second floor and there was no elevator. She was able to go up/down the 16 steps to the second floor using both handrails taking 1 step at a time. She was a little nervous at first but felt more comfortable as she went along and she never felt like she was going to fall. She's glad she has been practicing the stairs in therapy because it gave her the confidence to do them on Friday. Pt is having her first carpal tunnel surgery on 8/16/21. OBJECTIVE:       (8/3/21)  Flexibility L R Comment   Hamstring Mild tightness Mild tightness    Gastroc Mild tightness Mild tightness    ITB      Quad              (8/10/21)  ROM PROM AROM Overpressure Comment    L R L R L R    Flexion 125 with SP  120 115      Extension   0 0                            (8/3/21)  Strength L R Comment   Quad 4+/5 4+/5    Hamstring 5/5 5/5    Gastroc 4+/5 4+/5    Hip Flex 4+/5 4+/5    Hip Abd 4+/5 4+/5    Hip Add 4+/5 4+/5    Glut Med              (8/3/21)  Girth L R   Mid Patella 57.0 cm 58.0 cm   Suprapatellar     5cm above     15cm above       (8/3/21)  Special Test Results/Comment   Meniscal Click    Crepitus (+) PF crepitus bilaterally   Flexion Test    Valgus Laxity    Varus Laxity    Lachmans    Drop Back        Reflexes/Sensation: (4/15/21)   []Dermatomes/Myotomes intact    []Reflexes equal and normal bilaterally   [x]Other: Intact to light touch    Joint mobility: (8/3/21)   []Normal    [x]Hypo   []Hyper    Palpation: No tenderness.   Mild quad atrophy still present on the left vs right.(8/3/21)    Functional Mobility/Transfers: Stairs are still difficult (pt must ascend/descend stairs 1 at a time using HR), she is able to stand and walk for longer periods of time, she is unable to squat.  (8/3/21)    Posture: Forward head, rounded shoulders. (8/3/21)    Bandages/Dressings/Incisions: Pt wears compression hose and wraps on both legs (8/3/21)     Gait: (include devices/WB status) Gait is still mildly antalgic, no AD. Lola and step length have improved. She also demonstrates improved trunk rotation and arm swing on the left. She still demonstrates increased lateral trunk sway.   (8/3/21)    Orthopedic Special Tests: See above.        RESTRICTIONS/PRECAUTIONS:     Exercises/Interventions:     Therapeutic Ex (30065) Sets/sec Reps Notes/CUES   BIKE      TREADMILL            STRETCHING      Towel Pull Calf 30\" hold X 5    Inclined Calf      Hamstrings 30\" hold X 5 Seated   Quads      Hip Flexors      ITB      Adductors            ROM      Passive      Active      Weight Hangs      Sheet Pulls 10\" hold X 10    Ankle Pumps      ERMI            STRENGTHENING      Quad Sets 10\" hold X 10    Ham Sets      Hip ADD Sets BS 10\" hold X 10     SLR Flexion 3 sets X 10 with 1#  *Try 2# wt next visit   SLR Abduction  HEP   SLR Prone      SLR Adduction      SLR+      Supine Hip Abduction 2 sets X 10  Black band         Standing Marches 3 sets X 10 on Airex Standing at half wall   Standing Knee Flexion 3 sets X 10  Standing at half wall         PRE Machines      Knee Extension      Knee Flexion      Leg Press            CKC      Calf Raises 3 sets X 10 Seated   Mini Squats      Wall Sits      Step ups - 1 riser 3 sets X 10  Using half wall     Lateral Step Ups - 1 riser 2 sets X 10    TKE  Held today               Manual Intervention (86064)      Patellar Mobs      Femoral Tibial mobs      STM      Scar Massage      Foam Roll            NMR re-education (41232)   CUES NEEDED   Biodex Balance      Tandem Balance 30\" hold X 2 each on Airex *Feet staggered  *Standing next to half wallSLB      Rebounder      BOSU            Sit -> Stand  X 20 no arms     With cushion on chair     Up/Down 1 Flight of Stairs in the Stairwell  X 2 Using bilateral HR         Therapeutic Activity (01770)      Core Training      [place] Activities      Monster Walks      TRX training                  Patient Education: Dx, prognosis, pt goals/expectations, role of therapy (4/15/21)  X 8'                  Therapeutic Exercise and NMR EXR  [x] (99096) Provided verbal/tactile cueing for activities related to strengthening, flexibility, endurance, ROM for improvements in LE, proximal hip, and core control with self care, mobility, lifting, ambulation. [x] (47797) Provided verbal/tactile cueing for activities related to improving balance, coordination, kinesthetic sense, posture, motor skill, proprioception to assist with LE, proximal hip, and core control in self-care, mobility, lifting, ambulation and eccentric single leg control. NMR and Therapeutic Activities:    [x] (88895 or 38633) Provided verbal/tactile cueing for activities related to improving balance, coordination, kinesthetic sense, posture, motor skill, proprioception and motor activation to allow for proper function of core, proximal hip and LE with self-care and ADLs and functional mobility.   [] (84517) Gait Re-education- Provided training and instruction to the patient for proper LE, core and proximal hip recruitment and positioning and eccentric body weight control with ambulation re-education including up and down stairs     Home Exercise Program:    [x] (51533) Reviewed/Progressed HEP activities related to strengthening, flexibility, endurance, ROM of core, proximal hip and LE for functional self-care, mobility, lifting and ambulation/stair navigation - Updated HEP through [address] [place] [address] (see below). Pt was also issued new written handouts.  (4/27/21)  [x] (05349) Reviewed/Progressed HEP activities related to improving balance, coordination, kinesthetic sense, posture, motor skill, proprioception of core, proximal hip and LE for self-care, mobility, lifting, and ambulation/stair navigation          Access Code: [email or web address]  URL: [employer]/Date: 04/20/2021Prepared by: MONICA SybertExercises   Long Sitting Calf Stretch with Strap - 1 x daily - 7 x weekly - 1 sets - 3 reps - 30 seconds hold   Seated Table Hamstring Stretch - 1 x daily - 7 x weekly - 1 sets - 3 reps - 30 seconds hold   Long Sitting Quad Set - 1 x daily - 7 x weekly - 1 sets - 10 reps - 10 seconds hold   Long Sitting Hip Adduction Isometric with Ball - 1 x daily - 7 x weekly - 1 sets - 10 reps - 10 seconds hold   Supine Active Straight Leg Raise - 1 x daily - 7 x weekly - 2-3 sets - 10 reps   Hooklying Isometric Clamshell - 1 x daily - 7 x weekly - 3 sets - 10 reps   Standing March with Counter Support - 1 x daily - 7 x weekly - 3 sets - 10 reps   Seated Heel Raise - 1 x daily - 7 x weekly - 3 sets - 10 reps   Sit to Stand - 1 x daily - 7 x weekly - 15 reps   Tandem Stance with Support - 1 x daily - 7 x weekly - 2 reps - 20 seconds hold        Manual Treatments:  PROM / STM / Oscillations-Mobs:  G-I, II, III, IV (PA's, Inf., Post.)  [] (76715) Provided manual therapy to mobilize LE, proximal hip and/or LS spine soft tissue/joints for the purpose of modulating pain, promoting relaxation, increasing ROM, reducing/eliminating soft tissue swelling/inflammation/restriction, improving soft tissue extensibility and allowing for proper ROM for normal function with self-care, mobility, lifting and ambulation. Modalities:   Pt declined ice today   [] GAME READY (VASO)- for significant edema, swelling, pain control.      Charges:  Timed Code Treatment Minutes: 54'   Total Treatment Minutes: 54'      [] EVAL (LOW) [address] (typically 20 minutes face-to-face)  [] EVAL (MOD) [address] (typically 30 minutes face-to-face)  [] EVAL (HIGH) 87442 (typically 45 minutes face-to-face)  [] RE-EVAL     [x] NX(56051) x 2 (30')    [] IONTO  [x] NMR (29190) x 1 (10')   [] VASO  [] Manual (96728) x      [] Other:  [x] TA x 1 (15')     [] Mech Traction (85299)  [] ES(attended) (12855)      [] ES (un) (10024):         ASSESSMENT:  Pt is showing more confidence ascending/descending stairs in the stairwell but still needs to take 1 step at a time and use bilateral HRs. She did well with balance act today with no LOB. Flexion SLR's have gotten easier and pt should be ready to increase to 2# wt next visit. Pt is having carpal tunnel surgery next week (8/16/21), therapy for her knee will be put on hold until cleared by MD.                     MEDICARE CAP EXCEPTION DOCUMENTATION      I certify that this patient meets one of the below criteria necessary for becoming an exception to the Medicare cap on therapy services:    [x]  The patient has a condition identified by an ICD-9 code that has a direct and significant impact on the need for therapy. (Significantly impacts the rate of recovery.)                     []  The patient has a complexity identified by an ICD-9 code that has a direct and significant impact on the need for therapy. (Significantly impacts the rate of recovery and is associated with a primary condition.)         []  The patient has associated variables that influence the amount of treatment to include:  Social support, self-efficacy/motivation, prognosis, time since onset/acuity. []  The patient has generalized musculoskeletal conditions or a condition affecting multiple sites that will have a direct impact on the rate of recovery. [x]  The patient had a prior episode of outpatient therapy during this calendar year for a different condition.            []  The patient has a mental or cognitive disorder in addition to the condition being treated that will have a direct and significant impact on the rate of recovery. GOALS: (Goals updated 8/3/21)  Patient stated goal:  \"Make my legs stronger; walk and climb stairs easier and faster. \"  [x] Progressing: [] Met: [] Not Met: [] Adjusted    Therapist goals for Patient:   Short Term Goals: To be achieved in: 2 weeks  1. Independent in HEP and progression per patient tolerance, in order to prevent re-injury. [] Progressing: [x] Met: [] Not Met: [] Adjusted  2. Patient will have a decrease in left knee pain to 1-2/10 to facilitate improvement in movement, function, and ADLs as indicated by Functional Deficits. [] Progressing: [x] Met: [] Not Met: [] Adjusted    Long Term Goals: To be achieved in: 4-6 weeks  1. Disability index score of 20% or less for the Knee Outcome Survey Activities of Daily Living Scale to assist with reaching prior level of function. [x] Progressing: [] Met: [] Not Met: [] Adjusted  2. Patient will demonstrate an increase in left knee flexion AROM to at least 115 degrees to allow for proper joint functioning as indicated by patients Functional Deficits. [] Progressing: [x] Met: [] Not Met: [] Adjusted  3. Patient will demonstrate an increase in left hip and knee strength to at least 4+/5 to allow for proper functional mobility as indicated by patients Functional Deficits. [] Progressing: [x] Met: [] Not Met: [] Adjusted  4. Patient will be able to walk community distances, stand for at least 30 minutes, and be able to get up from a chair with little to no UE assistance needed for improved function. [x] Progressing: [] Met: [] Not Met: [] Adjusted  5. Patient will be able to ascend/descend stairs reciprocally with HR (patient specific functional goal)    [x] Progressing: [] Met: [] Not Met: [] Adjusted   6. Patient will have a decrease in left knee pain to 0-1/10 with daily act. [] Progressing: [x] Met: [] Not Met: [] Adjusted      New Goal: To be achieved in: 4 weeks  1.  Patient will demonstrate an increase in left hip and knee strength to at least 5/5 to allow for proper functional mobility as indicated by patients Functional Deficits. [] Progressing: [] Met: [] Not Met: [] Adjusted        Overall Progression Towards Functional goals/ Treatment Progress Update:  [x] Patient is progressing as expected towards functional goals listed. [] Progression is slowed due to complexities/Impairments listed. [] Progression has been slowed due to co-morbidities. [] Plan just implemented, too soon to assess goals progression <30days   [] Goals require adjustment due to lack of progress  [] Patient is not progressing as expected and requires additional follow up with physician  [] Other    Prognosis for POC: [x] Good [] Fair  [] Poor      Patient requires continued skilled intervention: [x] Yes  [] No    Treatment/Activity Tolerance:  [] Patient able to complete treatment  [x] Patient limited by fatigue  [] Patient limited by pain    [] Patient limited by other medical complications  [] Other:         PLAN: Continue therapy 1x/week for ROM, strength, endurance, and balance. Pt is having carpal tunnel surgery on 8/16/21. PT for her left knee will be put on hold until cleared by MD.       1x/week for up to 4 more weeks for ROM, strengthening, balance act, HEP instruction, pt education, manual therapy prn, and modalities prn (8/3/21)  [x] Continue per plan of care [] Ratna Woodruff current plan   [] Plan of care initiated [] Hold pending MD visit [] Discharge      Electronically signed by:  Altagracia Estrada PT     Physical Therapist Adore Rangel 421 #[id number]  Physical Therapist [place] License #[id number]    Note: If patient does not return for scheduled/ recommended follow up visits, this note will serve as a discharge from care along with most recent update on progress.

## 2021-08-13 ENCOUNTER — TELEPHONE (OUTPATIENT)
Dept: ORTHOPEDIC SURGERY | Age: 71
End: 2021-08-13

## 2021-08-13 NOTE — TELEPHONE ENCOUNTER
General Question     Subject: PATIENT WOULD LIKE TO KNOW WHAT WRIST SHE WAS GOING TO HAVE CTS [address] [place]?   Patient and /or Facility Request: PATIENT  Contact Number: [phone]

## 2021-08-19 ENCOUNTER — EVALUATION (OUTPATIENT)
Dept: PHYSICAL THERAPY | Age: 71
End: 2021-08-19
Payer: MEDICARE

## 2021-08-19 DIAGNOSIS — R29.898 WEAKNESS OF WRIST: ICD-10-CM

## 2021-08-19 DIAGNOSIS — R29.898 WEAKNESS OF LEFT HAND: ICD-10-CM

## 2021-08-19 DIAGNOSIS — G56.02 LEFT CARPAL TUNNEL SYNDROME: Primary | ICD-10-CM

## 2021-08-19 PROCEDURE — 97110 THERAPEUTIC EXERCISES: CPT | Performed by: PHYSICAL THERAPIST

## 2021-08-19 PROCEDURE — 1101F PT FALLS ASSESS-DOCD LE1/YR: CPT | Performed by: PHYSICAL THERAPIST

## 2021-08-19 PROCEDURE — G8539 DOC FUNCT AND CARE PLAN: HCPCS | Performed by: PHYSICAL THERAPIST

## 2021-08-19 PROCEDURE — 97530 THERAPEUTIC ACTIVITIES: CPT | Performed by: PHYSICAL THERAPIST

## 2021-08-19 PROCEDURE — G8427 DOCREV CUR MEDS BY ELIG CLIN: HCPCS | Performed by: PHYSICAL THERAPIST

## 2021-08-19 PROCEDURE — 97161 PT EVAL LOW COMPLEX 20 MIN: CPT | Performed by: PHYSICAL THERAPIST

## 2021-08-19 NOTE — PROGRESS NOTES
[facility]   Phone: [phone]    Fax: [phone]     Physical Therapy Certification    Dear Referring Practitioner: Dr. Elif Jarrell,    We had the pleasure of evaluating the following patient for physical therapy services at [address] [place] [address]. A summary of our findings can be found in the initial assessment below. This includes our plan of care. If you have any questions or concerns regarding these findings, please do not hesitate to contact me at the office phone number checked above. Thank you for the referral.       Physician Signature:_______________________________Date:__________________  By signing above (or electronic signature), therapists plan is approved by physician      Patient: Tello Morris   : 1950   MRN: [id number]  Referring Physician: Referring Practitioner: Dr. Elif Jarrell      Evaluation Date: 2021      Medical Diagnosis Information:  Diagnosis: S/P Left Carpal Tunnel Release (Sx: 21) (G56.02)    Treatment Diagnosis: Weakness of Left Wrist and Hand (R29.898)                                        Insurance information: PT Insurance Information: Medicare, [place] - visits based on medical necessity    Precautions/ Contra-indications:   Latex Allergy:  [x]NO      []YES    C-SSRS Triggered by Intake questionnaire (Past 2 wk assessment):   [x] No, Questionnaire did not trigger screening.   [] Yes, Patient intake triggered further evaluation      [] C-SSRS Screening completed  [] PCP notified via Plan of Care  [] Emergency services notified     Preferred Language for Healthcare:   [x]English       []other:      SUBJECTIVE:   Pt was in the hospital for 2 weeks back in 2020 due to an infection in both legs. While in the hospital, she started experiencing numbness and tingling in both hands which she thinks was caused by one of the medications she was given (Vancomycin).   She also lost feeling in her thumb, index, and middle fingers of both hands which eventually came back after several months but she continued to experience numbness and tingling. \"My hands felt like they were asleep. \"  She also reported a loss of strength in both hands and had difficulty with fine motor activities such as gripping, opening jars, putting in her earrings, putting on a necklace, and turning a door handle. She saw a neurologist and had an EMG and MRI done which showed bilateral carpal tunnel syndrome. She then followed up with Dr. Nakia Trammell who performed left CTR on 8/16/21. She is scheduled to have right CTR on 9/20/21. Pt states she has only taken 1 pain pill and it was right after surgery. She reports no pain, numbness, or tingling in her left wrist/hand since surgery. She iced the first day after surgery but hasn't iced since. She is sleeping well at night. She sees Dr. Nakia Trammell next week for her first post-op visit. Pt goal: \"Increase strength, increase use of my left hand\"      *Pt is right hand dominant    Relevant Medical History: Left Reverse TSA on 6/29/20, Right Reverse TSA ~2011, HTN, OA, Osteoporosis, HA/Migraines      Functional Disability Index:   Functional Assessment Tool Used:  UEFI (copy scanned into media)  Score: 8/80 = 10% (90% functional deficit)      Pain Scale: 0/10  Easing factors: Rest  Provocative factors: ADL's and self-care activities    Type: []Constant   []Intermittent  []Radiating []Localized []other:     Numbness/Tingling: Pr reports no numbness or tingling in her left hand since surgery    Occupation/School: Retired    Living Status: Pt lives in a 2-story house with her sister. Her bedroom is on the second floor. Her bathroom and shower are handicap accessible. She has a lift to the second floor. She has 2 steps to enter the front door and a ramp to the back door which is how she comes/in out of the house.     Prior Level of Function: Independent with ADLs and IADLs, no prior history of left hand/wrist symptoms. OBJECTIVE:     *Pt is right hand dominant    ROM PROM AROM Comments    Left Right Left Right    Elbow flexion   135 135    Elbow extension   0 0    Pronation   90 90    Supination   55 75    Wrist flexion   60 70    Wrist extension   60 70    Wrist radial deviation        Wrist ulnar deviation          Special Tests Left Right Comments   Cozens Not      Tinels Tested     Phalens Due to     EchoStar Sign Sx                   Strength Left Right Comments   Elbow flexion Not  5/5 *Pt has carpal tunnel on the right and has sx scheduled 9/20/21   Elbow extension Tested 5/5    Pronation Due to  4/5    Supination Sx 4/5    Wrist flexion  4/5    Wrist extension  4/5    Wrist radial deviation  4/5    Wrist ulnar deviation         Left Right Comments   Level 1 Not      Level 2 Tested     Level 3 Due to      Level 4 Sx     Level 5      other            Reflexes/Sensation:    []Dermatomes/Myotomes intact    []Reflexes equal and normal bilaterally   [x]Other: Intact to light touch    Joint mobility: NA due to Sx   []Normal    []Hypo   []Hyper    Palpation: General tenderness around incision site. Bruising present along the palm of her hand. Swelling present left wrist and hand. Functional Mobility/Transfers: Pt is unable to use her left hand for ADL's and self-care act due to recent surgery and post-op bandage. Posture: Forward head, rounded shoulders. Bandages/Dressings/Incisions: Removed post-op bandage today. Incision is clean and dry with no s/s of infection. Gait: (include devices/WB status): Gait is mildly antalgic, no AD. She demonstrates decreased pawel and step length (pt is also doing therapy at this facility for her left knee). Orthopedic Special Tests: See above                       [x] Patient history, allergies, meds reviewed. Medical chart reviewed. See intake form.      Review Of Systems (ROS):  [x]Performed Review of systems (Integumentary, CardioPulmonary, Neurological) by intake and observation. Intake form has been scanned into medical record. Patient has been instructed to contact their primary care physician regarding ROS issues if not already being addressed at this time. Co-morbidities/Complexities (which will affect course of rehabilitation):   []None           Arthritic conditions   []Rheumatoid arthritis (M05.9)  [x]Osteoarthritis (M19.91)   Cardiovascular conditions   [x]Hypertension (I10)  []Hyperlipidemia (E78.5)  []Angina pectoris (I20)  []Atherosclerosis (I70)   Musculoskeletal conditions   []Disc pathology   []Congenital spine pathologies   []Prior surgical intervention  [x]Osteoporosis (M81.8)  []Osteopenia (M85.8)   Endocrine conditions   []Hypothyroid (E03.9)  []Hyperthyroid Gastrointestinal conditions   []Constipation (C04.75)   Metabolic conditions   []Morbid obesity (E66.01)  []Diabetes type 1(E10.65) or 2 (E11.65)   []Neuropathy (G60.9)     Pulmonary conditions   []Asthma (J45)  []Coughing   []COPD (J44.9)   Psychological Disorders  []Anxiety (F41.9)  []Depression (F32.9)   []Other:   []Other:          Barriers to/and or personal factors that will affect rehab potential:              []Age  []Sex              [x]Motivation/Lack of Motivation - pt is very motivated to increase function                       [x]Co-Morbidities - HTN, OA, HA's/Migraines, Osteoporosis              []Cognitive Function, education/learning barriers              []Environmental, home barriers              []profession/work barriers  [x]past PT/medical experience - no prior PT for the left hand/wrist  []other:  Justification:      Falls Risk Assessment (30 days):   [x] Falls Risk assessed and no intervention required.   [] Falls Risk assessed and Patient requires intervention due to being higher risk   TUG score (>12s at risk):     [] Falls education provided, including        ASSESSMENT: Pt presents to therapy today s/p left carpal tunnel release on risk assessment was completed and documented in the medical chart. (9199H)   []? Falls were addressed in the plan of care. (3465S)   []? A falls risk assessment was not completed and documented in the medical chart (8771V,5F)   []? Falls were not addressed in the plan of care and reason was documented in the plan of care. (0518F 1P)         Medication     [x]? A list of current medications (including prescription, over the counter, herbal supplements, vitamin supplements, mineral supplements, dietary supplements) was reviewed with the patient and documented in the medical chart. ()           Functional Impairments   []Noted spinal or UE joint hypomobility   []Noted spinal or UE joint hypermobility   [x]Decreased UE functional ROM   [x]Decreased UE functional strength   []Abnormal reflexes/sensation/myotomal/dermatomal deficits   [x]Decreased RC/scapular/core strength and neuromuscular control   []other:      Functional Activity Limitations (from functional questionnaire and intake)   []Reduced ability to tolerate prolonged functional positions   [x]Reduced ability or difficulty with changes of positions or transfers between positions   []Reduced ability to maintain good posture and demonstrate good body mechanics with sitting, bending, and lifting   [x] Reduced ability or tolerance with driving and/or computer work   []Reduced ability to sleep   [x]Reduced ability to perform lifting, reaching, carrying tasks   [x]Reduced ability to tolerate impact through UE   [x]Reduced ability to reach behind back   [x]Reduced ability to  or hold objects   [x]Reduced ability to throw or toss an object   []other:    Participation Restrictions   [x]Reduced participation in self care activities   [x]Reduced participation in home management activities   []Reduced participation in work activities   [x]Reduced participation in social activities.    []Reduced participation in sport/recreation activities. Classification:   [x]Signs/symptoms consistent with post-surgical status including decreased ROM, strength and function. []Signs/symptoms consistent with joint sprain/strain   []Signs/symptoms consistent with shoulder impingement   []Signs/symptoms consistent with shoulder/elbow/wrist tendinopathy   []Signs/symptoms consistent with Rotator cuff tear   []Signs/symptoms consistent with labral tear   []Signs/symptoms consistent with postural dysfunction    []Signs/symptoms consistent with Glenohumeral IR Deficit - <45 degrees   []Signs/symptoms consistent with facet dysfunction of cervical/thoracic spine    []Signs/symptoms consistent with pathology which may benefit from Dry needling     []other:     Prognosis/Rehab Potential:      []Excellent   [x]Good    []Fair   []Poor    Tolerance of evaluation/treatment:    []Excellent   [x]Good    []Fair   []Poor    Physical Therapy Evaluation Complexity Justification  [x] A history of present problem with:  [] no personal factors and/or comorbidities that impact the plan of care;  [x]1-2 personal factors and/or comorbidities that impact the plan of care  []3 personal factors and/or comorbidities that impact the plan of care  [x] An examination of body systems using standardized tests and measures addressing any of the following: body structures and functions (impairments), activity limitations, and/or participation restrictions;:  [] a total of 1-2 or more elements   [x] a total of 3 or more elements   [] a total of 4 or more elements   [x] A clinical presentation with:  [x] stable and/or uncomplicated characteristics   [] evolving clinical presentation with changing characteristics  [] unstable and unpredictable characteristics;   [x] Clinical decision making of [x] low, [] moderate, [] high complexity using standardized patient assessment instrument and/or measurable assessment of functional outcome.     [x] EVAL (LOW) 19962 (typically 20 minutes face-to-face)  [] EVAL (MOD) 24486 (typically 30 minutes face-to-face)  [] EVAL (HIGH) 61638 (typically 45 minutes face-to-face)  [] RE-EVAL     PLAN:  Frequency/Duration:  1 day per week for 4 Weeks:  INTERVENTIONS:  [x] Therapeutic exercise including: strength training, ROM, for Upper extremity and core   [x]  NMR activation and proprioception for UE, scap and Core   [x] Manual therapy as indicated for shoulder, scapula and spine to include: Dry Needling/IASTM, STM, PROM, Gr I-IV mobilizations, manipulation. [x] Modalities as needed that may include: thermal agents, E-stim, Biofeedback, US, iontophoresis as indicated  [x] Patient education on joint protection, postural re-education, activity modification, progression of HEP. HEP instruction: Instructed patient in a HEP through [address] [place] [address]. Patient demonstrated exercises correctly. Handout with pictures and # of reps/sets was given to pt and a copy was scanned into media. Exercises are listed on flowsheet.       Patient Education:  Educated patient on Physical Therapy process. Also educated on diagnosis, related anatomy, pathology and prognosis. Reviewed patient goals/expectations and answered all questions. Patient displays understanding and in agreement with plan of care.     Discussed importance of HEP and icing, activity modification, and role of PT. Also educated pt on wound care. GOALS:  Patient stated goal: \"Increase strength, increase use of my left hand\"    [] Progressing: [] Met: [] Not Met: [] Adjusted    Therapist goals for Patient:   Short Term Goals: To be achieved in: 2 weeks  1. Independent in HEP and progression per patient tolerance, in order to prevent re-injury. [] Progressing: [] Met: [] Not Met: [] Adjusted      Long Term Goals: To be achieved in: 4 weeks  1. Disability index score of 15% or less for the UEFI to assist with reaching prior level of function. [] Progressing: [] Met: [] Not Met: [] Adjusted  2.  Patient will demonstrate an increase in left wrist AROM to at least 170 degrees flexion and extension and 85 degrees supination to allow for proper joint functioning as indicated by patients Functional Deficits. [] Progressing: [] Met: [] Not Met: [] Adjusted  3. Patient will demonstrate an increase in left hand and wrist strength to 5/5 to allow for proper functional mobility as indicated by patients Functional Deficits. [] Progressing: [] Met: [] Not Met: [] Adjusted  4. Patient will be able to perform fine motor activities, such as gripping, opening jars, putting on jewelry, and turning a door handle, without increased symptoms or restriction. [] Progressing: [] Met: [] Not Met: [] Adjusted  5.  Patient will be able to perform all ADL's and self care act with no pain, numbness, or tingling in her left hand/wrist.  (patient specific functional goal)    [] Progressing: [] Met: [] Not Met: [] Adjusted     Electronically signed by:  Vickie Silver PT     Physical Therapist Adore Rangel [address] #[id number]  Physical Therapist [place] License #[id number]

## 2021-08-20 NOTE — PROGRESS NOTES
[facility]   Phone: [phone]    Fax: [phone]      Physical Therapy Treatment Note/ Progress Report:           Date:  2021    Patient Name:  Dylan Issa    :  1950  MRN: [id number]  Restrictions/Precautions:    Medical/Treatment Diagnosis Information:  · Diagnosis: S/P Left Carpal Tunnel Release (Sx: 21) (G56.02)   ·  Treatment Diagnosis: Weakness of Left Wrist and Hand (R29.898)      Insurance/Certification information:  PT Insurance Information: Medicare, [employer] - visits based on medical necessity  Physician Information:  Referring Practitioner: Dr. Raina Montgomrey  Has the plan of care been signed (Y/N):        []  Yes  [x]  No (POC sent 21)     Date of Patient follow up with Physician: 21      Is this a Progress Report:     []  Yes  [x]  No        If Yes:  Date Range for reporting period:  Beginning 21  Ending 21    Progress report will be due (10 Rx or 30 days whichever is less):        Recertification will be due (POC Duration  / 90 days whichever is less):  21        Visit # Insurance Allowable Auth Required   1 Medical necessity   (Use Kx Modifier due to pt having prior PT this year) []  Yes [x]  No        Functional Scale:    Date assessed:  21    Functional Assessment Tool Used:  UEFI (copy scanned into media)  Score:  = 10% (90% functional deficit)                        (21)  Patient Age: 66years old  Patient Height: 5' 2\"  Patient Weight: 280 lbs  Patient BMI: 51.2        Pain (21)  []? Pain diagram was completed, pain was present and a follow up plan to address the pain is in the plan of care. ()   [x]? Pain diagram was completed and there was no pain present and therefore no follow up plan to address pain in the plan of care. ()   []? Pain diagram was not completed and the reason for not completing the pain diagram is in the medical chart ()  []?   Patient refused to participate   []? Severe mental or physical capacity, patient is in urgent emergent situation and to complete the questionnaire would jeopardize the patient's health status         Functional Questionnaire (8/19/21)  [x]? Patient completed the functional outcome questionnaire and deficiencies were addressed in the plan of care. ()   []? Patient completed the outcome questionnaire and there were no functional deficits identified and therefore no plan of care is required. ()   []? Patient did not complete the functional outcome questionnaire and the reason why is documented in the medical chart. ()  []? Patient refused to participate   []? Patient unable to complete the questionnaire   []? A functional outcome assessment has been reported on within the last 30 days         Falls (8/19/21)  [x]? The patient has had no falls or 1 fall without injury in the past year. (1101F)   []? There is no documentation of fall in the medical chart (1101F,8P)      []?  The patient has had 2 or more falls or one fall with injury in the past year that is documented in the medical chart. (1100F)  []? A falls risk assessment was completed and documented in the medical chart. (8459H)   []? Falls were addressed in the plan of care. (8344S)   []? A falls risk assessment was not completed and documented in the medical chart (4677L,4V)   []? Falls were not addressed in the plan of care and reason was documented in the plan of care. (4676R 1P)         Medication (8/19/21)     [x]? A list of current medications (including prescription, over the counter, herbal supplements, vitamin supplements, mineral supplements, dietary supplements) was reviewed with the patient and documented in the medical chart. ()       Falls Risk Assessment (30 days): (8/19/21)  [x] Falls Risk assessed and no intervention required.   [] Falls Risk assessed and Patient requires intervention due to being higher risk   TUG score (>12s at risk):     [] Falls education provided, including           Latex Allergy:  [x]NO      []YES  Preferred Language for Healthcare:   [x]English       []other:      Pain level:  0/10     SUBJECTIVE:  See initial eval      OBJECTIVE:  (Measurements taken 8/19/21)      *Pt is right hand dominant    ROM PROM AROM Comments    Left Right Left Right    Elbow flexion   135 135    Elbow extension   0 0    Pronation   90 90    Supination   55 75    Wrist flexion   60 70    Wrist extension   60 70    Wrist radial deviation        Wrist ulnar deviation            Special Tests Left Right Comments   Cozens Not      Tinels Tested     Phalens Due to     EchoStar Sign Sx                     Strength Left Right Comments   Elbow flexion Not  5/5 *Pt has carpal tunnel on the right and has sx scheduled 9/20/21   Elbow extension Tested 5/5    Pronation Due to  4/5    Supination Sx 4/5    Wrist flexion  4/5    Wrist extension  4/5    Wrist radial deviation  4/5    Wrist ulnar deviation           Left Right Comments   Level 1 Not      Level 2 Tested     Level 3 Due to      Level 4 Sx     Level 5      other            Reflexes/Sensation:    []Dermatomes/Myotomes intact    []Reflexes equal and normal bilaterally   [x]Other: Intact to light touch    Joint mobility: NA due to Sx   []Normal    []Hypo   []Hyper    Palpation: General tenderness around incision site. Bruising present along the palm of her hand. Swelling present left wrist and hand. Functional Mobility/Transfers: Pt is unable to use her left hand for ADL's and self-care act due to recent surgery and post-op bandage. Posture: Forward head, rounded shoulders. Bandages/Dressings/Incisions: Removed post-op bandage today. Incision is clean and dry with no s/s of infection. Gait: (include devices/WB status): Gait is mildly antalgic, no AD.   She demonstrates decreased pawel and step length (pt is also doing therapy at this facility for her left knee). Orthopedic Special Tests: See above            RESTRICTIONS/PRECAUTIONS: S/P left CTR (Sx: 8/16/21)    Exercises/Interventions:       Therapeutic Ex (96913) Sets/sec Reps Notes/CUES   AD UE BIKE            STRETCHING      Elbow extension      Elbow flexion      Wrist flex 20\" hold X 5    Wrist ext      Supination 20\" hold X 5    Pronation      Radial Deviation      Ulnar Deviation                  AROM      Thumb Opposition to all Fingers  X 15    Finger MP Flexion AROM  X 15    Claw   X 15    Finger Abd/Add  X 15 Hand on table                STRENGTHENING-PREs      Biceps      Triceps      Wrist flexion      Wrist extension      Supination      Pronation      Radial Deviation      Ulnar Deviation            Gripping      RB Finger Extension            THERABANDS      Rows      Lats      Biceps      Triceps      Internal Rotation      External Rotation                              Manual Intervention (79061)      Scar Massage      STM      Hawkgrips      Elbow joint mobilizations      Foamroll                  NMR re-education (55039)   CUES NEEDED   Plyoback      Therabar oscillations      Body Blade       Rhythmic Stabilization                                    Therapeutic Activity (69518)      Core training      Swiss ball activities            Patient Education: Dx, prognosis, pt goals/expectations, rehab timeline, wound care (8/19/21)  X 8'                          Therapeutic Exercise and NMR EXR  [x] (41452) Provided verbal/tactile cueing for activities related to strengthening, flexibility, endurance, ROM  for improvements in scapular, scapulothoracic and UE control with self care, reaching, carrying, lifting, house/yardwork, driving/computer work.     [x] (56310) Provided verbal/tactile cueing for activities related to improving balance, coordination, kinesthetic sense, posture, motor skill, proprioception  to assist with  scapular, scapulothoracic and UE control with self care, reaching, carrying, lifting, house/yardwork, driving/computer work. Therapeutic Activities:    [x] (31925 or 55029) Provided verbal/tactile cueing for activities related to improving balance, coordination, kinesthetic sense, posture, motor skill, proprioception and motor activation to allow for proper function of scapular, scapulothoracic and UE control with self care, carrying, lifting, driving/computer work. Home Exercise Program:    [x] (52654) Reviewed/Progressed HEP activities related to strengthening, flexibility, endurance, ROM of scapular, scapulothoracic and UE control with self care, reaching, carrying, lifting, house/yardwork, driving/computer work - Instructed pt in a HEP through [email or web address] (see below). Pt was also issued written handouts (8/19/21)  [] (51719) Reviewed/Progressed HEP activities related to improving balance, coordination, kinesthetic sense, posture, motor skill, proprioception of scapular, scapulothoracic and UE control with self care, reaching, carrying, lifting, house/yardwork, driving/computer work        Access Code: [email or web address]  URL: [email or web address]  Date: 08/19/2021  Prepared by: Karl Carballo    Exercises  Seated Wrist Flexion with Overpressure - 2 x daily - 7 x weekly - 1 sets - 5 reps - 20 seconds hold  Forearm Supination Stretch - 2 x daily - 7 x weekly - 1 sets - 5 reps - 20 seconds hold  Thumb Opposition - 2 x daily - 7 x weekly - 1 sets - 15 reps  Finger MP Flexion AROM - 2 x daily - 7 x weekly - 1 sets - 15 reps  Seated Claw Fist AROM - 2 x daily - 7 x weekly - 1 sets - 15 reps  Finger Spreading - 2 x daily - 7 x weekly - 1 sets - 15 reps          Manual Treatments:  PROM / STM / Oscillations-Mobs:  G-I, II, III, IV (PA's, Inf., Post.)  [] (73363) Provided manual therapy to mobilize soft tissue/joints of cervical/CT, scapular GHJ and UE for the purpose of modulating pain, promoting relaxation,  increasing ROM, reducing/eliminating soft tissue swelling/inflammation/restriction, improving soft tissue extensibility and allowing for proper ROM for normal function with self care, reaching, carrying, lifting, house/yardwork, driving/computer work    Modalities:  ICE x 10'     [] GAME READY (VASO)- for significant edema, swelling, pain control. Charges:  Timed Code Treatment Minutes: 25'   Total Treatment Minutes: 54'      [x] EVAL (LOW) 455 1011 (typically 20 minutes face-to-face)  [] EVAL (MOD) 63031 (typically 30 minutes face-to-face)  [] EVAL (HIGH) 18665 (typically 45 minutes face-to-face)  [] RE-EVAL     [x] WJ(74267) x 1 (15')     [] IONTO  [] NMR (34571) x     [] VASO  [] Manual (63211) x     [] Other:  [x] TA x  1 (10')    [] Mech Traction (84241)  [] ES(attended) (19406)      [] ES (un) (07721):         ASSESSMENT:  Pt presents to therapy today s/p left carpal tunnel release on 8/16/21 with decreased left wrist/hand ROM, decreased strength, and increased swelling. She presents with no pain and the numbness and tinging she was experiencing before surgery has resolved. Pt would benefit from skilled PT services to address these deficits and increase function. Good rehab potential.                 MEDICARE CAP EXCEPTION DOCUMENTATION      I certify that this patient meets one of the below criteria necessary for becoming an exception to the Medicare cap on therapy services:    []  The patient has a condition identified by an ICD-9 code that has a direct and significant impact on the need for therapy. (Significantly impacts the rate of recovery.)                     []  The patient has a complexity identified by an ICD-9 code that has a direct and significant impact on the need for therapy. (Significantly impacts the rate of recovery and is associated with a primary condition.)         []  The patient has associated variables that influence the amount of treatment to include:  Social support, self-efficacy/motivation, prognosis, time since onset/acuity. []  The patient has generalized musculoskeletal conditions or a condition affecting multiple sites that will have a direct impact on the rate of recovery. [x]  The patient had a prior episode of outpatient therapy during this calendar year for a different condition. []  The patient has a mental or cognitive disorder in addition to the condition being treated that will have a direct and significant impact on the rate of recovery. GOALS:  (Goals made 8/19/21)  Patient stated goal: \"Increase strength, increase use of my left hand\"    [] Progressing: [] Met: [] Not Met: [] Adjusted    Therapist goals for Patient:   Short Term Goals: To be achieved in: 2 weeks  1. Independent in HEP and progression per patient tolerance, in order to prevent re-injury. [] Progressing: [] Met: [] Not Met: [] Adjusted      Long Term Goals: To be achieved in: 4 weeks  1. Disability index score of 15% or less for the UEFI to assist with reaching prior level of function. [] Progressing: [] Met: [] Not Met: [] Adjusted  2. Patient will demonstrate an increase in left wrist AROM to at least 170 degrees flexion and extension and 85 degrees supination to allow for proper joint functioning as indicated by patients Functional Deficits. [] Progressing: [] Met: [] Not Met: [] Adjusted  3. Patient will demonstrate an increase in left hand and wrist strength to 5/5 to allow for proper functional mobility as indicated by patients Functional Deficits. [] Progressing: [] Met: [] Not Met: [] Adjusted  4. Patient will be able to perform fine motor activities, such as gripping, opening jars, putting on jewelry, and turning a door handle, without increased symptoms or restriction. [] Progressing: [] Met: [] Not Met: [] Adjusted  5.  Patient will be able to perform all ADL's and self care act with no pain, numbness, or tingling in her left hand/wrist.  (patient specific functional goal)    [] Progressing: [] Met: [] Not Met: [] Adjusted         Overall Progression Towards Functional goals/ Treatment Progress Update:  [] Patient is progressing as expected towards functional goals listed. [] Progression is slowed due to complexities/Impairments listed. [] Progression has been slowed due to co-morbidities. [x] Plan just implemented, too soon to assess goals progression <30days   [] Goals require adjustment due to lack of progress  [] Patient is not progressing as expected and requires additional follow up with physician  [] Other    Prognosis for POC: [x] Good [] Fair  [] Poor      Patient requires continued skilled intervention: [x] Yes  [] No    Treatment/Activity Tolerance:  [x] Patient able to complete treatment  [] Patient limited by fatigue  [] Patient limited by pain    [] Patient limited by other medical complications  [] Other:           PLAN: 1x/week for 4 weeks for ROM, strengthening, manual therapy as needed, HEP instruction, pt education, and modalities prn. (8/19/21)   [] Continue per plan of care [] Alter current plan   [x] Plan of care initiated [] Hold pending MD visit [] Discharge      Electronically signed by:  Sabiha Freire PT     Physical Therapist [address] [address] #[id number]  Physical Therapist [place] License #[id number]    Note: If patient does not return for scheduled/ recommended follow up visits, this note will serve as a discharge from care along with most recent update on progress.

## 2021-08-23 PROBLEM — G56.02 LEFT CARPAL TUNNEL SYNDROME: Status: ACTIVE | Noted: 2021-08-23

## 2021-08-23 PROBLEM — R29.898 WEAKNESS OF LEFT HAND: Status: ACTIVE | Noted: 2021-08-23

## 2021-08-23 PROBLEM — R29.898 WEAKNESS OF WRIST: Status: ACTIVE | Noted: 2021-08-23

## 2021-08-24 ENCOUNTER — TREATMENT (OUTPATIENT)
Dept: PHYSICAL THERAPY | Age: 71
End: 2021-08-24
Payer: MEDICARE

## 2021-08-24 ENCOUNTER — OFFICE VISIT (OUTPATIENT)
Dept: ORTHOPEDIC SURGERY | Age: 71
End: 2021-08-24

## 2021-08-24 VITALS — WEIGHT: 268 LBS | BODY MASS INDEX: 47.48 KG/M2 | HEIGHT: 63 IN

## 2021-08-24 DIAGNOSIS — R29.898 WEAKNESS OF LEFT HAND: ICD-10-CM

## 2021-08-24 DIAGNOSIS — R29.898 WEAKNESS OF WRIST: ICD-10-CM

## 2021-08-24 DIAGNOSIS — G56.02 CARPAL TUNNEL SYNDROME OF LEFT WRIST: Primary | ICD-10-CM

## 2021-08-24 DIAGNOSIS — G56.02 LEFT CARPAL TUNNEL SYNDROME: Primary | ICD-10-CM

## 2021-08-24 PROCEDURE — 97530 THERAPEUTIC ACTIVITIES: CPT | Performed by: PHYSICAL THERAPIST

## 2021-08-24 PROCEDURE — 97110 THERAPEUTIC EXERCISES: CPT | Performed by: PHYSICAL THERAPIST

## 2021-08-24 PROCEDURE — 99024 POSTOP FOLLOW-UP VISIT: CPT | Performed by: ORTHOPAEDIC SURGERY

## 2021-08-24 PROCEDURE — G8427 DOCREV CUR MEDS BY ELIG CLIN: HCPCS | Performed by: PHYSICAL THERAPIST

## 2021-08-24 NOTE — PROGRESS NOTES
[facility]   Phone: [phone]    Fax: [phone]      Physical Therapy Treatment Note/ Progress Report:           Date:  2021    Patient Name:  Huma Murphy    :  1950  MRN: [id number]  Restrictions/Precautions:    Medical/Treatment Diagnosis Information:  · Diagnosis: S/P Left Carpal Tunnel Release (Sx: 21) (G56.02)   ·  Treatment Diagnosis: Weakness of Left Wrist and Hand (R29.898)      Insurance/Certification information:  PT Insurance Information: Medicare, [employer] - visits based on medical necessity  Physician Information:  Referring Practitioner: Dr. Dereck Garcia  Has the plan of care been signed (Y/N):        [x]  Yes (POC signed 21)  []  No        Date of Patient follow up with Physician: Today      Is this a Progress Report:     []  Yes  [x]  No        If Yes:  Date Range for reporting period:  Beginning 21  Ending 21    Progress report will be due (10 Rx or 30 days whichever is less):        Recertification will be due (POC Duration  / 90 days whichever is less):  21        Visit # Insurance Allowable Auth Required   2 Medical necessity   (Use Kx Modifier due to pt having prior PT this year) []  Yes [x]  No        Functional Scale:    Date assessed:  21    Functional Assessment Tool Used:  UEFI (copy scanned into media)  Score:  = 10% (90% functional deficit)                        (21)  Patient Age: 66years old  Patient Height: 5' 2\"  Patient Weight: 280 lbs  Patient BMI: 51.2        Pain (21)  []? Pain diagram was completed, pain was present and a follow up plan to address the pain is in the plan of care. ()   [x]? Pain diagram was completed and there was no pain present and therefore no follow up plan to address pain in the plan of care. ()   []? Pain diagram was not completed and the reason for not completing the pain diagram is in the medical chart ()  []?   Patient refused to participate   []? Severe mental or physical capacity, patient is in urgent emergent situation and to complete the questionnaire would jeopardize the patient's health status         Functional Questionnaire (8/19/21)  [x]? Patient completed the functional outcome questionnaire and deficiencies were addressed in the plan of care. ()   []? Patient completed the outcome questionnaire and there were no functional deficits identified and therefore no plan of care is required. ()   []? Patient did not complete the functional outcome questionnaire and the reason why is documented in the medical chart. ()  []? Patient refused to participate   []? Patient unable to complete the questionnaire   []? A functional outcome assessment has been reported on within the last 30 days         Falls (8/19/21)  [x]? The patient has had no falls or 1 fall without injury in the past year. (1101F)   []? There is no documentation of fall in the medical chart (1101F,8P)      []?  The patient has had 2 or more falls or one fall with injury in the past year that is documented in the medical chart. (1100F)  []? A falls risk assessment was completed and documented in the medical chart. (6531P)   []? Falls were addressed in the plan of care. (2621I)   []? A falls risk assessment was not completed and documented in the medical chart (4041A,8Z)   []? Falls were not addressed in the plan of care and reason was documented in the plan of care. (9514T 1P)         Medication (8/19/21)     [x]? A list of current medications (including prescription, over the counter, herbal supplements, vitamin supplements, mineral supplements, dietary supplements) was reviewed with the patient and documented in the medical chart. ()       Falls Risk Assessment (30 days): (8/19/21)  [x] Falls Risk assessed and no intervention required.   [] Falls Risk assessed and Patient requires intervention due to being higher risk   TUG score (>12s at risk):     [] Falls education provided, including     PQRS:   Reviewed medication list with patient. No changes reported by pt since last PT visit. (8/24/21)        Latex Allergy:  [x]NO      []YES  Preferred Language for Healthcare:   [x]English       []other:      Pain level:  0/10     SUBJECTIVE:  Pt states her left wrist and hand feel really good. She reports no pain, numbness, or tingling. Swelling and bruising in her left hand has decreased. She reports compliance with HEP but has not been icing. She has an appt with Dr. Octavio Acharya today after therapy. (8 days post-op)      OBJECTIVE:        *Pt is right hand dominant    (8/24/21)  ROM PROM AROM Comments    Left Right Left Right    Elbow flexion   135 135    Elbow extension   0 0    Pronation   90 90    Supination   65 75    Wrist flexion   65 70    Wrist extension   65 70    Wrist radial deviation        Wrist ulnar deviation          (8/19/21)  Special Tests Left Right Comments   Cozens Not      Tinels Tested     Phalens Due to     Piano Key Sign Sx                   (8/19/21)  Strength Left Right Comments   Elbow flexion Not  5/5 *Pt has carpal tunnel on the right and has sx scheduled 9/20/21   Elbow extension Tested 5/5    Pronation Due to  4/5    Supination Sx 4/5    Wrist flexion  4/5    Wrist extension  4/5    Wrist radial deviation  4/5    Wrist ulnar deviation        (8/19/21)   Left Right Comments   Level 1 Not      Level 2 Tested     Level 3 Due to      Level 4 Sx     Level 5      other            Reflexes/Sensation: (8/19/21)   []Dermatomes/Myotomes intact    []Reflexes equal and normal bilaterally   [x]Other: Intact to light touch    Joint mobility: NA due to Sx (8/19/21)   []Normal    []Hypo   []Hyper    Palpation: General tenderness around incision site. Bruising present along the palm of her hand. Swelling present left wrist and hand.    (8/19/21)    Functional Mobility/Transfers: Pt is unable to use her left hand for ADL's and self-care act due to recent surgery and post-op bandage. (8/19/21)    Posture: Forward head, rounded shoulders. (8/19/21)    Bandages/Dressings/Incisions: Removed post-op bandage today. Incision is clean and dry with no s/s of infection. (8/19/21)    Gait: (include devices/WB status): Gait is mildly antalgic, no AD. She demonstrates decreased pawel and step length (pt is also doing therapy at this facility for her left knee).  (8/19/21)    Orthopedic Special Tests: See above            RESTRICTIONS/PRECAUTIONS: S/P left CTR (Sx: 8/16/21)    Exercises/Interventions:       Therapeutic Ex (49366) Sets/sec Reps Notes/CUES   AD UE BIKE            STRETCHING      Elbow extension      Elbow flexion      Wrist flex 20\" hold X 5    Wrist ext      Supination 20\" hold X 5    Pronation      Radial Deviation      Ulnar Deviation                  AROM      Thumb Opposition to all Fingers 2 sets X 15    Finger MP Flexion AROM 2 sets X 15    Claw  2 sets X 15    Finger Abd/Add 2 sets X 15 Hand on table          Wrist Flex/Ext 2 sets X 15    Pron/Sup 2 sets X 15    RD 2 sets X 15          STRENGTHENING-PREs      Biceps      Triceps      Wrist flexion      Wrist extension      Supination      Pronation      Radial Deviation      Ulnar Deviation            Gripping  X 30 Pink Ball (Gentle)   RB Finger Extension            THERABANDS      Rows      Lats      Biceps      Triceps      Internal Rotation      External Rotation                              Manual Intervention (49360)      Scar Massage      STM      Hawkgrips      Elbow joint mobilizations      Foamroll                  NMR re-education (14451)   CUES NEEDED   Plyoback      Therabar oscillations      Body Blade       Rhythmic Stabilization                                    Therapeutic Activity (99061)      Core training      Swiss ball activities            Patient Education: Dx, prognosis, pt goals/expectations, rehab timeline, wound care (8/19/21)  X 8' Therapeutic Exercise and NMR EXR  [x] (52399) Provided verbal/tactile cueing for activities related to strengthening, flexibility, endurance, ROM  for improvements in scapular, scapulothoracic and UE control with self care, reaching, carrying, lifting, house/yardwork, driving/computer work. [x] (69963) Provided verbal/tactile cueing for activities related to improving balance, coordination, kinesthetic sense, posture, motor skill, proprioception  to assist with  scapular, scapulothoracic and UE control with self care, reaching, carrying, lifting, house/yardwork, driving/computer work. Therapeutic Activities:    [x] (22093 or 76697) Provided verbal/tactile cueing for activities related to improving balance, coordination, kinesthetic sense, posture, motor skill, proprioception and motor activation to allow for proper function of scapular, scapulothoracic and UE control with self care, carrying, lifting, driving/computer work. Home Exercise Program:    [x] (70638) Reviewed/Progressed HEP activities related to strengthening, flexibility, endurance, ROM of scapular, scapulothoracic and UE control with self care, reaching, carrying, lifting, house/yardwork, driving/computer work - Updated pt's HEP through [employer] (see below). Pt was also issued new written handouts (8/24/21)  [] (26897) Reviewed/Progressed HEP activities related to improving balance, coordination, kinesthetic sense, posture, motor skill, proprioception of scapular, scapulothoracic and UE control with self care, reaching, carrying, lifting, house/yardwork, driving/computer work        Access Code: [email or web address]  URL: [email or web address]  Date: 08/24/2021  Prepared by: Karl Carballo    Exercises  Seated Wrist Flexion with Overpressure - 2 x daily - 7 x weekly - 1 sets - 5 reps - 20 seconds hold  Forearm Supination Stretch - 2 x daily - 7 x weekly - 1 sets - 5 reps - 20 seconds hold  Thumb Opposition - 2 x daily - 7 x weekly - 1 sets - 15 reps  Finger MP Flexion AROM - 2 x daily - 7 x weekly - 1 sets - 15 reps  Seated Claw Fist AROM - 2 x daily - 7 x weekly - 1 sets - 15 reps  Finger Spreading - 2 x daily - 7 x weekly - 1 sets - 15 reps  Wrist Flexion Extension AROM - Palms Down - 2 x daily - 7 x weekly - 2 sets - 15 reps  Seated Forearm Pronation and Supination AROM - 2 x daily - 7 x weekly - 2 sets - 15 reps  Wrist Radial Deviation AROM - 2 x daily - 7 x weekly - 2 sets - 15 reps  Seated Gripping Towel - 2 x daily - 7 x weekly - 30 reps          Manual Treatments:  PROM / STM / Oscillations-Mobs:  G-I, II, III, IV (PA's, Inf., Post.)  [] (67447) Provided manual therapy to mobilize soft tissue/joints of cervical/CT, scapular GHJ and UE for the purpose of modulating pain, promoting relaxation,  increasing ROM, reducing/eliminating soft tissue swelling/inflammation/restriction, improving soft tissue extensibility and allowing for proper ROM for normal function with self care, reaching, carrying, lifting, house/yardwork, driving/computer work    Modalities:  ICE x 10'     [] GAME READY (VASO)- for significant edema, swelling, pain control. Charges:  Timed Code Treatment Minutes: 40'   Total Treatment Minutes: 48'      [] EVAL (LOW) 90372 (typically 20 minutes face-to-face)  [] EVAL (MOD) 69994 (typically 30 minutes face-to-face)  [] EVAL (HIGH) 23029 (typically 45 minutes face-to-face)  [] RE-EVAL     [x] BZ(26409) x 2 (30')     [] IONTO  [] NMR (70221) x      [] VASO  [] Manual (51320) x      [] Other:  [x] TA x  1 (10')    [] Mech Traction (75707)  [] ES(attended) (75998)      [] ES (un) (70872):         ASSESSMENT:  Pt tolerated advancements well today. Added wrist AROM and gentle gripping with mild fatigue noted with gripping. She demonstrates good ROM at the left wrist but still has some tightness with supination. Her incision looks good with no s/s of infection.   Pain, numbness, and tingling she was experiencing prior to surgery has resolved. MEDICARE CAP EXCEPTION DOCUMENTATION      I certify that this patient meets one of the below criteria necessary for becoming an exception to the Medicare cap on therapy services:    []  The patient has a condition identified by an ICD-9 code that has a direct and significant impact on the need for therapy. (Significantly impacts the rate of recovery.)                     []  The patient has a complexity identified by an ICD-9 code that has a direct and significant impact on the need for therapy. (Significantly impacts the rate of recovery and is associated with a primary condition.)         []  The patient has associated variables that influence the amount of treatment to include:  Social support, self-efficacy/motivation, prognosis, time since onset/acuity. []  The patient has generalized musculoskeletal conditions or a condition affecting multiple sites that will have a direct impact on the rate of recovery. [x]  The patient had a prior episode of outpatient therapy during this calendar year for a different condition. []  The patient has a mental or cognitive disorder in addition to the condition being treated that will have a direct and significant impact on the rate of recovery. GOALS:  (Goals made 8/19/21)  Patient stated goal: \"Increase strength, increase use of my left hand\"    [] Progressing: [] Met: [] Not Met: [] Adjusted    Therapist goals for Patient:   Short Term Goals: To be achieved in: 2 weeks  1. Independent in HEP and progression per patient tolerance, in order to prevent re-injury. [] Progressing: [] Met: [] Not Met: [] Adjusted      Long Term Goals: To be achieved in: 4 weeks  1. Disability index score of 15% or less for the UEFI to assist with reaching prior level of function. [] Progressing: [] Met: [] Not Met: [] Adjusted  2.  Patient will demonstrate an increase in left wrist AROM to at least 170 degrees flexion and extension and 85 degrees supination to allow for proper joint functioning as indicated by patients Functional Deficits. [] Progressing: [] Met: [] Not Met: [] Adjusted  3. Patient will demonstrate an increase in left hand and wrist strength to 5/5 to allow for proper functional mobility as indicated by patients Functional Deficits. [] Progressing: [] Met: [] Not Met: [] Adjusted  4. Patient will be able to perform fine motor activities, such as gripping, opening jars, putting on jewelry, and turning a door handle, without increased symptoms or restriction. [] Progressing: [] Met: [] Not Met: [] Adjusted  5. Patient will be able to perform all ADL's and self care act with no pain, numbness, or tingling in her left hand/wrist.  (patient specific functional goal)    [] Progressing: [] Met: [] Not Met: [] Adjusted         Overall Progression Towards Functional goals/ Treatment Progress Update:  [] Patient is progressing as expected towards functional goals listed. [] Progression is slowed due to complexities/Impairments listed. [] Progression has been slowed due to co-morbidities. [x] Plan just implemented, too soon to assess goals progression <30days   [] Goals require adjustment due to lack of progress  [] Patient is not progressing as expected and requires additional follow up with physician  [] Other    Prognosis for POC: [x] Good [] Fair  [] Poor      Patient requires continued skilled intervention: [x] Yes  [] No    Treatment/Activity Tolerance:  [x] Patient able to complete treatment  [] Patient limited by fatigue  [] Patient limited by pain    [] Patient limited by other medical complications  [] Other:           PLAN: Continue therapy progressing per pt tolerance.       1x/week for 4 weeks for ROM, strengthening, manual therapy as needed, HEP instruction, pt education, and modalities prn. (8/19/21)   [x] Continue per plan of care [] Alter current plan   [] Plan of care initiated [] Hold pending MD visit [] Discharge      Electronically signed by:  Joseph Lockett PT     Physical Therapist Adore Rangel 421 #[id number]  Physical Therapist [facility] License #[id number]    Note: If patient does not return for scheduled/ recommended follow up visits, this note will serve as a discharge from care along with most recent update on progress.

## 2021-08-24 NOTE — PROGRESS NOTES
History of Present Illness: Liz Wren is a pleasant 66 y.o. female who presents for a post operative visit. She is 8 days  out following a left open carpal tunnel release. Overall She is doing okay and feels that their pain is well controlled with current pain medications. She has been seeing occupational therapy here at the [facility] office. She reports all of her preoperative numbness and tingling has resolved and she is very happy with her surgery    Medical History:  Patient's medications, allergies, past medical, surgical, social and family histories were reviewed and updated as appropriate. Review of Systems    Patient has had no medical changes since last evaluated        Review of Systems  A 14 point review of systems was completed by the patient on 7/13/2021 and is available in the media section of the scanned medical record and was reviewed on 8/24/2021. Vital Signs:  Vitals:       General/Appearance: Alert and oriented and in no apparent distress. Skin:  There are no skin lesions, cellulitis, or extreme edema. The patient has warm and well-perfused Bilateral upper extremities with brisk capillary refill. Left wrist exam    Inspection: wrist incision is clean, dry and intact and well approximated. Sutures intact there is no erythema, drainage or other signs of infection    Neurovascular: Sensation to light touch is intact, no motor deficits, palpable radial pulses 2+    Radiology:     No new XR obtained at this time. Assessment :  Ms. Liz Wren is a pleasant 66 y.o. patient who is status post left carpal tunnel release. Impression:  Encounter Diagnosis   Name Primary?     Carpal tunnel syndrome of left wrist Yes       Office Procedures:  Orders Placed This Encounter   Procedures    Ambulatory referral to Physical Therapy     Referral Priority:   Routine     Referral Type:   Eval and Treat     Referral Reason:   Specialty Services Required     Requested

## 2021-08-31 ENCOUNTER — TREATMENT (OUTPATIENT)
Dept: PHYSICAL THERAPY | Age: 71
End: 2021-08-31
Payer: MEDICARE

## 2021-08-31 ENCOUNTER — OFFICE VISIT (OUTPATIENT)
Dept: ORTHOPEDIC SURGERY | Age: 71
End: 2021-08-31

## 2021-08-31 VITALS — WEIGHT: 268 LBS | HEIGHT: 63 IN | BODY MASS INDEX: 47.48 KG/M2

## 2021-08-31 DIAGNOSIS — R29.898 WEAKNESS OF LEFT HAND: ICD-10-CM

## 2021-08-31 DIAGNOSIS — G56.02 CARPAL TUNNEL SYNDROME OF LEFT WRIST: Primary | ICD-10-CM

## 2021-08-31 DIAGNOSIS — G56.02 LEFT CARPAL TUNNEL SYNDROME: Primary | ICD-10-CM

## 2021-08-31 DIAGNOSIS — R29.898 WEAKNESS OF WRIST: ICD-10-CM

## 2021-08-31 PROCEDURE — 97110 THERAPEUTIC EXERCISES: CPT | Performed by: PHYSICAL THERAPIST

## 2021-08-31 PROCEDURE — 99024 POSTOP FOLLOW-UP VISIT: CPT | Performed by: PHYSICIAN ASSISTANT

## 2021-08-31 PROCEDURE — 97530 THERAPEUTIC ACTIVITIES: CPT | Performed by: PHYSICAL THERAPIST

## 2021-08-31 PROCEDURE — G8427 DOCREV CUR MEDS BY ELIG CLIN: HCPCS | Performed by: PHYSICAL THERAPIST

## 2021-08-31 NOTE — PROGRESS NOTES
refused to participate   []? Severe mental or physical capacity, patient is in urgent emergent situation and to complete the questionnaire would jeopardize the patient's health status         Functional Questionnaire (8/19/21)  [x]? Patient completed the functional outcome questionnaire and deficiencies were addressed in the plan of care. ()   []? Patient completed the outcome questionnaire and there were no functional deficits identified and therefore no plan of care is required. ()   []? Patient did not complete the functional outcome questionnaire and the reason why is documented in the medical chart. ()  []? Patient refused to participate   []? Patient unable to complete the questionnaire   []? A functional outcome assessment has been reported on within the last 30 days         Falls (8/19/21)  [x]? The patient has had no falls or 1 fall without injury in the past year. (1101F)   []? There is no documentation of fall in the medical chart (1101F,8P)      []?  The patient has had 2 or more falls or one fall with injury in the past year that is documented in the medical chart. (1100F)  []? A falls risk assessment was completed and documented in the medical chart. (3988V)   []? Falls were addressed in the plan of care. (1827Q)   []? A falls risk assessment was not completed and documented in the medical chart (7311T,4L)   []? Falls were not addressed in the plan of care and reason was documented in the plan of care. (0496Y 1P)         Medication (8/19/21)     [x]? A list of current medications (including prescription, over the counter, herbal supplements, vitamin supplements, mineral supplements, dietary supplements) was reviewed with the patient and documented in the medical chart. ()       Falls Risk Assessment (30 days): (8/19/21)  [x] Falls Risk assessed and no intervention required.   [] Falls Risk assessed and Patient requires intervention due to being higher risk   TUG score (>12s at risk):     [] Falls education provided, including     PQRS:   Reviewed medication list with patient. No changes reported by pt since last PT visit. (8/31/21)        Latex Allergy:  [x]NO      []YES  Preferred Language for Healthcare:   [x]English       []other:      Pain level:  0/10     SUBJECTIVE:  Pt saw Lai Olivares PA-C today and had her sutures removed. Sushma stressed the importance of keeping her incision clean. Pt reports all her preoperative numbness and tingling has resolved and she has no pain. She is still having difficulty holding/opening a jar with her left hand due to weakness. (2 weeks post-op)      OBJECTIVE:        *Pt is right hand dominant    (8/31/21)  ROM PROM AROM Comments    Left Right Left Right    Elbow flexion   135 135    Elbow extension   0 0    Pronation   90 90    Supination   70 75    Wrist flexion   65 70    Wrist extension   65 70    Wrist radial deviation        Wrist ulnar deviation          (8/19/21)  Special Tests Left Right Comments   Cozens Not      Tinels Tested     Phalens Due to     Piano Key Sign Sx                   (8/19/21)  Strength Left Right Comments   Elbow flexion Not  5/5 *Pt has carpal tunnel on the right and has sx scheduled 9/20/21   Elbow extension Tested 5/5    Pronation Due to  4/5    Supination Sx 4/5    Wrist flexion  4/5    Wrist extension  4/5    Wrist radial deviation  4/5    Wrist ulnar deviation        (8/19/21)   Left Right Comments   Level 1 Not      Level 2 Tested     Level 3 Due to      Level 4 Sx     Level 5      other            Reflexes/Sensation: (8/19/21)   []Dermatomes/Myotomes intact    []Reflexes equal and normal bilaterally   [x]Other: Intact to light touch    Joint mobility: NA due to Sx (8/19/21)   []Normal    []Hypo   []Hyper    Palpation: No tenderness, bruising, or swelling present.    (8/31/21)    Functional Mobility/Transfers: Pt is unable to use her left hand for ADL's and self-care act due to recent surgery and post-op bandage. (8/19/21)    Posture: Forward head, rounded shoulders. (8/19/21)    Bandages/Dressings/Incisions:Sushma Allie Starring, PA-C removed her sutures today. Incision is clean and dry with no s/s of infection. (8/31/21)    Gait: (include devices/WB status): Gait is mildly antalgic, no AD. She demonstrates decreased pawel and step length (pt is also doing therapy at this facility for her left knee).  (8/19/21)    Orthopedic Special Tests: See above            RESTRICTIONS/PRECAUTIONS: S/P left CTR (Sx: 8/16/21)    Exercises/Interventions:       Therapeutic Ex (88600) Sets/sec Reps Notes/CUES   AD UE BIKE            STRETCHING      Elbow extension      Elbow flexion      Wrist flex 20\" hold X 5    Wrist ext      Supination 20\" hold X 5    Pronation      Radial Deviation      Ulnar Deviation                  AROM      Thumb Opposition to all Fingers 2 sets X 15    Finger MP Flexion AROM 2 sets X 15    Claw  2 sets X 15    Finger Abd/Add 2 sets X 15 Hand on table          Wrist Flex/Ext 2 sets X 15    Pron/Sup 2 sets X 15    RD 2 sets X 15          Elbow AROM 2 sets X 15          STRENGTHENING-PREs      Biceps      Triceps      Wrist flexion      Wrist extension      Supination      Pronation      Radial Deviation      Ulnar Deviation            Gripping  X 30 Pink Ball (Gentle)   RB Finger Extension  X 30          THERABANDS      Rows      Lats      Biceps      Triceps      Internal Rotation      External Rotation                              Manual Intervention (93066)      Scar Massage      STM      Hawkgrips      Elbow joint mobilizations      Foamroll                  NMR re-education (55269)   CUES NEEDED   Plyoback      Therabar oscillations      Body Blade       Rhythmic Stabilization                                    Therapeutic Activity (18519)      Core training      Swiss ball activities            Patient Education: Dx, prognosis, pt goals/expectations, rehab timeline, wound care (8/19/21)  X 8'                          Therapeutic Exercise and NMR EXR  [x] (20198) Provided verbal/tactile cueing for activities related to strengthening, flexibility, endurance, ROM  for improvements in scapular, scapulothoracic and UE control with self care, reaching, carrying, lifting, house/yardwork, driving/computer work. [x] (96628) Provided verbal/tactile cueing for activities related to improving balance, coordination, kinesthetic sense, posture, motor skill, proprioception  to assist with  scapular, scapulothoracic and UE control with self care, reaching, carrying, lifting, house/yardwork, driving/computer work. Therapeutic Activities:    [x] (12363 or 96791) Provided verbal/tactile cueing for activities related to improving balance, coordination, kinesthetic sense, posture, motor skill, proprioception and motor activation to allow for proper function of scapular, scapulothoracic and UE control with self care, carrying, lifting, driving/computer work. Home Exercise Program:    [x] (83631) Reviewed/Progressed HEP activities related to strengthening, flexibility, endurance, ROM of scapular, scapulothoracic and UE control with self care, reaching, carrying, lifting, house/yardwork, driving/computer work - Updated pt's HEP through [employer] (see below). Pt was also issued new written handouts (8/24/21)  [] (57464) Reviewed/Progressed HEP activities related to improving balance, coordination, kinesthetic sense, posture, motor skill, proprioception of scapular, scapulothoracic and UE control with self care, reaching, carrying, lifting, house/yardwork, driving/computer work        Access Code: [email or web address]  URL: [email or web address]  Date: 08/24/2021  Prepared by: Montez Lo    Exercises  Seated Wrist Flexion with Overpressure - 2 x daily - 7 x weekly - 1 sets - 5 reps - 20 seconds hold  Forearm Supination Stretch - 2 x daily - 7 x weekly - 1 sets - 5 reps - 20 seconds hold  Thumb Opposition - 2 x daily - 7 x weekly - 1 sets - 15 reps  Finger MP Flexion AROM - 2 x daily - 7 x weekly - 1 sets - 15 reps  Seated Claw Fist AROM - 2 x daily - 7 x weekly - 1 sets - 15 reps  Finger Spreading - 2 x daily - 7 x weekly - 1 sets - 15 reps  Wrist Flexion Extension AROM - Palms Down - 2 x daily - 7 x weekly - 2 sets - 15 reps  Seated Forearm Pronation and Supination AROM - 2 x daily - 7 x weekly - 2 sets - 15 reps  Wrist Radial Deviation AROM - 2 x daily - 7 x weekly - 2 sets - 15 reps  Seated Gripping Towel - 2 x daily - 7 x weekly - 30 reps          Manual Treatments:  PROM / STM / Oscillations-Mobs:  G-I, II, III, IV (PA's, Inf., Post.)  [] (06132) Provided manual therapy to mobilize soft tissue/joints of cervical/CT, scapular GHJ and UE for the purpose of modulating pain, promoting relaxation,  increasing ROM, reducing/eliminating soft tissue swelling/inflammation/restriction, improving soft tissue extensibility and allowing for proper ROM for normal function with self care, reaching, carrying, lifting, house/yardwork, driving/computer work    Modalities:    Pt declined ice; she will ice at home   [] GAME READY (VASO)- for significant edema, swelling, pain control. Charges:  Timed Code Treatment Minutes: 40'   Total Treatment Minutes: 36'      [] EVAL (LOW) 38502 (typically 20 minutes face-to-face)  [] EVAL (MOD) 52281 (typically 30 minutes face-to-face)  [] EVAL (HIGH) 46864 (typically 45 minutes face-to-face)  [] RE-EVAL     [x] XH(77926) x 2 (30')     [] IONTO  [] NMR (73490) x      [] VASO  [] Manual (60725) x      [] Other:  [x] TA x  1 (10')    [] [facility] (07748)  [] ES(attended) (77119)      [] ES (un) (51346):         ASSESSMENT:  Pt is doing very well. All her preoperative numbness and tingling has resolved and she has no pain. She still has some weakness with gripping/opening jars.   She tolerated the exercises well today and demonstrates good understanding of her program.  Sutures were removed today by Dr. Nell HERRERA; discussed wound care and the importance of keeping her incision clean. MEDICARE CAP EXCEPTION DOCUMENTATION      I certify that this patient meets one of the below criteria necessary for becoming an exception to the Medicare cap on therapy services:    []  The patient has a condition identified by an ICD-9 code that has a direct and significant impact on the need for therapy. (Significantly impacts the rate of recovery.)                     []  The patient has a complexity identified by an ICD-9 code that has a direct and significant impact on the need for therapy. (Significantly impacts the rate of recovery and is associated with a primary condition.)         []  The patient has associated variables that influence the amount of treatment to include:  Social support, self-efficacy/motivation, prognosis, time since onset/acuity. []  The patient has generalized musculoskeletal conditions or a condition affecting multiple sites that will have a direct impact on the rate of recovery. [x]  The patient had a prior episode of outpatient therapy during this calendar year for a different condition. []  The patient has a mental or cognitive disorder in addition to the condition being treated that will have a direct and significant impact on the rate of recovery. GOALS:  (Goals made 8/19/21)  Patient stated goal: \"Increase strength, increase use of my left hand\"    [] Progressing: [] Met: [] Not Met: [] Adjusted    Therapist goals for Patient:   Short Term Goals: To be achieved in: 2 weeks  1. Independent in HEP and progression per patient tolerance, in order to prevent re-injury. [] Progressing: [] Met: [] Not Met: [] Adjusted      Long Term Goals: To be achieved in: 4 weeks  1. Disability index score of 15% or less for the UEFI to assist with reaching prior level of function. [] Progressing: [] Met: [] Not Met: [] Adjusted  2.  Patient per plan of care [] Alter current plan   [] Plan of care initiated [] Hold pending MD visit [] Discharge      Electronically signed by:  Guido Ross PT     Physical Therapist [address] #[id number]  Physical Therapist [place] License #[id number]    Note: If patient does not return for scheduled/ recommended follow up visits, this note will serve as a discharge from care along with most recent update on progress.

## 2021-09-02 NOTE — PROGRESS NOTES
History of Present Illness: Cecily Vines is a pleasant 66 y.o. female who presents for a post operative visit. She is 8 days  out following a left open carpal tunnel release on 8/16/21. Overall She is doing okay and feels that their pain is well controlled with current pain medications. She has been seeing occupational therapy here at the [facility] office. She reports all of her preoperative numbness and tingling has resolved and she is very happy with her surgery and is eager to have the right side done soon. Medical History:  Patient's medications, allergies, past medical, surgical, social and family histories were reviewed and updated as appropriate. Review of Systems    Patient has had no medical changes since last evaluated        Review of Systems  A 14 point review of systems was completed by the patient on 7/13/2021 and is available in the media section of the scanned medical record and was reviewed on 9/2/2021. Vital Signs:  Vitals:       General/Appearance: Alert and oriented and in no apparent distress. Skin:  There are no skin lesions, cellulitis, or extreme edema. The patient has warm and well-perfused Bilateral upper extremities with brisk capillary refill. Left wrist exam    Inspection: wrist incision is clean, dry and intact and well approximated. Sutures intact there is no erythema, drainage or other signs of infection    Neurovascular: Sensation to light touch is intact, no motor deficits, palpable radial pulses 2+    Radiology:     No new XR obtained at this time. Assessment :  Ms. Cecily Vines is a pleasant 66 y.o. patient who is status post left carpal tunnel release. Surgery was on 8/16/21      Impression:  No diagnosis found. Office Procedures:  No orders of the defined types were placed in this encounter. Treatment Plan:    Overall Cecily Vines is doing well. We removed her sutures today without any problems.  She was asked to do occupational therapy. She is scheduled to have her right side done in 4 weeks. We will see her back to reassess in 3 weeks prn.       2:08 PM      Lieutenant Gonzalez, [address] [facility]    This dictation was performed with a verbal recognition program [facility] ) and it was checked for errors. It is possible that there are still dictated errors within this office note. If so, please bring any errors to my attention for an addendum. All efforts were made to ensure that this office note is accurate.

## 2021-09-07 ENCOUNTER — TREATMENT (OUTPATIENT)
Dept: PHYSICAL THERAPY | Age: 71
End: 2021-09-07
Payer: MEDICARE

## 2021-09-07 DIAGNOSIS — R29.898 WEAKNESS OF WRIST: ICD-10-CM

## 2021-09-07 DIAGNOSIS — R29.898 WEAKNESS OF LEFT HAND: ICD-10-CM

## 2021-09-07 DIAGNOSIS — G56.02 LEFT CARPAL TUNNEL SYNDROME: Primary | ICD-10-CM

## 2021-09-07 PROCEDURE — 1101F PT FALLS ASSESS-DOCD LE1/YR: CPT | Performed by: PHYSICAL THERAPIST

## 2021-09-07 PROCEDURE — G8427 DOCREV CUR MEDS BY ELIG CLIN: HCPCS | Performed by: PHYSICAL THERAPIST

## 2021-09-07 PROCEDURE — G8539 DOC FUNCT AND CARE PLAN: HCPCS | Performed by: PHYSICAL THERAPIST

## 2021-09-07 PROCEDURE — 97530 THERAPEUTIC ACTIVITIES: CPT | Performed by: PHYSICAL THERAPIST

## 2021-09-07 PROCEDURE — 97110 THERAPEUTIC EXERCISES: CPT | Performed by: PHYSICAL THERAPIST

## 2021-09-07 NOTE — PROGRESS NOTES
[facility]   Phone: [phone]    Fax: [phone]      Physical Therapy Discharge Summary    Dear  Dr. Bekah Baldwin,    We had the pleasure of treating the following patient for physical therapy services at [address] [place] [address]. A summary of our findings can be found in the discharge summary below. If you have any questions or concerns regarding these findings, please do not hesitate to contact me at the office phone number checked above.   Thank you for the referral.     Physician Signature:________________________________Date:__________________  By signing above (or electronic signature), therapists plan is approved by physician      Overall Response to Treatment:   [x]Patient is responding well to treatment and improvement is noted with regards to goals   [x]Patient should continue to improve in reasonable time if they continue HEP   []Patient has plateaued and is no longer responding to skilled PT intervention    []Patient is getting worse and would benefit from return to referring MD   []Patient unable to adhere to initial POC   []Other:     Date range of Visits: 21 - 21    Total Visits: 4      Physical Therapy Treatment Note/ Progress Report:           Date:  2021    Patient Name:  Nargis Patterson    :  1950  MRN: [id number]  Restrictions/Precautions:    Medical/Treatment Diagnosis Information:  · Diagnosis: S/P Left Carpal Tunnel Release (Sx: 21) (G56.02)   ·  Treatment Diagnosis: Weakness of Left Wrist and Hand (R29.898)      Insurance/Certification information:  PT Insurance Information: Medicare, [place] - visits based on medical necessity  Physician Information:  Referring Practitioner: Dr. Bekah Baldwin  Has the plan of care been signed (Y/N):        [x]  Yes (POC signed 21)  []  No        Date of Patient follow up with Physician: 21      Is this a Progress Report:     [x]  Yes  []  No        If Yes:  Date Range for reporting period:  Beginning 8/19/21  Ending 9/7/21    Progress report will be due (10 Rx or 30 days whichever is less):  Pt discharged today       Recertification will be due (POC Duration  / 90 days whichever is less):  Pt discharged today        Visit # Insurance Allowable Auth Required   4 Medical necessity   (Use Kx Modifier due to pt having prior PT this year) []  Yes [x]  No        Functional Scale:    Date assessed:  9/7/21    Functional Assessment Tool Used:  UEFI (copy scanned into media)  Score: 77/80 = 96.25% (3.75% functional deficit)                        (8/19/21)  Patient Age: 66years old  Patient Height: 5' 2\"  Patient Weight: 280 lbs  Patient BMI: 51.2        Pain (9/7/21)  []? Pain diagram was completed, pain was present and a follow up plan to address the pain is in the plan of care. ()   [x]? Pain diagram was completed and there was no pain present and therefore no follow up plan to address pain in the plan of care. ()   []? Pain diagram was not completed and the reason for not completing the pain diagram is in the medical chart ()  []? Patient refused to participate   []? Severe mental or physical capacity, patient is in urgent emergent situation and to complete the questionnaire would jeopardize the patient's health status         Functional Questionnaire (9/7/21)  [x]? Patient completed the functional outcome questionnaire and deficiencies were addressed in the plan of care. ()   []? Patient completed the outcome questionnaire and there were no functional deficits identified and therefore no plan of care is required. ()   []? Patient did not complete the functional outcome questionnaire and the reason why is documented in the medical chart. ()  []? Patient refused to participate   []? Patient unable to complete the questionnaire   []? A functional outcome assessment has been reported on within the last 30 days         Falls (9/7/21)  [x]?   The patient has had no falls or 1 fall without injury in the past year. (1101F)   []? There is no documentation of fall in the medical chart (1101F,8P)      []?  The patient has had 2 or more falls or one fall with injury in the past year that is documented in the medical chart. (1100F)  []? A falls risk assessment was completed and documented in the medical chart. (4210E)   []? Falls were addressed in the plan of care. (8447F)   []? A falls risk assessment was not completed and documented in the medical chart (5199B,4O)   []? Falls were not addressed in the plan of care and reason was documented in the plan of care. (5431R 1P)         Medication (9/7/21)     [x]? A list of current medications (including prescription, over the counter, herbal supplements, vitamin supplements, mineral supplements, dietary supplements) was reviewed with the patient and documented in the medical chart. ()       Falls Risk Assessment (30 days): (9/7/21)  [x] Falls Risk assessed and no intervention required. [] Falls Risk assessed and Patient requires intervention due to being higher risk   TUG score (>12s at risk):     [] Falls education provided, including     PQRS:   Reviewed medication list with patient. No changes reported by pt since last PT visit.  (9/7/21)        Latex Allergy:  [x]NO      []YES  Preferred Language for Healthcare:   [x]English       []other:      Pain level:  0/10     SUBJECTIVE:  Pt states her left hand is doing really well. She has no pain and the preoperative numbness and tingling has resolved. She still has some weakness holding/opening jars but it's getting better. She still has some tenderness at her incision with pushing through her left hand to get up from a chair. Her incision is healing really well.           (3 weeks post-op)      OBJECTIVE:        *Pt is right hand dominant    (9/7/21)  ROM PROM AROM Comments    Left Right Left Right    Elbow flexion   135 135    Elbow extension   0 0 Pronation   90 90    Supination   75 75    Wrist flexion   70 70    Wrist extension   70 70    Wrist radial deviation        Wrist ulnar deviation          (9/7/21)  Special Tests Left Right Comments   Cozens Not      Tinels Tested     Phalens Due to     Piano Key Sign Sx                   (9/7/21)  Strength Left Right Comments   Elbow flexion 4+/5 5/5 *Pt has carpal tunnel on the right and has sx scheduled 9/20/21   Elbow extension 4+/5 5/5    Pronation 4+/5 4/5    Supination 4+/5 4/5    Wrist flexion 4+/5 4/5    Wrist extension 4+/5 4/5    Wrist radial deviation 4+/5 4/5    Wrist ulnar deviation        (9/7/21)   Left Right Comments   Trial 1 35# 40#    Trial 2 35# 38#    Trial 3 30# 40#                other            Reflexes/Sensation: (8/19/21)   []Dermatomes/Myotomes intact    []Reflexes equal and normal bilaterally   [x]Other: Intact to light touch    Joint mobility:  (9/7/21)   [x]Normal    []Hypo   []Hyper    Palpation: Mild tenderness still present over incision.   (9/7/21)    Functional Mobility/Transfers: Tenderness at her incision with pushing through her left hand to get up from a chair. She still has some weakness with holding/opening jars.  (9/7/21)    Posture: Forward head, rounded shoulders.  (9/7/21)    Bandages/Dressings/Incisions: Incision is healing well. (9/7/21)    Gait: (include devices/WB status): Gait is mildly antalgic, no AD.   She demonstrates decreased pawel and step length (pt is also doing therapy at this facility for her left knee). (9/7/21)    Orthopedic Special Tests: See above            RESTRICTIONS/PRECAUTIONS: S/P left CTR (Sx: 8/16/21)    Exercises/Interventions:       Therapeutic Ex (46997) Sets/sec Reps Notes/CUES   AD UE BIKE            STRETCHING      Elbow extension      Elbow flexion      Wrist flex 20\" hold X 5    Wrist ext 20\" hold X 5    Supination 20\" hold X 5    Pronation      Radial Deviation      Ulnar Deviation                  AROM      Thumb Opposition to all Fingers Finger MP Flexion AROM Claw  Finger Abd/Add       Wrist Flex/Ext    Pron/Sup    RD        Elbow AROM          STRENGTHENING-PREs      Biceps 3 sets X 10 2#   Triceps      Wrist flexion 3 sets X 10 2#   Wrist extension 3 sets X 10 2#   Supination 3 sets X 10 2#   Pronation 3 sets X 10 2#   Radial Deviation 3 sets X 10 2#   Ulnar Deviation            Gripping  X 3' Pink Football   RB Finger Extension  X 30          THERABANDS      Rows      Lats      Biceps      Triceps 3 sets X 10 with green band    Internal Rotation      External Rotation                              Manual Intervention (57521)      Scar Massage   Instructed pt in scar massage to her incision   STM      Hawkgrips      Elbow joint mobilizations      Foamroll                  NMR re-education (18189)   CUES NEEDED   Plyoback      Therabar oscillations      Body Blade       Rhythmic Stabilization                                    Therapeutic Activity (02257)      Core training      Swiss Nekted activities            Patient Education: Dx, prognosis, pt goals/expectations, rehab timeline, wound care (8/19/21)  X 8'                          Therapeutic Exercise and NMR EXR  [x] (56819) Provided verbal/tactile cueing for activities related to strengthening, flexibility, endurance, ROM  for improvements in scapular, scapulothoracic and UE control with self care, reaching, carrying, lifting, house/yardwork, driving/computer work. [x] (17407) Provided verbal/tactile cueing for activities related to improving balance, coordination, kinesthetic sense, posture, motor skill, proprioception  to assist with  scapular, scapulothoracic and UE control with self care, reaching, carrying, lifting, house/yardwork, driving/computer work.     Therapeutic Activities:    [x] (38964 or 05333) Provided verbal/tactile cueing for activities related to improving balance, coordination, kinesthetic sense, posture, motor skill, proprioception and motor activation to allow for proper function of scapular, scapulothoracic and UE control with self care, carrying, lifting, driving/computer work. Home Exercise Program:    [x] (54503) Reviewed/Progressed HEP activities related to strengthening, flexibility, endurance, ROM of scapular, scapulothoracic and UE control with self care, reaching, carrying, lifting, house/yardwork, driving/computer work - Updated pt's HEP through [address] [place] [address] (see below). Pt was also issued new written handouts (9/7/21)  [] (74728) Reviewed/Progressed HEP activities related to improving balance, coordination, kinesthetic sense, posture, motor skill, proprioception of scapular, scapulothoracic and UE control with self care, reaching, carrying, lifting, house/yardwork, driving/computer work         Access Code: [email or web address]  URL: [email or web address]  Date: 09/07/2021  Prepared by: Trevon Herron    Exercises  Seated Wrist Flexion with Overpressure - 2 x daily - 7 x weekly - 1 sets - 5 reps - 20 seconds hold  Wrist Extension Stretch Pronated - 1 x daily - 7 x weekly - 5 reps - 20 seconds hold  Forearm Supination Stretch - 2 x daily - 7 x weekly - 1 sets - 5 reps - 20 seconds hold  Seated Gripping Towel - 2 x daily - 7 x weekly  Seated Wrist Flexion with Dumbbell - 1 x daily - 7 x weekly - 3 sets - 10 reps  Seated Wrist Extension with Dumbbell - 1 x daily - 7 x weekly - 3 sets - 10 reps  Seated Wrist Radial Deviation with Dumbbell - 1 x daily - 7 x weekly - 3 sets - 10 reps  Forearm Supination with Dumbbell - 1 x daily - 7 x weekly - 3 sets - 10 reps  Seated Bicep Curls Supinated with Dumbbells - 1 x daily - 7 x weekly - 3 sets - 10 reps  Standing Elbow Extension with Anchored Resistance at Wall - 1 x daily - 7 x weekly - 3 sets - 10 reps          Manual Treatments:  PROM / STM / Oscillations-Mobs:  G-I, II, III, IV (PA's, Inf., Post.)  [x] (23831) Provided manual therapy to mobilize soft tissue/joints of cervical/CT, scapular GHJ and UE for the purpose of modulating pain, promoting relaxation,  increasing ROM, reducing/eliminating soft tissue swelling/inflammation/restriction, improving soft tissue extensibility and allowing for proper ROM for normal function with self care, reaching, carrying, lifting, house/yardwork, driving/computer work    Modalities:  ICE x 10'     [] GAME READY (VASO)- for significant edema, swelling, pain control. Charges:  Timed Code Treatment Minutes: 45'   Total Treatment Minutes: 54'      [] EVAL (LOW) 43426 (typically 20 minutes face-to-face)  [] EVAL (MOD) 56492 (typically 30 minutes face-to-face)  [] EVAL (HIGH) 33892 (typically 45 minutes face-to-face)  [] RE-EVAL     [x] VB(79036) x 2 (30')     [] IONTO  [] NMR (86767) x      [] VASO  [] Manual (17545) x      [] Other:  [x] TA x  1 (15')    [] Mech Traction (30617)  [] ES(attended) (24067)      [] ES (un) (02076):         ASSESSMENT:  Pt tolerated advancements well today. Initiated strengthening exercises with fatigue present but she demonstrated good technique with all the exercises. She demonstrates good ROM at the left wrist and her strength is improving. She still has some weakness holding/opening jars. Pt has no pain and her preoperative numbness and tingling has resolved. She demonstrates independence with a HEP to continue after D/C. MEDICARE CAP EXCEPTION DOCUMENTATION      I certify that this patient meets one of the below criteria necessary for becoming an exception to the Medicare cap on therapy services:    []  The patient has a condition identified by an ICD-9 code that has a direct and significant impact on the need for therapy. (Significantly impacts the rate of recovery.)                     []  The patient has a complexity identified by an ICD-9 code that has a direct and significant impact on the need for therapy.   (Significantly impacts the rate of recovery and is associated with a primary condition.)         []  The patient has associated variables that influence the amount of treatment to include:  Social support, self-efficacy/motivation, prognosis, time since onset/acuity. []  The patient has generalized musculoskeletal conditions or a condition affecting multiple sites that will have a direct impact on the rate of recovery. [x]  The patient had a prior episode of outpatient therapy during this calendar year for a different condition. []  The patient has a mental or cognitive disorder in addition to the condition being treated that will have a direct and significant impact on the rate of recovery. GOALS:  (Goals updated 9/7/21)  Patient stated goal: \"Increase strength, increase use of my left hand\"    [] Progressing: [x] Met: [] Not Met: [] Adjusted    Therapist goals for Patient:   Short Term Goals: To be achieved in: 2 weeks  1. Independent in HEP and progression per patient tolerance, in order to prevent re-injury. [] Progressing: [x] Met: [] Not Met: [] Adjusted      Long Term Goals: To be achieved in: 4 weeks  1. Disability index score of 15% or less for the UEFI to assist with reaching prior level of function. [] Progressing: [x] Met: [] Not Met: [] Adjusted  2. Patient will demonstrate an increase in left wrist AROM to at least 70 degrees flexion and extension and 85 degrees supination to allow for proper joint functioning as indicated by patients Functional Deficits. [x] Progressing: Pt still has some tightness with supination [] Met: [] Not Met: [] Adjusted  3. Patient will demonstrate an increase in left hand and wrist strength to 5/5 to allow for proper functional mobility as indicated by patients Functional Deficits. [x] Progressing: [] Met: [] Not Met: [] Adjusted  4. Patient will be able to perform fine motor activities, such as gripping, opening jars, putting on jewelry, and turning a door handle, without increased symptoms or restriction.    [x] Progressing: Pt still has some weakness with holding/opening jars [] Met: [] Not Met: [] Adjusted  5. Patient will be able to perform all ADL's and self care act with no pain, numbness, or tingling in her left hand/wrist.  (patient specific functional goal)    [] Progressing: [x] Met: [] Not Met: [] Adjusted         Overall Progression Towards Functional goals/ Treatment Progress Update:  [x] Patient is progressing as expected towards functional goals listed. [] Progression is slowed due to complexities/Impairments listed. [] Progression has been slowed due to co-morbidities. [] Plan just implemented, too soon to assess goals progression <30days   [] Goals require adjustment due to lack of progress  [] Patient is not progressing as expected and requires additional follow up with physician  [] Other    Prognosis for POC: [x] Good [] Fair  [] Poor      Patient requires continued skilled intervention: [] Yes  [x] No (Pt discharged today for her left wrist/hand)    Treatment/Activity Tolerance:  [x] Patient able to complete treatment  [] Patient limited by fatigue  [] Patient limited by pain    [] Patient limited by other medical complications  [] Other:           PLAN: Pt to be discharged to an independent [facility] at this time. Pt was instructed to contact the clinic with any questions/problems. She will follow-up with Dr. Leroy Don next week. [] Continue per plan of care [] Alter current plan   [] Plan of care initiated [] Hold pending MD visit [x] Discharge      Electronically signed by:  Dede Padron, PT     Physical Therapist Adore Rangel [address] #[id number]  Physical Therapist [place] License #[id number]    Note: If patient does not return for scheduled/ recommended follow up visits, this note will serve as a discharge from care along with most recent update on progress.

## 2021-09-16 ENCOUNTER — OFFICE VISIT (OUTPATIENT)
Dept: ORTHOPEDIC SURGERY | Age: 71
End: 2021-09-16

## 2021-09-16 ENCOUNTER — TREATMENT (OUTPATIENT)
Dept: PHYSICAL THERAPY | Age: 71
End: 2021-09-16
Payer: MEDICARE

## 2021-09-16 VITALS — BODY MASS INDEX: 47.48 KG/M2 | HEIGHT: 63 IN | WEIGHT: 268 LBS

## 2021-09-16 DIAGNOSIS — M17.12 PRIMARY OSTEOARTHRITIS OF LEFT KNEE: ICD-10-CM

## 2021-09-16 DIAGNOSIS — R29.898 WEAKNESS OF LEFT LOWER EXTREMITY: ICD-10-CM

## 2021-09-16 DIAGNOSIS — M25.662 DECREASED ROM OF LEFT KNEE: ICD-10-CM

## 2021-09-16 DIAGNOSIS — M25.562 LEFT KNEE PAIN, UNSPECIFIED CHRONICITY: Primary | ICD-10-CM

## 2021-09-16 DIAGNOSIS — Z98.890 S/P CARPAL TUNNEL RELEASE: Primary | ICD-10-CM

## 2021-09-16 PROCEDURE — G8427 DOCREV CUR MEDS BY ELIG CLIN: HCPCS | Performed by: PHYSICAL THERAPIST

## 2021-09-16 PROCEDURE — 1101F PT FALLS ASSESS-DOCD LE1/YR: CPT | Performed by: PHYSICAL THERAPIST

## 2021-09-16 PROCEDURE — 97530 THERAPEUTIC ACTIVITIES: CPT | Performed by: PHYSICAL THERAPIST

## 2021-09-16 PROCEDURE — 97110 THERAPEUTIC EXERCISES: CPT | Performed by: PHYSICAL THERAPIST

## 2021-09-16 PROCEDURE — 97112 NEUROMUSCULAR REEDUCATION: CPT | Performed by: PHYSICAL THERAPIST

## 2021-09-16 PROCEDURE — 99024 POSTOP FOLLOW-UP VISIT: CPT | Performed by: ORTHOPAEDIC SURGERY

## 2021-09-16 PROCEDURE — G8539 DOC FUNCT AND CARE PLAN: HCPCS | Performed by: PHYSICAL THERAPIST

## 2021-09-16 RX ORDER — BIOTIN 10000 MCG
1 CAPSULE ORAL DAILY
COMMUNITY

## 2021-09-16 NOTE — PROGRESS NOTES
History of Present Illness: Lacho Downey is a pleasant 66 y.o. female who presents for a post operative visit. She is 8 days  out following a left open carpal tunnel release on 8/16/21. She reports she has no pain or numbness. She is very happy with her surgery and is eager to have the right side done soon. She continues have numbness and tingling in the right hand primarily in the thumb index and middle finger. Medical History:  Patient's medications, allergies, past medical, surgical, social and family histories were reviewed and updated as appropriate. Patient has had no medical changes since last evaluated        Review of Systems  A 14 point review of systems was completed by the patient on 7/13/2021 and is available in the media section of the scanned medical record and was reviewed on 9/16/2021. Vital Signs: There were no vitals filed for this visit. General/Appearance: Alert and oriented and in no apparent distress. Skin:  There are no skin lesions, cellulitis, or extreme edema. The patient has warm and well-perfused Bilateral upper extremities with brisk capillary refill. Left wrist exam    Inspection: wrist incision is fully healed. no erythema, drainage or other signs of infection    Neurovascular: Sensation to light touch is intact, no motor deficits, palpable radial pulses 2+    Radiology:     No new XR obtained at this time. Assessment :  Ms. Lacho Downey is a pleasant 66 y.o. patient who is status post left carpal tunnel release. Surgery was on 8/16/21. She has carpal tunnel syndrome on the right. Impression:  Encounter Diagnosis   Name Primary?  S/P carpal tunnel release Yes       Office Procedures:  No orders of the defined types were placed in this encounter. Treatment Plan:    Overall Lacho Downey is doing well.   We will discharged her from being directly under care for her left wrist.  She is planned to have her right carpal tunnel release done this upcoming Monday. We did discuss the risks and benefits of the procedure today. She was agreeable to the above plan. All her questions were fully answered today. We will see her back postoperatively for the right side. 4:11 PM      Grupo Velázquez PA-C  Orthopaedic Sports Medicine Physician Assistant      During this examination, IGrupo PA-C, functioned as a scribe for Dr. Lois Guido. This dictation was performed with a verbal recognition program (DRAGON) and it was checked for errors. It is possible that there are still dictated errors within this office note. If so, please bring any errors to my attention for an addendum. All efforts were made to ensure that this office note is accurate.  _________________  I, Dr. Lois Guido, personally performed the services described in this documentation as described by Grupo Velázquez PA-C in my presence, and it is both accurate and complete. Janel Arana MD, PhD  9/16/2021

## 2021-09-16 NOTE — PROGRESS NOTES
[facility]   Phone: [phone]    Fax: [phone]      Physical Therapy Re-Certification Plan of Care    Dear  Dr. Coretta Burden,    We had the pleasure of treating the following patient for physical therapy services at [address] [place] [address]. A summary of our findings can be found in the updated assessment below. This includes our plan of care. If you have any questions or concerns regarding these findings, please do not hesitate to contact me at the office phone number checked above. Thank you for the referral.     Physician Signature:________________________________Date:__________________  By signing above (or electronic signature), therapists plan is approved by physician      Overall Response to Treatment:   []Patient is responding well to treatment and improvement is noted with regards to goals   []Patient should continue to improve in reasonable time if they continue HEP   []Patient has plateaued and is no longer responding to skilled PT intervention    []Patient is getting worse and would benefit from return to referring MD   []Patient unable to adhere to initial POC   [x]Other:  Pt has not been seen in therapy since 8/10/21 due to undergoing left carpal tunnel release by Dr. Ryan Barlow on 21. Pt has been trying to keep up with her exercises at home but it has been difficult due to recent wrist surgery. She feels she has lost some strength and flexibility in her left knee during her month absence from therapy and is anxious to resume therapy again. Stairs are still very challenging for her but she is able go up/down stairs 1 at a time if there are 2 handrails. She is not having much pain in her left knee, her main complaint is weakness.             Physical Therapy Treatment Note/ Progress Report:           Date:  2021    Patient Name:  Cecily Vines    :  1950  MRN: [id number]  Restrictions/Precautions:    Medical/Treatment Diagnosis Information:  Diagnosis: Left Knee Pain (M25.562), Primary OA of Left Knee (M17.12)   Treatment Diagnosis: Decreased Left Knee ROM (M25.662), Decreased Strength (R29.898)        Insurance/Certification information:  PT Insurance Information: Medicare - Visits based on medical necessity  Physician Information:  Referring Practitioner: Dr. Zee Bell  Has the plan of care been signed (Y/N):        [x]  Yes (POC signed 8/4/21)  []  No      Date of Patient follow up with Physician: As needed      Is this a Progress Report:     [x]  Yes  []  No        If Yes:  Date Range for reporting period:  Beginning 4/15/21   Ending 9/16/21    Progress report will be due (10 Rx or 30 days whichever is less):  56/93/69      Recertification will be due (POC Duration  / 90 days whichever is less):  10/14/21        Visit # Insurance Allowable Auth Required   26 Medical necessity  (pt has already used 13 visits this year for her shoulder) []  Yes [x]  No        Functional Scale:    Date assessed:  9/16/21  Functional Assessment Tool Used: Knee Outcome Survey Activities of Daily Living Scale (copy scanned into media)  Score:  49/70 = 70% (30% functional deficit)    (4/15/21)  Patient Age: 66years old  Patient Height: 5' 2\"  Patient Weight: 280 lbs  Patient BMI: 51.2      Pain (9/16/21)  [x]  Pain diagram was completed, pain was present and a follow up plan to address the pain is in the plan of care. ()   []  Pain diagram was completed and there was no pain present and therefore no follow up plan to address pain in the plan of care.  ()   []  Pain diagram was not completed and the reason for not completing the pain diagram is in the medical chart ()  []  Patient refused to participate   []  Severe mental or physical capacity, patient is in urgent emergent situation and to complete the questionnaire would jeopardize the patient's health status        Functional Questionnaire (9/16/21)  [x]  Patient completed the functional outcome questionnaire and deficiencies were addressed in the plan of care. ()   []  Patient completed the outcome questionnaire and there were no functional deficits identified and therefore no plan of care is required. ()   []  Patient did not complete the functional outcome questionnaire and the reason why is documented in the medical chart. ()  []  Patient refused to participate   []  Patient unable to complete the questionnaire   []   A functional outcome assessment has been reported on within the last 30 days        Falls (9/16/21)  [x]  The patient has had no falls or 1 fall without injury in the past year. (1101F)   []  There is no documentation of fall in the medical chart (1101F,8P)     [] The patient has had 2 or more falls or one fall with injury in the past year that is documented in the medical chart. (1100F)  []  A falls risk assessment was completed and documented in the medical chart. (4724P)   []  Falls were addressed in the plan of care. (1562D)   []  A falls risk assessment was not completed and documented in the medical chart (8244K,1U)   []   Falls were not addressed in the plan of care and reason was documented in the plan of care. (3594T 1P)        Medication (9/16/21)     [x] A list of current medications (including prescription, over the counter, herbal supplements, vitamin supplements, mineral supplements, dietary supplements) was reviewed with the patient and documented in the medical chart. ()        Falls Risk Assessment (30 days): (9/16/21)  [x] Falls Risk assessed and no intervention required. [] Falls Risk assessed and Patient requires intervention due to being higher risk   TUG score (>12s at risk):     [] Falls education provided, including     PQRS:   Reviewed medication list with patient. No changes reported by pt since last PT visit.   (9/16/21)          Latex Allergy:  [x]NO      []YES  Preferred Language for Healthcare:   [x]English []other:      Pain level:  0-1/10 (9/16/21)   Medication:  Celebrex      SUBJECTIVE:   Pt has not been seen in therapy since 8/10/21 due to undergoing left carpal tunnel release by Dr. Mercy Meza on 8/16/21. Pt has been trying to keep up with her exercises at home but it has been difficult due to recent wrist surgery. She feels she has lost some strength and flexibility in her left knee during her month absence from therapy and is anxious to resume therapy again. Stairs are still very challenging for her but she is able go up/down stairs 1 at a time if there are 2 handrails. She is not having much pain in her left knee, her main complaint is weakness. She is undergoing right carpal tunnel release next week. OBJECTIVE:       (9/16/21)  Flexibility L R Comment   Hamstring Mild tightness Mild tightness    Gastroc Mild tightness Mild tightness    ITB      Quad              (9/16/21)  ROM PROM AROM Overpressure Comment    L R L R L R    Flexion 118 with SP  115 110      Extension   0 0                            (9/16/21)  Strength L R Comment   Quad 4+/5 4+/5    Hamstring 5/5 5/5    Gastroc 4+/5 4+/5    Hip Flex 4/5 4/5    Hip Abd 4+/5 4+/5    Hip Add 4+/5 4+/5    Glut Med              (8/3/21)  Girth L R   Mid Patella 57.0 cm 58.0 cm   Suprapatellar     5cm above     15cm above       (9/16/21)  Special Test Results/Comment   Meniscal Click    Crepitus (+) PF crepitus bilaterally   Flexion Test    Valgus Laxity    Varus Laxity    Lachmans    Drop Back        Reflexes/Sensation: (4/15/21)   []Dermatomes/Myotomes intact    []Reflexes equal and normal bilaterally   [x]Other: Intact to light touch    Joint mobility: (9/16/21)   []Normal    [x]Hypo   []Hyper    Palpation: No tenderness.   Mild quad atrophy still present on the left vs right.(9/16/21)    Functional Mobility/Transfers: Stairs are still difficult (pt must ascend/descend stairs 1 at a time using 2 HR), she can't stand or walk for longer periods of time, she is unable to squat. (9/16/21)    Posture: Forward head, rounded shoulders. (9/16/21)    Bandages/Dressings/Incisions: Pt wears compression hose and wraps on both legs (9/16/21)     Gait: (include devices/WB status) Gait is still mildly antalgic, no AD. Lola was a little slower today with decreased step length bilaterally. She demonstrates decreased trunk rotation and increased lateral trunk sway. (9/16/21)    Orthopedic Special Tests: See above.        RESTRICTIONS/PRECAUTIONS:     Exercises/Interventions:     Therapeutic Ex (14950) Sets/sec Reps Notes/CUES   BIKE      TREADMILL            STRETCHING      Towel Pull Calf 30\" hold X 5    Inclined Calf      Hamstrings 30\" hold X 5 Seated   Quads      Hip Flexors      ITB      Adductors            ROM      Passive      Active      Weight Hangs      Sheet Pulls 10\" hold X 10    Ankle Pumps      ERMI            STRENGTHENING      Quad Sets 10\" hold X 10    Ham Sets      Hip ADD Sets BS 10\" hold X 10     SLR Flexion 3 sets X 10 with 1#     SLR Abduction  HEP   SLR Prone      SLR Adduction      SLR+      Supine Hip Abduction 2 sets X 10  Black band         Standing Marches 3 sets X 10 on Airex Standing at half wall   Standing Knee Flexion 3 sets X 10  Standing at half wall         PRE Machines      Knee Extension      Knee Flexion      Leg Press            CKC      Calf Raises 3 sets X 10 Seated   Mini Squats      Wall Sits      Step ups - 1 riser 2 sets X 10  Using half wall     Lateral Step Ups - 1 riser 2 sets X 10    TKE  Held today               Manual Intervention (01426)      Patellar Mobs      Femoral Tibial mobs      STM      Scar Massage      Foam Roll            NMR re-education (35680)   CUES NEEDED   Biodex Balance      Tandem Balance 30\" hold X 2 each on Airex *Feet staggered  *Standing next to half wallSLB      Rebounder      BOSU            Sit -> Stand  X 20 no arms     With cushion on chair     Up/Down 1 Flight of Stairs in the [employer] X 2 Using bilateral HR         Therapeutic Activity (15873)      Core Training      [place] Activities      Monster Walks      TRX training                  Patient Education: Dx, prognosis, pt goals/expectations, role of therapy (4/15/21)  X 8'                  Therapeutic Exercise and NMR EXR  [x] (90052) Provided verbal/tactile cueing for activities related to strengthening, flexibility, endurance, ROM for improvements in LE, proximal hip, and core control with self care, mobility, lifting, ambulation. [x] (90049) Provided verbal/tactile cueing for activities related to improving balance, coordination, kinesthetic sense, posture, motor skill, proprioception to assist with LE, proximal hip, and core control in self-care, mobility, lifting, ambulation and eccentric single leg control. NMR and Therapeutic Activities:    [x] (29925 or 47128) Provided verbal/tactile cueing for activities related to improving balance, coordination, kinesthetic sense, posture, motor skill, proprioception and motor activation to allow for proper function of core, proximal hip and LE with self-care and ADLs and functional mobility.   [] (21135) Gait Re-education- Provided training and instruction to the patient for proper LE, core and proximal hip recruitment and positioning and eccentric body weight control with ambulation re-education including up and down stairs     Home Exercise Program:    [x] (40396) Reviewed/Progressed HEP activities related to strengthening, flexibility, endurance, ROM of core, proximal hip and LE for functional self-care, mobility, lifting and ambulation/stair navigation - Updated HEP through [address] [place] [address] (see below). Pt was also issued new written handouts.  (4/27/21)  [x] (96599) Reviewed/Progressed HEP activities related to improving balance, coordination, kinesthetic sense, posture, motor skill, proprioception of core, proximal hip and LE for self-care, mobility, lifting, and ambulation/stair navigation Access Code: [email or web address]  URL: [email or web address]: 04/20/2021Prepared by: [email or web address] SybertExercises   Long Sitting Calf Stretch with Strap - 1 x daily - 7 x weekly - 1 sets - 3 reps - 30 seconds hold   Seated Table Hamstring Stretch - 1 x daily - 7 x weekly - 1 sets - 3 reps - 30 seconds hold   Long Sitting Quad Set - 1 x daily - 7 x weekly - 1 sets - 10 reps - 10 seconds hold   Long Sitting Hip Adduction Isometric with Ball - 1 x daily - 7 x weekly - 1 sets - 10 reps - 10 seconds hold   Supine Active Straight Leg Raise - 1 x daily - 7 x weekly - 2-3 sets - 10 reps   Hooklying Isometric Clamshell - 1 x daily - 7 x weekly - 3 sets - 10 reps   Standing March with Counter Support - 1 x daily - 7 x weekly - 3 sets - 10 reps   Seated Heel Raise - 1 x daily - 7 x weekly - 3 sets - 10 reps   Sit to Stand - 1 x daily - 7 x weekly - 15 reps   Tandem Stance with Support - 1 x daily - 7 x weekly - 2 reps - 20 seconds hold        Manual Treatments:  PROM / STM / Oscillations-Mobs:  G-I, II, III, IV (PA's, Inf., Post.)  [] (23670) Provided manual therapy to mobilize LE, proximal hip and/or LS spine soft tissue/joints for the purpose of modulating pain, promoting relaxation, increasing ROM, reducing/eliminating soft tissue swelling/inflammation/restriction, improving soft tissue extensibility and allowing for proper ROM for normal function with self-care, mobility, lifting and ambulation. Modalities:   Pt declined ice today   [] GAME READY (VASO)- for significant edema, swelling, pain control.      Charges:  Timed Code Treatment Minutes: 54'   Total Treatment Minutes: 54'      [] EVAL (LOW) 54640 (typically 20 minutes face-to-face)  [] EVAL (MOD) 78544 (typically 30 minutes face-to-face)  [] EVAL (HIGH) 92256 (typically 45 minutes face-to-face)  [] RE-EVAL     [x] ZR(32085) x 2 (30')    [] IONTO  [x] NMR (21263) x 1 (10')   [] VASO  [] Manual (05312) x      [] Other:  [x] TA x 1 (15')     [] [facility] Traction (13444)  [] ES(attended) (96327)      [] ES (un) (47210):         ASSESSMENT:  Pt has not been seen in therapy since 8/10/21 due to undergoing left carpal tunnel release on 8/16/21 and the exercises were more difficult today. She could only perform 2 sets of 10 with step ups due to fatigue and she needed to use the half wall for support. SLR's with 1# wt were also very challenging with rest breaks needed. She did well with tandem stance on airex. Left knee ROM, strength, and endurance have regressed during her month long absence from therapy. Her knee outcome survey activities of daily living score also regressed. Pt would benefit from resuming skilled therapy for her left knee to increase ROM, strength, and endurance for ascending/descending stairs, walking community distances, and improved function. Pt is scheduled to have undergo right carpal tunnel release next week. MEDICARE CAP EXCEPTION DOCUMENTATION      I certify that this patient meets one of the below criteria necessary for becoming an exception to the Medicare cap on therapy services:    [x]  The patient has a condition identified by an ICD-9 code that has a direct and significant impact on the need for therapy. (Significantly impacts the rate of recovery.)                     []  The patient has a complexity identified by an ICD-9 code that has a direct and significant impact on the need for therapy. (Significantly impacts the rate of recovery and is associated with a primary condition.)         []  The patient has associated variables that influence the amount of treatment to include:  Social support, self-efficacy/motivation, prognosis, time since onset/acuity. []  The patient has generalized musculoskeletal conditions or a condition affecting multiple sites that will have a direct impact on the rate of recovery.     [x]  The patient had a prior episode of outpatient therapy during this calendar year for a different condition. []  The patient has a mental or cognitive disorder in addition to the condition being treated that will have a direct and significant impact on the rate of recovery. GOALS: (Goals updated 9/16/21)  Patient stated goal:  \"Make my legs stronger; walk and climb stairs easier and faster. \"  [x] Progressing: [] Met: [] Not Met: [] Adjusted    Therapist goals for Patient:   Short Term Goals: To be achieved in: 2 weeks  1. Independent in HEP and progression per patient tolerance, in order to prevent re-injury. [] Progressing: [x] Met: [] Not Met: [] Adjusted  2. Patient will have a decrease in left knee pain to 1-2/10 to facilitate improvement in movement, function, and ADLs as indicated by Functional Deficits. [] Progressing: [x] Met: [] Not Met: [] Adjusted    Long Term Goals: To be achieved in: 4-6 weeks  1. Disability index score of 20% or less for the Knee Outcome Survey Activities of Daily Living Scale to assist with reaching prior level of function. [x] Progressing: [] Met: [] Not Met: [] Adjusted  2. Patient will demonstrate an increase in left knee flexion AROM to at least 115 degrees to allow for proper joint functioning as indicated by patients Functional Deficits. [] Progressing: [x] Met: [] Not Met: [] Adjusted  3. Patient will demonstrate an increase in left hip and knee strength to at least 4+/5 to allow for proper functional mobility as indicated by patients Functional Deficits. [x] Progressing: Her strength has regressed during her month long absence from therapy [] Met: [] Not Met: [] Adjusted  4. Patient will be able to walk community distances, stand for at least 30 minutes, and be able to get up from a chair with little to no UE assistance needed for improved function. [x] Progressing: [] Met: [] Not Met: [] Adjusted  5.  Patient will be able to ascend/descend stairs reciprocally with HR (patient specific functional goal)    [x] Progressing: [] Met: [] Not Met: [] Adjusted   6. Patient will have a decrease in left knee pain to 0-1/10 with daily act. [] Progressing: [x] Met: [] Not Met: [] Adjusted      New Goal: To be achieved in: 4 weeks (9/16/21)  1. Patient will demonstrate an increase in left hip and knee strength to at least 5/5 to allow for proper functional mobility as indicated by patients Functional Deficits. [x] Progressing: [] Met: [] Not Met: [] Adjusted        Overall Progression Towards Functional goals/ Treatment Progress Update:  [x] Patient is progressing as expected towards functional goals listed. [] Progression is slowed due to complexities/Impairments listed. [] Progression has been slowed due to co-morbidities. [] Plan just implemented, too soon to assess goals progression <30days   [] Goals require adjustment due to lack of progress  [] Patient is not progressing as expected and requires additional follow up with physician  [] Other    Prognosis for POC: [x] Good [] Fair  [] Poor      Patient requires continued skilled intervention: [x] Yes  [] No    Treatment/Activity Tolerance:  [] Patient able to complete treatment  [x] Patient limited by fatigue  [] Patient limited by pain    [] Patient limited by other medical complications  [] Other:         PLAN: Continue therapy 1x/week for ROM, strengthening, endurance, and balance. Pt is having right carpal tunnel surgery next week.    PT for her left knee will be put on hold again until cleared by MD.       1x/week for 4-6 weeks for ROM, strengthening, balance act, HEP instruction, pt education, manual therapy prn, and modalities prn (9/16/21)  [x] Continue per plan of care [] [facility] [place] current plan   [] Plan of care initiated [] Hold pending MD visit [] Discharge      Electronically signed by:  Payton Caldera, PT     Physical Therapist Adore Rangel 421 #[id number]  Physical Therapist [facility] License #[id number]    Note: If patient does not return for scheduled/ recommended follow up visits, this note will serve as a discharge from care along with most recent update on progress.

## 2021-09-23 ENCOUNTER — EVALUATION (OUTPATIENT)
Dept: PHYSICAL THERAPY | Age: 71
End: 2021-09-23
Payer: MEDICARE

## 2021-09-23 DIAGNOSIS — G56.01 RIGHT CARPAL TUNNEL SYNDROME: Primary | ICD-10-CM

## 2021-09-23 DIAGNOSIS — R29.898 WEAKNESS OF RIGHT HAND: ICD-10-CM

## 2021-09-23 DIAGNOSIS — R29.898 WEAKNESS OF WRIST: ICD-10-CM

## 2021-09-23 PROCEDURE — 1101F PT FALLS ASSESS-DOCD LE1/YR: CPT | Performed by: PHYSICAL THERAPIST

## 2021-09-23 PROCEDURE — G8539 DOC FUNCT AND CARE PLAN: HCPCS | Performed by: PHYSICAL THERAPIST

## 2021-09-23 PROCEDURE — 97161 PT EVAL LOW COMPLEX 20 MIN: CPT | Performed by: PHYSICAL THERAPIST

## 2021-09-23 PROCEDURE — G8427 DOCREV CUR MEDS BY ELIG CLIN: HCPCS | Performed by: PHYSICAL THERAPIST

## 2021-09-23 PROCEDURE — 97530 THERAPEUTIC ACTIVITIES: CPT | Performed by: PHYSICAL THERAPIST

## 2021-09-23 PROCEDURE — 97110 THERAPEUTIC EXERCISES: CPT | Performed by: PHYSICAL THERAPIST

## 2021-09-23 NOTE — PROGRESS NOTES
[facility]   Phone: [phone]    Fax: [phone]     Physical Therapy Certification    Dear Referring Practitioner: Dr. Toma Chavez,    We had the pleasure of evaluating the following patient for physical therapy services at [address] [place] [address]. A summary of our findings can be found in the initial assessment below. This includes our plan of care. If you have any questions or concerns regarding these findings, please do not hesitate to contact me at the office phone number checked above. Thank you for the referral.       Physician Signature:_______________________________Date:__________________  By signing above (or electronic signature), therapists plan is approved by physician      Patient: Noni Stoddard   : 1950   MRN: [id number]  Referring Physician: Referring Practitioner: Dr. Toma Chavez      Evaluation Date: 2021      Medical Diagnosis Information:  Diagnosis: S/P Right Carpal Tunnel Release (Sx: 21) (G56.01)    Treatment Diagnosis:  Weakness of Right Wrist and Hand (R29.898)   Insurance information: PT Insurance Information: Medicare, [place] - visits based on medical necessity    Precautions/ Contra-indications:   Latex Allergy:  [x]NO      []YES    C-SSRS Triggered by Intake questionnaire (Past 2 wk assessment):   [x] No, Questionnaire did not trigger screening.   [] Yes, Patient intake triggered further evaluation      [] C-SSRS Screening completed  [] PCP notified via Plan of Care  [] Emergency services notified     Preferred Language for Healthcare:   [x]English       []other:      SUBJECTIVE:    Pt was in the hospital for 2 weeks back in 2020 due to an infection in both legs. While in the hospital, she started experiencing numbness and tingling in both hands which she thinks was caused by one of the medications she was given (Vancomycin).   She also lost feeling in her thumb, index, and middle fingers of both hands which eventually came back after several months but she continued to experience numbness and tingling. \"My hands felt like they were asleep. \"  She also reported a loss of strength in both hands and had difficulty with fine motor activities such as gripping, opening jars, putting in her earrings, putting on a necklace, and turning a door handle. She saw a neurologist and had an EMG and MRI done which showed bilateral carpal tunnel syndrome. Dr. Max Benítez performed a left CTR on 8/16/21 and pt made a full recovery following surgery. She presents to therapy today s/p right CTR on 9/20/21. She has not taken any pain medication since surgery. She does have some pain in her right wrist and hand, rated 2/10, which she attributes to being RHD and using her right hand more than she should following surgery. She had a lot of difficulty buckling her seatbelt today coming to therapy which irritated her right hand. Her wrist and the palm of her right hand are very bruised. She reports no numbness or tingling in her right hand. She has been icing. She did not sleep well last night but it was unrelated to her hand/wrist.  She sees Dr. Max Benítez next week for her first post-op visit.           Pt goal:  \"Get back to living\"    *Pt is right hand dominant    Relevant Medical History: Left Reverse TSA on 6/29/20, Right Reverse TSA ~2011, HTN, OA, Osteoporosis, HA/Migraines, Left Carpal Tunnel Release 8/16/21       Functional Disability Index:   Functional Assessment Tool Used:  UEFI (copy scanned into media)  Score: 8/80 = 10% (90% functional deficit)    Pain Scale: 2/10  Easing factors: Rest, ice  Provocative factors: Using her right hand to perform ADL's and self-care act    Type: [x]Constant   []Intermittent  []Radiating []Localized []other:     Numbness/Tingling: Pr reports no numbness or tingling in her right hand since surgery    Occupation/School: Retired    Living Status: Pt lives in a 2-story house with her sister. Augusto Mendenhall bedroom is on the second floor.  Her bathroom and shower are handicap accessible. Sarabjit Jiang has a lift to the second floor.  She has 2 steps to enter the front door and a ramp to the back door which is how she comes/in out of the house. Prior Level of Function: Independent with ADLs and IADLs, no prior history of right hand/wrist symptoms. Pt underwent left carpal tunnel release on 8/16/21 and made a full recovery. OBJECTIVE:     *Pt is right hand dominant    ROM PROM AROM Comments    Left Right Left Right    Elbow flexion   135 135    Elbow extension   0 0    Pronation   90 90    Supination   75 55    Wrist flexion   70 60    Wrist extension   70 55    Wrist radial deviation        Wrist ulnar deviation            Special Tests Left Right Comments   Cozens  Not    Tinels  Tested    Phalens  Due to    [employer]  Sx                    Strength Left Right Comments   Elbow flexion 4+/5 Not    Elbow extension 4+/5 Tested    Pronation 4+/5 Due to    Supination 4+/5 Sx    Wrist flexion 4+/5     Wrist extension 4+/5     Wrist radial deviation      Wrist ulnar deviation           Left Right Comments   Level 1  Not    Level 2  Tested    Level 3  Due to    Level 4  Sx    Level 5      other          Reflexes/Sensation:    []Dermatomes/Myotomes intact    []Reflexes equal and normal bilaterally   [x]Other: Intact to light touch    Joint mobility: NA due to surgery   []Normal    []Hypo   []Hyper    Palpation: General tenderness around incision site. Bruising and swelling present right wrist and along the palm of her hand. Functional Mobility/Transfers: Difficulty with ADL's and self-care act due to recent surgery and post-op bandage. Buckling her seatbelt is also difficult. Pt does admit she is doing more than she should with her right hand due to being RHD. Posture: Forward head, rounded shoulders. Bandages/Dressings/Incisions: Removed post-op bandage today.   Incision is clean and dry with no s/s of infection. Gait: (include devices/WB status): Gait is mildly antalgic, no AD. She demonstrates decreased pawel and step length (pt is also doing therapy at this facility for her left knee). Orthopedic Special Tests: See above                       [x] Patient history, allergies, meds reviewed. Medical chart reviewed. See intake form. Review Of Systems (ROS):  [x]Performed Review of systems (Integumentary, CardioPulmonary, Neurological) by intake and observation. Intake form has been scanned into medical record. Patient has been instructed to contact their primary care physician regarding ROS issues if not already being addressed at this time.       Co-morbidities/Complexities (which will affect course of rehabilitation):   []None           Arthritic conditions   []Rheumatoid arthritis (M05.9)  [x]Osteoarthritis (M19.91)   Cardiovascular conditions   [x]Hypertension (I10)  []Hyperlipidemia (E78.5)  []Angina pectoris (I20)  []Atherosclerosis (I70)   Musculoskeletal conditions   []Disc pathology   []Congenital spine pathologies   []Prior surgical intervention  [x]Osteoporosis (M81.8)  []Osteopenia (M85.8)   Endocrine conditions   []Hypothyroid (E03.9)  []Hyperthyroid Gastrointestinal conditions   []Constipation (D49.76)   Metabolic conditions   []Morbid obesity (E66.01)  []Diabetes type 1(E10.65) or 2 (E11.65)   []Neuropathy (G60.9)     Pulmonary conditions   []Asthma (J45)  []Coughing   []COPD (J44.9)   Psychological Disorders  []Anxiety (F41.9)  []Depression (F32.9)   []Other:   []Other:          Barriers to/and or personal factors that will affect rehab potential:              []Age  []Sex              [x]Motivation/Lack of Motivation - pt is very motivated to increase function                                 [x]Co-Morbidities - HTN, OA, HA's/Migraines, Osteoporosis              []Cognitive Function, education/learning barriers              []Environmental, home barriers              []profession/work barriers  []past PT/medical experience - no prior PT for the right hand/wrist.  Pt underwent left CTR on 8/16/21 and made a full recovery  []other:  Justification:       Falls Risk Assessment (30 days):   [x] Falls Risk assessed and no intervention required. [] Falls Risk assessed and Patient requires intervention due to being higher risk   TUG score (>12s at risk):     [] Falls education provided, including        ASSESSMENT:  Pt presents to therapy today s/p right carpal tunnel release on 9/20/21 with decreased right wrist/hand ROM, decreased strength, increased swelling, and increased pain limiting her ability to perform ADL's and self-care act and buckle her seatbelt. She reports no numbness or tinging in her right hand. Pt would benefit from skilled PT services to address these deficits and increase function. Good rehab potential.           Patient Age: 66years old  Patient Height: 5' 2\"  Patient Weight: 280 lbs  Patient BMI: 51.2      Pain  [x]  Pain diagram was completed, pain was present and a follow up plan to address the pain is in the plan of care. ()   []  Pain diagram was completed and there was no pain present and therefore no follow up plan to address pain in the plan of care. ()   []  Pain diagram was not completed and the reason for not completing the pain diagram is in the medical chart ()  []  Patient refused to participate   []  Severe mental or physical capacity, patient is in urgent emergent situation and to complete the questionnaire would jeopardize the patient's health status        Functional Questionnaire  [x]  Patient completed the functional outcome questionnaire and deficiencies were addressed in the plan of care. ()   []  Patient completed the outcome questionnaire and there were no functional deficits identified and therefore no plan of care is required.  ()   []  Patient did not complete the functional outcome questionnaire and the reason why is documented in the medical chart. ()  []  Patient refused to participate   []  Patient unable to complete the questionnaire   []   A functional outcome assessment has been reported on within the last 30 days        Falls  [x]  The patient has had no falls or 1 fall without injury in the past year. (1101F)   []  There is no documentation of fall in the medical chart (1101F,8P)     [] The patient has had 2 or more falls or one fall with injury in the past year that is documented in the medical chart. (1100F)  []  A falls risk assessment was completed and documented in the medical chart. (5673Z)   []  Falls were addressed in the plan of care. (3750W)   []  A falls risk assessment was not completed and documented in the medical chart (2817H,9K)   []   Falls were not addressed in the plan of care and reason was documented in the plan of care. (0199R 1P)        Medication     [x] A list of current medications (including prescription, over the counter, herbal supplements, vitamin supplements, mineral supplements, dietary supplements) was reviewed with the patient and documented in the medical chart.  ()           Functional Impairments   []Noted spinal or UE joint hypomobility   []Noted spinal or UE joint hypermobility   [x]Decreased UE functional ROM   [x]Decreased UE functional strength   []Abnormal reflexes/sensation/myotomal/dermatomal deficits   [x]Decreased RC/scapular/core strength and neuromuscular control   []other:      Functional Activity Limitations (from functional questionnaire and intake)   []Reduced ability to tolerate prolonged functional positions   [x]Reduced ability or difficulty with changes of positions or transfers between positions   []Reduced ability to maintain good posture and demonstrate good body mechanics with sitting, bending, and lifting   [x] Reduced ability or tolerance with driving and/or computer work   []Reduced ability to sleep   [x]Reduced ability to perform lifting, reaching, carrying tasks   [x]Reduced ability to tolerate impact through UE   [x]Reduced ability to reach behind back   [x]Reduced ability to  or hold objects   [x]Reduced ability to throw or toss an object   []other:    Participation Restrictions   [x]Reduced participation in self care activities   [x]Reduced participation in home management activities   []Reduced participation in work activities   [x]Reduced participation in social activities. []Reduced participation in sport/recreation activities. Classification:   [x]Signs/symptoms consistent with post-surgical status including decreased ROM, strength and function.   []Signs/symptoms consistent with joint sprain/strain   []Signs/symptoms consistent with shoulder impingement   []Signs/symptoms consistent with shoulder/elbow/wrist tendinopathy   []Signs/symptoms consistent with Rotator cuff tear   []Signs/symptoms consistent with labral tear   []Signs/symptoms consistent with postural dysfunction    []Signs/symptoms consistent with Glenohumeral IR Deficit - <45 degrees   []Signs/symptoms consistent with facet dysfunction of cervical/thoracic spine    []Signs/symptoms consistent with pathology which may benefit from Dry needling     []other:     Prognosis/Rehab Potential:      []Excellent   [x]Good    []Fair   []Poor    Tolerance of evaluation/treatment:    []Excellent   [x]Good    []Fair   []Poor    Physical Therapy Evaluation Complexity Justification  [x] A history of present problem with:  [] no personal factors and/or comorbidities that impact the plan of care;  [x]1-2 personal factors and/or comorbidities that impact the plan of care  []3 personal factors and/or comorbidities that impact the plan of care  [x] An examination of body systems using standardized tests and measures addressing any of the following: body structures and functions (impairments), activity limitations, and/or participation restrictions;:  [] a total of 1-2 or more elements   [x] a total of 3 or more elements   [] a total of 4 or more elements   [x] A clinical presentation with:  [x] stable and/or uncomplicated characteristics   [] evolving clinical presentation with changing characteristics  [] unstable and unpredictable characteristics;   [x] Clinical decision making of [x] low, [] moderate, [] high complexity using standardized patient assessment instrument and/or measurable assessment of functional outcome. [x] EVAL (LOW) 16212 (typically 20 minutes face-to-face)  [] EVAL (MOD) 83991 (typically 30 minutes face-to-face)  [] EVAL (HIGH) 54123 (typically 45 minutes face-to-face)  [] RE-EVAL     PLAN:  Frequency/Duration:  1 day per week for 4 Weeks:  INTERVENTIONS:  [x] Therapeutic exercise including: strength training, ROM, for Upper extremity and core   [x]  NMR activation and proprioception for UE, scap and Core   [x] Manual therapy as indicated for shoulder, scapula and spine to include: Dry Needling/IASTM, STM, PROM, Gr I-IV mobilizations, manipulation. [x] Modalities as needed that may include: thermal agents, E-stim, Biofeedback, US, iontophoresis as indicated  [x] Patient education on joint protection, postural re-education, activity modification, progression of HEP. HEP instruction: Instructed patient in a HEP. Patient demonstrated exercises correctly. Pt declined written handout due to already being familiar with the exercises (pt underwent left CTR on 8/16/21). Exercises are listed on flowsheet.        Patient Education:  Educated patient on Physical Therapy process.  Also educated on diagnosis, related anatomy, pathology and prognosis.   Reviewed patient goals/expectations and answered all questions.  Patient displays understanding and in agreement with plan of care.     Discussed importance of HEP and icing, activity modification, and role of PT. Also educated pt on wound care.       GOALS:  Patient stated goal: \"Get back to living\"  [] Progressing: [] Met: [] Not Met: []

## 2021-09-24 NOTE — PROGRESS NOTES
[facility]   Phone: [phone]    Fax: [phone]      Physical Therapy Treatment Note/ Progress Report:           Date:  2021    Patient Name:  Yannick Kirkpatrick    :  1950  MRN: [id number]  Restrictions/Precautions:    Medical/Treatment Diagnosis Information:  · Diagnosis: S/P Right Carpal Tunnel Release (Sx: 21) (G56.01)  ·  Treatment Diagnosis: Weakness of Right Wrist and Hand (U06.461)   Insurance/Certification information:  PT Insurance Information: Medicare, [place] - visits based on medical necessity  Physician Information:  Referring Practitioner: Dr. Rashad Pendleton  Has the plan of care been signed (Y/N):        []  Yes  [x]  No (POC sent 21)    Date of Patient follow up with Physician: 21      Is this a Progress Report:     [x]  Yes  []  No        If Yes:  Date Range for reporting period:  Beginning 21  Ending 21    Progress report will be due (10 Rx or 30 days whichever is less):        Recertification will be due (POC Duration  / 90 days whichever is less): 10/21/21        Visit # Insurance Allowable Auth Required   1 Medical necessity   (Use Kx Modifier due to pt having prior PT this year) []  Yes [x]  No        Functional Scale:                                          Date assessed:  21                     Functional Assessment Tool Used:  UEFI (copy scanned into media)  Score:  8/80 = 10% (90% functional deficit)        (21)  Patient Age: 66years old  Patient Height: 5' 2\"  Patient Weight: 280 lbs  Patient BMI: 51.2      Pain (21)  [x]  Pain diagram was completed, pain was present and a follow up plan to address the pain is in the plan of care. ()   []  Pain diagram was completed and there was no pain present and therefore no follow up plan to address pain in the plan of care.  ()   []  Pain diagram was not completed and the reason for not completing the pain diagram is in the medical chart ()  []  Patient refused to participate   []  Severe mental or physical capacity, patient is in urgent emergent situation and to complete the questionnaire would jeopardize the patient's health status          Functional Questionnaire (9/23/21)  [x]  Patient completed the functional outcome questionnaire and deficiencies were addressed in the plan of care. ()   []  Patient completed the outcome questionnaire and there were no functional deficits identified and therefore no plan of care is required. ()   []  Patient did not complete the functional outcome questionnaire and the reason why is documented in the medical chart. ()  []  Patient refused to participate   []  Patient unable to complete the questionnaire   []   A functional outcome assessment has been reported on within the last 30 days          Falls (9/23/21)  [x]  The patient has had no falls or 1 fall without injury in the past year. (1101F)   []  There is no documentation of fall in the medical chart (1101F,8P)     [] The patient has had 2 or more falls or one fall with injury in the past year that is documented in the medical chart. (1100F)  []  A falls risk assessment was completed and documented in the medical chart. (9964U)   []  Falls were addressed in the plan of care. (9530Y)   []  A falls risk assessment was not completed and documented in the medical chart (7717I,5A)   []   Falls were not addressed in the plan of care and reason was documented in the plan of care. (7457O 1P)          Medication (9/23/21)     [x] A list of current medications (including prescription, over the counter, herbal supplements, vitamin supplements, mineral supplements, dietary supplements) was reviewed with the patient and documented in the medical chart. ()       Falls Risk Assessment (30 days): (9/23/21)  [x]? Falls Risk assessed and no intervention required. []?  Falls Risk assessed and Patient requires intervention due to being higher risk TUG score (>12s at risk):     []? Falls education provided, including        Latex Allergy:  [x]NO      []YES  Preferred Language for Healthcare:   [x]English       []other:      Pain level:  2/10       SUBJECTIVE:  See initial eval      OBJECTIVE:  (Measurements taken 9/23/21)    *Pt is right hand dominant    ROM PROM AROM Comments    Left Right Left Right    Elbow flexion   135 135    Elbow extension   0 0    Pronation   90 90    Supination   75 55    Wrist flexion   70 60    Wrist extension   70 55    Wrist radial deviation        Wrist ulnar deviation            Special Tests Left Right Comments   Cozens  Not    Tinels  Tested    Phalens  Due to    [employer]  Sx                    Strength Left Right Comments   Elbow flexion 4+/5 Not    Elbow extension 4+/5 Tested    Pronation 4+/5 Due to    Supination 4+/5 Sx    Wrist flexion 4+/5     Wrist extension 4+/5     Wrist radial deviation      Wrist ulnar deviation           Left Right Comments   Level 1  Not    Level 2  Tested    Level 3  Due to    Level 4  Sx    Level 5      other          Reflexes/Sensation:    []Dermatomes/Myotomes intact    []Reflexes equal and normal bilaterally   [x]Other: Intact to light touch    Joint mobility: NA due to surgery   []Normal    []Hypo   []Hyper    Palpation: General tenderness around incision site. Bruising and swelling present right wrist and along the palm of her hand. Functional Mobility/Transfers: Difficulty with ADL's and self-care act due to recent surgery and post-op bandage. Buckling her seatbelt is also difficult. Pt does admit she is doing more than she should with her right hand due to being RHD. Posture: Forward head, rounded shoulders. Bandages/Dressings/Incisions: Removed post-op bandage today. Incision is clean and dry with no s/s of infection. Gait: (include devices/WB status): Gait is mildly antalgic, no AD.   She demonstrates decreased pawel and step length (pt is also doing control with self care, reaching, carrying, lifting, house/yardwork, driving/computer work. Therapeutic Activities:    [x] (55917 or 87799) Provided verbal/tactile cueing for activities related to improving balance, coordination, kinesthetic sense, posture, motor skill, proprioception and motor activation to allow for proper function of scapular, scapulothoracic and UE control with self care, carrying, lifting, driving/computer work. Home Exercise Program:    [x] (40527) Reviewed/Progressed HEP activities related to strengthening, flexibility, endurance, ROM of scapular, scapulothoracic and UE control with self care, reaching, carrying, lifting, house/yardwork, driving/computer work - Instructed pt in a HEP. Pt declined handouts due to already being familiar with the exercises (pt underwent left carpal tunnel release on 8/16/21). (9/23/21)  [] (52248) Reviewed/Progressed HEP activities related to improving balance, coordination, kinesthetic sense, posture, motor skill, proprioception of scapular, scapulothoracic and UE control with self care, reaching, carrying, lifting, house/yardwork, driving/computer work      Manual Treatments:  PROM / STM / Oscillations-Mobs:  G-I, II, III, IV (PA's, Inf., Post.)  [] (64138) Provided manual therapy to mobilize soft tissue/joints of cervical/CT, scapular GHJ and UE for the purpose of modulating pain, promoting relaxation,  increasing ROM, reducing/eliminating soft tissue swelling/inflammation/restriction, improving soft tissue extensibility and allowing for proper ROM for normal function with self care, reaching, carrying, lifting, house/yardwork, driving/computer work    Modalities:  ICE x 10'   [] GAME READY (VASO)- for significant edema, swelling, pain control.     Charges:  Timed Code Treatment Minutes: 27'   Total Treatment Minutes: 62'      [x] EVAL (LOW) 455 1011 (typically 20 minutes face-to-face) x 20'  [] EVAL (MOD) 67429 (typically 30 minutes face-to-face)  [] being treated that will have a direct and significant impact on the rate of recovery.                       GOALS: (Goals made 9/23/21)  Patient stated goal: \"Get back to living\"  [] Progressing: [] Met: [] Not Met: [] Adjusted    Therapist goals for Patient:   Short Term Goals: To be achieved in: 2 weeks  1. Independent in HEP and progression per patient tolerance, in order to prevent re-injury. [] Progressing: [] Met: [] Not Met: [] Adjusted  2. Patient will have a decrease in right wrist/hand pain to 1/10 to facilitate improvement in movement, function, and ADLs as indicated by Functional Deficits. [] Progressing: [] Met: [] Not Met: [] Adjusted    Long Term Goals: To be achieved in: 4 weeks  1. Disability index score of 15% or less for the UEFI to assist with reaching prior level of function. [] Progressing: [] Met: [] Not Met: [] Adjusted  2. Patient will demonstrate an increase in right wrist AROM to at least 70 degrees flexion and extension and 85 degrees supination to allow for proper joint functioning as indicated by patients Functional Deficits. [] Progressing: [] Met: [] Not Met: [] Adjusted  3. Patient will demonstrate an increase in right wrist/hand strength to 5/5 to allow for proper functional mobility as indicated by patients Functional Deficits. [] Progressing: [] Met: [] Not Met: [] Adjusted  4. Patient will be able to perform fine motor activities, such as gripping, opening jars, putting on jewelry, and turning a door handle, without increased symptoms or restriction  [] Progressing: [] Met: [] Not Met: [] Adjusted  5. Patient will be able to perform all ADL's and self care act with no pain, numbness, or tingling in her right hand/wrist   (patient specific functional goal)    [] Progressing: [] Met: [] Not Met: [] Adjusted         Overall Progression Towards Functional goals/ Treatment Progress Update:  [] Patient is progressing as expected towards functional goals listed.     [] Progression is slowed due to complexities/Impairments listed. [] Progression has been slowed due to co-morbidities. [x] Plan just implemented, too soon to assess goals progression <30days   [] Goals require adjustment due to lack of progress  [] Patient is not progressing as expected and requires additional follow up with physician  [] Other    Prognosis for POC: [x] Good [] Fair  [] Poor      Patient requires continued skilled intervention: [x] Yes  [] No    Treatment/Activity Tolerance:  [x] Patient able to complete treatment  [] Patient limited by fatigue  [] Patient limited by pain    [] Patient limited by other medical complications  [] Other:           PLAN: 1x/week for 4 weeks for ROM, strengthening, manual therapy as needed, HEP instruction, pt education, and modalities prn. (9/23/21)   [] Continue per plan of care [] Alter current plan   [x] Plan of care initiated [] Hold pending MD visit [] Discharge      Electronically signed by:  Trevon Herron, PT       Physical Therapist [facility] [id number]  Physical Therapist [place] License #[id number]    Note: If patient does not return for scheduled/ recommended follow up visits, this note will serve as a discharge from care along with most recent update on progress.

## 2021-09-24 NOTE — PROGRESS NOTES
[facility]   Phone: [phone]    Fax: [phone]      Physical Therapy Treatment Note/ Progress Report:           Date:  2021    Patient Name:  Paulina Dave    :  1950  MRN: [id number]  Restrictions/Precautions:    Medical/Treatment Diagnosis Information:  ·    ·    Insurance/Certification information:     Physician Information:     Has the plan of care been signed (Y/N):        []  Yes  [x]  No     Date of Patient follow up with Physician: ***      Is this a Progress Report:     []  Yes  [x]  No        If Yes:  Date Range for reporting period:  Beginning***  Ending***    Progress report will be due (10 Rx or 30 days whichever is less): ***       Recertification will be due (POC Duration  / 90 days whichever is less): ***         Visit # Insurance Allowable Auth Required   *** *** []  Yes []  No        Functional Scale: ***    Date assessed:  ***      Latex Allergy:  [x]NO      []YES  Preferred Language for Healthcare:   [x]English       []other:      Pain level:  ***/10     SUBJECTIVE:  See eval    OBJECTIVE: See eval   Observation:    Test measurements:      RESTRICTIONS/PRECAUTIONS: ***    Exercises/Interventions:       Therapeutic Ex (04887) Sets/sec Reps Notes/CUES                                                                           Manual Intervention ([id number])                                          NMR re-education (07852)   CUES NEEDED                                                         Therapeutic Activity (44983)                                              Therapeutic Exercise and NMR EXR  [] (04145) Provided verbal/tactile cueing for activities related to strengthening, flexibility, endurance, ROM  for improvements in scapular, scapulothoracic and UE control with self care, reaching, carrying, lifting, house/yardwork, driving/computer work.    [] (48227) Provided verbal/tactile cueing for activities related to improving balance, coordination, kinesthetic sense, posture, motor skill, proprioception  to assist with  scapular, scapulothoracic and UE control with self care, reaching, carrying, lifting, house/yardwork, driving/computer work. Therapeutic Activities:    [] (22758 or 09099) Provided verbal/tactile cueing for activities related to improving balance, coordination, kinesthetic sense, posture, motor skill, proprioception and motor activation to allow for proper function of scapular, scapulothoracic and UE control with self care, carrying, lifting, driving/computer work. Home Exercise Program:    [x] (25043) Reviewed/Progressed HEP activities related to strengthening, flexibility, endurance, ROM of scapular, scapulothoracic and UE control with self care, reaching, carrying, lifting, house/yardwork, driving/computer work  [] (02188) Reviewed/Progressed HEP activities related to improving balance, coordination, kinesthetic sense, posture, motor skill, proprioception of scapular, scapulothoracic and UE control with self care, reaching, carrying, lifting, house/yardwork, driving/computer work      Manual Treatments:  PROM / STM / Oscillations-Mobs:  G-I, II, III, IV (PA's, Inf., Post.)  [] (44516) Provided manual therapy to mobilize soft tissue/joints of cervical/CT, scapular GHJ and UE for the purpose of modulating pain, promoting relaxation,  increasing ROM, reducing/eliminating soft tissue swelling/inflammation/restriction, improving soft tissue extensibility and allowing for proper ROM for normal function with self care, reaching, carrying, lifting, house/yardwork, driving/computer work    Modalities:     [] GAME READY (VASO)- for significant edema, swelling, pain control.     Charges:  Timed Code Treatment Minutes: ***   Total Treatment Minutes: ***      [] EVAL (LOW) 65337 (typically 20 minutes face-to-face)  [] EVAL (MOD) 31055 (typically 30 minutes face-to-face)  [] EVAL (HIGH) 02211 (typically 45 minutes face-to-face)  [] RE-EVAL     [] NL(52878) x     [] IONTO  [] NMR (60075) x     [] VASO  [] Manual (47968) x     [] Other:  [] TA x      [] Mech Traction (31143)  [] ES(attended) (97485)      [] ES (un) (72824):         ASSESSMENT:  See eval      GOALS:  Patient stated goal: ***  [] Progressing: [] Met: [] Not Met: [] Adjusted    Therapist goals for Patient:   Short Term Goals: To be achieved in: 2 weeks  1. Independent in HEP and progression per patient tolerance, in order to prevent re-injury. [] Progressing: [] Met: [] Not Met: [] Adjusted  2. Patient will have a decrease in pain to facilitate improvement in movement, function, and ADLs as indicated by Functional Deficits. [] Progressing: [] Met: [] Not Met: [] Adjusted    Long Term Goals: To be achieved in: *** weeks  1. Disability index score of ***% or less for the DASH to assist with reaching prior level of function. [] Progressing: [] Met: [] Not Met: [] Adjusted  2. Patient will demonstrate increased AROM to *** to allow for proper joint functioning as indicated by patients Functional Deficits. [] Progressing: [] Met: [] Not Met: [] Adjusted  3. Patient will demonstrate an increase in Strength to *** to allow for proper functional mobility as indicated by patients Functional Deficits. [] Progressing: [] Met: [] Not Met: [] Adjusted  4. Patient will return to *** functional activities without increased symptoms or restriction. [] Progressing: [] Met: [] Not Met: [] Adjusted  5. ***(patient specific functional goal)    [] Progressing: [] Met: [] Not Met: [] Adjusted       Overall Progression Towards Functional goals/ Treatment Progress Update:  [] Patient is progressing as expected towards functional goals listed. [] Progression is slowed due to complexities/Impairments listed. [] Progression has been slowed due to co-morbidities.   [x] Plan just implemented, too soon to assess goals progression <30days   [] Goals require adjustment due to lack of progress  [] Patient is not progressing as expected and requires additional follow up with physician  [] Other    Prognosis for POC: [x] Good [] Fair  [] Poor      Patient requires continued skilled intervention: [x] Yes  [] No    Treatment/Activity Tolerance:  [x] Patient able to complete treatment  [] Patient limited by fatigue  [] Patient limited by pain    [] Patient limited by other medical complications  [] Other:           PLAN: See eval  [] Continue per plan of care [] Alter current plan   [x] Plan of care initiated [] Hold pending MD visit [] Discharge      Electronically signed by:  Mehran Bateman PT     Physical Therapist [facility] [phone] [id number]  Physical Therapist [place] License #[id number]    Note: If patient does not return for scheduled/ recommended follow up visits, this note will serve as a discharge from care along with most recent update on progress.

## 2021-09-26 PROBLEM — G56.01 RIGHT CARPAL TUNNEL SYNDROME: Status: ACTIVE | Noted: 2021-09-26

## 2021-09-26 PROBLEM — R29.898 WEAKNESS OF RIGHT HAND: Status: ACTIVE | Noted: 2021-09-26

## 2021-09-30 ENCOUNTER — OFFICE VISIT (OUTPATIENT)
Dept: ORTHOPEDIC SURGERY | Age: 71
End: 2021-09-30

## 2021-09-30 ENCOUNTER — TREATMENT (OUTPATIENT)
Dept: PHYSICAL THERAPY | Age: 71
End: 2021-09-30
Payer: MEDICARE

## 2021-09-30 VITALS — WEIGHT: 268 LBS | HEIGHT: 63 IN | BODY MASS INDEX: 47.48 KG/M2

## 2021-09-30 DIAGNOSIS — G56.01 RIGHT CARPAL TUNNEL SYNDROME: Primary | ICD-10-CM

## 2021-09-30 DIAGNOSIS — R29.898 WEAKNESS OF RIGHT HAND: ICD-10-CM

## 2021-09-30 DIAGNOSIS — R29.898 WEAKNESS OF WRIST: ICD-10-CM

## 2021-09-30 DIAGNOSIS — Z98.890 S/P CARPAL TUNNEL RELEASE: Primary | ICD-10-CM

## 2021-09-30 PROCEDURE — 97530 THERAPEUTIC ACTIVITIES: CPT | Performed by: PHYSICAL THERAPIST

## 2021-09-30 PROCEDURE — 97110 THERAPEUTIC EXERCISES: CPT | Performed by: PHYSICAL THERAPIST

## 2021-09-30 PROCEDURE — G8427 DOCREV CUR MEDS BY ELIG CLIN: HCPCS | Performed by: PHYSICAL THERAPIST

## 2021-09-30 PROCEDURE — 99024 POSTOP FOLLOW-UP VISIT: CPT | Performed by: ORTHOPAEDIC SURGERY

## 2021-09-30 NOTE — PROGRESS NOTES
()  []  Patient refused to participate   []  Severe mental or physical capacity, patient is in urgent emergent situation and to complete the questionnaire would jeopardize the patient's health status          Functional Questionnaire (9/23/21)  [x]  Patient completed the functional outcome questionnaire and deficiencies were addressed in the plan of care. ()   []  Patient completed the outcome questionnaire and there were no functional deficits identified and therefore no plan of care is required. ()   []  Patient did not complete the functional outcome questionnaire and the reason why is documented in the medical chart. ()  []  Patient refused to participate   []  Patient unable to complete the questionnaire   []   A functional outcome assessment has been reported on within the last 30 days          Falls (9/23/21)  [x]  The patient has had no falls or 1 fall without injury in the past year. (1101F)   []  There is no documentation of fall in the medical chart (1101F,8P)     [] The patient has had 2 or more falls or one fall with injury in the past year that is documented in the medical chart. (1100F)  []  A falls risk assessment was completed and documented in the medical chart. (5130B)   []  Falls were addressed in the plan of care. (4573P)   []  A falls risk assessment was not completed and documented in the medical chart (7447B,2W)   []   Falls were not addressed in the plan of care and reason was documented in the plan of care. (2447B 1P)          Medication (9/23/21)     [x] A list of current medications (including prescription, over the counter, herbal supplements, vitamin supplements, mineral supplements, dietary supplements) was reviewed with the patient and documented in the medical chart. ()       Falls Risk Assessment (30 days): (9/23/21)  [x]? Falls Risk assessed and no intervention required. []?  Falls Risk assessed and Patient requires intervention due to being higher risk TUG score (>12s at risk):     []? Falls education provided, including      PQRS:   Reviewed medication list with patient. Pt has started taking Vicodin and Advil for pain. (9/30/21)      Latex Allergy:  [x]NO      []YES  Preferred Language for Healthcare:   [x]English       []other:      Pain level:  2/10 up to 10/10 (9/30/21)   Medication:  Vicodin and Advil      SUBJECTIVE:  Pt states she is having a lot of pain in her right hand and fingers that extends up into her forearm. Pain can be sharp at times. Pain ranges between 2-10/10 and she has been taking Vicodin 2x/day and Advil. She continues to have numbness and tingling in her right hand. Getting dressed is very painful, so much so that is almost brings her to tears. Eating (holding a utensil) is also very painful. She is sleeping with her right arm supported on a pillow. Her hand is very stiff in the morning, especially her middle finger. She is icing. She is seeing Dr. Matty Flores today after therapy.          (10 days post-op)      OBJECTIVE:      *Pt is right hand dominant    (9/30/21)  ROM PROM AROM Comments    Left Right Left Right    Elbow flexion   135 135    Elbow extension   0 0    Pronation   90 90    Supination   75 60    Wrist flexion   70 65    Wrist extension   70 65    Wrist radial deviation        Wrist ulnar deviation          (9/23/21)  Special Tests Left Right Comments   Cozens  Not    Tinels  Tested    Phalens  Due to    Piano Key Sign  Sx                  (9/23/21)  Strength Left Right Comments   Elbow flexion 4+/5 Not    Elbow extension 4+/5 Tested    Pronation 4+/5 Due to    Supination 4+/5 Sx    Wrist flexion 4+/5     Wrist extension 4+/5     Wrist radial deviation      Wrist ulnar deviation        (9/23/21)   Left Right Comments   Level 1  Not    Level 2  Tested    Level 3  Due to    Level 4  Sx    Level 5      other          Reflexes/Sensation: (9/23/21)   []Dermatomes/Myotomes intact    []Reflexes equal and normal bilaterally   [x]Other: Intact to light touch    Joint mobility: NA due to surgery (9/23/21)   []Normal    []Hypo   []Hyper    Palpation: General tenderness around incision site. Bruising is resolving but swelling still present right wrist and hand. (9/30/21)     Functional Mobility/Transfers: Difficulty with ADL's and self-care act due to recent surgery and post-op bandage. Buckling her seatbelt is also difficult. Pt does admit she is doing more than she should with her right hand due to being RHD.  (9/23/21)    Posture: Forward head, rounded shoulders.  (9/23/21)    Bandages/Dressings/Incisions:  Incision is clean and dry with no s/s of infection. Sutures are in place. (9/30/21)    Gait: (include devices/WB status): Gait is mildly antalgic, no AD.   She demonstrates decreased pawel and step length (pt is also doing therapy at this facility for her left knee). (9/23/21)    Orthopedic Special Tests: See above        RESTRICTIONS/PRECAUTIONS: S/P right CTR (Sx: 9/20/21)    Exercises/Interventions:       Therapeutic Ex (76927) Sets/sec Reps Notes/CUES   AD UE BIKE            STRETCHING      Elbow extension      Elbow flexion      Wrist flex 20\" hold X 5    Wrist ext      Supination 20\" hold X 5    Pronation      Radial Deviation      Ulnar Deviation                  AROM      Thumb Opposition to all Fingers  X 15    Finger MP Flexion AROM  X 15    Claw  X 15    Finger Abd/Add  X 15 Hand on table         Wrist Flex/Ext 2 sets X 15    Pron/Sup 2 sets X 15    RD 2 sets X 15                STRENGTHENING-PREs      Biceps      Triceps      Wrist flexion      Wrist extension      Supination      Pronation      Radial Deviation      Ulnar Deviation            Gripping  X 20 Pink ball (gentle)   RB Finger Extension            THERABANDS      Rows      Lats      Biceps      Triceps      Internal Rotation      External Rotation                              Manual Intervention (45819)      Scar Massage      STM      Hawkgrips Elbow joint mobilizations      Foamroll      Manual PROM Right Wrist  X 5'          NMR re-education (23915)   CUES NEEDED   Plyoback      Therabar oscillations      Body Blade       Rhythmic Stabilization                                    Therapeutic Activity (36955)      Core training      Swiss ball activities                  Patient Education: Dx, prognosis, pt goals/expectations, rehab timeline, wound care (9/23/21)  X 8'                          Therapeutic Exercise and NMR EXR  [x] (72658) Provided verbal/tactile cueing for activities related to strengthening, flexibility, endurance, ROM  for improvements in scapular, scapulothoracic and UE control with self care, reaching, carrying, lifting, house/yardwork, driving/computer work. [x] (15037) Provided verbal/tactile cueing for activities related to improving balance, coordination, kinesthetic sense, posture, motor skill, proprioception  to assist with  scapular, scapulothoracic and UE control with self care, reaching, carrying, lifting, house/yardwork, driving/computer work. Therapeutic Activities:    [x] (79953 or 03598) Provided verbal/tactile cueing for activities related to improving balance, coordination, kinesthetic sense, posture, motor skill, proprioception and motor activation to allow for proper function of scapular, scapulothoracic and UE control with self care, carrying, lifting, driving/computer work. Home Exercise Program:    [x] (79968) Reviewed/Progressed HEP activities related to strengthening, flexibility, endurance, ROM of scapular, scapulothoracic and UE control with self care, reaching, carrying, lifting, house/yardwork, driving/computer work - Instructed pt in a HEP.   Pt declined handouts due to already being familiar with the exercises (pt underwent left carpal tunnel release on 8/16/21). (9/23/21)  [] (70019) Reviewed/Progressed HEP activities related to improving balance, coordination, kinesthetic sense, posture, motor skill, proprioception of scapular, scapulothoracic and UE control with self care, reaching, carrying, lifting, house/yardwork, driving/computer work      Manual Treatments:  PROM / STM / Oscillations-Mobs:  G-I, II, III, IV (PA's, Inf., Post.)  [] (02984) Provided manual therapy to mobilize soft tissue/joints of cervical/CT, scapular GHJ and UE for the purpose of modulating pain, promoting relaxation,  increasing ROM, reducing/eliminating soft tissue swelling/inflammation/restriction, improving soft tissue extensibility and allowing for proper ROM for normal function with self care, reaching, carrying, lifting, house/yardwork, driving/computer work    Modalities:  ICE x 10'   [] GAME READY (VASO)- for significant edema, swelling, pain control. Charges:  Timed Code Treatment Minutes: 40'   Total Treatment Minutes: 48'      [] EVAL (LOW) 07582 (typically 20 minutes face-to-face)   [] EVAL (MOD) 89232 (typically 30 minutes face-to-face)  [] EVAL (HIGH) 64408 (typically 45 minutes face-to-face)  [] RE-EVAL     [x] BF(13160) x 2 (30') [] IONTO  [] NMR (45355) x     [] VASO  [] Manual (26392) x     [] Other:  [x] TA x 1 (10')     [] Mech Traction (93112)  [] ES(attended) (80535)     [] ES (un) (91673):         ASSESSMENT:  Pt tolerated the exercises well today despite high pain levels. Added wrist AROM and gentle gripping with mild fatigue noted with gripping. ROM at the right wrist improved today but she is still tight with supination. Her incision looks good with no s/s of infection. Pt is still having quite a bit of pain, numbness, and tingling in her right hand following surgery and will follow-up with Dr. Alison Edwards today. MEDICARE CAP EXCEPTION DOCUMENTATION        I certify that this patient meets one of the below criteria necessary for becoming an exception to the Medicare cap on therapy services:     []?   The patient has a condition identified by an ICD-9 code that has a direct and significant impact on the need for therapy. (Significantly impacts the rate of recovery.)                                         []?  The patient has a complexity identified by an ICD-9 code that has a direct and significant impact on the need for therapy. (Significantly impacts the rate of recovery and is associated with a primary condition.)                               []?  The patient has associated variables that influence the amount of treatment to include:  Social support, self-efficacy/motivation, prognosis, time since onset/acuity. []? The patient has generalized musculoskeletal conditions or a condition affecting multiple sites that will have a direct impact on the rate of recovery.     [x]? The patient had a prior episode of outpatient therapy during this calendar year for a different condition. []?  The patient has a mental or cognitive disorder in addition to the condition being treated that will have a direct and significant impact on the rate of recovery.                       GOALS: (Goals made 9/23/21)  Patient stated goal: \"Get back to living\"  [] Progressing: [] Met: [] Not Met: [] Adjusted    Therapist goals for Patient:   Short Term Goals: To be achieved in: 2 weeks  1. Independent in HEP and progression per patient tolerance, in order to prevent re-injury. [] Progressing: [] Met: [] Not Met: [] Adjusted  2. Patient will have a decrease in right wrist/hand pain to 1/10 to facilitate improvement in movement, function, and ADLs as indicated by Functional Deficits. [] Progressing: [] Met: [] Not Met: [] Adjusted    Long Term Goals: To be achieved in: 4 weeks  1. Disability index score of 15% or less for the UEFI to assist with reaching prior level of function. [] Progressing: [] Met: [] Not Met: [] Adjusted  2.  Patient will demonstrate an increase in right wrist AROM to at least 70 degrees flexion and extension and 85 degrees supination to allow for proper joint functioning as indicated by patients Functional Deficits. [] Progressing: [] Met: [] Not Met: [] Adjusted  3. Patient will demonstrate an increase in right wrist/hand strength to 5/5 to allow for proper functional mobility as indicated by patients Functional Deficits. [] Progressing: [] Met: [] Not Met: [] Adjusted  4. Patient will be able to perform fine motor activities, such as gripping, opening jars, putting on jewelry, and turning a door handle, without increased symptoms or restriction  [] Progressing: [] Met: [] Not Met: [] Adjusted  5. Patient will be able to perform all ADL's and self care act with no pain, numbness, or tingling in her right hand/wrist   (patient specific functional goal)    [] Progressing: [] Met: [] Not Met: [] Adjusted         Overall Progression Towards Functional goals/ Treatment Progress Update:  [] Patient is progressing as expected towards functional goals listed. [] Progression is slowed due to complexities/Impairments listed. [] Progression has been slowed due to co-morbidities. [x] Plan just implemented, too soon to assess goals progression <30days   [] Goals require adjustment due to lack of progress  [] Patient is not progressing as expected and requires additional follow up with physician  [] Other    Prognosis for POC: [x] Good [] Fair  [] Poor      Patient requires continued skilled intervention: [x] Yes  [] No    Treatment/Activity Tolerance:  [x] Patient able to complete treatment  [] Patient limited by fatigue  [] Patient limited by pain    [] Patient limited by other medical complications  [] Other:           PLAN: Pt will follow-up with Dr. Jennie Nagel today after therapy.     1x/week for 4 weeks for ROM, strengthening, manual therapy as needed, HEP instruction, pt education, and modalities prn. (9/23/21)   [x] Continue per plan of care [] Alter current plan   [] Plan of care initiated [] Hold pending MD visit [] Discharge      Electronically signed by:  Amy Beck, [id number] #[id number]  Physical Therapist [place] License #[id number]    Note: If patient does not return for scheduled/ recommended follow up visits, this note will serve as a discharge from care along with most recent update on progress.

## 2021-09-30 NOTE — PROGRESS NOTES
History of Present Illness: Charles Lombardi is a 66 y.o. female who presents for a post operative visit. The patient underwent a right carpal tunnel release on 9/20/2021 by Dr. Earline Maloney. Patient reports she is still having numbness and tingling in the hand. She is having pain associated with it. The patient denies any fevers, chills, numbness, tingling, and shortness of breath. Medical History:  Patient's medications, allergies, past medical, surgical, social and family histories were reviewed and updated as appropriate. Patient has had no medical changes since last evaluated        Review of Systems  A 14 point review of systems was completed by the patient is available in the media section of the scanned medical record and was reviewed on 9/30/2021. Vital Signs: There were no vitals filed for this visit. General/Appearance: Alert and oriented and in no apparent distress. Skin:  There are no skin lesions, cellulitis, or extreme edema. The patient has warm and well-perfused Bilateral upper extremities with brisk capillary refill. Right hand Exam:    Inspection: right hand incision that is clean, dry and intact and well approximated. The sutures are still in place. Mild ecchymosis and swelling are present as can be expected. There is no erythema, drainage or other signs of infection    Palpation:mild tenderness over the hand. Active ROM: fully preserved. Strength:  Deferred    Special Tests:  Deferred. Neurovascular: Sensation to light touch is intact, no motor deficits, palpable radial pulses 2+    Radiology:     Plain radiographs not obtained. Assessment :  Ms. Charles Lombardi is a 66 y.o. patient who underwent right open carpal tunnel release last week. She still has numbness but there are no motor deficits. Impression:  Encounter Diagnosis   Name Primary?     S/P carpal tunnel release Yes       Office Procedures:  No orders of the defined types were placed in this encounter. Treatment Plan:    Overall Jace Arteaga is doing well despite the persistent symptoms. The postoperative pain is well-controlled. We will watch for improvements in numbness and tingling. She should get started in physical therapy. We will give a print out of their physical therapy order. All of her questions were fully answered today. We would like to see Jace Arteaga back in 2 weeks for follow-up visit. [unfilled]  2:46 PM      Jason Xiao PA-C  Orthopaedic Sports Medicine Physician Assistant    During this examination, Jason ORANTES PA-C, functioned as a scribe for Dr. David Paige. This dictation was performed with a verbal recognition program (DRAGON) and it was checked for errors. It is possible that there are still dictated errors within this office note. If so, please bring any errors to my attention for an addendum. All efforts were made to ensure that this office note is accurate.  _____________  I, Dr. David Paige, personally performed the services described in this documentation as described by Jason Xiao PA-C in my presence, and it is both accurate and complete. Janel Flores MD, PhD  9/30/2021

## 2021-10-05 ENCOUNTER — TREATMENT (OUTPATIENT)
Dept: PHYSICAL THERAPY | Age: 71
End: 2021-10-05
Payer: MEDICARE

## 2021-10-05 DIAGNOSIS — R29.898 WEAKNESS OF RIGHT HAND: ICD-10-CM

## 2021-10-05 DIAGNOSIS — G56.01 RIGHT CARPAL TUNNEL SYNDROME: Primary | ICD-10-CM

## 2021-10-05 DIAGNOSIS — R29.898 WEAKNESS OF WRIST: ICD-10-CM

## 2021-10-05 PROCEDURE — 97530 THERAPEUTIC ACTIVITIES: CPT | Performed by: PHYSICAL THERAPIST

## 2021-10-05 PROCEDURE — 97110 THERAPEUTIC EXERCISES: CPT | Performed by: PHYSICAL THERAPIST

## 2021-10-05 PROCEDURE — G8427 DOCREV CUR MEDS BY ELIG CLIN: HCPCS | Performed by: PHYSICAL THERAPIST

## 2021-10-05 NOTE — PROGRESS NOTES
[facility]   Phone: [phone]    Fax: [phone]      Physical Therapy Treatment Note/ Progress Report:           Date:  10/5/2021    Patient Name:  Billy Kulkarni    :  1950  MRN: [id number]  Restrictions/Precautions:    Medical/Treatment Diagnosis Information:  · Diagnosis: S/P Right Carpal Tunnel Release (Sx: 21) (G56.01)  ·  Treatment Diagnosis: Weakness of Right Wrist and Hand (A88.349)   Insurance/Certification information:  PT Insurance Information: Medicare, [place] - visits based on medical necessity  Physician Information:  Referring Practitioner: Dr. Jordi Fung  Has the plan of care been signed (Y/N):        [x]  Yes (POC signed 21)  []  No     Date of Patient follow up with Physician: 10/21/21      Is this a Progress Report:     []  Yes  [x]  No        If Yes:  Date Range for reporting period:  Beginning 21  Ending 21    Progress report will be due (10 Rx or 30 days whichever is less):        Recertification will be due (POC Duration  / 90 days whichever is less): 10/21/21        Visit # Insurance Allowable Auth Required   3 Medical necessity   (Use Kx Modifier due to pt having prior PT this year) []  Yes [x]  No        Functional Scale:                                          Date assessed:  21                     Functional Assessment Tool Used:  UEFI (copy scanned into media)  Score:  880 = 10% (90% functional deficit)        (21)  Patient Age: 66years old  Patient Height: 5' 2\"  Patient Weight: 280 lbs  Patient BMI: 51.2      Pain (21)  [x]  Pain diagram was completed, pain was present and a follow up plan to address the pain is in the plan of care. ()   []  Pain diagram was completed and there was no pain present and therefore no follow up plan to address pain in the plan of care.  ()   []  Pain diagram was not completed and the reason for not completing the pain diagram is in the medical risk   TUG score (>12s at risk):     []? Falls education provided, including      PQRS:   Reviewed medication list with patient. Pt is taking Advil as needed. (10/5/21)      Latex Allergy:  [x]NO      []YES  Preferred Language for Healthcare:   [x]English       []other:      Pain level:  2/10 up to 7/10 (10/5/21)   Medication:  Advil as needed      SUBJECTIVE:  Pt states her pain is a little better this week but can still reach 7/10. She continues to c/o numbness and tingling in her right hand and fingers. Her hand is very stiff and painful in the morning when she first gets up. Most of her pain is in her middle finger. Pt does admit she is using her right hand more than she should at home due to being right hand dominant. Taking clothes from the washer and putting them in the dryer is painful. She has also been on her ipad a lot and uses primarily her right hand. She stopped taking Vicodin due to it making her sleepy but she is still taking Advil as needed.            (2 weeks post-op)      OBJECTIVE:      *Pt is right hand dominant    (10/5/21)  ROM PROM AROM Comments    Left Right Left Right    Elbow flexion   135 135    Elbow extension   0 0    Pronation   90 90    Supination   75 65    Wrist flexion   70 70    Wrist extension   70 70    Wrist radial deviation        Wrist ulnar deviation          (9/23/21)  Special Tests Left Right Comments   Cozens  Not    Tinels  Tested    Phalens  Due to    Piano Key Sign  Sx                  (9/23/21)  Strength Left Right Comments   Elbow flexion 4+/5 Not    Elbow extension 4+/5 Tested    Pronation 4+/5 Due to    Supination 4+/5 Sx    Wrist flexion 4+/5     Wrist extension 4+/5     Wrist radial deviation      Wrist ulnar deviation        (9/23/21)   Left Right Comments   Level 1  Not    Level 2  Tested    Level 3  Due to    Level 4  Sx    Level 5      other          Reflexes/Sensation: (9/23/21)   []Dermatomes/Myotomes intact    []Reflexes equal and normal bilaterally   [x]Other: Intact to light touch    Joint mobility: NA due to surgery (9/23/21)   []Normal    []Hypo   []Hyper    Palpation: General tenderness around incision site. Bruising is resolving but swelling still present right wrist and hand. (9/30/21)     Functional Mobility/Transfers: Difficulty with ADL's and self-care act due to recent surgery and post-op bandage. Buckling her seatbelt is also difficult. Pt does admit she is doing more than she should with her right hand due to being RHD.  (9/23/21)    Posture: Forward head, rounded shoulders.  (9/23/21)    Bandages/Dressings/Incisions:  Incision is clean and dry with no s/s of infection. Sutures are in place. (9/30/21)    Gait: (include devices/WB status): Gait is mildly antalgic, no AD.   She demonstrates decreased pawel and step length (pt is also doing therapy at this facility for her left knee). (9/23/21)    Orthopedic Special Tests: See above        RESTRICTIONS/PRECAUTIONS: S/P right CTR (Sx: 9/20/21)    Exercises/Interventions:       Therapeutic Ex (73168) Sets/sec Reps Notes/CUES   AD UE BIKE            STRETCHING      Elbow extension      Elbow flexion      Wrist flex 20\" hold X 5    Wrist ext      Supination 20\" hold X 5    Pronation      Radial Deviation      Ulnar Deviation                  AROM      Thumb Opposition to all Fingers  X 15    Finger MP Flexion AROM  X 15    Claw  X 15    Finger Abd/Add  X 15 Hand on table         Wrist Flex/Ext 2 sets X 15    Pron/Sup 2 sets X 15    RD 2 sets X 15          Elbow AROM 2 sets X 15          STRENGTHENING-PREs      Biceps      Triceps      Wrist flexion      Wrist extension      Supination      Pronation      Radial Deviation      Ulnar Deviation            Gripping  X 20 Pink ball (gentle)   RB Finger Extension  X 30          THERABANDS      Rows      Lats      Biceps      Triceps      Internal Rotation      External Rotation                              Manual Intervention (97045)      Scar Massage      STM      Hawkgrips      Elbow joint mobilizations      Foamroll      Manual PROM Right Wrist  X 5'          NMR re-education (62169)   CUES NEEDED   Plyoback      Therabar oscillations      Body Blade       Rhythmic Stabilization                                    Therapeutic Activity (91296)      Core training      Swiss ball activities                  Patient Education: Dx, prognosis, pt goals/expectations, rehab timeline, wound care (9/23/21)  X 8'                          Therapeutic Exercise and NMR EXR  [x] (53831) Provided verbal/tactile cueing for activities related to strengthening, flexibility, endurance, ROM  for improvements in scapular, scapulothoracic and UE control with self care, reaching, carrying, lifting, house/yardwork, driving/computer work. [x] (38863) Provided verbal/tactile cueing for activities related to improving balance, coordination, kinesthetic sense, posture, motor skill, proprioception  to assist with  scapular, scapulothoracic and UE control with self care, reaching, carrying, lifting, house/yardwork, driving/computer work. Therapeutic Activities:    [x] (55936 or 57607) Provided verbal/tactile cueing for activities related to improving balance, coordination, kinesthetic sense, posture, motor skill, proprioception and motor activation to allow for proper function of scapular, scapulothoracic and UE control with self care, carrying, lifting, driving/computer work. Home Exercise Program:    [x] (26858) Reviewed/Progressed HEP activities related to strengthening, flexibility, endurance, ROM of scapular, scapulothoracic and UE control with self care, reaching, carrying, lifting, house/yardwork, driving/computer work - Instructed pt in a HEP.   Pt declined handouts due to already being familiar with the exercises (pt underwent left carpal tunnel release on 8/16/21). (9/23/21)  [] (18909) Reviewed/Progressed HEP activities related to improving balance, coordination, direct and significant impact on the need for therapy. (Significantly impacts the rate of recovery.)                                         []?  The patient has a complexity identified by an ICD-9 code that has a direct and significant impact on the need for therapy. (Significantly impacts the rate of recovery and is associated with a primary condition.)                               []?  The patient has associated variables that influence the amount of treatment to include:  Social support, self-efficacy/motivation, prognosis, time since onset/acuity. []? The patient has generalized musculoskeletal conditions or a condition affecting multiple sites that will have a direct impact on the rate of recovery.     [x]? The patient had a prior episode of outpatient therapy during this calendar year for a different condition. []?  The patient has a mental or cognitive disorder in addition to the condition being treated that will have a direct and significant impact on the rate of recovery.                       GOALS: (Goals made 9/23/21)  Patient stated goal: \"Get back to living\"  [] Progressing: [] Met: [] Not Met: [] Adjusted    Therapist goals for Patient:   Short Term Goals: To be achieved in: 2 weeks  1. Independent in HEP and progression per patient tolerance, in order to prevent re-injury. [] Progressing: [] Met: [] Not Met: [] Adjusted  2. Patient will have a decrease in right wrist/hand pain to 1/10 to facilitate improvement in movement, function, and ADLs as indicated by Functional Deficits. [] Progressing: [] Met: [] Not Met: [] Adjusted    Long Term Goals: To be achieved in: 4 weeks  1. Disability index score of 15% or less for the UEFI to assist with reaching prior level of function. [] Progressing: [] Met: [] Not Met: [] Adjusted  2.  Patient will demonstrate an increase in right wrist AROM to at least 70 degrees flexion and extension and 85 degrees supination to allow for proper joint functioning as indicated by patients Functional Deficits. [] Progressing: [] Met: [] Not Met: [] Adjusted  3. Patient will demonstrate an increase in right wrist/hand strength to 5/5 to allow for proper functional mobility as indicated by patients Functional Deficits. [] Progressing: [] Met: [] Not Met: [] Adjusted  4. Patient will be able to perform fine motor activities, such as gripping, opening jars, putting on jewelry, and turning a door handle, without increased symptoms or restriction  [] Progressing: [] Met: [] Not Met: [] Adjusted  5. Patient will be able to perform all ADL's and self care act with no pain, numbness, or tingling in her right hand/wrist   (patient specific functional goal)    [] Progressing: [] Met: [] Not Met: [] Adjusted         Overall Progression Towards Functional goals/ Treatment Progress Update:  [] Patient is progressing as expected towards functional goals listed. [] Progression is slowed due to complexities/Impairments listed. [] Progression has been slowed due to co-morbidities. [x] Plan just implemented, too soon to assess goals progression <30days   [] Goals require adjustment due to lack of progress  [] Patient is not progressing as expected and requires additional follow up with physician  [] Other    Prognosis for POC: [x] Good [] Fair  [] Poor      Patient requires continued skilled intervention: [x] Yes  [] No    Treatment/Activity Tolerance:  [x] Patient able to complete treatment  [] Patient limited by fatigue  [] Patient limited by pain    [] Patient limited by other medical complications  [] Other:           PLAN: Continue therapy progressing per pt tolerance. Pt will follow-up with Dr. Tran Yuen again on 10/21/21.       1x/week for 4 weeks for ROM, strengthening, manual therapy as needed, HEP instruction, pt education, and modalities prn. (9/23/21)   [x] Continue per plan of care [] Alter current plan   [] Plan of care

## 2021-10-12 ENCOUNTER — TREATMENT (OUTPATIENT)
Dept: PHYSICAL THERAPY | Age: 71
End: 2021-10-12
Payer: MEDICARE

## 2021-10-12 DIAGNOSIS — R29.898 WEAKNESS OF WRIST: ICD-10-CM

## 2021-10-12 DIAGNOSIS — R29.898 WEAKNESS OF RIGHT HAND: ICD-10-CM

## 2021-10-12 DIAGNOSIS — G56.01 RIGHT CARPAL TUNNEL SYNDROME: Primary | ICD-10-CM

## 2021-10-12 PROCEDURE — G8427 DOCREV CUR MEDS BY ELIG CLIN: HCPCS | Performed by: PHYSICAL THERAPIST

## 2021-10-12 PROCEDURE — 97530 THERAPEUTIC ACTIVITIES: CPT | Performed by: PHYSICAL THERAPIST

## 2021-10-12 PROCEDURE — 97110 THERAPEUTIC EXERCISES: CPT | Performed by: PHYSICAL THERAPIST

## 2021-10-12 NOTE — PROGRESS NOTES
[facility]   Phone: [phone]    Fax: [phone]      Physical Therapy Treatment Note/ Progress Report:           Date:  10/12/2021    Patient Name:  Ernestina Chris    :  1950  MRN: [id number]  Restrictions/Precautions:    Medical/Treatment Diagnosis Information:  · Diagnosis: S/P Right Carpal Tunnel Release (Sx: 21) (G56.01)  ·  Treatment Diagnosis: Weakness of Right Wrist and Hand (N35.021)   Insurance/Certification information:  PT Insurance Information: Medicare, [place] - visits based on medical necessity  Physician Information:  Referring Practitioner: Dr. Syeda López  Has the plan of care been signed (Y/N):        [x]  Yes (POC signed 21)  []  No     Date of Patient follow up with Physician: 10/21/21      Is this a Progress Report:     []  Yes  [x]  No        If Yes:  Date Range for reporting period:  Beginning 21  Ending 21    Progress report will be due (10 Rx or 30 days whichever is less):        Recertification will be due (POC Duration  / 90 days whichever is less): 10/21/21        Visit # Insurance Allowable Auth Required   4 Medical necessity   (Use Kx Modifier due to pt having prior PT this year) []  Yes [x]  No        Functional Scale:                                          Date assessed:  21                     Functional Assessment Tool Used:  UEFI (copy scanned into media)  Score:  880 = 10% (90% functional deficit)        (21)  Patient Age: 66years old  Patient Height: 5' 2\"  Patient Weight: 280 lbs  Patient BMI: 51.2      Pain (21)  [x]  Pain diagram was completed, pain was present and a follow up plan to address the pain is in the plan of care. ()   []  Pain diagram was completed and there was no pain present and therefore no follow up plan to address pain in the plan of care.  ()   []  Pain diagram was not completed and the reason for not completing the pain diagram is in the medical risk   TUG score (>12s at risk):     []? Falls education provided, including      PQRS:   Reviewed medication list with patient. Pt has stopped taking Advil.  (10/12/21)      Latex Allergy:  [x]NO      []YES  Preferred Language for Healthcare:   [x]English       []other:      Pain level:  2/10 up to 7/10 (10/12/21)   Medication:  Nothing for her hand      SUBJECTIVE:  Pt reports no change in right hand symptoms. She continues to experience shooting pain, numbness, and tingling in her all her fingers, greatest in her thumb, second, and third finger. Pt states her hand feels worse now than before surgery. She is trying not to use her right hand as much with daily activities but when she does, she feels shooting pain. She is having a lot of difficulty gripping things with her right hand. Her right hand is very stiff in the morning when she first wakes up. She is icing her hand, usually once a day, and it helps. She has an appt with Dr. Bozena Luna next week.              (3 weeks post-op)      OBJECTIVE:      *Pt is right hand dominant    (10/12/21)  ROM PROM AROM Comments    Left Right Left Right    Elbow flexion   135 135    Elbow extension   0 0    Pronation   90 90    Supination   75 70    Wrist flexion   70 70    Wrist extension   70 70    Wrist radial deviation        Wrist ulnar deviation          (9/23/21)  Special Tests Left Right Comments   Cozens  Not    Tinels  Tested    Phalens  Due to    Piano Key Sign  Sx                  (9/23/21)  Strength Left Right Comments   Elbow flexion 4+/5 Not    Elbow extension 4+/5 Tested    Pronation 4+/5 Due to    Supination 4+/5 Sx    Wrist flexion 4+/5     Wrist extension 4+/5     Wrist radial deviation      Wrist ulnar deviation        (9/23/21)   Left Right Comments   Level 1  Not    Level 2  Tested    Level 3  Due to    Level 4  Sx    Level 5      other          Reflexes/Sensation: (9/23/21)   []Dermatomes/Myotomes intact    []Reflexes equal and normal bilaterally   [x]Other: Intact to light touch    Joint mobility: NA due to surgery (9/23/21)   []Normal    []Hypo   []Hyper    Palpation: General tenderness around incision site. (10/12/21)     Functional Mobility/Transfers: Difficulty with ADL's and self-care act due to recent surgery and post-op bandage. Buckling her seatbelt is also difficult. Pt does admit she is doing more than she should with her right hand due to being RHD.  (9/23/21)    Posture: Forward head, rounded shoulders.  (9/23/21)    Bandages/Dressings/Incisions:  Incision is healing well but is still not completely closed. (10/12/21)    Gait: (include devices/WB status): Gait is mildly antalgic, no AD.   She demonstrates decreased pawel and step length (pt is also doing therapy at this facility for her left knee). (9/23/21)    Orthopedic Special Tests: See above        RESTRICTIONS/PRECAUTIONS: S/P right CTR (Sx: 9/20/21)    Exercises/Interventions:       Therapeutic Ex (11138) Sets/sec Reps Notes/CUES   AD UE BIKE            STRETCHING      Elbow extension      Elbow flexion      Wrist flex 20\" hold X 5    Wrist ext 20\" hold X 5    Supination 20\" hold X 5    Pronation      Radial Deviation      Ulnar Deviation                  AROM      Thumb Opposition to all Fingers  X 15    Finger MP Flexion AROM  X 15    Claw  X 15    Finger Abd/Add  X 15 Hand on table         Wrist Flex/Ext 2 sets X 15    Pron/Sup 2 sets X 15    RD 2 sets X 15          Elbow AROM 2 sets X 15          STRENGTHENING-PREs      Biceps 3 sets X 10 1#   Triceps      Wrist flexion 3 sets X 10 1#   Wrist extension 3 sets X 10 1#   Supination 3 sets X 10 1#   Pronation 3 sets X 10 1#   Radial Deviation 3 sets X 10 1#   Ulnar Deviation            Gripping  X 30 Pink ball (gentle)   RB Finger Extension  X 30          THERABANDS      Rows      Lats      Biceps      Triceps      Internal Rotation      External Rotation                              Manual Intervention (05377)      Scar Massage      STM      Hawkgrips      Elbow joint mobilizations      Foamroll      Manual PROM Right Wrist  X 5'          NMR re-education (25380)   CUES NEEDED   Plyoback      Therabar oscillations      Body Blade       Rhythmic Stabilization                                    Therapeutic Activity (41477)      Core training      Swiss ball activities                  Patient Education: Dx, prognosis, pt goals/expectations, rehab timeline, wound care (9/23/21)  X 8'                          Therapeutic Exercise and NMR EXR  [x] (40828) Provided verbal/tactile cueing for activities related to strengthening, flexibility, endurance, ROM  for improvements in scapular, scapulothoracic and UE control with self care, reaching, carrying, lifting, house/yardwork, driving/computer work. [x] (86354) Provided verbal/tactile cueing for activities related to improving balance, coordination, kinesthetic sense, posture, motor skill, proprioception  to assist with  scapular, scapulothoracic and UE control with self care, reaching, carrying, lifting, house/yardwork, driving/computer work. Therapeutic Activities:    [x] (46669 or 58405) Provided verbal/tactile cueing for activities related to improving balance, coordination, kinesthetic sense, posture, motor skill, proprioception and motor activation to allow for proper function of scapular, scapulothoracic and UE control with self care, carrying, lifting, driving/computer work. Home Exercise Program:    [x] (70233) Reviewed/Progressed HEP activities related to strengthening, flexibility, endurance, ROM of scapular, scapulothoracic and UE control with self care, reaching, carrying, lifting, house/yardwork, driving/computer work - Reviewed/updated HEP with pt. Pt declined handouts due to already being familiar with the exercises (pt underwent left carpal tunnel release on 8/16/21).  (10/12/21)  [] (74874) Reviewed/Progressed HEP activities related to improving balance, coordination, kinesthetic sense, posture, motor skill, proprioception of scapular, scapulothoracic and UE control with self care, reaching, carrying, lifting, house/yardwork, driving/computer work      Manual Treatments:  PROM / STM / Oscillations-Mobs:  G-I, II, III, IV (PA's, Inf., Post.)  [] (51562) Provided manual therapy to mobilize soft tissue/joints of cervical/CT, scapular GHJ and UE for the purpose of modulating pain, promoting relaxation,  increasing ROM, reducing/eliminating soft tissue swelling/inflammation/restriction, improving soft tissue extensibility and allowing for proper ROM for normal function with self care, reaching, carrying, lifting, house/yardwork, driving/computer work    Modalities:  ICE x 10'   [] GAME READY (VASO)- for significant edema, swelling, pain control. Charges:  Timed Code Treatment Minutes: 45'   Total Treatment Minutes: 54'      [] EVAL (LOW) 94189 (typically 20 minutes face-to-face)   [] EVAL (MOD) 20186 (typically 30 minutes face-to-face)  [] EVAL (HIGH) 32525 (typically 45 minutes face-to-face)  [] RE-EVAL     [x] VM(46219) x 2 (30') [] IONTO  [] NMR (82623) x     [] VASO  [] Manual (68331) x     [] Other:  [x] TA x 1 (15')     [] Mech Traction (79239)  [] ES(attended) (86788)     [] ES (un) (38043):         ASSESSMENT:  Progressed exercises as above. Initiated gentle strengthening for the right wrist with 1# wt which she tolerated well with no increase in symptoms. Also added bicep curl with 1# wt. She demonstrates good ROM at the right wrist but still has some tightness with supination. No change in pain, numbness, or tingling in right hand following right CTR. In fact, pt feels her symptoms are worse now than before surgery. She will follow-up with Dr. Jag Palma next week. Continued to stress activity modification with pt and the importance of icing.              MEDICARE CAP EXCEPTION DOCUMENTATION        I certify that this patient meets one of the below criteria necessary for becoming an exception to the Medicare cap on therapy services:     []? The patient has a condition identified by an ICD-9 code that has a direct and significant impact on the need for therapy. (Significantly impacts the rate of recovery.)                                         []?  The patient has a complexity identified by an ICD-9 code that has a direct and significant impact on the need for therapy. (Significantly impacts the rate of recovery and is associated with a primary condition.)                               []?  The patient has associated variables that influence the amount of treatment to include:  Social support, self-efficacy/motivation, prognosis, time since onset/acuity. []? The patient has generalized musculoskeletal conditions or a condition affecting multiple sites that will have a direct impact on the rate of recovery.     [x]? The patient had a prior episode of outpatient therapy during this calendar year for a different condition. []?  The patient has a mental or cognitive disorder in addition to the condition being treated that will have a direct and significant impact on the rate of recovery.                       GOALS: (Goals made 9/23/21)  Patient stated goal: \"Get back to living\"  [] Progressing: [] Met: [] Not Met: [] Adjusted    Therapist goals for Patient:   Short Term Goals: To be achieved in: 2 weeks  1. Independent in HEP and progression per patient tolerance, in order to prevent re-injury. [] Progressing: [] Met: [] Not Met: [] Adjusted  2. Patient will have a decrease in right wrist/hand pain to 1/10 to facilitate improvement in movement, function, and ADLs as indicated by Functional Deficits. [] Progressing: [] Met: [] Not Met: [] Adjusted    Long Term Goals: To be achieved in: 4 weeks  1. Disability index score of 15% or less for the UEFI to assist with reaching prior level of function.    [] Progressing: [] Met: [] Not Met: [] Adjusted  2. Patient will demonstrate an increase in right wrist AROM to at least 70 degrees flexion and extension and 85 degrees supination to allow for proper joint functioning as indicated by patients Functional Deficits. [] Progressing: [] Met: [] Not Met: [] Adjusted  3. Patient will demonstrate an increase in right wrist/hand strength to 5/5 to allow for proper functional mobility as indicated by patients Functional Deficits. [] Progressing: [] Met: [] Not Met: [] Adjusted  4. Patient will be able to perform fine motor activities, such as gripping, opening jars, putting on jewelry, and turning a door handle, without increased symptoms or restriction  [] Progressing: [] Met: [] Not Met: [] Adjusted  5. Patient will be able to perform all ADL's and self care act with no pain, numbness, or tingling in her right hand/wrist   (patient specific functional goal)    [] Progressing: [] Met: [] Not Met: [] Adjusted         Overall Progression Towards Functional goals/ Treatment Progress Update:  [] Patient is progressing as expected towards functional goals listed. [] Progression is slowed due to complexities/Impairments listed. [] Progression has been slowed due to co-morbidities. [x] Plan just implemented, too soon to assess goals progression <30days   [] Goals require adjustment due to lack of progress  [] Patient is not progressing as expected and requires additional follow up with physician  [] Other    Prognosis for POC: [x] Good [] Fair  [] Poor      Patient requires continued skilled intervention: [x] Yes  [] No    Treatment/Activity Tolerance:  [x] Patient able to complete treatment  [] Patient limited by fatigue  [] Patient limited by pain    [] Patient limited by other medical complications  [] Other:           PLAN: Continue therapy progressing per pt tolerance. Re-assess next visit. Pt will follow-up with Dr. Drew Saenz on 10/21/21.       1x/week for 4 weeks for ROM, strengthening, manual therapy as needed, HEP instruction, pt education, and modalities prn. (9/23/21)   [x] Continue per plan of care [] Alter current plan   [] Plan of care initiated [] Hold pending MD visit [] Discharge      Electronically signed by:  Monique Mistry, PT       Physical Therapist [address] [address] #[id number]  Physical Therapist [place] License #[id number]    Note: If patient does not return for scheduled/ recommended follow up visits, this note will serve as a discharge from care along with most recent update on progress.

## 2021-10-19 ENCOUNTER — TREATMENT (OUTPATIENT)
Dept: PHYSICAL THERAPY | Age: 71
End: 2021-10-19
Payer: MEDICARE

## 2021-10-19 DIAGNOSIS — G56.01 RIGHT CARPAL TUNNEL SYNDROME: Primary | ICD-10-CM

## 2021-10-19 DIAGNOSIS — R29.898 WEAKNESS OF RIGHT HAND: ICD-10-CM

## 2021-10-19 DIAGNOSIS — R29.898 WEAKNESS OF WRIST: ICD-10-CM

## 2021-10-19 PROCEDURE — G8539 DOC FUNCT AND CARE PLAN: HCPCS | Performed by: PHYSICAL THERAPIST

## 2021-10-19 PROCEDURE — 97110 THERAPEUTIC EXERCISES: CPT | Performed by: PHYSICAL THERAPIST

## 2021-10-19 PROCEDURE — 1101F PT FALLS ASSESS-DOCD LE1/YR: CPT | Performed by: PHYSICAL THERAPIST

## 2021-10-19 PROCEDURE — G8427 DOCREV CUR MEDS BY ELIG CLIN: HCPCS | Performed by: PHYSICAL THERAPIST

## 2021-10-19 PROCEDURE — 97530 THERAPEUTIC ACTIVITIES: CPT | Performed by: PHYSICAL THERAPIST

## 2021-10-19 NOTE — PROGRESS NOTES
[facility]   Phone: [phone]    Fax: [phone]     Physical Therapy Re-Certification Plan of Care    Dear  Dr. Lazaro Goodson,    We had the pleasure of treating the following patient for physical therapy services at [address] [place] [address]. A summary of our findings can be found in the updated assessment below. This includes our plan of care. If you have any questions or concerns regarding these findings, please do not hesitate to contact me at the office phone number checked above. Thank you for the referral.     Physician Signature:________________________________Date:__________________  By signing above (or electronic signature), therapists plan is approved by physician      Overall Response to Treatment:   []Patient is responding well to treatment and improvement is noted with regards to goals   []Patient should continue to improve in reasonable time if they continue HEP   []Patient has plateaued and is no longer responding to skilled PT intervention    []Patient is getting worse and would benefit from return to referring MD   []Patient unable to adhere to initial POC   [x]Other: Pt continues to c/o shooting pain, numbness, and tingling in her right hand, greatest in her thumb, second, and third fingers. She feels her symptoms are worse now than before surgery. She feels shooting pain anytime she uses her right hand, worse with washing herself and gripping.            Physical Therapy Treatment Note/ Progress Report:           Date:  10/19/2021    Patient Name:  Idalia Gannon    :  1950  MRN: [id number]  Restrictions/Precautions:    Medical/Treatment Diagnosis Information:  · Diagnosis: S/P Right Carpal Tunnel Release (Sx: 21) (G56.01)  ·  Treatment Diagnosis: Weakness of Right Wrist and Hand (X77.121)   Insurance/Certification information:  PT Insurance Information: Medicare, [place] - visits based on medical necessity  Physician Information:  Referring Practitioner: Dr. Aleksandar Grayson  Has the plan of care been signed (Y/N):        [x]  Yes (POC signed 9/27/21)  []  No     Date of Patient follow up with Physician: 10/21/21      Is this a Progress Report:     [x]  Yes  []  No        If Yes:  Date Range for reporting period:  Beginning 9/23/21  Ending 10/19/21    Progress report will be due (10 Rx or 30 days whichever is less): 45/22/16       Recertification will be due (POC Duration  / 90 days whichever is less): 11/16/21        Visit # Insurance Allowable Auth Required   5 Medical necessity   (Use Kx Modifier due to pt having prior PT this year) []  Yes [x]  No        Functional Scale:                                          Date assessed:  10/19/21                     Functional Assessment Tool Used:  UEFI (copy scanned into media)  Score:  38/80 = 47.5% (52.5% functional deficit)        (9/23/21)  Patient Age: 66years old  Patient Height: 5' 2\"  Patient Weight: 280 lbs  Patient BMI: 51.2      Pain (10/19/21)  [x]  Pain diagram was completed, pain was present and a follow up plan to address the pain is in the plan of care. ()   []  Pain diagram was completed and there was no pain present and therefore no follow up plan to address pain in the plan of care. ()   []  Pain diagram was not completed and the reason for not completing the pain diagram is in the medical chart ()  []  Patient refused to participate   []  Severe mental or physical capacity, patient is in urgent emergent situation and to complete the questionnaire would jeopardize the patient's health status          Functional Questionnaire (10/19/21)  [x]  Patient completed the functional outcome questionnaire and deficiencies were addressed in the plan of care. ()   []  Patient completed the outcome questionnaire and there were no functional deficits identified and therefore no plan of care is required.  ()   []  Patient did not complete the functional outcome questionnaire and the reason why is documented in the medical chart. ()  []  Patient refused to participate   []  Patient unable to complete the questionnaire   []   A functional outcome assessment has been reported on within the last 30 days          Falls (10/19/21)  [x]  The patient has had no falls or 1 fall without injury in the past year. (1101F)   []  There is no documentation of fall in the medical chart (1101F,8P)     [] The patient has had 2 or more falls or one fall with injury in the past year that is documented in the medical chart. (1100F)  []  A falls risk assessment was completed and documented in the medical chart. (4417F)   []  Falls were addressed in the plan of care. (2997M)   []  A falls risk assessment was not completed and documented in the medical chart (4923S,3L)   []   Falls were not addressed in the plan of care and reason was documented in the plan of care. (8476B 1P)          Medication (10/19/21)     [x] A list of current medications (including prescription, over the counter, herbal supplements, vitamin supplements, mineral supplements, dietary supplements) was reviewed with the patient and documented in the medical chart. ()       Falls Risk Assessment (30 days): (10/19/21)  [x]? Falls Risk assessed and no intervention required. []? Falls Risk assessed and Patient requires intervention due to being higher risk   TUG score (>12s at risk):     []? Falls education provided, including      PQRS:   Reviewed medication list with patient. No changes reported by pt since last PT visit. (10/19/21)      Latex Allergy:  [x]NO      []YES  Preferred Language for Healthcare:   [x]English       []other:      Pain level:  2/10 up to 7/10 (10/19/21)   Medication:  Nothing for her hand      SUBJECTIVE:  Pt continues to have pain, numbness, and tingling in her right hand, greatest in her thumb, second, and third fingers. She feels her pain is worse now than before surgery.   She feels shooting pain anytime she uses her right hand, worse with washing herself and gripping. She has been forgetting to ice and hasn't iced since last week. She has an appt with Dr. Ellis Bird on Thursday. (4 weeks post-op)      OBJECTIVE:      *Pt is right hand dominant    (10/19/21)  ROM PROM AROM Comments    Left Right Left Right    Elbow flexion   135 135    Elbow extension   0 0    Pronation   90 90    Supination   75 75    Wrist flexion   70 70    Wrist extension   70 70    Wrist radial deviation        Wrist ulnar deviation          (10/19/21)  Special Tests Left Right Comments   Cozens  Not    Tinels  Tested    Phalens  Due to    Piano Key Sign  Sx                  (10/19/21)  Strength Left Right Comments   Elbow flexion 5/5 4+/5    Elbow extension 5/5 4+/5    Pronation 5/5 4/5    Supination 5/5 4/5    Wrist flexion 5/5 4+/5    Wrist extension 5/5 4+/5    Wrist radial deviation      Wrist ulnar deviation        (10/19/21)   Left Right Comments   Level 1 30# 20#    Level 2 30# 25#    Level 3 25# 20#    Level 4      Level 5      other          Reflexes/Sensation: (9/23/21)   []Dermatomes/Myotomes intact    []Reflexes equal and normal bilaterally   [x]Other: Intact to light touch    Joint mobility:  (10/19/21)   [x]Normal    []Hypo   []Hyper    Palpation: Still some tenderness around incision site. (10/19/21)     Functional Mobility/Transfers: Pt continues to have difficulty with ADL's, self-care act, and gripping due to pain, numbness, and tingling in her right hand. Buckling her seatbelt are also difficult. (10/19/21)    Posture: Forward head, rounded shoulders. (10/19/21)    Bandages/Dressings/Incisions:  Incision is now closed. (10/19/21)    Gait: (include devices/WB status): Gait is mildly antalgic, no AD. She demonstrates decreased pawel and step length (pt is also doing therapy at this facility for her left knee).  (10/19/21)    Orthopedic Special Tests: See above        RESTRICTIONS/PRECAUTIONS: S/P right CTR (Sx: 9/20/21)    Exercises/Interventions:       Therapeutic Ex (89126) Sets/sec Reps Notes/CUES   AD UE BIKE            STRETCHING      Elbow extension      Elbow flexion      Wrist flex 20\" hold X 5    Wrist ext 20\" hold X 5    Supination 20\" hold X 5    Pronation      Radial Deviation      Ulnar Deviation                  AROM      Thumb Opposition to all Fingers  X 15    Finger MP Flexion AROM  X 15    Claw  X 15    Finger Abd/Add  X 15 Hand on table         Wrist Flex/Ext 2 sets X 15    Pron/Sup 2 sets X 15    RD 2 sets X 15          Elbow AROM 2 sets X 15          STRENGTHENING-PREs      Biceps 3 sets X 10 2#   Triceps      Wrist flexion 3 sets X 10 2#   Wrist extension 3 sets X 10 2#   Supination 3 sets X 10 2#   Pronation 3 sets X 10 2#   Radial Deviation 3 sets X 10 2#   Ulnar Deviation            Gripping  X 30 Pink ball    RB Finger Extension  X 30          THERABANDS      Rows      Lats      Biceps      Triceps 3 sets X 10  Green band   Internal Rotation      External Rotation                              Manual Intervention (51086)      Scar Massage      STM      Hawkgrips      Elbow joint mobilizations      Foamroll      Manual PROM Right Wrist  X 5'          NMR re-education (77994)   CUES NEEDED   Plyoback      Therabar oscillations      Body Blade       Rhythmic Stabilization                                    Therapeutic Activity (30488)      Core training      Swiss ball activities                  Patient Education: Dx, prognosis, pt goals/expectations, rehab timeline, wound care (9/23/21)  X 8'                          Therapeutic Exercise and NMR EXR  [x] (59612) Provided verbal/tactile cueing for activities related to strengthening, flexibility, endurance, ROM  for improvements in scapular, scapulothoracic and UE control with self care, reaching, carrying, lifting, house/yardwork, driving/computer work.     [x] (67453) Provided verbal/tactile cueing for activities related to improving balance, coordination, kinesthetic sense, posture, motor skill, proprioception  to assist with  scapular, scapulothoracic and UE control with self care, reaching, carrying, lifting, house/yardwork, driving/computer work. Therapeutic Activities:    [x] (19854 or 31654) Provided verbal/tactile cueing for activities related to improving balance, coordination, kinesthetic sense, posture, motor skill, proprioception and motor activation to allow for proper function of scapular, scapulothoracic and UE control with self care, carrying, lifting, driving/computer work. Home Exercise Program:    [x] (43502) Reviewed/Progressed HEP activities related to strengthening, flexibility, endurance, ROM of scapular, scapulothoracic and UE control with self care, reaching, carrying, lifting, house/yardwork, driving/computer work - Reviewed/updated HEP with pt. Pt declined handouts due to already being familiar with the exercises (pt underwent left carpal tunnel release on 8/16/21). (10/12/21)  [] (25952) Reviewed/Progressed HEP activities related to improving balance, coordination, kinesthetic sense, posture, motor skill, proprioception of scapular, scapulothoracic and UE control with self care, reaching, carrying, lifting, house/yardwork, driving/computer work      Manual Treatments:  PROM / STM / Oscillations-Mobs:  G-I, II, III, IV (PA's, Inf., Post.)  [] (42376) Provided manual therapy to mobilize soft tissue/joints of cervical/CT, scapular GHJ and UE for the purpose of modulating pain, promoting relaxation,  increasing ROM, reducing/eliminating soft tissue swelling/inflammation/restriction, improving soft tissue extensibility and allowing for proper ROM for normal function with self care, reaching, carrying, lifting, house/yardwork, driving/computer work    Modalities:  ICE x 10'   [] GAME READY (VASO)- for significant edema, swelling, pain control.     Charges:  Timed Code Treatment Minutes: 39'   Total Treatment Minutes: 54'      [] EVAL (LOW) 86598 (typically 20 minutes face-to-face)   [] EVAL (MOD) 97127 (typically 30 minutes face-to-face)  [] EVAL (HIGH) 65127 (typically 45 minutes face-to-face)  [] RE-EVAL     [x] IF(98744) x 2 (30') [] IONTO  [] NMR (36401) x     [] VASO  [] Manual (92339) x     [] Other:  [x] TA x 1 (15')     [] Mech Traction (05213)  [] ES(attended) (26196)     [] ES (un) (67162):         ASSESSMENT:  Pt continues to have pain, numbness, and tingling in her right hand which is limiting her ability to perform ADL's and self-care act. She was able to increase to 2# wt with wrist PRE's and bicep curls today but c/o \"pins and needles\" in her right hand with holding the wt. She demonstrates good ROM at the right wrist but still has weakness with gripping and resisted pron and sup. Pt will follow-up with Dr. Adrianne Yu on Thursday to discuss ongoing symptoms. Continued to stress activity modification and icing. MEDICARE CAP EXCEPTION DOCUMENTATION        I certify that this patient meets one of the below criteria necessary for becoming an exception to the Medicare cap on therapy services:     []? The patient has a condition identified by an ICD-9 code that has a direct and significant impact on the need for therapy. (Significantly impacts the rate of recovery.)                                         []?  The patient has a complexity identified by an ICD-9 code that has a direct and significant impact on the need for therapy. (Significantly impacts the rate of recovery and is associated with a primary condition.)                               []?  The patient has associated variables that influence the amount of treatment to include:  Social support, self-efficacy/motivation, prognosis, time since onset/acuity. []? The patient has generalized musculoskeletal conditions or a condition affecting multiple sites that will have a direct impact on the rate of recovery.     [x]?   The patient had a prior episode of outpatient therapy during this calendar year for a different condition. []?  The patient has a mental or cognitive disorder in addition to the condition being treated that will have a direct and significant impact on the rate of recovery.                       GOALS: (Goals updated 10/19/21)  Patient stated goal: \"Get back to living\"  [x] Progressing: [] Met: [] Not Met: [] Adjusted    Therapist goals for Patient:   Short Term Goals: To be achieved in: 2 weeks  1. Independent in HEP and progression per patient tolerance, in order to prevent re-injury. [] Progressing: [x] Met: [] Not Met: [] Adjusted  2. Patient will have a decrease in right wrist/hand pain to 1/10 to facilitate improvement in movement, function, and ADLs as indicated by Functional Deficits. [] Progressing: [] Met: [x] Not Met: Pt feels her symptoms are worse now than before surgery  [] Adjusted    Long Term Goals: To be achieved in: 4 weeks  1. Disability index score of 15% or less for the UEFI to assist with reaching prior level of function. [x] Progressing: [] Met: [] Not Met: [] Adjusted  2. Patient will demonstrate an increase in right wrist AROM to at least 70 degrees flexion and extension and 85 degrees supination to allow for proper joint functioning as indicated by patients Functional Deficits. [x] Progressing: [] Met: [] Not Met: [] Adjusted  3. Patient will demonstrate an increase in right wrist/hand strength to 5/5 to allow for proper functional mobility as indicated by patients Functional Deficits. [x] Progressing: [] Met: [] Not Met: [] Adjusted  4. Patient will be able to perform fine motor activities, such as gripping, opening jars, putting on jewelry, and turning a door handle, without increased symptoms or restriction  [] Progressing: [] Met: [x] Not Met: [] Adjusted  5.   Patient will be able to perform all ADL's and self care act with no pain, numbness, or tingling in her right hand/wrist   (patient specific functional goal)    [] Progressing: [] Met: [x] Not Met: Pt feels her symptoms are worse now than before surgery [] Adjusted         Overall Progression Towards Functional goals/ Treatment Progress Update:  [] Patient is progressing as expected towards functional goals listed. [] Progression is slowed due to complexities/Impairments listed. [] Progression has been slowed due to co-morbidities. [] Plan just implemented, too soon to assess goals progression <30days   [] Goals require adjustment due to lack of progress  [x] Patient is not progressing as expected and requires additional follow up with physician - pt continues to experience pain, numbness, and tingling in her right hand following right CTR. She has an appt with Dr. Keri Baker on Thursday. [] Other    Prognosis for POC: [x] Good [] Fair  [] Poor      Patient requires continued skilled intervention: [x] Yes  [] No    Treatment/Activity Tolerance:  [x] Patient able to complete treatment  [] Patient limited by fatigue  [] Patient limited by pain    [] Patient limited by other medical complications  [] Other:           PLAN: Pt will follow-up with Dr. Keri Baker on Thursday. [] Continue per plan of care [] Alter current plan   [] Plan of care initiated [x] Hold pending MD visit [] Discharge      Electronically signed by:  Shirlyn Primrose, PT       Physical Therapist Adore Rangel 421 #[id number]  Physical Therapist [place] License #[id number]    Note: If patient does not return for scheduled/ recommended follow up visits, this note will serve as a discharge from care along with most recent update on progress.

## 2021-10-21 ENCOUNTER — OFFICE VISIT (OUTPATIENT)
Dept: ORTHOPEDIC SURGERY | Age: 71
End: 2021-10-21

## 2021-10-21 VITALS — WEIGHT: 268 LBS | BODY MASS INDEX: 47.48 KG/M2 | HEIGHT: 63 IN

## 2021-10-21 DIAGNOSIS — Z98.890 S/P CARPAL TUNNEL RELEASE: Primary | ICD-10-CM

## 2021-10-21 PROCEDURE — 99024 POSTOP FOLLOW-UP VISIT: CPT | Performed by: ORTHOPAEDIC SURGERY

## 2021-10-21 NOTE — PROGRESS NOTES
History of Present Illness: Sandy Torres is a 66 y.o. female who presents for a post operative visit. The patient underwent a right carpal tunnel release on 9/20/2021 by Dr. Ophelia Melendez. She reports she is still having numbness and tingling in the hand. There has been no improvement in her symptoms as compared to the last visit or before surgery. Her numbness and tingling only involving the median nerve distribution at her right hand. There is no elbow pain and she denies any neck pain or radiculopathy. Medical History:  Patient's medications, allergies, past medical, surgical, social and family histories were reviewed and updated as appropriate. Patient has had no medical changes since last evaluated        Review of Systems  A 14 point review of systems was completed by the patient is available in the media section of the scanned medical record and was reviewed on 10/21/2021. Vital Signs: There were no vitals filed for this visit. General/Appearance: Alert and oriented and in no apparent distress. Skin:  There are no skin lesions, cellulitis, or extreme edema. The patient has warm and well-perfused Bilateral upper extremities with brisk capillary refill. Right hand Exam:    Inspection: right hand incision that is clean, dry and intact and well approximated. There is no erythema, drainage or other signs of infection    Palpation:mild tenderness over the hand. Active ROM: fully preserved. Strength:  Deferred    Special Tests:  Deferred. Neurovascular: Sensation to light touch is intact, no motor deficits, palpable radial pulses 2+    Radiology:     Plain radiographs not obtained. Assessment :  Ms. Sandy Torres is a 66 y.o. patient who underwent right open carpal tunnel release one month ago. She still has numbness but there are no motor deficits. Impression:  Encounter Diagnosis   Name Primary?     S/P carpal tunnel release Yes       Office Procedures:  Orders Placed This Encounter   Procedures    EMG     Standing Status:   Future     Standing Expiration Date:   10/21/2022     Order Specific Question:   Which body part? Answer:   R UE     Comments:   Right        Treatment Plan:    Overall Kylie Rae still complaining from numbness  at her right hand radial three fingers. No improvement after the surgery. Physical examination showed intact motor function of the median nerve . At this point we would like to send her for EMG/NCS for her right upper extremity to rule out any proximal neuropathy and to compare the finding with the previous study before surgery. We would like to see Kylie Rae back in after the The EMG results. She is in agreement with this plan and all of her questions were fully answered today. Aman Le MD  Clinical Fellow at [address] [place] [address]    10/21/2021  10:36 AM    During this examination, I, Aman Le MD functioned as a scribe for Dr. Shauna Ghotra. This dictation was performed with a verbal recognition program (DRAGON) and it was checked for errors. It is possible that there are still dictated errors within this office note. If so, please bring any errors to my attention for an addendum. All efforts were made to ensure that this office note is accurate.  ____________________  I was physically present and personally supervised the Orthopaedic Sports Medicine Fellow in the evaluation and development of a treatment plan for this patient. I personally interviewed the patient and performed a physical examination. In addition, I discussed the patient's condition and treatment options with them. I have also reviewed and agree with the past medical, family and social history unless otherwise noted. All of the patient's questions were answered. Janel Baxter MD, PhD  10/21/2021

## 2021-10-26 ENCOUNTER — TREATMENT (OUTPATIENT)
Dept: PHYSICAL THERAPY | Age: 71
End: 2021-10-26
Payer: MEDICARE

## 2021-10-26 DIAGNOSIS — R29.898 WEAKNESS OF LEFT LOWER EXTREMITY: ICD-10-CM

## 2021-10-26 DIAGNOSIS — M25.562 LEFT KNEE PAIN, UNSPECIFIED CHRONICITY: Primary | ICD-10-CM

## 2021-10-26 DIAGNOSIS — M17.12 PRIMARY OSTEOARTHRITIS OF LEFT KNEE: ICD-10-CM

## 2021-10-26 DIAGNOSIS — M25.662 DECREASED ROM OF LEFT KNEE: ICD-10-CM

## 2021-10-26 PROCEDURE — 97110 THERAPEUTIC EXERCISES: CPT | Performed by: PHYSICAL THERAPIST

## 2021-10-26 PROCEDURE — 1101F PT FALLS ASSESS-DOCD LE1/YR: CPT | Performed by: PHYSICAL THERAPIST

## 2021-10-26 PROCEDURE — G8539 DOC FUNCT AND CARE PLAN: HCPCS | Performed by: PHYSICAL THERAPIST

## 2021-10-26 PROCEDURE — 97112 NEUROMUSCULAR REEDUCATION: CPT | Performed by: PHYSICAL THERAPIST

## 2021-10-26 PROCEDURE — G8427 DOCREV CUR MEDS BY ELIG CLIN: HCPCS | Performed by: PHYSICAL THERAPIST

## 2021-10-26 PROCEDURE — 97530 THERAPEUTIC ACTIVITIES: CPT | Performed by: PHYSICAL THERAPIST

## 2021-10-26 NOTE — PROGRESS NOTES
[facility]   Phone: [phone]    Fax: [phone]      Physical Therapy Re-Certification Plan of Care    Dear  Dr. Adonis Gomes,    We had the pleasure of treating the following patient for physical therapy services at [address] [place] [address]. A summary of our findings can be found in the updated assessment below. This includes our plan of care. If you have any questions or concerns regarding these findings, please do not hesitate to contact me at the office phone number checked above. Thank you for the referral.     Physician Signature:________________________________Date:__________________  By signing above (or electronic signature), therapists plan is approved by physician      Overall Response to Treatment:   []Patient is responding well to treatment and improvement is noted with regards to goals   []Patient should continue to improve in reasonable time if they continue HEP   []Patient has plateaued and is no longer responding to skilled PT intervention    []Patient is getting worse and would benefit from return to referring MD   []Patient unable to adhere to initial POC   [x]Other:  Pt has not been seen in therapy since 9/16/21 due to undergoing right carpal tunnel release by Dr. Bailee Merino on 9/20/21. Pt has been trying to keep up with her exercises at home but it has been difficult due to recent bilateral wrist surgeries. She feels she has lost strength and flexibility in her left knee during her absence from therapy (pt also missed a month of therapy following left carpal tunnel release on 8/16/21) and she is anxious to resume therapy again. Stairs are still very challenging for her but she is able go up/down stairs 1 at a time if there are 2 handrails. She is not having much pain in her left knee, her main complaint continues to be weakness.             Physical Therapy Treatment Note/ Progress Report:           Date:  10/26/2021    Patient Name: Maulik Collins    :  1950  MRN: [id number]  Restrictions/Precautions:    Medical/Treatment Diagnosis Information:  Diagnosis: Left Knee Pain (M25.562), Primary OA of Left Knee (M17.12)   Treatment Diagnosis: Decreased Left Knee ROM (M25.662), Decreased Strength (R29.898)        Insurance/Certification information:  PT Insurance Information: Medicare - Visits based on medical necessity  Physician Information:  Referring Practitioner: Dr. Sherine Spencer  Has the plan of care been signed (Y/N):        [x]  Yes (POC signed 21)  []  No      Date of Patient follow up with Physician: As needed      Is this a Progress Report:     [x]  Yes  []  No        If Yes:  Date Range for reporting period:  Beginning 4/15/21   Ending 10/26/21    Progress report will be due (10 Rx or 30 days whichever is less):        Recertification will be due (POC Duration  / 90 days whichever is less):  21        Visit # Insurance Allowable Auth Required   27 Medical necessity  (pt has already used 13 visits this year for her shoulder) []  Yes [x]  No        Functional Scale:    Date assessed:  10/26/21  Functional Assessment Tool Used: Knee Outcome Survey Activities of Daily Living Scale (copy scanned into media)  Score:  44/70 = 63% (37% functional deficit)    (4/15/21)  Patient Age: 66years old  Patient Height: 5' 2\"  Patient Weight: 280 lbs  Patient BMI: 51.2      Pain (10/26/21)  [x]  Pain diagram was completed, pain was present and a follow up plan to address the pain is in the plan of care. ()   []  Pain diagram was completed and there was no pain present and therefore no follow up plan to address pain in the plan of care.  ()   []  Pain diagram was not completed and the reason for not completing the pain diagram is in the medical chart ()  []  Patient refused to participate   []  Severe mental or physical capacity, patient is in urgent emergent situation and to complete the questionnaire would jeopardize the patient's health status        Functional Questionnaire (10/26/21)  [x]  Patient completed the functional outcome questionnaire and deficiencies were addressed in the plan of care. ()   []  Patient completed the outcome questionnaire and there were no functional deficits identified and therefore no plan of care is required. ()   []  Patient did not complete the functional outcome questionnaire and the reason why is documented in the medical chart. ()  []  Patient refused to participate   []  Patient unable to complete the questionnaire   []   A functional outcome assessment has been reported on within the last 30 days        Falls (10/26/21)  [x]  The patient has had no falls or 1 fall without injury in the past year. (1101F)   []  There is no documentation of fall in the medical chart (1101F,8P)     [] The patient has had 2 or more falls or one fall with injury in the past year that is documented in the medical chart. (1100F)  []  A falls risk assessment was completed and documented in the medical chart. (7523K)   []  Falls were addressed in the plan of care. (7256T)   []  A falls risk assessment was not completed and documented in the medical chart (8161B,5J)   []   Falls were not addressed in the plan of care and reason was documented in the plan of care. (8812E 1P)        Medication (10/26/21)     [x] A list of current medications (including prescription, over the counter, herbal supplements, vitamin supplements, mineral supplements, dietary supplements) was reviewed with the patient and documented in the medical chart. ()        Falls Risk Assessment (30 days): (10/26/21)  [x] Falls Risk assessed and no intervention required. [] Falls Risk assessed and Patient requires intervention due to being higher risk   TUG score (>12s at risk):     [] Falls education provided, including     PQRS:   Reviewed medication list with patient. No changes reported by pt since last PT visit. (10/26/21)          Latex Allergy:  [x]NO      []YES  Preferred Language for Healthcare:   [x]English       []other:      Pain level:  1-2/10 (10/26/21)   Medication:  Celebrex      SUBJECTIVE:   Pt has not been seen in therapy since 9/16/21 due to undergoing right carpal tunnel release by Dr. Bailee Merino on 9/20/21. Pt has been trying to keep up with her exercises at home but it has been difficult due to having bilateral wrist surgeries (pt also underwent left carpal tunnel release on 8/16/21 and missed a month of therapy for her knee). She feels she has lost strength and flexibility in her left knee during her absence from therapy and she is anxious to resume therapy again for her knee. She is not having much pain in her left knee, her main complaint continues to be weakness. Stairs are still very challenging for her but she is able go up/down stairs 1 at a time if there are 2 handrails. She has been trying to avoid stairs the last month due to recent right wrist surgery and not wanting to put pressure through her right hand/wrist.  She reports no buckling or giving way. She is using Voltaren cream on both knees at least 2x/day.              OBJECTIVE:       (10/26/21)  Flexibility L R Comment   Hamstring Mod tightness Mild tightness    Gastroc Mod tightness Mild tightness    ITB      Quad              (10/26/21)  ROM PROM AROM Overpressure Comment    L R L R L R    Flexion 110 with SP  105 100      Extension   0 0                            (10/26/21)  Strength L R Comment   Quad 4/5 4+/5    Hamstring 4+/5 4+/5    Gastroc 4/5 4+/5    Hip Flex 4/5 4/5    Hip Abd 4/5 4+/5    Hip Add 4/5 4+/5    Glut Med              (10/26/21)  Girth L R   Mid Patella 57.0 cm 58.0 cm   Suprapatellar     5cm above     15cm above       (10/26/21)  Special Test Results/Comment   Meniscal Click    Crepitus (+) PF crepitus bilaterally   Flexion Test    Valgus Laxity    Varus Laxity    Lachmans    Drop Back        Reflexes/Sensation: (4/15/21)   []Dermatomes/Myotomes intact    []Reflexes equal and normal bilaterally   [x]Other: Intact to light touch    Joint mobility: (10/26/21)   []Normal    [x]Hypo   []Hyper    Palpation: No tenderness. Mild quad atrophy still present on the left vs right.(10/26/21)    Functional Mobility/Transfers: Stairs are still difficult (pt must ascend/descend stairs 1 at a time using 2 HR), she can't stand or walk for long periods of time, she is unable to squat. She is also having some difficulty getting up from a chair and has to use her arms to help push herself up.   (10/26/21)    Posture: Forward head, rounded shoulders. (10/26/21)    Bandages/Dressings/Incisions: Pt wears compression hose and wraps on both legs (10/26/21)     Gait: (include devices/WB status) Gait is still mildly antalgic, no AD. Decreased pawel with decreased step length bilaterally. She demonstrates decreased trunk rotation and increased lateral trunk sway. (10/26/21)    Orthopedic Special Tests: See above.        RESTRICTIONS/PRECAUTIONS:     Exercises/Interventions:     Therapeutic Ex (31395) Sets/sec Reps Notes/CUES   BIKE      TREADMILL            STRETCHING      Towel Pull Calf 30\" hold X 5    Inclined Calf      Hamstrings 30\" hold X 5 Seated   Quads      Hip Flexors      ITB      Adductors            ROM      Passive      Active      Weight Hangs      Sheet Pulls 10\" hold X 10    Ankle Pumps      ERMI            STRENGTHENING      Quad Sets 10\" hold X 10    Ham Sets      Hip ADD Sets BS 10\" hold X 10     SLR Flexion 3 sets X 10 with 1#     SLR Abduction  HEP   SLR Prone      SLR Adduction      SLR+      Supine Hip Abduction 2 sets X 10  Black band         Standing Marches 3 sets X 10 on Airex Standing at half wall   Standing Knee Flexion 3 sets X 10  Standing at half wall         PRE Machines      Knee Extension      Knee Flexion      Leg Press            CKC      Calf Raises 3 sets X 10 Seated   Mini Squats      Wall Sits      Step ups - 0 riser 2 sets X 10  Using half wall     Lateral Step Ups - 0 riser 2 sets X 10    TKE  Held today               Manual Intervention (32119)      Patellar Mobs      Femoral Tibial mobs      STM      Scar Massage      Foam Roll            NMR re-education (19087)   CUES NEEDED   Biodex Balance      Tandem Balance 30\" hold X 2 each on Airex *Feet staggered  *Standing next to half wallSLB      Rebounder      BOSU            Sit -> Stand  X 20 with left arm assisting     With cushion on chair     Up/Down 1 Flight of Stairs in the [place]   Held due to recent hand surgeries      Therapeutic Activity (84928)      Core Training      Swiss Jerilee Passy Activities      Monster Walks      TRX training                  Patient Education: Dx, prognosis, pt goals/expectations, role of therapy (4/15/21)  X 8'                  Therapeutic Exercise and NMR EXR  [x] (61751) Provided verbal/tactile cueing for activities related to strengthening, flexibility, endurance, ROM for improvements in LE, proximal hip, and core control with self care, mobility, lifting, ambulation. [x] (46207) Provided verbal/tactile cueing for activities related to improving balance, coordination, kinesthetic sense, posture, motor skill, proprioception to assist with LE, proximal hip, and core control in self-care, mobility, lifting, ambulation and eccentric single leg control.      NMR and Therapeutic Activities:    [x] (77745 or 00170) Provided verbal/tactile cueing for activities related to improving balance, coordination, kinesthetic sense, posture, motor skill, proprioception and motor activation to allow for proper function of core, proximal hip and LE with self-care and ADLs and functional mobility.   [] (16240) Gait Re-education- Provided training and instruction to the patient for proper LE, core and proximal hip recruitment and positioning and eccentric body weight control with ambulation re-education including up and down stairs     Home Exercise Program:    [x] (86137) Reviewed/Progressed HEP activities related to strengthening, flexibility, endurance, ROM of core, proximal hip and LE for functional self-care, mobility, lifting and ambulation/stair navigation - Updated HEP through [address] [place] [address] (see below). Pt was also issued new written handouts. (4/27/21)  [x] (09764) Reviewed/Progressed HEP activities related to improving balance, coordination, kinesthetic sense, posture, motor skill, proprioception of core, proximal hip and LE for self-care, mobility, lifting, and ambulation/stair navigation          Access Code: [email or web address]  URL: [employer]/Date: 04/20/2021Prepared by: [email or web address] SybertExercises   Long Sitting Calf Stretch with Strap - 1 x daily - 7 x weekly - 1 sets - 3 reps - 30 seconds hold   Seated Table Hamstring Stretch - 1 x daily - 7 x weekly - 1 sets - 3 reps - 30 seconds hold   Long Sitting Quad Set - 1 x daily - 7 x weekly - 1 sets - 10 reps - 10 seconds hold   Long Sitting Hip Adduction Isometric with Ball - 1 x daily - 7 x weekly - 1 sets - 10 reps - 10 seconds hold   Supine Active Straight Leg Raise - 1 x daily - 7 x weekly - 2-3 sets - 10 reps   Hooklying Isometric Clamshell - 1 x daily - 7 x weekly - 3 sets - 10 reps   Standing March with Counter Support - 1 x daily - 7 x weekly - 3 sets - 10 reps   Seated Heel Raise - 1 x daily - 7 x weekly - 3 sets - 10 reps   Sit to Stand - 1 x daily - 7 x weekly - 15 reps   Tandem Stance with Support - 1 x daily - 7 x weekly - 2 reps - 20 seconds hold        Manual Treatments:  PROM / STM / Oscillations-Mobs:  G-I, II, III, IV (PA's, Inf., Post.)  [] (09813) Provided manual therapy to mobilize LE, proximal hip and/or LS spine soft tissue/joints for the purpose of modulating pain, promoting relaxation, increasing ROM, reducing/eliminating soft tissue swelling/inflammation/restriction, improving soft tissue extensibility and allowing for proper ROM for normal function with self-care, mobility, lifting and ambulation. Modalities:   Pt declined ice today   [] GAME READY (VASO)- for significant edema, swelling, pain control. Charges:  Timed Code Treatment Minutes: 54'   Total Treatment Minutes: 54'      [] EVAL (LOW) 07791 (typically 20 minutes face-to-face)  [] EVAL (MOD) 10955 (typically 30 minutes face-to-face)  [] EVAL (HIGH) 73532 (typically 45 minutes face-to-face)  [] RE-EVAL     [x] LQ(02533) x 2 (30')    [] IONTO  [x] NMR (08222) x 1 (10')   [] VASO  [] Manual (50644) x      [] Other:  [x] TA x 1 (15')     [] Mech Traction (45515)  [] ES(attended) (96956)      [] ES (un) (36272):         ASSESSMENT:  Pt has only been seen in therapy once for her left knee over the last 10+ weeks due to undergoing carpal tunnel release on both wrists (left on 8/16/21 and right on 9/20/21). She has been doing some of her exercises at home but not all of them. Upon re-assessment today, pt demonstrated a loss of strength and ROM in her left knee which is not surprising due to her having to focus on other medical issues. She struggled with several of her exercises today due to weakness and lack of endurance with several rest breaks needed during her program.  She was unable to perform step ups with 1 riser today and even struggled to complete 20 reps with no riser due to weakness. Pt also needed to use her left arm to assist her with sit -> stands today. Left knee was very tight with knee flexion. Reviewed HEP with pt and discussed the importance of doing the exercises on a consistent basis at home to increase strength and endurance. MEDICARE CAP EXCEPTION DOCUMENTATION      I certify that this patient meets one of the below criteria necessary for becoming an exception to the Medicare cap on therapy services:    [x]  The patient has a condition identified by an ICD-9 code that has a direct and significant impact on the need for therapy.  (Significantly impacts the rate of recovery.) []  The patient has a complexity identified by an ICD-9 code that has a direct and significant impact on the need for therapy. (Significantly impacts the rate of recovery and is associated with a primary condition.)         []  The patient has associated variables that influence the amount of treatment to include:  Social support, self-efficacy/motivation, prognosis, time since onset/acuity. []  The patient has generalized musculoskeletal conditions or a condition affecting multiple sites that will have a direct impact on the rate of recovery. [x]  The patient had a prior episode of outpatient therapy during this calendar year for a different condition. []  The patient has a mental or cognitive disorder in addition to the condition being treated that will have a direct and significant impact on the rate of recovery. GOALS: (Goals updated 10/26/21) - ROM, strength, and function have regressed due to pt undergoing bilateral wrist surgeries which caused her to miss several weeks of therapy. Patient stated goal:  \"Make my legs stronger; walk and climb stairs easier and faster. \"  [x] Progressing: Strength and overall function have regressed during her absence from therapy [] Met: [] Not Met: [] Adjusted    Therapist goals for Patient:   Short Term Goals: To be achieved in: 2 weeks  1. Independent in HEP and progression per patient tolerance, in order to prevent re-injury. [] Progressing: [x] Met: [] Not Met: [] Adjusted  2. Patient will have a decrease in left knee pain to 1-2/10 to facilitate improvement in movement, function, and ADLs as indicated by Functional Deficits. [] Progressing: [x] Met: [] Not Met: [] Adjusted    Long Term Goals: To be achieved in: 4-6 weeks  1. Disability index score of 20% or less for the Knee Outcome Survey Activities of Daily Living Scale to assist with reaching prior level of function.    [x] Progressing: [] Met: [] Not Met: [] Adjusted  2. Patient will demonstrate an increase in left knee flexion AROM to at least 115 degrees to allow for proper joint functioning as indicated by patients Functional Deficits. [x] Progressing: Her ROM has regressed during her absence from therapy [] Met: [] Not Met: [] Adjusted  3. Patient will demonstrate an increase in left hip and knee strength to at least 4+/5 to allow for proper functional mobility as indicated by patients Functional Deficits. [x] Progressing: Her strength has regressed during her absence from therapy [] Met: [] Not Met: [] Adjusted  4. Patient will be able to walk community distances, stand for at least 30 minutes, and be able to get up from a chair with little to no UE assistance needed for improved function. [x] Progressing: [] Met: [] Not Met: [] Adjusted  5. Patient will be able to ascend/descend stairs reciprocally with HR (patient specific functional goal)    [x] Progressing: [] Met: [] Not Met: [] Adjusted   6. Patient will have a decrease in left knee pain to 0-1/10 with daily act. [x] Progressing: [] Met: [] Not Met: [] Adjusted      New Goal: To be achieved in: 4 weeks (10/26/21)  1. Patient will demonstrate an increase in left hip and knee strength to at least 5/5 to allow for proper functional mobility as indicated by patients Functional Deficits. [] Progressing: [] Met: [x] Not Met: Her strength has regressed during her absence from therapy [] Adjusted        Overall Progression Towards Functional goals/ Treatment Progress Update:  [] Patient is progressing as expected towards functional goals listed. [] Progression is slowed due to complexities/Impairments listed. [x] Progression has been slowed due to co-morbidities - ROM, strength, and function have regressed due to pt undergoing bilateral wrist surgeries which caused her to miss several weeks of therapy.     [] Plan just implemented, too soon to assess goals progression <30days   [] Goals require adjustment due to lack of progress  [] Patient is not progressing as expected and requires additional follow up with physician  [] Other    Prognosis for POC: [x] Good [] Fair  [] Poor      Patient requires continued skilled intervention: [x] Yes  [] No    Treatment/Activity Tolerance:  [] Patient able to complete treatment  [x] Patient limited by fatigue  [] Patient limited by pain    [] Patient limited by other medical complications  [] Other:         PLAN: Continue therapy 1x/week for ROM, strengthening, endurance, and balance. 1x/week for 4-6 weeks for ROM, strengthening, balance act, HEP instruction, pt education, manual therapy prn, and modalities prn (10/26/21)  [x] Continue per plan of care [] Alter current plan   [] Plan of care initiated [] Hold pending MD visit [] Discharge      Electronically signed by:  Kristina Paige PT     Physical Therapist Adore Rangel [address] #[id number]  Physical Therapist [place] License #[id number]    Note: If patient does not return for scheduled/ recommended follow up visits, this note will serve as a discharge from care along with most recent update on progress.

## 2021-11-02 ENCOUNTER — TREATMENT (OUTPATIENT)
Dept: PHYSICAL THERAPY | Age: 71
End: 2021-11-02
Payer: MEDICARE

## 2021-11-02 DIAGNOSIS — M25.662 DECREASED ROM OF LEFT KNEE: ICD-10-CM

## 2021-11-02 DIAGNOSIS — M25.562 LEFT KNEE PAIN, UNSPECIFIED CHRONICITY: Primary | ICD-10-CM

## 2021-11-02 DIAGNOSIS — M17.12 PRIMARY OSTEOARTHRITIS OF LEFT KNEE: ICD-10-CM

## 2021-11-02 DIAGNOSIS — R29.898 WEAKNESS OF LEFT LOWER EXTREMITY: ICD-10-CM

## 2021-11-02 PROCEDURE — 97110 THERAPEUTIC EXERCISES: CPT | Performed by: PHYSICAL THERAPIST

## 2021-11-02 PROCEDURE — G8427 DOCREV CUR MEDS BY ELIG CLIN: HCPCS | Performed by: PHYSICAL THERAPIST

## 2021-11-02 PROCEDURE — 97112 NEUROMUSCULAR REEDUCATION: CPT | Performed by: PHYSICAL THERAPIST

## 2021-11-02 PROCEDURE — 97530 THERAPEUTIC ACTIVITIES: CPT | Performed by: PHYSICAL THERAPIST

## 2021-11-02 NOTE — PROGRESS NOTES
[facility]   Phone: [phone]    Fax: [phone]           Physical Therapy Treatment Note/ Progress Report:           Date:  2021    Patient Name:  Loi Boles    :  1950  MRN: [id number]  Restrictions/Precautions:    Medical/Treatment Diagnosis Information:  Diagnosis: Left Knee Pain (M25.562), Primary OA of Left Knee (M17.12)   Treatment Diagnosis: Decreased Left Knee ROM (M25.662), Decreased Strength (R29.898)        Insurance/Certification information:  PT Insurance Information: Medicare - Visits based on medical necessity  Physician Information:  Referring Practitioner: Dr. Constance Escudero  Has the plan of care been signed (Y/N):        [x]  Yes (POC signed 10/27/21)  []  No      Date of Patient follow up with Physician: As needed      Is this a Progress Report:     []  Yes  [x]  No        If Yes:  Date Range for reporting period:  Beginning 4/15/21   Ending 10/26/21    Progress report will be due (10 Rx or 30 days whichever is less):        Recertification will be due (POC Duration  / 90 days whichever is less):  21        Visit # Insurance Allowable Auth Required   28 Medical necessity  (pt has already used 13 visits this year for her shoulder) []  Yes [x]  No        Functional Scale:    Date assessed:  10/26/21  Functional Assessment Tool Used: Knee Outcome Survey Activities of Daily Living Scale (copy scanned into media)  Score:  44/70 = 63% (37% functional deficit)    (4/15/21)  Patient Age: 66years old  Patient Height: 5' 2\"  Patient Weight: 280 lbs  Patient BMI: 51.2      Pain (10/26/21)  [x]  Pain diagram was completed, pain was present and a follow up plan to address the pain is in the plan of care. ()   []  Pain diagram was completed and there was no pain present and therefore no follow up plan to address pain in the plan of care.  ()   []  Pain diagram was not completed and the reason for not completing the pain diagram is in the medical chart ()  []  Patient refused to participate   []  Severe mental or physical capacity, patient is in urgent emergent situation and to complete the questionnaire would jeopardize the patient's health status        Functional Questionnaire (10/26/21)  [x]  Patient completed the functional outcome questionnaire and deficiencies were addressed in the plan of care. ()   []  Patient completed the outcome questionnaire and there were no functional deficits identified and therefore no plan of care is required. ()   []  Patient did not complete the functional outcome questionnaire and the reason why is documented in the medical chart. ()  []  Patient refused to participate   []  Patient unable to complete the questionnaire   []   A functional outcome assessment has been reported on within the last 30 days        Falls (10/26/21)  [x]  The patient has had no falls or 1 fall without injury in the past year. (1101F)   []  There is no documentation of fall in the medical chart (1101F,8P)     [] The patient has had 2 or more falls or one fall with injury in the past year that is documented in the medical chart. (1100F)  []  A falls risk assessment was completed and documented in the medical chart. (9759Q)   []  Falls were addressed in the plan of care. (3588Y)   []  A falls risk assessment was not completed and documented in the medical chart (9794Y,6L)   []   Falls were not addressed in the plan of care and reason was documented in the plan of care. (6151W 1P)        Medication (10/26/21)     [x] A list of current medications (including prescription, over the counter, herbal supplements, vitamin supplements, mineral supplements, dietary supplements) was reviewed with the patient and documented in the medical chart. ()        Falls Risk Assessment (30 days): (10/26/21)  [x] Falls Risk assessed and no intervention required.   [] Falls Risk assessed and Patient requires intervention due to being higher risk   TUG score (>12s at risk):     [] Falls education provided, including     PQRS:   Reviewed medication list with patient. No changes reported by pt since last PT visit. (11/2/21)          Latex Allergy:  [x]NO      []YES  Preferred Language for Healthcare:   [x]English       []other:      Pain level:  1-2/10 (11/2/21)   Medication:  Celebrex      SUBJECTIVE:   Pt states she was very fatigued after last visit with resuming the exercises for her knee. She isn't having much pain in either knee but she feels very weak. She is having an EMG tomorrow due to ongoing symptoms in her right hand following right carpal tunnel release. OBJECTIVE:       (10/26/21)  Flexibility L R Comment   Hamstring Mod tightness Mild tightness    Gastroc Mod tightness Mild tightness    ITB      Quad              (11/2/21)  ROM PROM AROM Overpressure Comment    L R L R L R    Flexion 115 with SP  105 100      Extension   0 0                            (10/26/21)  Strength L R Comment   Quad 4/5 4+/5    Hamstring 4+/5 4+/5    Gastroc 4/5 4+/5    Hip Flex 4/5 4/5    Hip Abd 4/5 4+/5    Hip Add 4/5 4+/5    Glut Med              (10/26/21)  Girth L R   Mid Patella 57.0 cm 58.0 cm   Suprapatellar     5cm above     15cm above       (10/26/21)  Special Test Results/Comment   Meniscal Click    Crepitus (+) PF crepitus bilaterally   Flexion Test    Valgus Laxity    Varus Laxity    Lachmans    Drop Back        Reflexes/Sensation: (4/15/21)   []Dermatomes/Myotomes intact    []Reflexes equal and normal bilaterally   [x]Other: Intact to light touch    Joint mobility: (10/26/21)   []Normal    [x]Hypo   []Hyper    Palpation: No tenderness. Mild quad atrophy still present on the left vs right.(10/26/21)    Functional Mobility/Transfers: Stairs are still difficult (pt must ascend/descend stairs 1 at a time using 2 HR), she can't stand or walk for long periods of time, she is unable to squat.   She is also having some difficulty getting up from a chair and has to use her arms to help push herself up.   (10/26/21)    Posture: Forward head, rounded shoulders. (10/26/21)    Bandages/Dressings/Incisions: Pt wears compression hose and wraps on both legs (10/26/21)     Gait: (include devices/WB status) Gait is still mildly antalgic, no AD. Decreased pawel with decreased step length bilaterally. She demonstrates decreased trunk rotation and increased lateral trunk sway. (10/26/21)    Orthopedic Special Tests: See above.        RESTRICTIONS/PRECAUTIONS:     Exercises/Interventions:     Therapeutic Ex (55875) Sets/sec Reps Notes/CUES   BIKE      TREADMILL            STRETCHING      Towel Pull Calf 30\" hold X 5    Inclined Calf      Hamstrings 30\" hold X 5 Seated   Quads      Hip Flexors      ITB      Adductors            ROM      Passive      Active      Weight Hangs      Sheet Pulls 10\" hold X 10    Ankle Pumps      ERMI            STRENGTHENING      Quad Sets 10\" hold X 10    Ham Sets      Hip ADD Sets BS 10\" hold X 10     SLR Flexion 3 sets X 10 with 2#     SLR Abduction  HEP   SLR Prone      SLR Adduction      SLR+      Supine Hip Abduction 2 sets X 10  Black band         Standing Marches 3 sets X 10 on Airex Standing at half wall   Standing Knee Flexion 3 sets X 10  Standing at half wall         PRE Machines      Knee Extension      Knee Flexion      Leg Press            CKC      Calf Raises 3 sets X 10 Seated   Mini Squats      Wall Sits      Step ups - 0 riser 2 sets X 10  Using half wall     Lateral Step Ups - 0 riser 2 sets X 10    TKE  Held today               Manual Intervention (29686)      Patellar Mobs      Femoral Tibial mobs      STM      Scar Massage      Foam Roll            NMR re-education (93344)   CUES NEEDED   Biodex Balance      Tandem Balance 30\" hold X 2 each   Held Airex today due to pt having pain in both feet *Feet staggered  *Standing next to half wallSLB      Rebounder      BOSU            Sit -> Stand X 20 with left arm assisting     With cushion on chair     Up/Down 1 Flight of Stairs in the [place]   Held due to recent hand surgeries      Therapeutic Activity (96295)      Core Training      [place] Willy 30      TRX training                  Patient Education: Dx, prognosis, pt goals/expectations, role of therapy (4/15/21)  X 8'                  Therapeutic Exercise and NMR EXR  [x] (05662) Provided verbal/tactile cueing for activities related to strengthening, flexibility, endurance, ROM for improvements in LE, proximal hip, and core control with self care, mobility, lifting, ambulation. [x] (50814) Provided verbal/tactile cueing for activities related to improving balance, coordination, kinesthetic sense, posture, motor skill, proprioception to assist with LE, proximal hip, and core control in self-care, mobility, lifting, ambulation and eccentric single leg control. NMR and Therapeutic Activities:    [x] (33523 or 92954) Provided verbal/tactile cueing for activities related to improving balance, coordination, kinesthetic sense, posture, motor skill, proprioception and motor activation to allow for proper function of core, proximal hip and LE with self-care and ADLs and functional mobility.   [] (58316) Gait Re-education- Provided training and instruction to the patient for proper LE, core and proximal hip recruitment and positioning and eccentric body weight control with ambulation re-education including up and down stairs     Home Exercise Program:    [x] (34381) Reviewed/Progressed HEP activities related to strengthening, flexibility, endurance, ROM of core, proximal hip and LE for functional self-care, mobility, lifting and ambulation/stair navigation - Updated HEP through [address] [place] [address] (see below). Pt was also issued new written handouts.  (4/27/21)  [x] (07592) Reviewed/Progressed HEP activities related to improving balance, coordination, kinesthetic sense, posture, motor skill, proprioception of core, proximal hip and LE for self-care, mobility, lifting, and ambulation/stair navigation          Access Code: [email or web address]  URL: [email or web address]: 04/20/2021Prepared by: RONY SybertExercises   Long Sitting Calf Stretch with Strap - 1 x daily - 7 x weekly - 1 sets - 3 reps - 30 seconds hold   Seated Table Hamstring Stretch - 1 x daily - 7 x weekly - 1 sets - 3 reps - 30 seconds hold   Long Sitting Quad Set - 1 x daily - 7 x weekly - 1 sets - 10 reps - 10 seconds hold   Long Sitting Hip Adduction Isometric with Ball - 1 x daily - 7 x weekly - 1 sets - 10 reps - 10 seconds hold   Supine Active Straight Leg Raise - 1 x daily - 7 x weekly - 2-3 sets - 10 reps   Hooklying Isometric Clamshell - 1 x daily - 7 x weekly - 3 sets - 10 reps   Standing March with Counter Support - 1 x daily - 7 x weekly - 3 sets - 10 reps   Seated Heel Raise - 1 x daily - 7 x weekly - 3 sets - 10 reps   Sit to Stand - 1 x daily - 7 x weekly - 15 reps   Tandem Stance with Support - 1 x daily - 7 x weekly - 2 reps - 20 seconds hold        Manual Treatments:  PROM / STM / Oscillations-Mobs:  G-I, II, III, IV (PA's, Inf., Post.)  [] (58981) Provided manual therapy to mobilize LE, proximal hip and/or LS spine soft tissue/joints for the purpose of modulating pain, promoting relaxation, increasing ROM, reducing/eliminating soft tissue swelling/inflammation/restriction, improving soft tissue extensibility and allowing for proper ROM for normal function with self-care, mobility, lifting and ambulation. Modalities:   Pt declined ice today   [] GAME READY (VASO)- for significant edema, swelling, pain control.      Charges:  Timed Code Treatment Minutes: 54'   Total Treatment Minutes: 54'      [] EVAL (LOW) 455 1011 (typically 20 minutes face-to-face)  [] EVAL (MOD) 56588 (typically 30 minutes face-to-face)  [] EVAL (HIGH) 45203 (typically 45 minutes face-to-face)  [] RE-EVAL     [x] NL(68696) x 2 (30')    [] IONTO  [x] NMR (27891) x 1 (10')   [] VASO  [] Manual (68324) x      [] Other:  [x] TA x 1 (15')     [] Mech Traction (99923)  [] ES(attended) (86126)      [] ES (un) (58818):         ASSESSMENT:  Pt tolerated the exercises better today. She required fewer rest breaks and was not as fatigued at the end of her session. She was able to increase to 2# wt with flexion SLR which was very challenging. Step ups were still difficult due to weakness and lack of endurance and she could only complete 20 reps. Pt was unable to use the Airex pad today with tandem stance due to pain in both feet. She was able to perform tandem stance on solid surface with no pain in either foot. Knee flexion ROM improved to 115 degrees today with SP with stiffness present at end range of motion. Pt needs to continue increasing strength and endurance for improved function. MEDICARE CAP EXCEPTION DOCUMENTATION      I certify that this patient meets one of the below criteria necessary for becoming an exception to the Medicare cap on therapy services:    [x]  The patient has a condition identified by an ICD-9 code that has a direct and significant impact on the need for therapy. (Significantly impacts the rate of recovery.)                     []  The patient has a complexity identified by an ICD-9 code that has a direct and significant impact on the need for therapy. (Significantly impacts the rate of recovery and is associated with a primary condition.)         []  The patient has associated variables that influence the amount of treatment to include:  Social support, self-efficacy/motivation, prognosis, time since onset/acuity. []  The patient has generalized musculoskeletal conditions or a condition affecting multiple sites that will have a direct impact on the rate of recovery. [x]  The patient had a prior episode of outpatient therapy during this calendar year for a different condition.            []  The patient has a mental or cognitive disorder in addition to the condition being treated that will have a direct and significant impact on the rate of recovery. GOALS: (Goals updated 10/26/21) - ROM, strength, and function have regressed due to pt undergoing bilateral wrist surgeries which caused her to miss several weeks of therapy. Patient stated goal:  \"Make my legs stronger; walk and climb stairs easier and faster. \"  [x] Progressing: Strength and overall function have regressed during her absence from therapy [] Met: [] Not Met: [] Adjusted    Therapist goals for Patient:   Short Term Goals: To be achieved in: 2 weeks  1. Independent in HEP and progression per patient tolerance, in order to prevent re-injury. [] Progressing: [x] Met: [] Not Met: [] Adjusted  2. Patient will have a decrease in left knee pain to 1-2/10 to facilitate improvement in movement, function, and ADLs as indicated by Functional Deficits. [] Progressing: [x] Met: [] Not Met: [] Adjusted    Long Term Goals: To be achieved in: 4-6 weeks  1. Disability index score of 20% or less for the Knee Outcome Survey Activities of Daily Living Scale to assist with reaching prior level of function. [x] Progressing: [] Met: [] Not Met: [] Adjusted  2. Patient will demonstrate an increase in left knee flexion AROM to at least 115 degrees to allow for proper joint functioning as indicated by patients Functional Deficits. [x] Progressing: Her ROM has regressed during her absence from therapy [] Met: [] Not Met: [] Adjusted  3. Patient will demonstrate an increase in left hip and knee strength to at least 4+/5 to allow for proper functional mobility as indicated by patients Functional Deficits. [x] Progressing: Her strength has regressed during her absence from therapy [] Met: [] Not Met: [] Adjusted  4.  Patient will be able to walk community distances, stand for at least 30 minutes, and be able to get up from a chair with little to no UE assistance needed for improved function. [x] Progressing: [] Met: [] Not Met: [] Adjusted  5. Patient will be able to ascend/descend stairs reciprocally with HR (patient specific functional goal)    [x] Progressing: [] Met: [] Not Met: [] Adjusted   6. Patient will have a decrease in left knee pain to 0-1/10 with daily act. [x] Progressing: [] Met: [] Not Met: [] Adjusted      New Goal: To be achieved in: 4 weeks (10/26/21)  1. Patient will demonstrate an increase in left hip and knee strength to at least 5/5 to allow for proper functional mobility as indicated by patients Functional Deficits. [] Progressing: [] Met: [x] Not Met: Her strength has regressed during her absence from therapy [] Adjusted        Overall Progression Towards Functional goals/ Treatment Progress Update:  [] Patient is progressing as expected towards functional goals listed. [] Progression is slowed due to complexities/Impairments listed. [x] Progression has been slowed due to co-morbidities - ROM, strength, and function have regressed due to pt undergoing bilateral wrist surgeries which caused her to miss several weeks of therapy. [] Plan just implemented, too soon to assess goals progression <30days   [] Goals require adjustment due to lack of progress  [] Patient is not progressing as expected and requires additional follow up with physician  [] Other    Prognosis for POC: [x] Good [] Fair  [] Poor      Patient requires continued skilled intervention: [x] Yes  [] No    Treatment/Activity Tolerance:  [] Patient able to complete treatment  [x] Patient limited by fatigue  [] Patient limited by pain    [] Patient limited by other medical complications  [] Other:         PLAN: Continue therapy 1-2x/week for ROM, strengthening, endurance, and balance. Pt would like to increase frequency of PT to 2x/week due to missing several weeks of therapy after having surgery on both hands.     1-2x/week for 4-6 weeks for ROM, strengthening, balance act, HEP instruction, pt education, manual therapy prn, and modalities prn (10/26/21)  [x] Continue per plan of care [] Viki Poe current plan   [] Plan of care initiated [] Hold pending MD visit [] Discharge      Electronically signed by:  Oseas Landers PT     Physical Therapist Adore Rangel 421 #[id number]  Physical Therapist [place] License #[id number]    Note: If patient does not return for scheduled/ recommended follow up visits, this note will serve as a discharge from care along with most recent update on progress.

## 2021-11-04 ENCOUNTER — TREATMENT (OUTPATIENT)
Dept: PHYSICAL THERAPY | Age: 71
End: 2021-11-04
Payer: MEDICARE

## 2021-11-04 DIAGNOSIS — M17.12 PRIMARY OSTEOARTHRITIS OF LEFT KNEE: ICD-10-CM

## 2021-11-04 DIAGNOSIS — R29.898 WEAKNESS OF LEFT LOWER EXTREMITY: ICD-10-CM

## 2021-11-04 DIAGNOSIS — M25.662 DECREASED ROM OF LEFT KNEE: ICD-10-CM

## 2021-11-04 DIAGNOSIS — M25.562 LEFT KNEE PAIN, UNSPECIFIED CHRONICITY: Primary | ICD-10-CM

## 2021-11-04 PROCEDURE — G8427 DOCREV CUR MEDS BY ELIG CLIN: HCPCS | Performed by: PHYSICAL THERAPIST

## 2021-11-04 PROCEDURE — 97112 NEUROMUSCULAR REEDUCATION: CPT | Performed by: PHYSICAL THERAPIST

## 2021-11-04 PROCEDURE — 97110 THERAPEUTIC EXERCISES: CPT | Performed by: PHYSICAL THERAPIST

## 2021-11-04 PROCEDURE — 97530 THERAPEUTIC ACTIVITIES: CPT | Performed by: PHYSICAL THERAPIST

## 2021-11-04 NOTE — PROGRESS NOTES
[facility]   Phone: [phone]    Fax: [phone]           Physical Therapy Treatment Note/ Progress Report:           Date:  2021    Patient Name:  Charles Lombardi    :  1950  MRN: [id number]  Restrictions/Precautions:    Medical/Treatment Diagnosis Information:  Diagnosis: Left Knee Pain (M25.562), Primary OA of Left Knee (M17.12)   Treatment Diagnosis: Decreased Left Knee ROM (M25.662), Decreased Strength (R29.898)        Insurance/Certification information:  PT Insurance Information: Medicare - Visits based on medical necessity  Physician Information:  Referring Practitioner: Dr. Zen Day  Has the plan of care been signed (Y/N):        [x]  Yes (POC signed 10/27/21)  []  No      Date of Patient follow up with Physician: As needed      Is this a Progress Report:     []  Yes  [x]  No        If Yes:  Date Range for reporting period:  Beginning 4/15/21   Ending 10/26/21    Progress report will be due (10 Rx or 30 days whichever is less):        Recertification will be due (POC Duration  / 90 days whichever is less):  21        Visit # Insurance Allowable Auth Required   29 Medical necessity  (pt has already used 13 visits this year for her shoulder) []  Yes [x]  No        Functional Scale:    Date assessed:  10/26/21  Functional Assessment Tool Used: Knee Outcome Survey Activities of Daily Living Scale (copy scanned into media)  Score:  44/70 = 63% (37% functional deficit)    (4/15/21)  Patient Age: 66years old  Patient Height: 5' 2\"  Patient Weight: 280 lbs  Patient BMI: 51.2      Pain (10/26/21)  [x]  Pain diagram was completed, pain was present and a follow up plan to address the pain is in the plan of care. ()   []  Pain diagram was completed and there was no pain present and therefore no follow up plan to address pain in the plan of care.  ()   []  Pain diagram was not completed and the reason for not completing the pain diagram is in the medical chart ()  []  Patient refused to participate   []  Severe mental or physical capacity, patient is in urgent emergent situation and to complete the questionnaire would jeopardize the patient's health status        Functional Questionnaire (10/26/21)  [x]  Patient completed the functional outcome questionnaire and deficiencies were addressed in the plan of care. ()   []  Patient completed the outcome questionnaire and there were no functional deficits identified and therefore no plan of care is required. ()   []  Patient did not complete the functional outcome questionnaire and the reason why is documented in the medical chart. ()  []  Patient refused to participate   []  Patient unable to complete the questionnaire   []   A functional outcome assessment has been reported on within the last 30 days        Falls (10/26/21)  [x]  The patient has had no falls or 1 fall without injury in the past year. (1101F)   []  There is no documentation of fall in the medical chart (1101F,8P)     [] The patient has had 2 or more falls or one fall with injury in the past year that is documented in the medical chart. (1100F)  []  A falls risk assessment was completed and documented in the medical chart. (4455S)   []  Falls were addressed in the plan of care. (3543O)   []  A falls risk assessment was not completed and documented in the medical chart (8714V,4Q)   []   Falls were not addressed in the plan of care and reason was documented in the plan of care. (2514W 1P)        Medication (10/26/21)     [x] A list of current medications (including prescription, over the counter, herbal supplements, vitamin supplements, mineral supplements, dietary supplements) was reviewed with the patient and documented in the medical chart. ()        Falls Risk Assessment (30 days): (10/26/21)  [x] Falls Risk assessed and no intervention required.   [] Falls Risk assessed and Patient requires intervention due to being higher risk   TUG score (>12s at risk):     [] Falls education provided, including     PQRS:   Reviewed medication list with patient. No changes reported by pt since last PT visit. (11/4/21)          Latex Allergy:  [x]NO      []YES  Preferred Language for Healthcare:   [x]English       []other:      Pain level:  1-2/10 (11/4/21)   Medication:  Celebrex      SUBJECTIVE:   Pt's main complaint continues to be weakness in both legs. Stairs are still very difficult. She has to use her arms to help push herself up from a chair. She had an EMG for her right UE yesterday and is waiting to here from Dr. Sachin Whitaker office. OBJECTIVE:       (10/26/21)  Flexibility L R Comment   Hamstring Mod tightness Mild tightness    Gastroc Mod tightness Mild tightness    ITB      Quad              (11/2/21)  ROM PROM AROM Overpressure Comment    L R L R L R    Flexion 115 with SP  105 100      Extension   0 0                            (10/26/21)  Strength L R Comment   Quad 4/5 4+/5    Hamstring 4+/5 4+/5    Gastroc 4/5 4+/5    Hip Flex 4/5 4/5    Hip Abd 4/5 4+/5    Hip Add 4/5 4+/5    Glut Med              (10/26/21)  Girth L R   Mid Patella 57.0 cm 58.0 cm   Suprapatellar     5cm above     15cm above       (10/26/21)  Special Test Results/Comment   Meniscal Click    Crepitus (+) PF crepitus bilaterally   Flexion Test    Valgus Laxity    Varus Laxity    Lachmans    Drop Back        Reflexes/Sensation: (4/15/21)   []Dermatomes/Myotomes intact    []Reflexes equal and normal bilaterally   [x]Other: Intact to light touch    Joint mobility: (10/26/21)   []Normal    [x]Hypo   []Hyper    Palpation: No tenderness. Mild quad atrophy still present on the left vs right.(10/26/21)    Functional Mobility/Transfers: Stairs are still difficult (pt must ascend/descend stairs 1 at a time using 2 HR), she can't stand or walk for long periods of time, she is unable to squat.   She is also having some difficulty getting up from a chair and has to use her arms to help push herself up.   (10/26/21)    Posture: Forward head, rounded shoulders. (10/26/21)    Bandages/Dressings/Incisions: Pt wears compression hose and wraps on both legs (10/26/21)     Gait: (include devices/WB status) Gait is still mildly antalgic, no AD. Decreased pawel with decreased step length bilaterally. She demonstrates decreased trunk rotation and increased lateral trunk sway. (10/26/21)    Orthopedic Special Tests: See above.        RESTRICTIONS/PRECAUTIONS:     Exercises/Interventions:     Therapeutic Ex (49841) Sets/sec Reps Notes/CUES   BIKE      TREADMILL            STRETCHING      Towel Pull Calf 30\" hold X 5    Inclined Calf      Hamstrings 30\" hold X 5 Seated   Quads      Hip Flexors      ITB      Adductors            ROM      Passive      Active      Weight Hangs      Sheet Pulls 10\" hold X 10    Ankle Pumps      ERMI            STRENGTHENING      Quad Sets 10\" hold X 10    Ham Sets      Hip ADD Sets BS 10\" hold X 10     SLR Flexion 3 sets X 10 with 2#     SLR Abduction  HEP   SLR Prone      SLR Adduction      SLR+      Supine Hip Abduction 2 sets X 10  Black band         Standing Marches 3 sets X 10 on Airex Standing at half wall   Standing Knee Flexion 3 sets X 10  Standing at half wall         PRE Machines      Knee Extension      Knee Flexion      Leg Press            CKC      Calf Raises 3 sets X 10 Seated   Mini Squats      Wall Sits      Step ups - 0 riser 3 sets X 10  Using half wall     Lateral Step Ups - 0 riser 2 sets X 10    TKE  Held today               Manual Intervention (94534)      Patellar Mobs      Femoral Tibial mobs      STM      Scar Massage      Foam Roll            NMR re-education (11604)   CUES NEEDED   Biodex Balance      Tandem Balance 30\" hold X 2 each   Held Airex today due to pt having pain in both feet *Feet staggered  *Standing next to half wallSLB      Rebounder      BOSU            Sit -> Stand  X 20 with left arm assisting     With cushion on chair     Up/Down 1 Flight of Stairs in the [place]   Held due to recent hand surgeries      Therapeutic Activity (34712)      Core [place]      TRX training                  Patient Education: Dx, prognosis, pt goals/expectations, role of therapy (4/15/21)  X 8'                  Therapeutic Exercise and NMR EXR  [x] (10418) Provided verbal/tactile cueing for activities related to strengthening, flexibility, endurance, ROM for improvements in LE, proximal hip, and core control with self care, mobility, lifting, ambulation. [x] (68821) Provided verbal/tactile cueing for activities related to improving balance, coordination, kinesthetic sense, posture, motor skill, proprioception to assist with LE, proximal hip, and core control in self-care, mobility, lifting, ambulation and eccentric single leg control. NMR and Therapeutic Activities:    [x] (53680 or 47381) Provided verbal/tactile cueing for activities related to improving balance, coordination, kinesthetic sense, posture, motor skill, proprioception and motor activation to allow for proper function of core, proximal hip and LE with self-care and ADLs and functional mobility.   [] (23121) Gait Re-education- Provided training and instruction to the patient for proper LE, core and proximal hip recruitment and positioning and eccentric body weight control with ambulation re-education including up and down stairs     Home Exercise Program:    [x] (60969) Reviewed/Progressed HEP activities related to strengthening, flexibility, endurance, ROM of core, proximal hip and LE for functional self-care, mobility, lifting and ambulation/stair navigation - Updated HEP through [address] [place] [address] (see below). Pt was also issued new written handouts.  (4/27/21)  [x] (20761) Reviewed/Progressed HEP activities related to improving balance, coordination, kinesthetic sense, posture, motor skill, proprioception of core, proximal hip and LE for self-care, mobility, lifting, and ambulation/stair navigation          Access Code: [email or web address]  URL: [email or web address]: 04/20/2021Prepared by: REYNALDO SybertExercises   Long Sitting Calf Stretch with Strap - 1 x daily - 7 x weekly - 1 sets - 3 reps - 30 seconds hold   Seated Table Hamstring Stretch - 1 x daily - 7 x weekly - 1 sets - 3 reps - 30 seconds hold   Long Sitting Quad Set - 1 x daily - 7 x weekly - 1 sets - 10 reps - 10 seconds hold   Long Sitting Hip Adduction Isometric with Ball - 1 x daily - 7 x weekly - 1 sets - 10 reps - 10 seconds hold   Supine Active Straight Leg Raise - 1 x daily - 7 x weekly - 2-3 sets - 10 reps   Hooklying Isometric Clamshell - 1 x daily - 7 x weekly - 3 sets - 10 reps   Standing March with Counter Support - 1 x daily - 7 x weekly - 3 sets - 10 reps   Seated Heel Raise - 1 x daily - 7 x weekly - 3 sets - 10 reps   Sit to Stand - 1 x daily - 7 x weekly - 15 reps   Tandem Stance with Support - 1 x daily - 7 x weekly - 2 reps - 20 seconds hold        Manual Treatments:  PROM / STM / Oscillations-Mobs:  G-I, II, III, IV (PA's, Inf., Post.)  [] (59909) Provided manual therapy to mobilize LE, proximal hip and/or LS spine soft tissue/joints for the purpose of modulating pain, promoting relaxation, increasing ROM, reducing/eliminating soft tissue swelling/inflammation/restriction, improving soft tissue extensibility and allowing for proper ROM for normal function with self-care, mobility, lifting and ambulation. Modalities:   Pt declined ice today   [] GAME READY (VASO)- for significant edema, swelling, pain control.      Charges:  Timed Code Treatment Minutes: 54'   Total Treatment Minutes: 54'      [] EVAL (LOW) 455 1011 (typically 20 minutes face-to-face)  [] EVAL (MOD) 95379 (typically 30 minutes face-to-face)  [] EVAL (HIGH) 48612 (typically 45 minutes face-to-face)  [] RE-EVAL     [x] TC(20123) x 2 (30')    [] IONTO  [x] NMR (85778) x 1 (10')   [] VASO  [] Manual (96878) x      [] Other:  [x] TA x 1 (15')     [] [facility] (89579)  [] ES(attended) (48149)      [] ES (un) (20554):         ASSESSMENT:  Pt had a very busy day today and she was very tired coming into therapy. She was able to complete her program but she was more fatigued with the exercises and needed more rest breaks. She was able to do 3 sets of 10 with forward step ups today but it was challenging. Strength and endurance deficits continue to limit function and mobility. MEDICARE CAP EXCEPTION DOCUMENTATION      I certify that this patient meets one of the below criteria necessary for becoming an exception to the Medicare cap on therapy services:    [x]  The patient has a condition identified by an ICD-9 code that has a direct and significant impact on the need for therapy. (Significantly impacts the rate of recovery.)                     []  The patient has a complexity identified by an ICD-9 code that has a direct and significant impact on the need for therapy. (Significantly impacts the rate of recovery and is associated with a primary condition.)         []  The patient has associated variables that influence the amount of treatment to include:  Social support, self-efficacy/motivation, prognosis, time since onset/acuity. []  The patient has generalized musculoskeletal conditions or a condition affecting multiple sites that will have a direct impact on the rate of recovery. [x]  The patient had a prior episode of outpatient therapy during this calendar year for a different condition. []  The patient has a mental or cognitive disorder in addition to the condition being treated that will have a direct and significant impact on the rate of recovery. GOALS: (Goals updated 10/26/21) - ROM, strength, and function have regressed due to pt undergoing bilateral wrist surgeries which caused her to miss several weeks of therapy. Patient stated goal:  \"Make my legs stronger; walk and climb stairs easier and faster. \"  [x] Progressing: Strength and overall function have regressed during her absence from therapy [] Met: [] Not Met: [] Adjusted    Therapist goals for Patient:   Short Term Goals: To be achieved in: 2 weeks  1. Independent in HEP and progression per patient tolerance, in order to prevent re-injury. [] Progressing: [x] Met: [] Not Met: [] Adjusted  2. Patient will have a decrease in left knee pain to 1-2/10 to facilitate improvement in movement, function, and ADLs as indicated by Functional Deficits. [] Progressing: [x] Met: [] Not Met: [] Adjusted    Long Term Goals: To be achieved in: 4-6 weeks  1. Disability index score of 20% or less for the Knee Outcome Survey Activities of Daily Living Scale to assist with reaching prior level of function. [x] Progressing: [] Met: [] Not Met: [] Adjusted  2. Patient will demonstrate an increase in left knee flexion AROM to at least 115 degrees to allow for proper joint functioning as indicated by patients Functional Deficits. [x] Progressing: Her ROM has regressed during her absence from therapy [] Met: [] Not Met: [] Adjusted  3. Patient will demonstrate an increase in left hip and knee strength to at least 4+/5 to allow for proper functional mobility as indicated by patients Functional Deficits. [x] Progressing: Her strength has regressed during her absence from therapy [] Met: [] Not Met: [] Adjusted  4. Patient will be able to walk community distances, stand for at least 30 minutes, and be able to get up from a chair with little to no UE assistance needed for improved function. [x] Progressing: [] Met: [] Not Met: [] Adjusted  5. Patient will be able to ascend/descend stairs reciprocally with HR (patient specific functional goal)    [x] Progressing: [] Met: [] Not Met: [] Adjusted   6. Patient will have a decrease in left knee pain to 0-1/10 with daily act.     [x] Progressing: [] Met: [] Not Met: [] Adjusted      New Goal: To be achieved in: 4 weeks (10/26/21)  1. Patient will demonstrate an increase in left hip and knee strength to at least 5/5 to allow for proper functional mobility as indicated by patients Functional Deficits. [] Progressing: [] Met: [x] Not Met: Her strength has regressed during her absence from therapy [] Adjusted        Overall Progression Towards Functional goals/ Treatment Progress Update:  [] Patient is progressing as expected towards functional goals listed. [] Progression is slowed due to complexities/Impairments listed. [x] Progression has been slowed due to co-morbidities - ROM, strength, and function have regressed due to pt undergoing bilateral wrist surgeries which caused her to miss several weeks of therapy. [] Plan just implemented, too soon to assess goals progression <30days   [] Goals require adjustment due to lack of progress  [] Patient is not progressing as expected and requires additional follow up with physician  [] Other    Prognosis for POC: [x] Good [] Fair  [] Poor      Patient requires continued skilled intervention: [x] Yes  [] No    Treatment/Activity Tolerance:  [] Patient able to complete treatment  [x] Patient limited by fatigue  [] Patient limited by pain    [] Patient limited by other medical complications  [] Other:         PLAN: Continue therapy 1-2x/week for ROM, strengthening, endurance, and balance. Pt would like to increase frequency of PT to 2x/week due to missing several weeks of therapy after having surgery on both hands.     1-2x/week for 4-6 weeks for ROM, strengthening, balance act, HEP instruction, pt education, manual therapy prn, and modalities prn (10/26/21)  [x] Continue per plan of care [] Alter current plan   [] Plan of care initiated [] Hold pending MD visit [] Discharge      Electronically signed by:  Trinity Marin, PT     Physical Therapist [address] [address] #[id number]  Physical Therapist [place] License #[id number]    Note: If patient does not return for scheduled/ recommended follow up visits, this note will serve as a discharge from care along with most recent update on progress.

## 2021-11-11 ENCOUNTER — TREATMENT (OUTPATIENT)
Dept: PHYSICAL THERAPY | Age: 71
End: 2021-11-11
Payer: MEDICARE

## 2021-11-11 DIAGNOSIS — R29.898 WEAKNESS OF LEFT LOWER EXTREMITY: ICD-10-CM

## 2021-11-11 DIAGNOSIS — M25.562 LEFT KNEE PAIN, UNSPECIFIED CHRONICITY: Primary | ICD-10-CM

## 2021-11-11 DIAGNOSIS — M17.12 PRIMARY OSTEOARTHRITIS OF LEFT KNEE: ICD-10-CM

## 2021-11-11 DIAGNOSIS — M25.662 DECREASED ROM OF LEFT KNEE: ICD-10-CM

## 2021-11-11 PROCEDURE — 97110 THERAPEUTIC EXERCISES: CPT | Performed by: PHYSICAL THERAPIST

## 2021-11-11 PROCEDURE — G8427 DOCREV CUR MEDS BY ELIG CLIN: HCPCS | Performed by: PHYSICAL THERAPIST

## 2021-11-11 PROCEDURE — 97530 THERAPEUTIC ACTIVITIES: CPT | Performed by: PHYSICAL THERAPIST

## 2021-11-11 PROCEDURE — 97112 NEUROMUSCULAR REEDUCATION: CPT | Performed by: PHYSICAL THERAPIST

## 2021-11-11 NOTE — PROGRESS NOTES
diagram is in the medical chart ()  []  Patient refused to participate   []  Severe mental or physical capacity, patient is in urgent emergent situation and to complete the questionnaire would jeopardize the patient's health status        Functional Questionnaire (10/26/21)  [x]  Patient completed the functional outcome questionnaire and deficiencies were addressed in the plan of care. ()   []  Patient completed the outcome questionnaire and there were no functional deficits identified and therefore no plan of care is required. ()   []  Patient did not complete the functional outcome questionnaire and the reason why is documented in the medical chart. ()  []  Patient refused to participate   []  Patient unable to complete the questionnaire   []   A functional outcome assessment has been reported on within the last 30 days        Falls (10/26/21)  [x]  The patient has had no falls or 1 fall without injury in the past year. (1101F)   []  There is no documentation of fall in the medical chart (1101F,8P)     [] The patient has had 2 or more falls or one fall with injury in the past year that is documented in the medical chart. (1100F)  []  A falls risk assessment was completed and documented in the medical chart. (2501N)   []  Falls were addressed in the plan of care. (5951W)   []  A falls risk assessment was not completed and documented in the medical chart (7599C,2W)   []   Falls were not addressed in the plan of care and reason was documented in the plan of care. (6694X 1P)        Medication (10/26/21)     [x] A list of current medications (including prescription, over the counter, herbal supplements, vitamin supplements, mineral supplements, dietary supplements) was reviewed with the patient and documented in the medical chart. ()        Falls Risk Assessment (30 days): (10/26/21)  [x] Falls Risk assessed and no intervention required.   [] Falls Risk assessed and Patient requires intervention due to being higher risk   TUG score (>12s at risk):     [] Falls education provided, including     PQRS:   Reviewed medication list with patient. No changes reported by pt since last PT visit. (11/11/21)          Latex Allergy:  [x]NO      []YES  Preferred Language for Healthcare:   [x]English       []other:      Pain level:  1-2/10 (11/11/21)   Medication:  Celebrex      SUBJECTIVE:   Pt sat at a conference all morning and both knees feel stiff. Her main complaint continues to be weakness and she knows she needs to do more at home. She still has to use her arms to push herself up from a chair. She still has not heard from Dr. Fan Rojas office regarding EMG results. OBJECTIVE:       (10/26/21)  Flexibility L R Comment   Hamstring Mod tightness Mild tightness    Gastroc Mod tightness Mild tightness    ITB      Quad              (11/11/21)  ROM PROM AROM Overpressure Comment    L R L R L R    Flexion 120 with SP  110 105      Extension   0 0                            (10/26/21)  Strength L R Comment   Quad 4/5 4+/5    Hamstring 4+/5 4+/5    Gastroc 4/5 4+/5    Hip Flex 4/5 4/5    Hip Abd 4/5 4+/5    Hip Add 4/5 4+/5    Glut Med              (10/26/21)  Girth L R   Mid Patella 57.0 cm 58.0 cm   Suprapatellar     5cm above     15cm above       (10/26/21)  Special Test Results/Comment   Meniscal Click    Crepitus (+) PF crepitus bilaterally   Flexion Test    Valgus Laxity    Varus Laxity    Lachmans    Drop Back        Reflexes/Sensation: (4/15/21)   []Dermatomes/Myotomes intact    []Reflexes equal and normal bilaterally   [x]Other: Intact to light touch    Joint mobility: (10/26/21)   []Normal    [x]Hypo   []Hyper    Palpation: No tenderness. Mild quad atrophy still present on the left vs right.(10/26/21)    Functional Mobility/Transfers: Stairs are still difficult (pt must ascend/descend stairs 1 at a time using 2 HR), she can't stand or walk for long periods of time, she is unable to squat.   She is also having some difficulty getting up from a chair and has to use her arms to help push herself up.   (10/26/21)    Posture: Forward head, rounded shoulders. (10/26/21)    Bandages/Dressings/Incisions: Pt wears compression hose and wraps on both legs (10/26/21)     Gait: (include devices/WB status) Gait is still mildly antalgic, no AD. Decreased pawel with decreased step length bilaterally. She demonstrates decreased trunk rotation and increased lateral trunk sway. (10/26/21)    Orthopedic Special Tests: See above.        RESTRICTIONS/PRECAUTIONS:     Exercises/Interventions:     Therapeutic Ex (11517) Sets/sec Reps Notes/CUES   BIKE      TREADMILL            STRETCHING      Towel Pull Calf 30\" hold X 5    Inclined Calf      Hamstrings 30\" hold X 5 Seated   Quads      Hip Flexors      ITB      Adductors            ROM      Passive      Active      Weight Hangs      Sheet Pulls 10\" hold X 10    Ankle Pumps      ERMI            STRENGTHENING      Quad Sets 10\" hold X 10    Ham Sets      Hip ADD Sets BS 10\" hold X 10     SLR Flexion 3 sets X 10 with 2#     SLR Abduction  HEP   SLR Prone      SLR Adduction      SLR+      Supine Hip Abduction 2 sets X 10  Black band         Standing Marches 3 sets X 10 on Airex Standing at half wall   Standing Knee Flexion 3 sets X 10  Standing at half wall         PRE Machines      Knee Extension      Knee Flexion      Leg Press            CKC      Calf Raises 3 sets X 10 Seated   Mini Squats      Wall Sits      Step ups - 0 riser 3 sets X 10  Using half wall     Lateral Step Ups - 0 riser 2 sets X 10    TKE  Held today               Manual Intervention (19888)      Patellar Mobs      Femoral Tibial mobs      STM      Scar Massage      Foam Roll            NMR re-education (24299)   CUES NEEDED   Biodex Balance      Tandem Balance 30\" hold X 2 each   Held Airex today due to pt having pain in both feet *Feet staggered  *Standing next to half wallSLB      Rebounder      BOSU Sit -> Stand  X 20 with left arm assisting     With cushion on chair     Up/Down 1 Flight of Stairs in the [place]   Held due to recent hand surgeries      Therapeutic Activity (96534)      Core Training      [place] Willy 30      TRX training                  Patient Education: Dx, prognosis, pt goals/expectations, role of therapy (4/15/21)  X 8'                  Therapeutic Exercise and NMR EXR  [x] (16873) Provided verbal/tactile cueing for activities related to strengthening, flexibility, endurance, ROM for improvements in LE, proximal hip, and core control with self care, mobility, lifting, ambulation. [x] (33526) Provided verbal/tactile cueing for activities related to improving balance, coordination, kinesthetic sense, posture, motor skill, proprioception to assist with LE, proximal hip, and core control in self-care, mobility, lifting, ambulation and eccentric single leg control. NMR and Therapeutic Activities:    [x] (98189 or 99301) Provided verbal/tactile cueing for activities related to improving balance, coordination, kinesthetic sense, posture, motor skill, proprioception and motor activation to allow for proper function of core, proximal hip and LE with self-care and ADLs and functional mobility.   [] (96913) Gait Re-education- Provided training and instruction to the patient for proper LE, core and proximal hip recruitment and positioning and eccentric body weight control with ambulation re-education including up and down stairs     Home Exercise Program:    [x] (86623) Reviewed/Progressed HEP activities related to strengthening, flexibility, endurance, ROM of core, proximal hip and LE for functional self-care, mobility, lifting and ambulation/stair navigation - Updated HEP through [address] [place] [address] (see below). Pt was also issued new written handouts.  (4/27/21)  [x] (54684) Reviewed/Progressed HEP activities related to improving balance, coordination, kinesthetic sense, posture, motor skill, proprioception of core, proximal hip and LE for self-care, mobility, lifting, and ambulation/stair navigation          Access Code: [email or web address]  URL: [employer]/Date: 04/20/2021Prepared by: JAY SybertExercises   Long Sitting Calf Stretch with Strap - 1 x daily - 7 x weekly - 1 sets - 3 reps - 30 seconds hold   Seated Table Hamstring Stretch - 1 x daily - 7 x weekly - 1 sets - 3 reps - 30 seconds hold   Long Sitting Quad Set - 1 x daily - 7 x weekly - 1 sets - 10 reps - 10 seconds hold   Long Sitting Hip Adduction Isometric with Ball - 1 x daily - 7 x weekly - 1 sets - 10 reps - 10 seconds hold   Supine Active Straight Leg Raise - 1 x daily - 7 x weekly - 2-3 sets - 10 reps   Hooklying Isometric Clamshell - 1 x daily - 7 x weekly - 3 sets - 10 reps   Standing March with Counter Support - 1 x daily - 7 x weekly - 3 sets - 10 reps   Seated Heel Raise - 1 x daily - 7 x weekly - 3 sets - 10 reps   Sit to Stand - 1 x daily - 7 x weekly - 15 reps   Tandem Stance with Support - 1 x daily - 7 x weekly - 2 reps - 20 seconds hold        Manual Treatments:  PROM / STM / Oscillations-Mobs:  G-I, II, III, IV (PA's, Inf., Post.)  [] (18632) Provided manual therapy to mobilize LE, proximal hip and/or LS spine soft tissue/joints for the purpose of modulating pain, promoting relaxation, increasing ROM, reducing/eliminating soft tissue swelling/inflammation/restriction, improving soft tissue extensibility and allowing for proper ROM for normal function with self-care, mobility, lifting and ambulation. Modalities:   Pt declined ice today   [] GAME READY (VASO)- for significant edema, swelling, pain control.      Charges:  Timed Code Treatment Minutes: 54'   Total Treatment Minutes: 54'      [] EVAL (LOW) 455 1011 (typically 20 minutes face-to-face)  [] EVAL (MOD) 47916 (typically 30 minutes face-to-face)  [] EVAL (HIGH) 41502 (typically 45 minutes face-to-face)  [] RE-EVAL     [x] NF(19416) x 2 (30')    [] IONTO  [x] NMR (67214) x 1 (10')   [] VASO  [] Manual (29154) x      [] Other:  [x] TA x 1 (15')     [] Mech Traction (55488)  [] ES(attended) (05605)      [] ES (un) (96587):         ASSESSMENT:  Pt was tired coming into therapy today due to having to get up early for a conference. She fatigued more quickly with the exercises, especially forward and lateral step ups, with rest breaks needed. She also struggled more with tandem stance balance today with several UE touches needed. Knee flexion ROM improved to 120 degrees today with SP with stiffness present at end range of motion. Pt needs to continue increasing strength and endurance. MEDICARE CAP EXCEPTION DOCUMENTATION      I certify that this patient meets one of the below criteria necessary for becoming an exception to the Medicare cap on therapy services:    [x]  The patient has a condition identified by an ICD-9 code that has a direct and significant impact on the need for therapy. (Significantly impacts the rate of recovery.)                     []  The patient has a complexity identified by an ICD-9 code that has a direct and significant impact on the need for therapy. (Significantly impacts the rate of recovery and is associated with a primary condition.)         []  The patient has associated variables that influence the amount of treatment to include:  Social support, self-efficacy/motivation, prognosis, time since onset/acuity. []  The patient has generalized musculoskeletal conditions or a condition affecting multiple sites that will have a direct impact on the rate of recovery. [x]  The patient had a prior episode of outpatient therapy during this calendar year for a different condition. []  The patient has a mental or cognitive disorder in addition to the condition being treated that will have a direct and significant impact on the rate of recovery.                 GOALS: (Goals updated 10/26/21) - ROM, strength, and function have regressed due to pt undergoing bilateral wrist surgeries which caused her to miss several weeks of therapy. Patient stated goal:  \"Make my legs stronger; walk and climb stairs easier and faster. \"  [x] Progressing: Strength and overall function have regressed during her absence from therapy [] Met: [] Not Met: [] Adjusted    Therapist goals for Patient:   Short Term Goals: To be achieved in: 2 weeks  1. Independent in HEP and progression per patient tolerance, in order to prevent re-injury. [] Progressing: [x] Met: [] Not Met: [] Adjusted  2. Patient will have a decrease in left knee pain to 1-2/10 to facilitate improvement in movement, function, and ADLs as indicated by Functional Deficits. [] Progressing: [x] Met: [] Not Met: [] Adjusted    Long Term Goals: To be achieved in: 4-6 weeks  1. Disability index score of 20% or less for the Knee Outcome Survey Activities of Daily Living Scale to assist with reaching prior level of function. [x] Progressing: [] Met: [] Not Met: [] Adjusted  2. Patient will demonstrate an increase in left knee flexion AROM to at least 115 degrees to allow for proper joint functioning as indicated by patients Functional Deficits. [x] Progressing: Her ROM has regressed during her absence from therapy [] Met: [] Not Met: [] Adjusted  3. Patient will demonstrate an increase in left hip and knee strength to at least 4+/5 to allow for proper functional mobility as indicated by patients Functional Deficits. [x] Progressing: Her strength has regressed during her absence from therapy [] Met: [] Not Met: [] Adjusted  4. Patient will be able to walk community distances, stand for at least 30 minutes, and be able to get up from a chair with little to no UE assistance needed for improved function. [x] Progressing: [] Met: [] Not Met: [] Adjusted  5.  Patient will be able to ascend/descend stairs reciprocally with HR (patient specific functional goal)    [x] Progressing: [] Met: [] Not Met: [] Adjusted   6. Patient will have a decrease in left knee pain to 0-1/10 with daily act. [x] Progressing: [] Met: [] Not Met: [] Adjusted      New Goal: To be achieved in: 4 weeks (10/26/21)  1. Patient will demonstrate an increase in left hip and knee strength to at least 5/5 to allow for proper functional mobility as indicated by patients Functional Deficits. [] Progressing: [] Met: [x] Not Met: Her strength has regressed during her absence from therapy [] Adjusted        Overall Progression Towards Functional goals/ Treatment Progress Update:  [] Patient is progressing as expected towards functional goals listed. [] Progression is slowed due to complexities/Impairments listed. [x] Progression has been slowed due to co-morbidities - ROM, strength, and function have regressed due to pt undergoing bilateral wrist surgeries which caused her to miss several weeks of therapy. [] Plan just implemented, too soon to assess goals progression <30days   [] Goals require adjustment due to lack of progress  [] Patient is not progressing as expected and requires additional follow up with physician  [] Other    Prognosis for POC: [x] Good [] Fair  [] Poor      Patient requires continued skilled intervention: [x] Yes  [] No    Treatment/Activity Tolerance:  [] Patient able to complete treatment  [x] Patient limited by fatigue  [] Patient limited by pain    [] Patient limited by other medical complications  [] Other:         PLAN: Continue therapy 1-2x/week for ROM, strengthening, endurance, and balance. Pt would like to increase frequency of PT to 2x/week due to missing several weeks of therapy after having surgery on both hands.     1-2x/week for 4-6 weeks for ROM, strengthening, balance act, HEP instruction, pt education, manual therapy prn, and modalities prn (10/26/21)  [x] Continue per plan of care [] Alter current plan   [] Plan of care initiated [] Hold pending MD visit [] Discharge      Electronically signed by:  Amy Beck PT     Physical Therapist [facility] #[id number]  Physical Therapist [place] License #[id number]    Note: If patient does not return for scheduled/ recommended follow up visits, this note will serve as a discharge from care along with most recent update on progress.

## 2021-11-16 ENCOUNTER — TREATMENT (OUTPATIENT)
Dept: PHYSICAL THERAPY | Age: 71
End: 2021-11-16
Payer: MEDICARE

## 2021-11-16 DIAGNOSIS — M25.562 LEFT KNEE PAIN, UNSPECIFIED CHRONICITY: Primary | ICD-10-CM

## 2021-11-16 DIAGNOSIS — M17.12 PRIMARY OSTEOARTHRITIS OF LEFT KNEE: ICD-10-CM

## 2021-11-16 DIAGNOSIS — M25.662 DECREASED ROM OF LEFT KNEE: ICD-10-CM

## 2021-11-16 DIAGNOSIS — R29.898 WEAKNESS OF LEFT LOWER EXTREMITY: ICD-10-CM

## 2021-11-16 PROCEDURE — 97112 NEUROMUSCULAR REEDUCATION: CPT | Performed by: PHYSICAL THERAPIST

## 2021-11-16 PROCEDURE — 97110 THERAPEUTIC EXERCISES: CPT | Performed by: PHYSICAL THERAPIST

## 2021-11-16 PROCEDURE — G8427 DOCREV CUR MEDS BY ELIG CLIN: HCPCS | Performed by: PHYSICAL THERAPIST

## 2021-11-16 PROCEDURE — 97530 THERAPEUTIC ACTIVITIES: CPT | Performed by: PHYSICAL THERAPIST

## 2021-11-16 NOTE — PROGRESS NOTES
[facility]   Phone: [phone]    Fax: [phone]           Physical Therapy Treatment Note/ Progress Report:           Date:  2021    Patient Name:  Kylie Rae    :  1950  MRN: [id number]  Restrictions/Precautions:    Medical/Treatment Diagnosis Information:  Diagnosis: Left Knee Pain (M25.562), Primary OA of Left Knee (M17.12)   Treatment Diagnosis: Decreased Left Knee ROM (M25.662), Decreased Strength (R29.898)        Insurance/Certification information:  PT Insurance Information: Medicare - Visits based on medical necessity  Physician Information:  Referring Practitioner: Dr. Carlin Kelley  Has the plan of care been signed (Y/N):        [x]  Yes (POC signed 10/27/21)  []  No      Date of Patient follow up with Physician: As needed      Is this a Progress Report:     []  Yes  [x]  No        If Yes:  Date Range for reporting period:  Beginning 4/15/21   Ending 10/26/21    Progress report will be due (10 Rx or 30 days whichever is less):        Recertification will be due (POC Duration  / 90 days whichever is less):  21        Visit # Insurance Allowable Auth Required   31 Medical necessity  (pt has already used 13 visits this year for her shoulder) []  Yes [x]  No        Functional Scale:    Date assessed:  10/26/21  Functional Assessment Tool Used: Knee Outcome Survey Activities of Daily Living Scale (copy scanned into media)  Score:  44/70 = 63% (37% functional deficit)    (4/15/21)  Patient Age: 66years old  Patient Height: 5' 2\"  Patient Weight: 280 lbs  Patient BMI: 51.2      Pain (10/26/21)  [x]  Pain diagram was completed, pain was present and a follow up plan to address the pain is in the plan of care. ()   []  Pain diagram was completed and there was no pain present and therefore no follow up plan to address pain in the plan of care.  ()   []  Pain diagram was not completed and the reason for not completing the pain diagram is in the medical chart ()  []  Patient refused to participate   []  Severe mental or physical capacity, patient is in urgent emergent situation and to complete the questionnaire would jeopardize the patient's health status        Functional Questionnaire (10/26/21)  [x]  Patient completed the functional outcome questionnaire and deficiencies were addressed in the plan of care. ()   []  Patient completed the outcome questionnaire and there were no functional deficits identified and therefore no plan of care is required. ()   []  Patient did not complete the functional outcome questionnaire and the reason why is documented in the medical chart. ()  []  Patient refused to participate   []  Patient unable to complete the questionnaire   []   A functional outcome assessment has been reported on within the last 30 days        Falls (10/26/21)  [x]  The patient has had no falls or 1 fall without injury in the past year. (1101F)   []  There is no documentation of fall in the medical chart (1101F,8P)     [] The patient has had 2 or more falls or one fall with injury in the past year that is documented in the medical chart. (1100F)  []  A falls risk assessment was completed and documented in the medical chart. (1120P)   []  Falls were addressed in the plan of care. (5496J)   []  A falls risk assessment was not completed and documented in the medical chart (4612D,5N)   []   Falls were not addressed in the plan of care and reason was documented in the plan of care. (4869Y 1P)        Medication (10/26/21)     [x] A list of current medications (including prescription, over the counter, herbal supplements, vitamin supplements, mineral supplements, dietary supplements) was reviewed with the patient and documented in the medical chart. ()        Falls Risk Assessment (30 days): (10/26/21)  [x] Falls Risk assessed and no intervention required.   [] Falls Risk assessed and Patient requires intervention due to being higher risk   TUG score (>12s at risk):     [] Falls education provided, including     PQRS:   Reviewed medication list with patient. No changes reported by pt since last PT visit. (11/16/21)          Latex Allergy:  [x]NO      []YES  Preferred Language for Healthcare:   [x]English       []other:      Pain level:  1-2/10 (11/16/21)   Medication:  Celebrex      SUBJECTIVE:   Pt states both legs ached over the weekend with the change in weather. She continues to c/o weakness in both legs. She still has to use her arms to help push herself up from a chair. Pt can't find her HEP handouts and is wondering if she can get another copy. OBJECTIVE:       (10/26/21)  Flexibility L R Comment   Hamstring Mod tightness Mild tightness    Gastroc Mod tightness Mild tightness    ITB      Quad              (11/16/21)  ROM PROM AROM Overpressure Comment    L R L R L R    Flexion 120 with SP  110 105      Extension   0 0                            (10/26/21)  Strength L R Comment   Quad 4/5 4+/5    Hamstring 4+/5 4+/5    Gastroc 4/5 4+/5    Hip Flex 4/5 4/5    Hip Abd 4/5 4+/5    Hip Add 4/5 4+/5    Glut Med              (10/26/21)  Girth L R   Mid Patella 57.0 cm 58.0 cm   Suprapatellar     5cm above     15cm above       (10/26/21)  Special Test Results/Comment   Meniscal Click    Crepitus (+) PF crepitus bilaterally   Flexion Test    Valgus Laxity    Varus Laxity    Lachmans    Drop Back        Reflexes/Sensation: (4/15/21)   []Dermatomes/Myotomes intact    []Reflexes equal and normal bilaterally   [x]Other: Intact to light touch    Joint mobility: (10/26/21)   []Normal    [x]Hypo   []Hyper    Palpation: No tenderness. Mild quad atrophy still present on the left vs right.(10/26/21)    Functional Mobility/Transfers: Stairs are still difficult (pt must ascend/descend stairs 1 at a time using 2 HR), she can't stand or walk for long periods of time, she is unable to squat.   She is also having some difficulty getting up from a chair and has to use her arms to help push herself up.   (10/26/21)    Posture: Forward head, rounded shoulders. (10/26/21)    Bandages/Dressings/Incisions: Pt wears compression hose and wraps on both legs (10/26/21)     Gait: (include devices/WB status) Gait is still mildly antalgic, no AD. Decreased pawel with decreased step length bilaterally. She demonstrates decreased trunk rotation and increased lateral trunk sway. (10/26/21)    Orthopedic Special Tests: See above.        RESTRICTIONS/PRECAUTIONS:     Exercises/Interventions:     Therapeutic Ex (02774) Sets/sec Reps Notes/CUES   BIKE      TREADMILL            STRETCHING      Towel Pull Calf 30\" hold X 5    Inclined Calf      Hamstrings 30\" hold X 5 Seated   Quads      Hip Flexors      ITB      Adductors            ROM      Passive      Active      Weight Hangs      Sheet Pulls 10\" hold X 10    Ankle Pumps      ERMI            STRENGTHENING      Quad Sets 10\" hold X 10    Ham Sets      Hip ADD Sets BS 10\" hold X 10     SLR Flexion 3 sets X 10 with 2#     SLR Abduction  HEP   SLR Prone      SLR Adduction      SLR+      Supine Hip Abduction 2 sets X 10  Black band         Standing Marches 3 sets X 10 on Airex Standing at half wall   Standing Knee Flexion 3 sets X 10  Standing at half wall         PRE Machines      Knee Extension      Knee Flexion      Leg Press            CKC      Calf Raises 3 sets X 10 Seated   Mini Squats      Wall Sits      Step ups - 0 riser 2 sets X 10  Using half wall     Lateral Step Ups - 0 riser 2 sets X 10    TKE  Held today               Manual Intervention (73818)      Patellar Mobs      Femoral Tibial mobs      STM      Scar Massage      Foam Roll            NMR re-education (35884)   CUES NEEDED   Biodex Balance      Tandem Balance 30\" hold X 2 each   Held Airex today due to pt having pain in both feet *Feet staggered  *Standing next to half wallSLB      Rebounder      BOSU            Sit -> Stand  X 20 with left arm assisting     With cushion on chair     Up/Down 1 Flight of Stairs in the Stairwell  X 2  Using bilateral HR         Therapeutic Activity (51855)      Core Training      Congolese Willy 30      TRX training                  Patient Education: Dx, prognosis, pt goals/expectations, role of therapy (4/15/21)  X 8'                  Therapeutic Exercise and NMR EXR  [x] (23398) Provided verbal/tactile cueing for activities related to strengthening, flexibility, endurance, ROM for improvements in LE, proximal hip, and core control with self care, mobility, lifting, ambulation. [x] (31320) Provided verbal/tactile cueing for activities related to improving balance, coordination, kinesthetic sense, posture, motor skill, proprioception to assist with LE, proximal hip, and core control in self-care, mobility, lifting, ambulation and eccentric single leg control. NMR and Therapeutic Activities:    [x] (71211 or 71267) Provided verbal/tactile cueing for activities related to improving balance, coordination, kinesthetic sense, posture, motor skill, proprioception and motor activation to allow for proper function of core, proximal hip and LE with self-care and ADLs and functional mobility.   [] (23033) Gait Re-education- Provided training and instruction to the patient for proper LE, core and proximal hip recruitment and positioning and eccentric body weight control with ambulation re-education including up and down stairs     Home Exercise Program:    [x] (90234) Reviewed/Progressed HEP activities related to strengthening, flexibility, endurance, ROM of core, proximal hip and LE for functional self-care, mobility, lifting and ambulation/stair navigation - Updated HEP through [address] [place] [address] (see below). Pt was also issued new written handouts.  (4/27/21)  [x] (87340) Reviewed/Progressed HEP activities related to improving balance, coordination, kinesthetic sense, posture, motor skill, proprioception of core, proximal hip and LE for self-care, mobility, lifting, and ambulation/stair navigation          Access Code: [email or web address]  URL: [email or web address]/Date: 04/20/2021Prepared by: NAVNEET SybertExercises   Long Sitting Calf Stretch with Strap - 1 x daily - 7 x weekly - 1 sets - 3 reps - 30 seconds hold   Seated Table Hamstring Stretch - 1 x daily - 7 x weekly - 1 sets - 3 reps - 30 seconds hold   Long Sitting Quad Set - 1 x daily - 7 x weekly - 1 sets - 10 reps - 10 seconds hold   Long Sitting Hip Adduction Isometric with Ball - 1 x daily - 7 x weekly - 1 sets - 10 reps - 10 seconds hold   Supine Active Straight Leg Raise - 1 x daily - 7 x weekly - 2-3 sets - 10 reps   Hooklying Isometric Clamshell - 1 x daily - 7 x weekly - 3 sets - 10 reps   Standing March with Counter Support - 1 x daily - 7 x weekly - 3 sets - 10 reps   Seated Heel Raise - 1 x daily - 7 x weekly - 3 sets - 10 reps   Sit to Stand - 1 x daily - 7 x weekly - 15 reps   Tandem Stance with Support - 1 x daily - 7 x weekly - 2 reps - 20 seconds hold        Manual Treatments:  PROM / STM / Oscillations-Mobs:  G-I, II, III, IV (PA's, Inf., Post.)  [] (20900) Provided manual therapy to mobilize LE, proximal hip and/or LS spine soft tissue/joints for the purpose of modulating pain, promoting relaxation, increasing ROM, reducing/eliminating soft tissue swelling/inflammation/restriction, improving soft tissue extensibility and allowing for proper ROM for normal function with self-care, mobility, lifting and ambulation. Modalities:   Pt declined ice today   [] GAME READY (VASO)- for significant edema, swelling, pain control.      Charges:  Timed Code Treatment Minutes: 54'   Total Treatment Minutes: 54'      [] EVAL (LOW) 455 1011 (typically 20 minutes face-to-face)  [] EVAL (MOD) 40330 (typically 30 minutes face-to-face)  [] EVAL (HIGH) 61076 (typically 45 minutes face-to-face)  [] RE-EVAL     [x] IM(78387) x 2 (30')    [] IONTO  [x] NMR (03745) x 1 (10')   [] VASO  [] Manual (65498) x      [] Other:  [x] TA x 1 (15')     [] [facility] (93443)  [] ES(attended) (54805)      [] ES (un) (90767):         ASSESSMENT:  Pt requested to do the stairs in the stairwell today (this had been put on hold due to pt undergoing bilateral CTR and still having symptoms on the right). She was able to ascend/descend 1 flight of stairs but she had to use both HR's to pull herself up the steps. She had difficulty going down the stairs and had to turn her body sideways. She also performed step ups in the clinic which were challenging with pt needing to use the half wall for support. She struggled with tandem stance initially but got better as she went along. She continues to be very fatigued with the exercises with rest breaks needed. Strength and endurance deficits continue to limit function and mobility. Stressed HEP compliance with pt and provided her with new handouts. MEDICARE CAP EXCEPTION DOCUMENTATION      I certify that this patient meets one of the below criteria necessary for becoming an exception to the Medicare cap on therapy services:    [x]  The patient has a condition identified by an ICD-9 code that has a direct and significant impact on the need for therapy. (Significantly impacts the rate of recovery.)                     []  The patient has a complexity identified by an ICD-9 code that has a direct and significant impact on the need for therapy. (Significantly impacts the rate of recovery and is associated with a primary condition.)         []  The patient has associated variables that influence the amount of treatment to include:  Social support, self-efficacy/motivation, prognosis, time since onset/acuity. []  The patient has generalized musculoskeletal conditions or a condition affecting multiple sites that will have a direct impact on the rate of recovery.     [x]  The patient had a prior episode of outpatient therapy during this calendar year for a different condition. []  The patient has a mental or cognitive disorder in addition to the condition being treated that will have a direct and significant impact on the rate of recovery. GOALS: (Goals updated 10/26/21) - ROM, strength, and function have regressed due to pt undergoing bilateral wrist surgeries which caused her to miss several weeks of therapy. Patient stated goal:  \"Make my legs stronger; walk and climb stairs easier and faster. \"  [x] Progressing: Strength and overall function have regressed during her absence from therapy [] Met: [] Not Met: [] Adjusted    Therapist goals for Patient:   Short Term Goals: To be achieved in: 2 weeks  1. Independent in HEP and progression per patient tolerance, in order to prevent re-injury. [] Progressing: [x] Met: [] Not Met: [] Adjusted  2. Patient will have a decrease in left knee pain to 1-2/10 to facilitate improvement in movement, function, and ADLs as indicated by Functional Deficits. [] Progressing: [x] Met: [] Not Met: [] Adjusted    Long Term Goals: To be achieved in: 4-6 weeks  1. Disability index score of 20% or less for the Knee Outcome Survey Activities of Daily Living Scale to assist with reaching prior level of function. [x] Progressing: [] Met: [] Not Met: [] Adjusted  2. Patient will demonstrate an increase in left knee flexion AROM to at least 115 degrees to allow for proper joint functioning as indicated by patients Functional Deficits. [x] Progressing: Her ROM has regressed during her absence from therapy [] Met: [] Not Met: [] Adjusted  3. Patient will demonstrate an increase in left hip and knee strength to at least 4+/5 to allow for proper functional mobility as indicated by patients Functional Deficits. [x] Progressing: Her strength has regressed during her absence from therapy [] Met: [] Not Met: [] Adjusted  4.  Patient will be able to walk community distances, stand for at least 30 minutes, and be able to get up from a chair with little to no UE assistance needed for improved function. [x] Progressing: [] Met: [] Not Met: [] Adjusted  5. Patient will be able to ascend/descend stairs reciprocally with HR (patient specific functional goal)    [x] Progressing: [] Met: [] Not Met: [] Adjusted   6. Patient will have a decrease in left knee pain to 0-1/10 with daily act. [x] Progressing: [] Met: [] Not Met: [] Adjusted      New Goal: To be achieved in: 4 weeks (10/26/21)  1. Patient will demonstrate an increase in left hip and knee strength to at least 5/5 to allow for proper functional mobility as indicated by patients Functional Deficits. [] Progressing: [] Met: [x] Not Met: Her strength has regressed during her absence from therapy [] Adjusted        Overall Progression Towards Functional goals/ Treatment Progress Update:  [] Patient is progressing as expected towards functional goals listed. [] Progression is slowed due to complexities/Impairments listed. [x] Progression has been slowed due to co-morbidities - ROM, strength, and function have regressed due to pt undergoing bilateral wrist surgeries which caused her to miss several weeks of therapy. [] Plan just implemented, too soon to assess goals progression <30days   [] Goals require adjustment due to lack of progress  [] Patient is not progressing as expected and requires additional follow up with physician  [] Other    Prognosis for POC: [x] Good [] Fair  [] Poor      Patient requires continued skilled intervention: [x] Yes  [] No    Treatment/Activity Tolerance:  [] Patient able to complete treatment  [x] Patient limited by fatigue  [] Patient limited by pain    [] Patient limited by other medical complications  [] Other:         PLAN: Continue therapy 2x/week for ROM, strengthening, endurance, and balance.       1-2x/week for 4-6 weeks for ROM, strengthening, balance act, HEP instruction, pt education, manual therapy prn, and modalities prn (10/26/21)  [x] Continue per plan of care [] Alter current plan   [] Plan of care initiated [] Hold pending MD visit [] Discharge      Electronically signed by:  Trinity Marin PT     Physical Therapist Adore Rangel 421 #[id number]  Physical Therapist [place] License #[id number]    Note: If patient does not return for scheduled/ recommended follow up visits, this note will serve as a discharge from care along with most recent update on progress.

## 2021-11-18 ENCOUNTER — TREATMENT (OUTPATIENT)
Dept: PHYSICAL THERAPY | Age: 71
End: 2021-11-18
Payer: MEDICARE

## 2021-11-18 DIAGNOSIS — M17.12 PRIMARY OSTEOARTHRITIS OF LEFT KNEE: ICD-10-CM

## 2021-11-18 DIAGNOSIS — M25.662 DECREASED ROM OF LEFT KNEE: ICD-10-CM

## 2021-11-18 DIAGNOSIS — M25.562 LEFT KNEE PAIN, UNSPECIFIED CHRONICITY: Primary | ICD-10-CM

## 2021-11-18 DIAGNOSIS — R29.898 WEAKNESS OF LEFT LOWER EXTREMITY: ICD-10-CM

## 2021-11-18 PROCEDURE — 97112 NEUROMUSCULAR REEDUCATION: CPT | Performed by: PHYSICAL THERAPIST

## 2021-11-18 PROCEDURE — G8427 DOCREV CUR MEDS BY ELIG CLIN: HCPCS | Performed by: PHYSICAL THERAPIST

## 2021-11-18 PROCEDURE — 97110 THERAPEUTIC EXERCISES: CPT | Performed by: PHYSICAL THERAPIST

## 2021-11-18 PROCEDURE — 97530 THERAPEUTIC ACTIVITIES: CPT | Performed by: PHYSICAL THERAPIST

## 2021-11-18 NOTE — PROGRESS NOTES
[facility]   Phone: [phone]    Fax: [phone]           Physical Therapy Treatment Note/ Progress Report:           Date:  2021    Patient Name:  Magdaleno Jennings    :  1950  MRN: [id number]  Restrictions/Precautions:    Medical/Treatment Diagnosis Information:  Diagnosis: Left Knee Pain (M25.562), Primary OA of Left Knee (M17.12)   Treatment Diagnosis: Decreased Left Knee ROM (M25.662), Decreased Strength (R29.898)        Insurance/Certification information:  PT Insurance Information: Medicare - Visits based on medical necessity  Physician Information:  Referring Practitioner: Dr. Jesika Rodriguez  Has the plan of care been signed (Y/N):        [x]  Yes (POC signed 10/27/21)  []  No      Date of Patient follow up with Physician: As needed      Is this a Progress Report:     []  Yes  [x]  No        If Yes:  Date Range for reporting period:  Beginning 4/15/21   Ending 10/26/21    Progress report will be due (10 Rx or 30 days whichever is less):        Recertification will be due (POC Duration  / 90 days whichever is less):  21        Visit # Insurance Allowable Auth Required   32 Medical necessity  (pt has already used 13 visits this year for her shoulder) []  Yes [x]  No        Functional Scale:    Date assessed:  10/26/21  Functional Assessment Tool Used: Knee Outcome Survey Activities of Daily Living Scale (copy scanned into media)  Score:  44/70 = 63% (37% functional deficit)    (4/15/21)  Patient Age: 66years old  Patient Height: 5' 2\"  Patient Weight: 280 lbs  Patient BMI: 51.2      Pain (10/26/21)  [x]  Pain diagram was completed, pain was present and a follow up plan to address the pain is in the plan of care. ()   []  Pain diagram was completed and there was no pain present and therefore no follow up plan to address pain in the plan of care.  ()   []  Pain diagram was not completed and the reason for not completing the pain diagram is in the medical chart ()  []  Patient refused to participate   []  Severe mental or physical capacity, patient is in urgent emergent situation and to complete the questionnaire would jeopardize the patient's health status        Functional Questionnaire (10/26/21)  [x]  Patient completed the functional outcome questionnaire and deficiencies were addressed in the plan of care. ()   []  Patient completed the outcome questionnaire and there were no functional deficits identified and therefore no plan of care is required. ()   []  Patient did not complete the functional outcome questionnaire and the reason why is documented in the medical chart. ()  []  Patient refused to participate   []  Patient unable to complete the questionnaire   []   A functional outcome assessment has been reported on within the last 30 days        Falls (10/26/21)  [x]  The patient has had no falls or 1 fall without injury in the past year. (1101F)   []  There is no documentation of fall in the medical chart (1101F,8P)     [] The patient has had 2 or more falls or one fall with injury in the past year that is documented in the medical chart. (1100F)  []  A falls risk assessment was completed and documented in the medical chart. (4910D)   []  Falls were addressed in the plan of care. (2925V)   []  A falls risk assessment was not completed and documented in the medical chart (6918I,1P)   []   Falls were not addressed in the plan of care and reason was documented in the plan of care. (9876O 1P)        Medication (10/26/21)     [x] A list of current medications (including prescription, over the counter, herbal supplements, vitamin supplements, mineral supplements, dietary supplements) was reviewed with the patient and documented in the medical chart. ()        Falls Risk Assessment (30 days): (10/26/21)  [x] Falls Risk assessed and no intervention required.   [] Falls Risk assessed and Patient requires intervention due to being higher risk   TUG score (>12s at risk):     [] Falls education provided, including     PQRS:   Reviewed medication list with patient. No changes reported by pt since last PT visit. (11/18/21)          Latex Allergy:  [x]NO      []YES  Preferred Language for Healthcare:   [x]English       []other:      Pain level:  1-2/10 (11/18/21)   Medication:  Celebrex      SUBJECTIVE:   Pt states she did not go to bed until 5:30 this morning and she got up at 8:30. She's gotten into a bad habit of staying up really late and knows she isn't getting enough sleep. She is exhausted today and asked to skip doing the stairs in the stairwell. Pt states she isn't having much pain in her left knee but both legs still feel very weak. OBJECTIVE:       (10/26/21)  Flexibility L R Comment   Hamstring Mod tightness Mild tightness    Gastroc Mod tightness Mild tightness    ITB      Quad              (11/16/21)  ROM PROM AROM Overpressure Comment    L R L R L R    Flexion 120 with SP  110 105      Extension   0 0                            (10/26/21)  Strength L R Comment   Quad 4/5 4+/5    Hamstring 4+/5 4+/5    Gastroc 4/5 4+/5    Hip Flex 4/5 4/5    Hip Abd 4/5 4+/5    Hip Add 4/5 4+/5    Glut Med              (10/26/21)  Girth L R   Mid Patella 57.0 cm 58.0 cm   Suprapatellar     5cm above     15cm above       (10/26/21)  Special Test Results/Comment   Meniscal Click    Crepitus (+) PF crepitus bilaterally   Flexion Test    Valgus Laxity    Varus Laxity    Lachmans    Drop Back        Reflexes/Sensation: (4/15/21)   []Dermatomes/Myotomes intact    []Reflexes equal and normal bilaterally   [x]Other: Intact to light touch    Joint mobility: (10/26/21)   []Normal    [x]Hypo   []Hyper    Palpation: No tenderness.   Mild quad atrophy still present on the left vs right.(10/26/21)    Functional Mobility/Transfers: Stairs are still difficult (pt must ascend/descend stairs 1 at a time using 2 HR), she can't stand or walk for long periods of time, she is unable to squat. She is also having some difficulty getting up from a chair and has to use her arms to help push herself up.   (10/26/21)    Posture: Forward head, rounded shoulders. (10/26/21)    Bandages/Dressings/Incisions: Pt wears compression hose and wraps on both legs (10/26/21)     Gait: (include devices/WB status) Gait is still mildly antalgic, no AD. Decreased pawel with decreased step length bilaterally. She demonstrates decreased trunk rotation and increased lateral trunk sway. (10/26/21)    Orthopedic Special Tests: See above.        RESTRICTIONS/PRECAUTIONS:     Exercises/Interventions:     Therapeutic Ex (52856) Sets/sec Reps Notes/CUES   BIKE      TREADMILL            STRETCHING      Towel Pull Calf 30\" hold X 5    Inclined Calf      Hamstrings 30\" hold X 5 Seated   Quads      Hip Flexors      ITB      Adductors            ROM      Passive      Active      Weight Hangs      Sheet Pulls 10\" hold X 10    Ankle Pumps      ERMI            STRENGTHENING      Quad Sets 10\" hold X 10    Ham Sets      Hip ADD Sets BS 10\" hold X 10     SLR Flexion 3 sets X 10 with 2#     SLR Abduction  HEP   SLR Prone      SLR Adduction      SLR+      Supine Hip Abduction 2 sets X 10  Black band         Standing Marches 3 sets X 10 on Airex Standing at half wall   Standing Knee Flexion 3 sets X 10  Standing at half wall         PRE Machines      Knee Extension      Knee Flexion      Leg Press            CKC      Calf Raises 3 sets X 10 Seated   Mini Squats      Wall Sits      Step ups - 0 riser 2 sets X 10  Using half wall     Lateral Step Ups - 0 riser 2 sets X 10    TKE  Held today               Manual Intervention (76849)      Patellar Mobs      Femoral Tibial mobs      STM      Scar Massage      Foam Roll            NMR re-education (66238)   CUES NEEDED   Biodex Balance      Tandem Balance 30\" hold X 2 each   Held Airex today due to pt having pain in both feet *Feet staggered  *Standing related to improving balance, coordination, kinesthetic sense, posture, motor skill, proprioception of core, proximal hip and LE for self-care, mobility, lifting, and ambulation/stair navigation          Access Code: [email or web address]  URL: [email or web address]: 04/20/2021Prepared by: [email or web address] SybertExercises   Long Sitting Calf Stretch with Strap - 1 x daily - 7 x weekly - 1 sets - 3 reps - 30 seconds hold   Seated Table Hamstring Stretch - 1 x daily - 7 x weekly - 1 sets - 3 reps - 30 seconds hold   Long Sitting Quad Set - 1 x daily - 7 x weekly - 1 sets - 10 reps - 10 seconds hold   Long Sitting Hip Adduction Isometric with Ball - 1 x daily - 7 x weekly - 1 sets - 10 reps - 10 seconds hold   Supine Active Straight Leg Raise - 1 x daily - 7 x weekly - 2-3 sets - 10 reps   Hooklying Isometric Clamshell - 1 x daily - 7 x weekly - 3 sets - 10 reps   Standing March with Counter Support - 1 x daily - 7 x weekly - 3 sets - 10 reps   Seated Heel Raise - 1 x daily - 7 x weekly - 3 sets - 10 reps   Sit to Stand - 1 x daily - 7 x weekly - 15 reps   Tandem Stance with Support - 1 x daily - 7 x weekly - 2 reps - 20 seconds hold        Manual Treatments:  PROM / STM / Oscillations-Mobs:  G-I, II, III, IV (PA's, Inf., Post.)  [] (36122) Provided manual therapy to mobilize LE, proximal hip and/or LS spine soft tissue/joints for the purpose of modulating pain, promoting relaxation, increasing ROM, reducing/eliminating soft tissue swelling/inflammation/restriction, improving soft tissue extensibility and allowing for proper ROM for normal function with self-care, mobility, lifting and ambulation. Modalities:   Pt declined ice today   [] GAME READY (VASO)- for significant edema, swelling, pain control.      Charges:  Timed Code Treatment Minutes: 54'   Total Treatment Minutes: 54'      [] EVAL (LOW) 455 1011 (typically 20 minutes face-to-face)  [] EVAL (MOD) 00013 (typically 30 minutes face-to-face)  [] EVAL (HIGH) 87115 (typically 45 minutes face-to-face)  [] RE-EVAL     [x] KB(51440) x 2 (30')    [] IONTO  [x] NMR (88153) x 1 (10')   [] VASO  [] Manual (90958) x      [] Other:  [x] TA x 1 (15')     [] Mech Traction (11954)  [] ES(attended) (00628)      [] ES (un) (14623):         ASSESSMENT:  Pt was very tired coming into therapy today and the exercises were more difficult with several rest breaks needed. Held the stairs in the stairwell per pt request but still performed normal step ups with pt needing to use the half wall for support. Tandem balance was also more challenging for her today with several UE touches needed. She was able to perform sit->stand with no UE assistance today. Discussed pt's current sleeping pattern and educated her on the importance of getting adequate sleep every night. Also stressed HEP compliance. MEDICARE CAP EXCEPTION DOCUMENTATION      I certify that this patient meets one of the below criteria necessary for becoming an exception to the Medicare cap on therapy services:    [x]  The patient has a condition identified by an ICD-9 code that has a direct and significant impact on the need for therapy. (Significantly impacts the rate of recovery.)                     []  The patient has a complexity identified by an ICD-9 code that has a direct and significant impact on the need for therapy. (Significantly impacts the rate of recovery and is associated with a primary condition.)         []  The patient has associated variables that influence the amount of treatment to include:  Social support, self-efficacy/motivation, prognosis, time since onset/acuity. []  The patient has generalized musculoskeletal conditions or a condition affecting multiple sites that will have a direct impact on the rate of recovery. [x]  The patient had a prior episode of outpatient therapy during this calendar year for a different condition.            []  The patient has a mental or cognitive disorder in addition to the condition being treated that will have a direct and significant impact on the rate of recovery. GOALS: (Goals updated 10/26/21) - ROM, strength, and function have regressed due to pt undergoing bilateral wrist surgeries which caused her to miss several weeks of therapy. Patient stated goal:  \"Make my legs stronger; walk and climb stairs easier and faster. \"  [x] Progressing: Strength and overall function have regressed during her absence from therapy [] Met: [] Not Met: [] Adjusted    Therapist goals for Patient:   Short Term Goals: To be achieved in: 2 weeks  1. Independent in HEP and progression per patient tolerance, in order to prevent re-injury. [] Progressing: [x] Met: [] Not Met: [] Adjusted  2. Patient will have a decrease in left knee pain to 1-2/10 to facilitate improvement in movement, function, and ADLs as indicated by Functional Deficits. [] Progressing: [x] Met: [] Not Met: [] Adjusted    Long Term Goals: To be achieved in: 4-6 weeks  1. Disability index score of 20% or less for the Knee Outcome Survey Activities of Daily Living Scale to assist with reaching prior level of function. [x] Progressing: [] Met: [] Not Met: [] Adjusted  2. Patient will demonstrate an increase in left knee flexion AROM to at least 115 degrees to allow for proper joint functioning as indicated by patients Functional Deficits. [x] Progressing: Her ROM has regressed during her absence from therapy [] Met: [] Not Met: [] Adjusted  3. Patient will demonstrate an increase in left hip and knee strength to at least 4+/5 to allow for proper functional mobility as indicated by patients Functional Deficits. [x] Progressing: Her strength has regressed during her absence from therapy [] Met: [] Not Met: [] Adjusted  4. Patient will be able to walk community distances, stand for at least 30 minutes, and be able to get up from a chair with little to no UE assistance needed for improved function. [x] Progressing: [] Met: [] Not Met: [] Adjusted  5. Patient will be able to ascend/descend stairs reciprocally with HR (patient specific functional goal)    [x] Progressing: [] Met: [] Not Met: [] Adjusted   6. Patient will have a decrease in left knee pain to 0-1/10 with daily act. [x] Progressing: [] Met: [] Not Met: [] Adjusted      New Goal: To be achieved in: 4 weeks (10/26/21)  1. Patient will demonstrate an increase in left hip and knee strength to at least 5/5 to allow for proper functional mobility as indicated by patients Functional Deficits. [] Progressing: [] Met: [x] Not Met: Her strength has regressed during her absence from therapy [] Adjusted        Overall Progression Towards Functional goals/ Treatment Progress Update:  [] Patient is progressing as expected towards functional goals listed. [] Progression is slowed due to complexities/Impairments listed. [x] Progression has been slowed due to co-morbidities - ROM, strength, and function have regressed due to pt undergoing bilateral wrist surgeries which caused her to miss several weeks of therapy. [] Plan just implemented, too soon to assess goals progression <30days   [] Goals require adjustment due to lack of progress  [] Patient is not progressing as expected and requires additional follow up with physician  [] Other    Prognosis for POC: [x] Good [] Fair  [] Poor      Patient requires continued skilled intervention: [x] Yes  [] No    Treatment/Activity Tolerance:  [] Patient able to complete treatment  [x] Patient limited by fatigue  [] Patient limited by pain    [] Patient limited by other medical complications  [] Other:         PLAN: Continue therapy 2x/week for ROM, strengthening, endurance, and balance. Re-assess next week.       1-2x/week for 4-6 weeks for ROM, strengthening, balance act, HEP instruction, pt education, manual therapy prn, and modalities prn (10/26/21)  [x] Continue per plan of care [] Alter current plan [] Plan of care initiated [] Hold pending MD visit [] Discharge      Electronically signed by:  Monique Mistry PT     Physical Therapist Adore Rangel 421 #[id number]  Physical Therapist [facility] License #[id number]    Note: If patient does not return for scheduled/ recommended follow up visits, this note will serve as a discharge from care along with most recent update on progress.

## 2021-11-23 ENCOUNTER — TREATMENT (OUTPATIENT)
Dept: PHYSICAL THERAPY | Age: 71
End: 2021-11-23
Payer: MEDICARE

## 2021-11-23 DIAGNOSIS — M25.662 DECREASED ROM OF LEFT KNEE: ICD-10-CM

## 2021-11-23 DIAGNOSIS — M17.12 PRIMARY OSTEOARTHRITIS OF LEFT KNEE: ICD-10-CM

## 2021-11-23 DIAGNOSIS — R29.898 WEAKNESS OF LEFT LOWER EXTREMITY: ICD-10-CM

## 2021-11-23 DIAGNOSIS — M25.562 LEFT KNEE PAIN, UNSPECIFIED CHRONICITY: Primary | ICD-10-CM

## 2021-11-23 PROCEDURE — 97112 NEUROMUSCULAR REEDUCATION: CPT | Performed by: PHYSICAL THERAPIST

## 2021-11-23 PROCEDURE — 1101F PT FALLS ASSESS-DOCD LE1/YR: CPT | Performed by: PHYSICAL THERAPIST

## 2021-11-23 PROCEDURE — 97530 THERAPEUTIC ACTIVITIES: CPT | Performed by: PHYSICAL THERAPIST

## 2021-11-23 PROCEDURE — G8539 DOC FUNCT AND CARE PLAN: HCPCS | Performed by: PHYSICAL THERAPIST

## 2021-11-23 PROCEDURE — G8427 DOCREV CUR MEDS BY ELIG CLIN: HCPCS | Performed by: PHYSICAL THERAPIST

## 2021-11-23 PROCEDURE — 97110 THERAPEUTIC EXERCISES: CPT | Performed by: PHYSICAL THERAPIST

## 2021-11-23 NOTE — PROGRESS NOTES
[facility]   Phone: [phone]    Fax: [phone]     Physical Therapy Re-Certification Plan of Care    Dear  Dr. Johanna Savage,    We had the pleasure of treating the following patient for physical therapy services at [address] [place] [address]. A summary of our findings can be found in the updated assessment below. This includes our plan of care. If you have any questions or concerns regarding these findings, please do not hesitate to contact me at the office phone number checked above. Thank you for the referral.     Physician Signature:________________________________Date:__________________  By signing above (or electronic signature), therapists plan is approved by physician      Overall Response to Treatment:   [x]Patient is responding well to treatment and improvement is noted with regards to goals; however, pt continues to have weakness in both knees and she still struggles with stairs.      []Patient should continue to improve in reasonable time if they continue HEP   []Patient has plateaued and is no longer responding to skilled PT intervention    []Patient is getting worse and would benefit from return to referring MD   []Patient unable to adhere to initial POC   []Other:            Physical Therapy Treatment Note/ Progress Report:           Date:  2021    Patient Name:  Aleksey Leung    :  1950  MRN: [id number]  Restrictions/Precautions:    Medical/Treatment Diagnosis Information:  Diagnosis: Left Knee Pain (M25.562), Primary OA of Left Knee (M17.12)   Treatment Diagnosis: Decreased Left Knee ROM (M25.662), Decreased Strength (R29.898)        Insurance/Certification information:  PT Insurance Information: Medicare - Visits based on medical necessity  Physician Information:  Referring Practitioner: Dr. Johanna Savage  Has the plan of care been signed (Y/N):        [x]  Yes (POC signed 10/27/21)  []  No      Date of Patient follow up with Physician: As needed      Is this a Progress Report:     [x]  Yes  []  No        If Yes:  Date Range for reporting period:  Beginning 4/15/21   Ending 11/23/21    Progress report will be due (10 Rx or 30 days whichever is less):  44/82/70      Recertification will be due (POC Duration  / 90 days whichever is less):  12/21/21        Visit # Insurance Allowable Auth Required   33 Medical necessity  (pt has already used 13 visits this year for her shoulder) []  Yes [x]  No        Functional Scale:    Date assessed:  11/23/21  Functional Assessment Tool Used: Knee Outcome Survey Activities of Daily Living Scale (copy scanned into media)  Score:  45/70 = 64% (36% functional deficit)        (4/15/21)  Patient Age: 66years old  Patient Height: 5' 2\"  Patient Weight: 280 lbs  Patient BMI: 51.2      Pain (11/23/21)  [x]  Pain diagram was completed, pain was present and a follow up plan to address the pain is in the plan of care. ()   []  Pain diagram was completed and there was no pain present and therefore no follow up plan to address pain in the plan of care. ()   []  Pain diagram was not completed and the reason for not completing the pain diagram is in the medical chart ()  []  Patient refused to participate   []  Severe mental or physical capacity, patient is in urgent emergent situation and to complete the questionnaire would jeopardize the patient's health status        Functional Questionnaire (11/23/21)  [x]  Patient completed the functional outcome questionnaire and deficiencies were addressed in the plan of care. ()   []  Patient completed the outcome questionnaire and there were no functional deficits identified and therefore no plan of care is required. ()   []  Patient did not complete the functional outcome questionnaire and the reason why is documented in the medical chart. ()  []  Patient refused to participate   []  Patient unable to complete the questionnaire   []   A functional outcome assessment has been reported on within the last 30 days        Falls (11/23/21)  [x]  The patient has had no falls or 1 fall without injury in the past year. (1101F)   []  There is no documentation of fall in the medical chart (1101F,8P)     [] The patient has had 2 or more falls or one fall with injury in the past year that is documented in the medical chart. (1100F)  []  A falls risk assessment was completed and documented in the medical chart. (0683L)   []  Falls were addressed in the plan of care. (7560K)   []  A falls risk assessment was not completed and documented in the medical chart (3300W,2E)   []   Falls were not addressed in the plan of care and reason was documented in the plan of care. (1482U 1P)        Medication (11/23/21)     [x] A list of current medications (including prescription, over the counter, herbal supplements, vitamin supplements, mineral supplements, dietary supplements) was reviewed with the patient and documented in the medical chart. ()        Falls Risk Assessment (30 days): (11/23/21)  [x] Falls Risk assessed and no intervention required. [] Falls Risk assessed and Patient requires intervention due to being higher risk   TUG score (>12s at risk):     [] Falls education provided, including     PQRS:   Reviewed medication list with patient. No changes reported by pt since last PT visit. (11/23/21)          Latex Allergy:  [x]NO      []YES  Preferred Language for Healthcare:   [x]English       []other:      Pain level:  0-1/10 (11/23/21)   Medication:  Celebrex      SUBJECTIVE:   Pt states getting up from a chair is a little easier but if it's a low chair she has to use her arms to push herself up. She stands for 2 hours every morning getting herself ready and helping her sister who is in a WC. She is tired after standing for those 2 hours and both legs feel achy. Stairs are still her most difficult activity.   She isn't having much pain in either knee; her main complaint is weakness. She is still going to bed late and getting up early which is a bad habit she is trying to break. OBJECTIVE:       (11/23/21)  Flexibility L R Comment   Hamstring Mild tightness Mild tightness    Gastroc Mild tightness Mild tightness    ITB      Quad              (11/23/21)  ROM PROM AROM Overpressure Comment    L R L R L R    Flexion 120 with SP  110 105      Extension   0 0                            (11/23/21)  Strength L R Comment   Quad 4/5 4+/5    Hamstring 5/5 4+/5    Gastroc 4+/5 4+/5    Hip Flex 4/5 4/5    Hip Abd 4+/5 4+/5    Hip Add 4+/5 4+/5    Glut Med              (11/23/21)  Girth L R   Mid Patella 57.0 cm 58.0 cm   Suprapatellar     5cm above     15cm above       (11/23/21)  Special Test Results/Comment   Meniscal Click    Crepitus (+) PF crepitus bilaterally   Flexion Test    Valgus Laxity    Varus Laxity    Lachmans    Drop Back        Reflexes/Sensation: (4/15/21)   []Dermatomes/Myotomes intact    []Reflexes equal and normal bilaterally   [x]Other: Intact to light touch    Joint mobility: (11/23/21)   []Normal    [x]Hypo   []Hyper    Palpation: No tenderness. Mild quad atrophy still present on the left vs right.(11/23/21)    Functional Mobility/Transfers: Stairs are still difficult (pt must ascend/descend stairs 1 at a time using 2 HR), she can't stand or walk for long periods of time, she is unable to squat. She still has difficulty getting up from a low chair and has to use her arms to help push herself up.   (11/23/21)    Posture: Forward head, rounded shoulders. (11/23/21)    Bandages/Dressings/Incisions: Pt wears compression hose and wraps on both legs (11/23/21)     Gait: (include devices/WB status) Gait is still mildly antalgic, no AD. Lola and step length have improved bilaterally. She still demonstrates decreased trunk rotation and increased lateral trunk sway. (11/23/21)    Orthopedic Special Tests: See above.        RESTRICTIONS/PRECAUTIONS: Exercises/Interventions:     Therapeutic Ex (50161) Sets/sec Reps Notes/CUES   BIKE      TREADMILL            STRETCHING      Towel Pull Calf 30\" hold X 5    Inclined Calf      Hamstrings 30\" hold X 5 Seated   Quads      Hip Flexors      ITB      Adductors            ROM      Passive      Active      Weight Hangs      Sheet Pulls 10\" hold X 10    Ankle Pumps      ERMI            STRENGTHENING      Quad Sets 10\" hold X 10    Ham Sets      Hip ADD Sets BS 10\" hold X 10     SLR Flexion 3 sets X 10 with 2#     SLR Abduction  HEP   SLR Prone      SLR Adduction      SLR+      Supine Hip Abduction 2 sets X 10  Black band         Standing Marches 3 sets X 10 on Airex Standing at half wall   Standing Knee Flexion 3 sets X 10  Standing at half wall         PRE Machines      Knee Extension      Knee Flexion      Leg Press            CKC      Calf Raises 3 sets X 10 Seated   Mini Squats      Wall Sits      Step ups - 0 riser 2 sets X 10  Using half wall     Lateral Step Ups - 0 riser 2 sets X 10    TKE  Held today               Manual Intervention (00486)      Patellar Mobs      Femoral Tibial mobs      STM      Scar Massage      Foam Roll            NMR re-education (23567)   CUES NEEDED   Biodex Balance      Tandem Balance 30\" hold X 2 each   Held Airex today due to pt having pain in both feet *Feet staggered  *Standing next to half wallSLB      Rebounder      BOSU            Sit -> Stand  X 30 with no UE assistance     With cushion on chair     Up/Down 1 Flight of Stairs in the Stairwell  X 1 Using bilateral HR  1 step at a time       Therapeutic Activity (73774)      Core Training      Swiss Kiara Son Activities      Monster Walks      TRX training                  Patient Education: Dx, prognosis, pt goals/expectations, role of therapy (4/15/21)  X 8'                  Therapeutic Exercise and NMR EXR  [x] (59569) Provided verbal/tactile cueing for activities related to strengthening, flexibility, endurance, ROM for improvements in LE, proximal hip, and core control with self care, mobility, lifting, ambulation. [x] (80048) Provided verbal/tactile cueing for activities related to improving balance, coordination, kinesthetic sense, posture, motor skill, proprioception to assist with LE, proximal hip, and core control in self-care, mobility, lifting, ambulation and eccentric single leg control. NMR and Therapeutic Activities:    [x] (97685 or 62630) Provided verbal/tactile cueing for activities related to improving balance, coordination, kinesthetic sense, posture, motor skill, proprioception and motor activation to allow for proper function of core, proximal hip and LE with self-care and ADLs and functional mobility.   [] (99782) Gait Re-education- Provided training and instruction to the patient for proper LE, core and proximal hip recruitment and positioning and eccentric body weight control with ambulation re-education including up and down stairs     Home Exercise Program:    [x] (47803) Reviewed/Progressed HEP activities related to strengthening, flexibility, endurance, ROM of core, proximal hip and LE for functional self-care, mobility, lifting and ambulation/stair navigation - Updated HEP through [address] [place] [address] (see below). Pt was also issued new written handouts. (4/27/21)  [x] (29861) Reviewed/Progressed HEP activities related to improving balance, coordination, kinesthetic sense, posture, motor skill, proprioception of core, proximal hip and LE for self-care, mobility, lifting, and ambulation/stair navigation          Access Code: [email or web address]  URL: [email or web address]: 04/20/2021Prepared by: Shane Elias SychepeExercises   Long Sitting Calf Stretch with Strap - 1 x daily - 7 x weekly - 1 sets - 3 reps - 30 seconds hold   Seated Table Hamstring Stretch - 1 x daily - 7 x weekly - 1 sets - 3 reps - 30 seconds hold   Long Sitting Quad Set - 1 x daily - 7 x weekly - 1 sets - 10 reps - 10 seconds hold   Long Sitting Hip Adduction Isometric with Ball - 1 x daily - 7 x weekly - 1 sets - 10 reps - 10 seconds hold   Supine Active Straight Leg Raise - 1 x daily - 7 x weekly - 2-3 sets - 10 reps   Hooklying Isometric Clamshell - 1 x daily - 7 x weekly - 3 sets - 10 reps   Standing March with Counter Support - 1 x daily - 7 x weekly - 3 sets - 10 reps   Seated Heel Raise - 1 x daily - 7 x weekly - 3 sets - 10 reps   Sit to Stand - 1 x daily - 7 x weekly - 15 reps   Tandem Stance with Support - 1 x daily - 7 x weekly - 2 reps - 20 seconds hold        Manual Treatments:  PROM / STM / Oscillations-Mobs:  G-I, II, III, IV (PA's, Inf., Post.)  [] (78738) Provided manual therapy to mobilize LE, proximal hip and/or LS spine soft tissue/joints for the purpose of modulating pain, promoting relaxation, increasing ROM, reducing/eliminating soft tissue swelling/inflammation/restriction, improving soft tissue extensibility and allowing for proper ROM for normal function with self-care, mobility, lifting and ambulation. Modalities:   Pt declined ice today   [] GAME READY (VASO)- for significant edema, swelling, pain control. Charges:  Timed Code Treatment Minutes: 54'   Total Treatment Minutes: 54'      [] EVAL (LOW) 59136 (typically 20 minutes face-to-face)  [] EVAL (MOD) 45431 (typically 30 minutes face-to-face)  [] EVAL (HIGH) 61162 (typically 45 minutes face-to-face)  [] RE-EVAL     [x] XA(14453) x 2 (30')    [] IONTO  [x] NMR (05618) x 1 (10')   [] VASO  [] Manual (92595) x      [] Other:  [x] TA x 1 (15')     [] Mech Traction (67864)  [] ES(attended) (28965)      [] ES (un) (69996):         ASSESSMENT:   Pt did really well with tandem balance today but is still unable to add Airex pad due to pain in both feet. She was able to ascend/descend 1 flight of stairs in the stairwell today taking 1 step at a time but she needed to use both HR to pull herself up each step.   Attempted to increase step height with step ups in the clinic but pt had to use the half wall to pull herself up onto the step so height was not increased. Pt is showing improvement in overall strength but she still has weakness in both quads. She also continues to lack endurance which is limiting her function. Pt would continue to benefit from skilled therapy to increase strength, mobility, and endurance for ascending/descending stairs, walking community distances, and improved function. MEDICARE CAP EXCEPTION DOCUMENTATION      I certify that this patient meets one of the below criteria necessary for becoming an exception to the Medicare cap on therapy services:    [x]  The patient has a condition identified by an ICD-9 code that has a direct and significant impact on the need for therapy. (Significantly impacts the rate of recovery.)                     []  The patient has a complexity identified by an ICD-9 code that has a direct and significant impact on the need for therapy. (Significantly impacts the rate of recovery and is associated with a primary condition.)         []  The patient has associated variables that influence the amount of treatment to include:  Social support, self-efficacy/motivation, prognosis, time since onset/acuity. []  The patient has generalized musculoskeletal conditions or a condition affecting multiple sites that will have a direct impact on the rate of recovery. [x]  The patient had a prior episode of outpatient therapy during this calendar year for a different condition. []  The patient has a mental or cognitive disorder in addition to the condition being treated that will have a direct and significant impact on the rate of recovery. GOALS: (Goals updated 11/23/21)    Patient stated goal:  \"Make my legs stronger; walk and climb stairs easier and faster. \"  [x] Progressing:  [] Met: [] Not Met: [] Adjusted    Therapist goals for Patient:   Short Term Goals: To be achieved in: 2 weeks  1.  Independent in HEP and progression per patient tolerance, in order to prevent re-injury. [] Progressing: [x] Met: [] Not Met: [] Adjusted  2. Patient will have a decrease in left knee pain to 1-2/10 to facilitate improvement in movement, function, and ADLs as indicated by Functional Deficits. [] Progressing: [x] Met: [] Not Met: [] Adjusted    Long Term Goals: To be achieved in: 4-6 weeks  1. Disability index score of 20% or less for the Knee Outcome Survey Activities of Daily Living Scale to assist with reaching prior level of function. [x] Progressing: [] Met: [] Not Met: [] Adjusted  2. Patient will demonstrate an increase in left knee flexion AROM to at least 115 degrees to allow for proper joint functioning as indicated by patients Functional Deficits. [x] Progressing:  [] Met: [] Not Met: [] Adjusted  3. Patient will demonstrate an increase in left hip and knee strength to at least 4+/5 to allow for proper functional mobility as indicated by patients Functional Deficits. [x] Progressing:  [] Met: [] Not Met: [] Adjusted  4. Patient will be able to walk community distances, stand for at least 30 minutes, and be able to get up from a chair with little to no UE assistance needed for improved function. [x] Progressing: [] Met: [] Not Met: [] Adjusted  5. Patient will be able to ascend/descend stairs reciprocally with HR (patient specific functional goal)    [x] Progressing: [] Met: [] Not Met: [] Adjusted   6. Patient will have a decrease in left knee pain to 0-1/10 with daily act. [] Progressing: [x] Met: [] Not Met: [] Adjusted      New Goal: To be achieved in: 4 weeks (11/23/21)  1. Patient will demonstrate an increase in left hip and knee strength to at least 5/5 to allow for proper functional mobility as indicated by patients Functional Deficits.    [x] Progressing: [] Met: [] Not Met:  [] Adjusted        Overall Progression Towards Functional goals/ Treatment Progress Update:  [] Patient is progressing as expected towards functional goals listed. [] Progression is slowed due to complexities/Impairments listed. [x] Progression has been slowed due to co-morbidities - Pt underwent bilateral wrist surgeries which caused her to miss several weeks of therapy. [] Plan just implemented, too soon to assess goals progression <30days   [] Goals require adjustment due to lack of progress  [] Patient is not progressing as expected and requires additional follow up with physician  [] Other    Prognosis for POC: [x] Good [] Fair  [] Poor      Patient requires continued skilled intervention: [x] Yes  [] No    Treatment/Activity Tolerance:  [] Patient able to complete treatment  [x] Patient limited by fatigue  [] Patient limited by pain    [] Patient limited by other medical complications  [] Other:         PLAN: Continue therapy 2x/week for ROM, strengthening, endurance, and balance. 1-2x/week for 4-6 weeks for ROM, strengthening, balance act, HEP instruction, pt education, manual therapy prn, and modalities prn (11/23/21)  [x] Continue per plan of care [] Alter current plan   [] Plan of care initiated [] Hold pending MD visit [] Discharge      Electronically signed by:  Linda Martinez, PT     Physical Therapist Adore Rangel [address] #[id number]  Physical Therapist [place] License #[id number]    Note: If patient does not return for scheduled/ recommended follow up visits, this note will serve as a discharge from care along with most recent update on progress.

## 2021-11-30 ENCOUNTER — TREATMENT (OUTPATIENT)
Dept: PHYSICAL THERAPY | Age: 71
End: 2021-11-30
Payer: MEDICARE

## 2021-11-30 DIAGNOSIS — M25.562 LEFT KNEE PAIN, UNSPECIFIED CHRONICITY: Primary | ICD-10-CM

## 2021-11-30 DIAGNOSIS — M17.12 PRIMARY OSTEOARTHRITIS OF LEFT KNEE: ICD-10-CM

## 2021-11-30 DIAGNOSIS — M25.662 DECREASED ROM OF LEFT KNEE: ICD-10-CM

## 2021-11-30 DIAGNOSIS — R29.898 WEAKNESS OF LEFT LOWER EXTREMITY: ICD-10-CM

## 2021-11-30 PROCEDURE — G8427 DOCREV CUR MEDS BY ELIG CLIN: HCPCS | Performed by: PHYSICAL THERAPIST

## 2021-11-30 PROCEDURE — 97530 THERAPEUTIC ACTIVITIES: CPT | Performed by: PHYSICAL THERAPIST

## 2021-11-30 PROCEDURE — 97110 THERAPEUTIC EXERCISES: CPT | Performed by: PHYSICAL THERAPIST

## 2021-11-30 PROCEDURE — 97112 NEUROMUSCULAR REEDUCATION: CPT | Performed by: PHYSICAL THERAPIST

## 2021-11-30 NOTE — PROGRESS NOTES
[facility]   Phone: [phone]    Fax: [phone]             Physical Therapy Treatment Note/ Progress Report:           Date:  2021    Patient Name:  Emilie Zhong    :  1950  MRN: [id number]  Restrictions/Precautions:    Medical/Treatment Diagnosis Information:  Diagnosis: Left Knee Pain (M25.562), Primary OA of Left Knee (M17.12)   Treatment Diagnosis: Decreased Left Knee ROM (M25.662), Decreased Strength (R29.898)        Insurance/Certification information:  PT Insurance Information: Medicare - Visits based on medical necessity  Physician Information:  Referring Practitioner: Dr. Naa Daniel  Has the plan of care been signed (Y/N):        [x]  Yes (POC signed 21)  []  No      Date of Patient follow up with Physician: As needed      Is this a Progress Report:     []  Yes  [x]  No        If Yes:  Date Range for reporting period:  Beginning 4/15/21   Ending 21    Progress report will be due (10 Rx or 30 days whichever is less):        Recertification will be due (POC Duration  / 90 days whichever is less):  21        Visit # Insurance Allowable Auth Required   34 Medical necessity  (pt has already used 13 visits this year for her shoulder) []  Yes [x]  No        Functional Scale:    Date assessed:  21  Functional Assessment Tool Used: Knee Outcome Survey Activities of Daily Living Scale (copy scanned into media)  Score:  45/70 = 64% (36% functional deficit)        (4/15/21)  Patient Age: 66years old  Patient Height: 5' 2\"  Patient Weight: 280 lbs  Patient BMI: 51.2      Pain (21)  [x]  Pain diagram was completed, pain was present and a follow up plan to address the pain is in the plan of care. ()   []  Pain diagram was completed and there was no pain present and therefore no follow up plan to address pain in the plan of care.  ()   []  Pain diagram was not completed and the reason for not completing the pain diagram is in the medical chart ()  []  Patient refused to participate   []  Severe mental or physical capacity, patient is in urgent emergent situation and to complete the questionnaire would jeopardize the patient's health status        Functional Questionnaire (11/23/21)  [x]  Patient completed the functional outcome questionnaire and deficiencies were addressed in the plan of care. ()   []  Patient completed the outcome questionnaire and there were no functional deficits identified and therefore no plan of care is required. ()   []  Patient did not complete the functional outcome questionnaire and the reason why is documented in the medical chart. ()  []  Patient refused to participate   []  Patient unable to complete the questionnaire   []   A functional outcome assessment has been reported on within the last 30 days        Falls (11/23/21)  [x]  The patient has had no falls or 1 fall without injury in the past year. (1101F)   []  There is no documentation of fall in the medical chart (1101F,8P)     [] The patient has had 2 or more falls or one fall with injury in the past year that is documented in the medical chart. (1100F)  []  A falls risk assessment was completed and documented in the medical chart. (0149A)   []  Falls were addressed in the plan of care. (1695S)   []  A falls risk assessment was not completed and documented in the medical chart (7565Q,3E)   []   Falls were not addressed in the plan of care and reason was documented in the plan of care. (8044J 1P)        Medication (11/23/21)     [x] A list of current medications (including prescription, over the counter, herbal supplements, vitamin supplements, mineral supplements, dietary supplements) was reviewed with the patient and documented in the medical chart. ()        Falls Risk Assessment (30 days): (11/23/21)  [x] Falls Risk assessed and no intervention required.   [] Falls Risk assessed and Patient requires intervention due to being higher risk   TUG score (>12s at risk):     [] Falls education provided, including     PQRS:   Reviewed medication list with patient. No changes reported by pt since last PT visit. (11/30/21)          Latex Allergy:  [x]NO      []YES  Preferred Language for Healthcare:   [x]English       []other:      Pain level:  0-1/10 (11/30/21)   Medication:  Celebrex      SUBJECTIVE:   Pt states her left knee buckled one day last week while she was walking inside the house. Pt states her knee hasn't done that in a very long time. Her main complaint continues to be weakness in both knees. She reports no pain in her left knee but both her ankles have been bothering her. She uses Voltaren cream on both ankles and feet every morning and at night which helps. OBJECTIVE:       (11/23/21)  Flexibility L R Comment   Hamstring Mild tightness Mild tightness    Gastroc Mild tightness Mild tightness    ITB      Quad              (11/30/21)  ROM PROM AROM Overpressure Comment    L R L R L R    Flexion 120 with SP  110 105      Extension   0 0                            (11/23/21)  Strength L R Comment   Quad 4/5 4+/5    Hamstring 5/5 4+/5    Gastroc 4+/5 4+/5    Hip Flex 4/5 4/5    Hip Abd 4+/5 4+/5    Hip Add 4+/5 4+/5    Glut Med              (11/23/21)  Girth L R   Mid Patella 57.0 cm 58.0 cm   Suprapatellar     5cm above     15cm above       (11/23/21)  Special Test Results/Comment   Meniscal Click    Crepitus (+) PF crepitus bilaterally   Flexion Test    Valgus Laxity    Varus Laxity    Lachmans    Drop Back        Reflexes/Sensation: (4/15/21)   []Dermatomes/Myotomes intact    []Reflexes equal and normal bilaterally   [x]Other: Intact to light touch    Joint mobility: (11/23/21)   []Normal    [x]Hypo   []Hyper    Palpation: No tenderness.   Mild quad atrophy still present on the left vs right.(11/23/21)    Functional Mobility/Transfers: Stairs are still difficult (pt must ascend/descend stairs 1 at a time X 2 each   Held Airex today due to pt having pain in both feet *Feet staggered  *Standing next to half wallSLB      Rebounder      BOSU            Sit -> Stand  X 30 with no UE assistance     With cushion on chair     Up/Down 1 Flight of Stairs in the [place]  X 1 Using bilateral HR  1 step at a time       Therapeutic Activity (30610)      Core Training      [facility] Activities      Monster Walks      TRX training                  Patient Education: Dx, prognosis, pt goals/expectations, role of therapy (4/15/21)  X 8'                  Therapeutic Exercise and NMR EXR  [x] (02048) Provided verbal/tactile cueing for activities related to strengthening, flexibility, endurance, ROM for improvements in LE, proximal hip, and core control with self care, mobility, lifting, ambulation. [x] (01724) Provided verbal/tactile cueing for activities related to improving balance, coordination, kinesthetic sense, posture, motor skill, proprioception to assist with LE, proximal hip, and core control in self-care, mobility, lifting, ambulation and eccentric single leg control. NMR and Therapeutic Activities:    [x] (50014 or 88638) Provided verbal/tactile cueing for activities related to improving balance, coordination, kinesthetic sense, posture, motor skill, proprioception and motor activation to allow for proper function of core, proximal hip and LE with self-care and ADLs and functional mobility.   [] (63754) Gait Re-education- Provided training and instruction to the patient for proper LE, core and proximal hip recruitment and positioning and eccentric body weight control with ambulation re-education including up and down stairs     Home Exercise Program:    [x] (57817) Reviewed/Progressed HEP activities related to strengthening, flexibility, endurance, ROM of core, proximal hip and LE for functional self-care, mobility, lifting and ambulation/stair navigation - Updated HEP through [address] [place] [address] (see below).   Pt was also issued new written handouts. (4/27/21)  [x] (48531) Reviewed/Progressed HEP activities related to improving balance, coordination, kinesthetic sense, posture, motor skill, proprioception of core, proximal hip and LE for self-care, mobility, lifting, and ambulation/stair navigation          Access Code: [email or web address]  URL: Strands/Date: 04/20/2021Prepared by: [email or web address] SybertExercises   Long Sitting Calf Stretch with Strap - 1 x daily - 7 x weekly - 1 sets - 3 reps - 30 seconds hold   Seated Table Hamstring Stretch - 1 x daily - 7 x weekly - 1 sets - 3 reps - 30 seconds hold   Long Sitting Quad Set - 1 x daily - 7 x weekly - 1 sets - 10 reps - 10 seconds hold   Long Sitting Hip Adduction Isometric with Ball - 1 x daily - 7 x weekly - 1 sets - 10 reps - 10 seconds hold   Supine Active Straight Leg Raise - 1 x daily - 7 x weekly - 2-3 sets - 10 reps   Hooklying Isometric Clamshell - 1 x daily - 7 x weekly - 3 sets - 10 reps   Standing March with Counter Support - 1 x daily - 7 x weekly - 3 sets - 10 reps   Seated Heel Raise - 1 x daily - 7 x weekly - 3 sets - 10 reps   Sit to Stand - 1 x daily - 7 x weekly - 15 reps   Tandem Stance with Support - 1 x daily - 7 x weekly - 2 reps - 20 seconds hold        Manual Treatments:  PROM / STM / Oscillations-Mobs:  G-I, II, III, IV (PA's, Inf., Post.)  [] (34064) Provided manual therapy to mobilize LE, proximal hip and/or LS spine soft tissue/joints for the purpose of modulating pain, promoting relaxation, increasing ROM, reducing/eliminating soft tissue swelling/inflammation/restriction, improving soft tissue extensibility and allowing for proper ROM for normal function with self-care, mobility, lifting and ambulation. Modalities:   Pt declined ice today   [] GAME READY (VASO)- for significant edema, swelling, pain control.      Charges:  Timed Code Treatment Minutes: 54'   Total Treatment Minutes: 54'      [] EVAL (LOW) 455 1011 (typically 20 minutes face-to-face)  [] EVAL (MOD) 46903 (typically 30 minutes face-to-face)  [] EVAL (HIGH) 41026 (typically 45 minutes face-to-face)  [] RE-EVAL     [x] KE(26816) x 2 (30')    [] IONTO  [x] NMR (35317) x 1 (10')   [] VASO  [] Manual (91944) x      [] Other:  [x] TA x 1 (15')     [] Mech Traction (59541)  [] ES(attended) (67041)      [] ES (un) (30399):         ASSESSMENT:   Pt was fatigued with the exercises today. Stairs/step ups are still very difficult for pt due to quad weakness with pt needing to use HR (with stairs) or half wall (with step ups in the clinic) for support. She continues to lack endurance with rest breaks needed during her program.  Pt needs to continue increasing strength and endurance for improved function. MEDICARE CAP EXCEPTION DOCUMENTATION      I certify that this patient meets one of the below criteria necessary for becoming an exception to the Medicare cap on therapy services:    [x]  The patient has a condition identified by an ICD-9 code that has a direct and significant impact on the need for therapy. (Significantly impacts the rate of recovery.)                     []  The patient has a complexity identified by an ICD-9 code that has a direct and significant impact on the need for therapy. (Significantly impacts the rate of recovery and is associated with a primary condition.)         []  The patient has associated variables that influence the amount of treatment to include:  Social support, self-efficacy/motivation, prognosis, time since onset/acuity. []  The patient has generalized musculoskeletal conditions or a condition affecting multiple sites that will have a direct impact on the rate of recovery. [x]  The patient had a prior episode of outpatient therapy during this calendar year for a different condition.            []  The patient has a mental or cognitive disorder in addition to the condition being treated that will have a direct and significant impact on the rate of recovery. GOALS: (Goals updated 11/23/21)    Patient stated goal:  \"Make my legs stronger; walk and climb stairs easier and faster. \"  [x] Progressing:  [] Met: [] Not Met: [] Adjusted    Therapist goals for Patient:   Short Term Goals: To be achieved in: 2 weeks  1. Independent in HEP and progression per patient tolerance, in order to prevent re-injury. [] Progressing: [x] Met: [] Not Met: [] Adjusted  2. Patient will have a decrease in left knee pain to 1-2/10 to facilitate improvement in movement, function, and ADLs as indicated by Functional Deficits. [] Progressing: [x] Met: [] Not Met: [] Adjusted    Long Term Goals: To be achieved in: 4-6 weeks  1. Disability index score of 20% or less for the Knee Outcome Survey Activities of Daily Living Scale to assist with reaching prior level of function. [x] Progressing: [] Met: [] Not Met: [] Adjusted  2. Patient will demonstrate an increase in left knee flexion AROM to at least 115 degrees to allow for proper joint functioning as indicated by patients Functional Deficits. [x] Progressing:  [] Met: [] Not Met: [] Adjusted  3. Patient will demonstrate an increase in left hip and knee strength to at least 4+/5 to allow for proper functional mobility as indicated by patients Functional Deficits. [x] Progressing:  [] Met: [] Not Met: [] Adjusted  4. Patient will be able to walk community distances, stand for at least 30 minutes, and be able to get up from a chair with little to no UE assistance needed for improved function. [x] Progressing: [] Met: [] Not Met: [] Adjusted  5. Patient will be able to ascend/descend stairs reciprocally with HR (patient specific functional goal)    [x] Progressing: [] Met: [] Not Met: [] Adjusted   6. Patient will have a decrease in left knee pain to 0-1/10 with daily act. [] Progressing: [x] Met: [] Not Met: [] Adjusted      New Goal: To be achieved in: 4 weeks (11/23/21)  1.  Patient will demonstrate an increase in left hip and knee strength to at least 5/5 to allow for proper functional mobility as indicated by patients Functional Deficits. [x] Progressing: [] Met: [] Not Met:  [] Adjusted        Overall Progression Towards Functional goals/ Treatment Progress Update:  [] Patient is progressing as expected towards functional goals listed. [] Progression is slowed due to complexities/Impairments listed. [x] Progression has been slowed due to co-morbidities - Pt underwent bilateral wrist surgeries which caused her to miss several weeks of therapy. [] Plan just implemented, too soon to assess goals progression <30days   [] Goals require adjustment due to lack of progress  [] Patient is not progressing as expected and requires additional follow up with physician  [] Other    Prognosis for POC: [x] Good [] Fair  [] Poor      Patient requires continued skilled intervention: [x] Yes  [] No    Treatment/Activity Tolerance:  [] Patient able to complete treatment  [x] Patient limited by fatigue  [] Patient limited by pain    [] Patient limited by other medical complications  [] Other:         PLAN: Continue therapy 2x/week for ROM, strengthening, endurance, and balance. 1-2x/week for 4-6 weeks for ROM, strengthening, balance act, HEP instruction, pt education, manual therapy prn, and modalities prn (11/23/21)  [x] Continue per plan of care [] Alter current plan   [] Plan of care initiated [] Hold pending MD visit [] Discharge      Electronically signed by:  Marianna Luque, PT     Physical Therapist Adore Rangel 421 #[id number]  Physical Therapist [place] License #[id number]    Note: If patient does not return for scheduled/ recommended follow up visits, this note will serve as a discharge from care along with most recent update on progress.

## 2021-12-02 ENCOUNTER — TREATMENT (OUTPATIENT)
Dept: PHYSICAL THERAPY | Age: 71
End: 2021-12-02
Payer: MEDICARE

## 2021-12-02 ENCOUNTER — OFFICE VISIT (OUTPATIENT)
Dept: ORTHOPEDIC SURGERY | Age: 71
End: 2021-12-02
Payer: MEDICARE

## 2021-12-02 VITALS — HEIGHT: 63 IN | WEIGHT: 268 LBS | BODY MASS INDEX: 47.48 KG/M2

## 2021-12-02 DIAGNOSIS — G56.03 CARPAL TUNNEL SYNDROME ON BOTH SIDES: ICD-10-CM

## 2021-12-02 DIAGNOSIS — R29.898 WEAKNESS OF LEFT LOWER EXTREMITY: ICD-10-CM

## 2021-12-02 DIAGNOSIS — Z98.890 S/P CARPAL TUNNEL RELEASE: Primary | ICD-10-CM

## 2021-12-02 DIAGNOSIS — M17.12 PRIMARY OSTEOARTHRITIS OF LEFT KNEE: ICD-10-CM

## 2021-12-02 DIAGNOSIS — M25.662 DECREASED ROM OF LEFT KNEE: ICD-10-CM

## 2021-12-02 DIAGNOSIS — M25.512 LEFT SHOULDER PAIN, UNSPECIFIED CHRONICITY: ICD-10-CM

## 2021-12-02 DIAGNOSIS — M25.562 LEFT KNEE PAIN, UNSPECIFIED CHRONICITY: Primary | ICD-10-CM

## 2021-12-02 PROCEDURE — G8427 DOCREV CUR MEDS BY ELIG CLIN: HCPCS | Performed by: PHYSICAL THERAPIST

## 2021-12-02 PROCEDURE — 97112 NEUROMUSCULAR REEDUCATION: CPT | Performed by: PHYSICAL THERAPIST

## 2021-12-02 PROCEDURE — 97530 THERAPEUTIC ACTIVITIES: CPT | Performed by: PHYSICAL THERAPIST

## 2021-12-02 PROCEDURE — 20605 DRAIN/INJ JOINT/BURSA W/O US: CPT | Performed by: ORTHOPAEDIC SURGERY

## 2021-12-02 PROCEDURE — 97110 THERAPEUTIC EXERCISES: CPT | Performed by: PHYSICAL THERAPIST

## 2021-12-02 PROCEDURE — 99024 POSTOP FOLLOW-UP VISIT: CPT | Performed by: ORTHOPAEDIC SURGERY

## 2021-12-02 NOTE — PROGRESS NOTES
[facility]   Phone: [phone]    Fax: [phone]             Physical Therapy Treatment Note/ Progress Report:           Date:  2021    Patient Name:  Bhargavi Gomes    :  1950  MRN: [id number]  Restrictions/Precautions:    Medical/Treatment Diagnosis Information:  Diagnosis: Left Knee Pain (M25.562), Primary OA of Left Knee (M17.12)   Treatment Diagnosis: Decreased Left Knee ROM (M25.662), Decreased Strength (R29.898)        Insurance/Certification information:  PT Insurance Information: Medicare - Visits based on medical necessity  Physician Information:  Referring Practitioner: Dr. Karen Haney  Has the plan of care been signed (Y/N):        [x]  Yes (POC signed 21)  []  No      Date of Patient follow up with Physician: As needed      Is this a Progress Report:     []  Yes  [x]  No        If Yes:  Date Range for reporting period:  Beginning 4/15/21   Ending 21    Progress report will be due (10 Rx or 30 days whichever is less):        Recertification will be due (POC Duration  / 90 days whichever is less):  21        Visit # Insurance Allowable Auth Required   35 Medical necessity  (pt has already used 13 visits this year for her shoulder) []  Yes [x]  No        Functional Scale:    Date assessed:  21  Functional Assessment Tool Used: Knee Outcome Survey Activities of Daily Living Scale (copy scanned into media)  Score:  45/70 = 64% (36% functional deficit)        (4/15/21)  Patient Age: 66years old  Patient Height: 5' 2\"  Patient Weight: 280 lbs  Patient BMI: 51.2      Pain (21)  [x]  Pain diagram was completed, pain was present and a follow up plan to address the pain is in the plan of care. ()   []  Pain diagram was completed and there was no pain present and therefore no follow up plan to address pain in the plan of care.  ()   []  Pain diagram was not completed and the reason for not completing the pain diagram is in the medical chart ()  []  Patient refused to participate   []  Severe mental or physical capacity, patient is in urgent emergent situation and to complete the questionnaire would jeopardize the patient's health status        Functional Questionnaire (11/23/21)  [x]  Patient completed the functional outcome questionnaire and deficiencies were addressed in the plan of care. ()   []  Patient completed the outcome questionnaire and there were no functional deficits identified and therefore no plan of care is required. ()   []  Patient did not complete the functional outcome questionnaire and the reason why is documented in the medical chart. ()  []  Patient refused to participate   []  Patient unable to complete the questionnaire   []   A functional outcome assessment has been reported on within the last 30 days        Falls (11/23/21)  [x]  The patient has had no falls or 1 fall without injury in the past year. (1101F)   []  There is no documentation of fall in the medical chart (1101F,8P)     [] The patient has had 2 or more falls or one fall with injury in the past year that is documented in the medical chart. (1100F)  []  A falls risk assessment was completed and documented in the medical chart. (1195J)   []  Falls were addressed in the plan of care. (8808T)   []  A falls risk assessment was not completed and documented in the medical chart (1927F,8B)   []   Falls were not addressed in the plan of care and reason was documented in the plan of care. (5811A 1P)        Medication (11/23/21)     [x] A list of current medications (including prescription, over the counter, herbal supplements, vitamin supplements, mineral supplements, dietary supplements) was reviewed with the patient and documented in the medical chart. ()        Falls Risk Assessment (30 days): (11/23/21)  [x] Falls Risk assessed and no intervention required.   [] Falls Risk assessed and Patient requires intervention due to being higher risk   TUG score (>12s at risk):     [] Falls education provided, including     PQRS:   Reviewed medication list with patient. No changes reported by pt since last PT visit. (12/2/21)          Latex Allergy:  [x]NO      []YES  Preferred Language for Healthcare:   [x]English       []other:      Pain level:  0-1/10 (12/2/21)   Medication:  Celebrex      SUBJECTIVE:   Pt saw Dr. Adrianne Yu prior to therapy today for her right hand and to review EMG results (pt underwent right CTR on 9/20/21 but continues to have numbness and tingling in her right hand with no improvement following surgery). Dr. Adrianne Yu gave her an injection in her hand and told her it could take up to 2 years for her hand symptoms to improve based on the amount of damage there was. Pt also mentioned she is having increasing pain in her left shoulder and shoulder blade and Dr. Adrianne Yu ordered a CT scan of her left shoulder. Pt states both knees feel weak.               OBJECTIVE:       (11/23/21)  Flexibility L R Comment   Hamstring Mild tightness Mild tightness    Gastroc Mild tightness Mild tightness    ITB      Quad              (11/30/21)  ROM PROM AROM Overpressure Comment    L R L R L R    Flexion 120 with SP  110 105      Extension   0 0                            (11/23/21)  Strength L R Comment   Quad 4/5 4+/5    Hamstring 5/5 4+/5    Gastroc 4+/5 4+/5    Hip Flex 4/5 4/5    Hip Abd 4+/5 4+/5    Hip Add 4+/5 4+/5    Glut Med              (11/23/21)  Girth L R   Mid Patella 57.0 cm 58.0 cm   Suprapatellar     5cm above     15cm above       (11/23/21)  Special Test Results/Comment   Meniscal Click    Crepitus (+) PF crepitus bilaterally   Flexion Test    Valgus Laxity    Varus Laxity    Lachmans    Drop Back        Reflexes/Sensation: (4/15/21)   []Dermatomes/Myotomes intact    []Reflexes equal and normal bilaterally   [x]Other: Intact to light touch    Joint mobility: (11/23/21)   []Normal    [x]Hypo   []Hyper    Palpation: No tenderness. Mild quad atrophy still present on the left vs right.(11/23/21)    Functional Mobility/Transfers: Stairs are still difficult (pt must ascend/descend stairs 1 at a time using 2 HR), she can't stand or walk for long periods of time, she is unable to squat. She still has difficulty getting up from a low chair and has to use her arms to help push herself up.   (11/23/21)    Posture: Forward head, rounded shoulders. (11/23/21)    Bandages/Dressings/Incisions: Pt wears compression hose and wraps on both legs (11/23/21)     Gait: (include devices/WB status) Gait is still mildly antalgic, no AD. Olla and step length have improved bilaterally. She still demonstrates decreased trunk rotation and increased lateral trunk sway. (11/23/21)    Orthopedic Special Tests: See above.        RESTRICTIONS/PRECAUTIONS:     Exercises/Interventions:     Therapeutic Ex (69521) Sets/sec Reps Notes/CUES   BIKE      TREADMILL            STRETCHING      Towel Pull Calf 30\" hold X 5    Inclined Calf      Hamstrings 30\" hold X 5 Seated   Quads      Hip Flexors      ITB      Adductors            ROM      Passive      Active      Weight Hangs      Sheet Pulls 10\" hold X 10    Ankle Pumps      ERMI            STRENGTHENING      Quad Sets 10\" hold X 10    Ham Sets      Hip ADD Sets BS 10\" hold X 10     SLR Flexion 3 sets X 10 with 2#     SLR Abduction  HEP   SLR Prone      SLR Adduction      SLR+      Supine Hip Abduction 2 sets X 10  Black band         Standing Marches 3 sets X 10 on Airex Standing at half wall   Standing Knee Flexion 3 sets X 10  Standing at half wall         PRE Machines      Knee Extension      Knee Flexion      Leg Press            CKC      Calf Raises 3 sets X 10 Seated (Pt cannot perform standing due to bilateral foot pain)   Mini Squats      Wall Sits      Step ups - 0 riser 2 sets X 10  Using half wall     Lateral Step Ups - 0 riser 2 sets X 10    TKE  Held today               Manual Intervention (19265) Patellar Mobs      Femoral Tibial mobs      STM      Scar Massage      Foam Roll            NMR re-education (96658)   CUES NEEDED   Biodex Balance      Tandem Balance 30\" hold X 2 each   Held Airex today due to pt having pain in both feet *Feet staggered  *Standing next to half wallSLB      Rebounder      BOSU            Sit -> Stand  X 30 with no UE assistance     With cushion on chair     Up/Down 1 Flight of Stairs in the Stairwell   Held today due to pt getting an injection in her right hand (12/2/21)      Therapeutic Activity (03389)      Core Training      [facility] training                  Patient Education: Dx, prognosis, pt goals/expectations, role of therapy (4/15/21)  X 8'                  Therapeutic Exercise and NMR EXR  [x] (66716) Provided verbal/tactile cueing for activities related to strengthening, flexibility, endurance, ROM for improvements in LE, proximal hip, and core control with self care, mobility, lifting, ambulation. [x] (56585) Provided verbal/tactile cueing for activities related to improving balance, coordination, kinesthetic sense, posture, motor skill, proprioception to assist with LE, proximal hip, and core control in self-care, mobility, lifting, ambulation and eccentric single leg control.      NMR and Therapeutic Activities:    [x] (80449 or 67505) Provided verbal/tactile cueing for activities related to improving balance, coordination, kinesthetic sense, posture, motor skill, proprioception and motor activation to allow for proper function of core, proximal hip and LE with self-care and ADLs and functional mobility.   [] (63400) Gait Re-education- Provided training and instruction to the patient for proper LE, core and proximal hip recruitment and positioning and eccentric body weight control with ambulation re-education including up and down stairs     Home Exercise Program:    [x] (23733) Reviewed/Progressed HEP activities related to strengthening, flexibility, endurance, ROM of core, proximal hip and LE for functional self-care, mobility, lifting and ambulation/stair navigation - Updated HEP through [address] [place] [address] (see below). Pt was also issued new written handouts. (4/27/21)  [x] (50803) Reviewed/Progressed HEP activities related to improving balance, coordination, kinesthetic sense, posture, motor skill, proprioception of core, proximal hip and LE for self-care, mobility, lifting, and ambulation/stair navigation          Access Code: [email or web address]  URL: Vicino/Date: 04/20/2021Prepared by: GAURAV SybertExercises   Long Sitting Calf Stretch with Strap - 1 x daily - 7 x weekly - 1 sets - 3 reps - 30 seconds hold   Seated Table Hamstring Stretch - 1 x daily - 7 x weekly - 1 sets - 3 reps - 30 seconds hold   Long Sitting Quad Set - 1 x daily - 7 x weekly - 1 sets - 10 reps - 10 seconds hold   Long Sitting Hip Adduction Isometric with Ball - 1 x daily - 7 x weekly - 1 sets - 10 reps - 10 seconds hold   Supine Active Straight Leg Raise - 1 x daily - 7 x weekly - 2-3 sets - 10 reps   Hooklying Isometric Clamshell - 1 x daily - 7 x weekly - 3 sets - 10 reps   Standing March with Counter Support - 1 x daily - 7 x weekly - 3 sets - 10 reps   Seated Heel Raise - 1 x daily - 7 x weekly - 3 sets - 10 reps   Sit to Stand - 1 x daily - 7 x weekly - 15 reps   Tandem Stance with Support - 1 x daily - 7 x weekly - 2 reps - 20 seconds hold        Manual Treatments:  PROM / STM / Oscillations-Mobs:  G-I, II, III, IV (PA's, Inf., Post.)  [] (65605) Provided manual therapy to mobilize LE, proximal hip and/or LS spine soft tissue/joints for the purpose of modulating pain, promoting relaxation, increasing ROM, reducing/eliminating soft tissue swelling/inflammation/restriction, improving soft tissue extensibility and allowing for proper ROM for normal function with self-care, mobility, lifting and ambulation.      Modalities:   Pt declined ice today   [] GAME READY (VASO)- for significant edema, swelling, pain control. Charges:  Timed Code Treatment Minutes: 54'   Total Treatment Minutes: 54'      [] EVAL (LOW) 455 [id number] (typically 20 minutes face-to-face)  [] EVAL (MOD) 76444 (typically 30 minutes face-to-face)  [] EVAL (HIGH) 48751 (typically 45 minutes face-to-face)  [] RE-EVAL     [x] HV(68101) x 2 (30')    [] IONTO  [x] NMR (84799) x 1 (10')   [] VASO  [] Manual (99856) x      [] Other:  [x] TA x 1 (15')     [] Mech Traction (91744)  [] ES(attended) (61302)      [] ES (un) (87127):         ASSESSMENT:   Held going up/down the stairs in the stairwell today due to pt getting an injection in her right hand prior to therapy and not wanting pt to put any pressure on her right hand. She was able to perform forward and lateral step ups in the clinic using only the left arm for support. She lacks confidence in her left knee with step ups and compensates by using her left arm to pull herself up onto the step versus using her quad. Strength and endurance deficits continue to be present limiting functional mobility. MEDICARE CAP EXCEPTION DOCUMENTATION      I certify that this patient meets one of the below criteria necessary for becoming an exception to the Medicare cap on therapy services:    [x]  The patient has a condition identified by an ICD-9 code that has a direct and significant impact on the need for therapy. (Significantly impacts the rate of recovery.)                     []  The patient has a complexity identified by an ICD-9 code that has a direct and significant impact on the need for therapy. (Significantly impacts the rate of recovery and is associated with a primary condition.)         []  The patient has associated variables that influence the amount of treatment to include:  Social support, self-efficacy/motivation, prognosis, time since onset/acuity.          []  The patient has generalized musculoskeletal conditions or a condition affecting multiple sites that will have a direct impact on the rate of recovery. [x]  The patient had a prior episode of outpatient therapy during this calendar year for a different condition. []  The patient has a mental or cognitive disorder in addition to the condition being treated that will have a direct and significant impact on the rate of recovery. GOALS: (Goals updated 11/23/21)    Patient stated goal:  \"Make my legs stronger; walk and climb stairs easier and faster. \"  [x] Progressing:  [] Met: [] Not Met: [] Adjusted    Therapist goals for Patient:   Short Term Goals: To be achieved in: 2 weeks  1. Independent in HEP and progression per patient tolerance, in order to prevent re-injury. [] Progressing: [x] Met: [] Not Met: [] Adjusted  2. Patient will have a decrease in left knee pain to 1-2/10 to facilitate improvement in movement, function, and ADLs as indicated by Functional Deficits. [] Progressing: [x] Met: [] Not Met: [] Adjusted    Long Term Goals: To be achieved in: 4-6 weeks  1. Disability index score of 20% or less for the Knee Outcome Survey Activities of Daily Living Scale to assist with reaching prior level of function. [x] Progressing: [] Met: [] Not Met: [] Adjusted  2. Patient will demonstrate an increase in left knee flexion AROM to at least 115 degrees to allow for proper joint functioning as indicated by patients Functional Deficits. [x] Progressing:  [] Met: [] Not Met: [] Adjusted  3. Patient will demonstrate an increase in left hip and knee strength to at least 4+/5 to allow for proper functional mobility as indicated by patients Functional Deficits. [x] Progressing:  [] Met: [] Not Met: [] Adjusted  4. Patient will be able to walk community distances, stand for at least 30 minutes, and be able to get up from a chair with little to no UE assistance needed for improved function. [x] Progressing: [] Met: [] Not Met: [] Adjusted  5.  Patient will be able to ascend/descend stairs reciprocally with HR (patient specific functional goal)    [x] Progressing: [] Met: [] Not Met: [] Adjusted   6. Patient will have a decrease in left knee pain to 0-1/10 with daily act. [] Progressing: [x] Met: [] Not Met: [] Adjusted      New Goal: To be achieved in: 4 weeks (11/23/21)  1. Patient will demonstrate an increase in left hip and knee strength to at least 5/5 to allow for proper functional mobility as indicated by patients Functional Deficits. [x] Progressing: [] Met: [] Not Met:  [] Adjusted        Overall Progression Towards Functional goals/ Treatment Progress Update:  [] Patient is progressing as expected towards functional goals listed. [] Progression is slowed due to complexities/Impairments listed. [x] Progression has been slowed due to co-morbidities - Pt underwent bilateral wrist surgeries which caused her to miss several weeks of therapy. [] Plan just implemented, too soon to assess goals progression <30days   [] Goals require adjustment due to lack of progress  [] Patient is not progressing as expected and requires additional follow up with physician  [] Other    Prognosis for POC: [x] Good [] Fair  [] Poor      Patient requires continued skilled intervention: [x] Yes  [] No    Treatment/Activity Tolerance:  [] Patient able to complete treatment  [x] Patient limited by fatigue  [] Patient limited by pain    [] Patient limited by other medical complications  [] Other:         PLAN: Continue therapy 2x/week for ROM, strengthening, endurance, and balance.        1-2x/week for 4-6 weeks for ROM, strengthening, balance act, HEP instruction, pt education, manual therapy prn, and modalities prn (11/23/21)  [x] Continue per plan of care [] Alter current plan   [] Plan of care initiated [] Hold pending MD visit [] Discharge      Electronically signed by:  Horacio Knutson, PT     Physical Therapist [facility] #[id number]  Physical Therapist [facility] #[id number]    Note: If patient does not return for scheduled/ recommended follow up visits, this note will serve as a discharge from care along with most recent update on progress.

## 2021-12-03 ENCOUNTER — TELEPHONE (OUTPATIENT)
Dept: ORTHOPEDIC SURGERY | Age: 71
End: 2021-12-03

## 2021-12-03 NOTE — TELEPHONE ENCOUNTER
General Question     Subject: patient scheduled her appointment for her ct scan with [facility] - December 13, 2021.     Patient:  Sung Schmidtos  Contact Number: [phone]

## 2021-12-06 RX ORDER — METHYLPREDNISOLONE ACETATE 40 MG/ML
40 INJECTION, SUSPENSION INTRA-ARTICULAR; INTRALESIONAL; INTRAMUSCULAR; SOFT TISSUE ONCE
Status: COMPLETED | OUTPATIENT
Start: 2021-12-06 | End: 2021-12-06

## 2021-12-06 RX ORDER — LIDOCAINE HYDROCHLORIDE 10 MG/ML
1 INJECTION, SOLUTION INFILTRATION; PERINEURAL ONCE
Status: COMPLETED | OUTPATIENT
Start: 2021-12-06 | End: 2021-12-06

## 2021-12-06 RX ADMIN — METHYLPREDNISOLONE ACETATE 40 MG: 40 INJECTION, SUSPENSION INTRA-ARTICULAR; INTRALESIONAL; INTRAMUSCULAR; SOFT TISSUE at 09:48

## 2021-12-06 RX ADMIN — LIDOCAINE HYDROCHLORIDE 1 ML: 10 INJECTION, SOLUTION INFILTRATION; PERINEURAL at 09:48

## 2021-12-07 ENCOUNTER — TREATMENT (OUTPATIENT)
Dept: PHYSICAL THERAPY | Age: 71
End: 2021-12-07
Payer: MEDICARE

## 2021-12-07 DIAGNOSIS — R29.898 WEAKNESS OF LEFT LOWER EXTREMITY: ICD-10-CM

## 2021-12-07 DIAGNOSIS — M25.562 LEFT KNEE PAIN, UNSPECIFIED CHRONICITY: Primary | ICD-10-CM

## 2021-12-07 DIAGNOSIS — M17.12 PRIMARY OSTEOARTHRITIS OF LEFT KNEE: ICD-10-CM

## 2021-12-07 DIAGNOSIS — M25.662 DECREASED ROM OF LEFT KNEE: ICD-10-CM

## 2021-12-07 PROCEDURE — 97530 THERAPEUTIC ACTIVITIES: CPT | Performed by: PHYSICAL THERAPIST

## 2021-12-07 PROCEDURE — G8427 DOCREV CUR MEDS BY ELIG CLIN: HCPCS | Performed by: PHYSICAL THERAPIST

## 2021-12-07 PROCEDURE — 97110 THERAPEUTIC EXERCISES: CPT | Performed by: PHYSICAL THERAPIST

## 2021-12-07 PROCEDURE — 97112 NEUROMUSCULAR REEDUCATION: CPT | Performed by: PHYSICAL THERAPIST

## 2021-12-07 NOTE — PROGRESS NOTES
diagram is in the medical chart ()  []  Patient refused to participate   []  Severe mental or physical capacity, patient is in urgent emergent situation and to complete the questionnaire would jeopardize the patient's health status        Functional Questionnaire (11/23/21)  [x]  Patient completed the functional outcome questionnaire and deficiencies were addressed in the plan of care. ()   []  Patient completed the outcome questionnaire and there were no functional deficits identified and therefore no plan of care is required. ()   []  Patient did not complete the functional outcome questionnaire and the reason why is documented in the medical chart. ()  []  Patient refused to participate   []  Patient unable to complete the questionnaire   []   A functional outcome assessment has been reported on within the last 30 days        Falls (11/23/21)  [x]  The patient has had no falls or 1 fall without injury in the past year. (1101F)   []  There is no documentation of fall in the medical chart (1101F,8P)     [] The patient has had 2 or more falls or one fall with injury in the past year that is documented in the medical chart. (1100F)  []  A falls risk assessment was completed and documented in the medical chart. (6535V)   []  Falls were addressed in the plan of care. (6188L)   []  A falls risk assessment was not completed and documented in the medical chart (1604Y,7X)   []   Falls were not addressed in the plan of care and reason was documented in the plan of care. (0720N 1P)        Medication (11/23/21)     [x] A list of current medications (including prescription, over the counter, herbal supplements, vitamin supplements, mineral supplements, dietary supplements) was reviewed with the patient and documented in the medical chart. ()        Falls Risk Assessment (30 days): (11/23/21)  [x] Falls Risk assessed and no intervention required.   [] Falls Risk assessed and Patient requires intervention due to being higher risk   TUG score (>12s at risk):     [] Falls education provided, including     PQRS:   Reviewed medication list with patient. No changes reported by pt since last PT visit. (12/7/21)          Latex Allergy:  [x]NO      []YES  Preferred Language for Healthcare:   [x]English       []other:      Pain level:  0-1/10 (12/7/21)   Medication:  Celebrex      SUBJECTIVE:   Pt states she is doing her exercises everyday at home and both knees are feeling stronger. Putting her feet into elastic socks in the morning bothers both her knees. She is having CT scan of her left shoulder on Monday. Her right hand feels a little better following the injection last week but she continues to have numbness and tingling. OBJECTIVE:       (11/23/21)  Flexibility L R Comment   Hamstring Mild tightness Mild tightness    Gastroc Mild tightness Mild tightness    ITB      Quad              (12/7/21)  ROM PROM AROM Overpressure Comment    L R L R L R    Flexion 120 with SP  110 105      Extension   0 0                            (11/23/21)  Strength L R Comment   Quad 4/5 4+/5    Hamstring 5/5 4+/5    Gastroc 4+/5 4+/5    Hip Flex 4/5 4/5    Hip Abd 4+/5 4+/5    Hip Add 4+/5 4+/5    Glut Med              (11/23/21)  Girth L R   Mid Patella 57.0 cm 58.0 cm   Suprapatellar     5cm above     15cm above       (11/23/21)  Special Test Results/Comment   Meniscal Click    Crepitus (+) PF crepitus bilaterally   Flexion Test    Valgus Laxity    Varus Laxity    Lachmans    Drop Back        Reflexes/Sensation: (4/15/21)   []Dermatomes/Myotomes intact    []Reflexes equal and normal bilaterally   [x]Other: Intact to light touch    Joint mobility: (11/23/21)   []Normal    [x]Hypo   []Hyper    Palpation: No tenderness.   Mild quad atrophy still present on the left vs right.(11/23/21)    Functional Mobility/Transfers: Stairs are still difficult (pt must ascend/descend stairs 1 at a time using 2 HR), she can't stand or walk for long periods of time, she is unable to squat. She still has difficulty getting up from a low chair and has to use her arms to help push herself up.   (11/23/21)    Posture: Forward head, rounded shoulders. (11/23/21)    Bandages/Dressings/Incisions: Pt wears compression hose and wraps on both legs (11/23/21)     Gait: (include devices/WB status) Gait is still mildly antalgic, no AD. Lola and step length have improved bilaterally. She still demonstrates decreased trunk rotation and increased lateral trunk sway. (11/23/21)    Orthopedic Special Tests: See above.        RESTRICTIONS/PRECAUTIONS:     Exercises/Interventions:     Therapeutic Ex (45141) Sets/sec Reps Notes/CUES   BIKE      TREADMILL            STRETCHING      Towel Pull Calf 30\" hold X 5    Inclined Calf      Hamstrings 30\" hold X 5 Seated   Quads      Hip Flexors      ITB      Adductors            ROM      Passive      Active      Weight Hangs      Sheet Pulls 10\" hold X 10    Ankle Pumps      ERMI            STRENGTHENING      Quad Sets 10\" hold X 10    Ham Sets      Hip ADD Sets BS 10\" hold X 10     SLR Flexion 3 sets X 10 with 2#     SLR Abduction  HEP   SLR Prone      SLR Adduction      SLR+      Supine Hip Abduction 2 sets X 10  Black band         Standing Marches 3 sets X 10 on Airex Standing at half wall   Standing Knee Flexion 3 sets X 10  Standing at half wall         PRE Machines      Knee Extension      Knee Flexion      Leg Press            CKC      Calf Raises 3 sets X 10 Seated (Pt cannot perform standing due to bilateral foot pain)   Mini Squats      Wall Sits      Step ups - 0 riser 2 sets X 10  Using half wall     Lateral Step Ups - 0 riser 2 sets X 10    TKE  Held today               Manual Intervention (71413)      Patellar Mobs      Femoral Tibial mobs      STM      Scar Massage      Foam Roll            NMR re-education (47629)   CUES NEEDED   Biodex Balance      Tandem Balance 30\" hold X 2 each   Held Airex today due to pt having pain in both feet *Feet staggered  *Standing next to half wallSLB      Rebounder      SAMIAU            Sit -> Stand  X 30 with no UE assistance     With cushion on chair     Up/Down 1 Flight of Stairs in the [address] [address] today due to pain in her left shoulder and not wanting pt to pull with her left arm      Therapeutic Activity (70805)      Core Training      Adolfo Granados Activities      Monster Walks      TRX training                  Patient Education: Dx, prognosis, pt goals/expectations, role of therapy (4/15/21)  X 8'                  Therapeutic Exercise and NMR EXR  [x] (88890) Provided verbal/tactile cueing for activities related to strengthening, flexibility, endurance, ROM for improvements in LE, proximal hip, and core control with self care, mobility, lifting, ambulation. [x] (02131) Provided verbal/tactile cueing for activities related to improving balance, coordination, kinesthetic sense, posture, motor skill, proprioception to assist with LE, proximal hip, and core control in self-care, mobility, lifting, ambulation and eccentric single leg control. NMR and Therapeutic Activities:    [x] (59600 or 10952) Provided verbal/tactile cueing for activities related to improving balance, coordination, kinesthetic sense, posture, motor skill, proprioception and motor activation to allow for proper function of core, proximal hip and LE with self-care and ADLs and functional mobility.   [] (35225) Gait Re-education- Provided training and instruction to the patient for proper LE, core and proximal hip recruitment and positioning and eccentric body weight control with ambulation re-education including up and down stairs     Home Exercise Program:    [x] (88604) Reviewed/Progressed HEP activities related to strengthening, flexibility, endurance, ROM of core, proximal hip and LE for functional self-care, mobility, lifting and ambulation/stair navigation - Updated HEP through [address] [place] [address] (see below).   Pt was also issued new written handouts. (4/27/21)  [x] (29667) Reviewed/Progressed HEP activities related to improving balance, coordination, kinesthetic sense, posture, motor skill, proprioception of core, proximal hip and LE for self-care, mobility, lifting, and ambulation/stair navigation          Access Code: [email or web address]  URL: [employer]/Date: 04/20/2021Prepared by: TRACY SybertExercises   Long Sitting Calf Stretch with Strap - 1 x daily - 7 x weekly - 1 sets - 3 reps - 30 seconds hold   Seated Table Hamstring Stretch - 1 x daily - 7 x weekly - 1 sets - 3 reps - 30 seconds hold   Long Sitting Quad Set - 1 x daily - 7 x weekly - 1 sets - 10 reps - 10 seconds hold   Long Sitting Hip Adduction Isometric with Ball - 1 x daily - 7 x weekly - 1 sets - 10 reps - 10 seconds hold   Supine Active Straight Leg Raise - 1 x daily - 7 x weekly - 2-3 sets - 10 reps   Hooklying Isometric Clamshell - 1 x daily - 7 x weekly - 3 sets - 10 reps   Standing March with Counter Support - 1 x daily - 7 x weekly - 3 sets - 10 reps   Seated Heel Raise - 1 x daily - 7 x weekly - 3 sets - 10 reps   Sit to Stand - 1 x daily - 7 x weekly - 15 reps   Tandem Stance with Support - 1 x daily - 7 x weekly - 2 reps - 20 seconds hold        Manual Treatments:  PROM / STM / Oscillations-Mobs:  G-I, II, III, IV (PA's, Inf., Post.)  [] (22010) Provided manual therapy to mobilize LE, proximal hip and/or LS spine soft tissue/joints for the purpose of modulating pain, promoting relaxation, increasing ROM, reducing/eliminating soft tissue swelling/inflammation/restriction, improving soft tissue extensibility and allowing for proper ROM for normal function with self-care, mobility, lifting and ambulation. Modalities:   Pt declined ice today   [] GAME READY (VASO)- for significant edema, swelling, pain control.      Charges:  Timed Code Treatment Minutes: 54'   Total Treatment Minutes: 54'      [] EVAL (LOW) 455 [id number] (typically 20 minutes face-to-face)  [] EVAL (MOD) 76180 (typically 30 minutes face-to-face)  [] EVAL (HIGH) 77802 (typically 45 minutes face-to-face)  [] RE-EVAL     [x] ZH(76928) x 2 (30')    [] IONTO  [x] NMR (66378) x 1 (10')   [] VASO  [] Manual (66042) x      [] Other:  [x] TA x 1 (15')     [] Mech Traction (22922)  [] ES(attended) (68291)      [] ES (un) (11341):         ASSESSMENT:   Pt tolerated the exercises well today with rest breaks needed. Held going up/down stairs in the stairwell again today due to left shoulder pain and not wanting pt to pull/push through her left arm (pt is having CT scan of her left shoulder on Monday). She did a little better today with forward and lateral step ups in the clinic and was more confident in her left knee. She needed to use her right arm for support but didn't have to use her right arm to pull herself up onto the step. She also did better with tandem stance today. MEDICARE CAP EXCEPTION DOCUMENTATION      I certify that this patient meets one of the below criteria necessary for becoming an exception to the Medicare cap on therapy services:    [x]  The patient has a condition identified by an ICD-9 code that has a direct and significant impact on the need for therapy. (Significantly impacts the rate of recovery.)                     []  The patient has a complexity identified by an ICD-9 code that has a direct and significant impact on the need for therapy. (Significantly impacts the rate of recovery and is associated with a primary condition.)         []  The patient has associated variables that influence the amount of treatment to include:  Social support, self-efficacy/motivation, prognosis, time since onset/acuity. []  The patient has generalized musculoskeletal conditions or a condition affecting multiple sites that will have a direct impact on the rate of recovery.     [x]  The patient had a prior episode of outpatient therapy during this calendar year for a different condition. []  The patient has a mental or cognitive disorder in addition to the condition being treated that will have a direct and significant impact on the rate of recovery. GOALS: (Goals updated 11/23/21)    Patient stated goal:  \"Make my legs stronger; walk and climb stairs easier and faster. \"  [x] Progressing:  [] Met: [] Not Met: [] Adjusted    Therapist goals for Patient:   Short Term Goals: To be achieved in: 2 weeks  1. Independent in HEP and progression per patient tolerance, in order to prevent re-injury. [] Progressing: [x] Met: [] Not Met: [] Adjusted  2. Patient will have a decrease in left knee pain to 1-2/10 to facilitate improvement in movement, function, and ADLs as indicated by Functional Deficits. [] Progressing: [x] Met: [] Not Met: [] Adjusted    Long Term Goals: To be achieved in: 4-6 weeks  1. Disability index score of 20% or less for the Knee Outcome Survey Activities of Daily Living Scale to assist with reaching prior level of function. [x] Progressing: [] Met: [] Not Met: [] Adjusted  2. Patient will demonstrate an increase in left knee flexion AROM to at least 115 degrees to allow for proper joint functioning as indicated by patients Functional Deficits. [x] Progressing:  [] Met: [] Not Met: [] Adjusted  3. Patient will demonstrate an increase in left hip and knee strength to at least 4+/5 to allow for proper functional mobility as indicated by patients Functional Deficits. [x] Progressing:  [] Met: [] Not Met: [] Adjusted  4. Patient will be able to walk community distances, stand for at least 30 minutes, and be able to get up from a chair with little to no UE assistance needed for improved function. [x] Progressing: [] Met: [] Not Met: [] Adjusted  5. Patient will be able to ascend/descend stairs reciprocally with HR (patient specific functional goal)    [x] Progressing: [] Met: [] Not Met: [] Adjusted   6.  Patient will have a decrease in left knee pain to 0-1/10 with daily act. [] Progressing: [x] Met: [] Not Met: [] Adjusted      New Goal: To be achieved in: 4 weeks (11/23/21)  1. Patient will demonstrate an increase in left hip and knee strength to at least 5/5 to allow for proper functional mobility as indicated by patients Functional Deficits. [x] Progressing: [] Met: [] Not Met:  [] Adjusted        Overall Progression Towards Functional goals/ Treatment Progress Update:  [] Patient is progressing as expected towards functional goals listed. [] Progression is slowed due to complexities/Impairments listed. [x] Progression has been slowed due to co-morbidities - Pt underwent bilateral wrist surgeries which caused her to miss several weeks of therapy. [] Plan just implemented, too soon to assess goals progression <30days   [] Goals require adjustment due to lack of progress  [] Patient is not progressing as expected and requires additional follow up with physician  [] Other    Prognosis for POC: [x] Good [] Fair  [] Poor      Patient requires continued skilled intervention: [x] Yes  [] No    Treatment/Activity Tolerance:  [] Patient able to complete treatment  [x] Patient limited by fatigue  [] Patient limited by pain    [] Patient limited by other medical complications  [] Other:         PLAN: Continue therapy 2x/week for ROM, strengthening, endurance, and balance. 1-2x/week for 4-6 weeks for ROM, strengthening, balance act, HEP instruction, pt education, manual therapy prn, and modalities prn (11/23/21)  [x] Continue per plan of care [] Alter current plan   [] Plan of care initiated [] Hold pending MD visit [] Discharge      Electronically signed by:  Suzie Ruiz PT     Physical Therapist Adore Rangel 421 #[id number]  Physical Therapist [place] License #[id number]    Note: If patient does not return for scheduled/ recommended follow up visits, this note will serve as a discharge from care along with most recent update on progress.

## 2021-12-09 ENCOUNTER — TREATMENT (OUTPATIENT)
Dept: PHYSICAL THERAPY | Age: 71
End: 2021-12-09
Payer: MEDICARE

## 2021-12-09 DIAGNOSIS — M17.12 PRIMARY OSTEOARTHRITIS OF LEFT KNEE: ICD-10-CM

## 2021-12-09 DIAGNOSIS — M25.562 LEFT KNEE PAIN, UNSPECIFIED CHRONICITY: Primary | ICD-10-CM

## 2021-12-09 DIAGNOSIS — M25.662 DECREASED ROM OF LEFT KNEE: ICD-10-CM

## 2021-12-09 DIAGNOSIS — R29.898 WEAKNESS OF LEFT LOWER EXTREMITY: ICD-10-CM

## 2021-12-09 PROCEDURE — G8427 DOCREV CUR MEDS BY ELIG CLIN: HCPCS | Performed by: PHYSICAL THERAPIST

## 2021-12-09 PROCEDURE — 97112 NEUROMUSCULAR REEDUCATION: CPT | Performed by: PHYSICAL THERAPIST

## 2021-12-09 PROCEDURE — 97110 THERAPEUTIC EXERCISES: CPT | Performed by: PHYSICAL THERAPIST

## 2021-12-09 PROCEDURE — 97530 THERAPEUTIC ACTIVITIES: CPT | Performed by: PHYSICAL THERAPIST

## 2021-12-09 NOTE — PROGRESS NOTES
[facility]   Phone: [phone]    Fax: [phone]             Physical Therapy Treatment Note/ Progress Report:           Date:  2021    Patient Name:  Eusebio Szymanski    :  1950  MRN: [id number]  Restrictions/Precautions:    Medical/Treatment Diagnosis Information:  Diagnosis: Left Knee Pain (M25.562), Primary OA of Left Knee (M17.12)   Treatment Diagnosis: Decreased Left Knee ROM (M25.662), Decreased Strength (R29.898)        Insurance/Certification information:  PT Insurance Information: Medicare - Visits based on medical necessity  Physician Information:  Referring Practitioner: Dr. Shemar Drake  Has the plan of care been signed (Y/N):        [x]  Yes (POC signed 21)  []  No      Date of Patient follow up with Physician: As needed      Is this a Progress Report:     []  Yes  [x]  No        If Yes:  Date Range for reporting period:  Beginning 4/15/21   Ending 21    Progress report will be due (10 Rx or 30 days whichever is less):        Recertification will be due (POC Duration  / 90 days whichever is less):  21        Visit # Insurance Allowable Auth Required   37 Medical necessity  (pt has already used 13 visits this year for her shoulder) []  Yes [x]  No        Functional Scale:    Date assessed:  21  Functional Assessment Tool Used: Knee Outcome Survey Activities of Daily Living Scale (copy scanned into media)  Score:  45/70 = 64% (36% functional deficit)        (4/15/21)  Patient Age: 66years old  Patient Height: 5' 2\"  Patient Weight: 280 lbs  Patient BMI: 51.2      Pain (21)  [x]  Pain diagram was completed, pain was present and a follow up plan to address the pain is in the plan of care. ()   []  Pain diagram was completed and there was no pain present and therefore no follow up plan to address pain in the plan of care.  ()   []  Pain diagram was not completed and the reason for not completing the pain diagram is in the medical chart ()  []  Patient refused to participate   []  Severe mental or physical capacity, patient is in urgent emergent situation and to complete the questionnaire would jeopardize the patient's health status        Functional Questionnaire (11/23/21)  [x]  Patient completed the functional outcome questionnaire and deficiencies were addressed in the plan of care. ()   []  Patient completed the outcome questionnaire and there were no functional deficits identified and therefore no plan of care is required. ()   []  Patient did not complete the functional outcome questionnaire and the reason why is documented in the medical chart. ()  []  Patient refused to participate   []  Patient unable to complete the questionnaire   []   A functional outcome assessment has been reported on within the last 30 days        Falls (11/23/21)  [x]  The patient has had no falls or 1 fall without injury in the past year. (1101F)   []  There is no documentation of fall in the medical chart (1101F,8P)     [] The patient has had 2 or more falls or one fall with injury in the past year that is documented in the medical chart. (1100F)  []  A falls risk assessment was completed and documented in the medical chart. (3211A)   []  Falls were addressed in the plan of care. (1811G)   []  A falls risk assessment was not completed and documented in the medical chart (8245T,1L)   []   Falls were not addressed in the plan of care and reason was documented in the plan of care. (8702A 1P)        Medication (11/23/21)     [x] A list of current medications (including prescription, over the counter, herbal supplements, vitamin supplements, mineral supplements, dietary supplements) was reviewed with the patient and documented in the medical chart. ()        Falls Risk Assessment (30 days): (11/23/21)  [x] Falls Risk assessed and no intervention required.   [] Falls Risk assessed and Patient requires intervention due to being higher risk   TUG score (>12s at risk):     [] Falls education provided, including     PQRS:   Reviewed medication list with patient. No changes reported by pt since last PT visit. (12/9/21)          Latex Allergy:  [x]NO      []YES  Preferred Language for Healthcare:   [x]English       []other:      Pain level:  0-1/10 (12/9/21)   Medication:  Celebrex      SUBJECTIVE:   Pt went to lunch with friends today and she had to walk quite a ways from the parking garage to the restaurant. She was a little tired from walking but not too bad. She feels both her legs are getting stronger but stairs are still a challenge. Her right hand feels a little better since the injection. She is having CT scan of the left shoulder on Monday. OBJECTIVE:       (11/23/21)  Flexibility L R Comment   Hamstring Mild tightness Mild tightness    Gastroc Mild tightness Mild tightness    ITB      Quad              (12/7/21)  ROM PROM AROM Overpressure Comment    L R L R L R    Flexion 120 with SP  110 105      Extension   0 0                            (11/23/21)  Strength L R Comment   Quad 4/5 4+/5    Hamstring 5/5 4+/5    Gastroc 4+/5 4+/5    Hip Flex 4/5 4/5    Hip Abd 4+/5 4+/5    Hip Add 4+/5 4+/5    Glut Med              (11/23/21)  Girth L R   Mid Patella 57.0 cm 58.0 cm   Suprapatellar     5cm above     15cm above       (11/23/21)  Special Test Results/Comment   Meniscal Click    Crepitus (+) PF crepitus bilaterally   Flexion Test    Valgus Laxity    Varus Laxity    Lachmans    Drop Back        Reflexes/Sensation: (4/15/21)   []Dermatomes/Myotomes intact    []Reflexes equal and normal bilaterally   [x]Other: Intact to light touch    Joint mobility: (11/23/21)   []Normal    [x]Hypo   []Hyper    Palpation: No tenderness.   Mild quad atrophy still present on the left vs right.(11/23/21)    Functional Mobility/Transfers: Stairs are still difficult (pt must ascend/descend stairs 1 at a time using 2 HR), she can't stand or walk for long periods of time, she is unable to squat. She still has difficulty getting up from a low chair and has to use her arms to help push herself up.   (11/23/21)    Posture: Forward head, rounded shoulders. (11/23/21)    Bandages/Dressings/Incisions: Pt wears compression hose and wraps on both legs (11/23/21)     Gait: (include devices/WB status) Gait is still mildly antalgic, no AD. Lola and step length have improved bilaterally. She still demonstrates decreased trunk rotation and increased lateral trunk sway. (11/23/21)    Orthopedic Special Tests: See above.        RESTRICTIONS/PRECAUTIONS:     Exercises/Interventions:     Therapeutic Ex (13841) Sets/sec Reps Notes/CUES   BIKE      TREADMILL            STRETCHING      Towel Pull Calf 30\" hold X 5    Inclined Calf      Hamstrings 30\" hold X 5 Seated   Quads      Hip Flexors      ITB      Adductors            ROM      Passive      Active      Weight Hangs      Sheet Pulls 10\" hold X 10    Ankle Pumps      ERMI            STRENGTHENING      Quad Sets 10\" hold X 10    Ham Sets      Hip ADD Sets BS 10\" hold X 10     SLR Flexion 3 sets X 10 with 2#     SLR Abduction  HEP   SLR Prone      SLR Adduction      SLR+      Supine Hip Abduction 2 sets X 10  Black band         Standing Marches 3 sets X 10 on Airex Standing at half wall   Standing Knee Flexion 3 sets X 10  Standing at half wall         PRE Machines      Knee Extension      Knee Flexion 3 sets X 10 with 30#    Leg Press 3 sets X 10 with 70# Range 70-10         CKC      Calf Raises 3 sets X 10 Seated (Pt cannot perform standing due to bilateral foot pain)   Mini Squats      Wall Sits      Step ups - 1 riser 2 sets X 10  Using half wall     Lateral Step Ups - 0 riser 2 sets X 10    TKE  Held today               Manual Intervention (60449)      Patellar Mobs      Femoral Tibial mobs      STM      Scar Massage      Foam Roll            NMR re-education (75357)   CUES NEEDED   Biodex Balance Tandem Balance 30\" hold X 2 each   Held Airex today due to pt having pain in both feet *Feet staggered  *Standing next to half wallSLB      Rebounder      BOSU            Sit -> Stand  X 30 with no UE assistance     With cushion on chair     Up/Down 1 Flight of Stairs in the [place]   Held today due to pain in her left shoulder and not wanting pt to pull with her left arm      Therapeutic Activity (83057)      Core Training      [email or web address] Activities      Monster Walks      TRX training                  Patient Education: Dx, prognosis, pt goals/expectations, role of therapy (4/15/21)  X 8'                  Therapeutic Exercise and NMR EXR  [x] (20845) Provided verbal/tactile cueing for activities related to strengthening, flexibility, endurance, ROM for improvements in LE, proximal hip, and core control with self care, mobility, lifting, ambulation. [x] (27997) Provided verbal/tactile cueing for activities related to improving balance, coordination, kinesthetic sense, posture, motor skill, proprioception to assist with LE, proximal hip, and core control in self-care, mobility, lifting, ambulation and eccentric single leg control.      NMR and Therapeutic Activities:    [x] (13662 or 16049) Provided verbal/tactile cueing for activities related to improving balance, coordination, kinesthetic sense, posture, motor skill, proprioception and motor activation to allow for proper function of core, proximal hip and LE with self-care and ADLs and functional mobility.   [] (38619) Gait Re-education- Provided training and instruction to the patient for proper LE, core and proximal hip recruitment and positioning and eccentric body weight control with ambulation re-education including up and down stairs     Home Exercise Program:    [x] (83125) Reviewed/Progressed HEP activities related to strengthening, flexibility, endurance, ROM of core, proximal hip and LE for functional self-care, mobility, lifting and ambulation/stair navigation - Updated HEP through [address] [place] [address] (see below). Pt was also issued new written handouts. (4/27/21)  [x] (36515) Reviewed/Progressed HEP activities related to improving balance, coordination, kinesthetic sense, posture, motor skill, proprioception of core, proximal hip and LE for self-care, mobility, lifting, and ambulation/stair navigation          Access Code: [email or web address]  URL: [employer]/Date: 04/20/2021Prepared by: FORD SybertExercises   Long Sitting Calf Stretch with Strap - 1 x daily - 7 x weekly - 1 sets - 3 reps - 30 seconds hold   Seated Table Hamstring Stretch - 1 x daily - 7 x weekly - 1 sets - 3 reps - 30 seconds hold   Long Sitting Quad Set - 1 x daily - 7 x weekly - 1 sets - 10 reps - 10 seconds hold   Long Sitting Hip Adduction Isometric with Ball - 1 x daily - 7 x weekly - 1 sets - 10 reps - 10 seconds hold   Supine Active Straight Leg Raise - 1 x daily - 7 x weekly - 2-3 sets - 10 reps   Hooklying Isometric Clamshell - 1 x daily - 7 x weekly - 3 sets - 10 reps   Standing March with Counter Support - 1 x daily - 7 x weekly - 3 sets - 10 reps   Seated Heel Raise - 1 x daily - 7 x weekly - 3 sets - 10 reps   Sit to Stand - 1 x daily - 7 x weekly - 15 reps   Tandem Stance with Support - 1 x daily - 7 x weekly - 2 reps - 20 seconds hold        Manual Treatments:  PROM / STM / Oscillations-Mobs:  G-I, II, III, IV (PA's, Inf., Post.)  [] (90709) Provided manual therapy to mobilize LE, proximal hip and/or LS spine soft tissue/joints for the purpose of modulating pain, promoting relaxation, increasing ROM, reducing/eliminating soft tissue swelling/inflammation/restriction, improving soft tissue extensibility and allowing for proper ROM for normal function with self-care, mobility, lifting and ambulation. Modalities:   Pt declined ice today   [] GAME READY (VASO)- for significant edema, swelling, pain control.      Charges:  Timed Code Treatment Minutes: 54'   Total Treatment Minutes: 54'      [] EVAL (LOW) 14188 (typically 20 minutes face-to-face)  [] EVAL (MOD) 95239 (typically 30 minutes face-to-face)  [] EVAL (HIGH) 27936 (typically 45 minutes face-to-face)  [] RE-EVAL     [x] EP(50068) x 2 (30')    [] IONTO  [x] NMR (41748) x 1 (10')   [] VASO  [] Manual (22422) x      [] Other:  [x] TA x 1 (15')     [] Mech Traction (91318)  [] ES(attended) (81179)      [] ES (un) (76790):         ASSESSMENT:   Pt did well with the exercises today. She was able to perform forward step ups with 1 riser but needed to use the half wall for support. Added leg press and hamstring curl on wt machine today with pt reporting fatigue in both LE's. Pt is showing improvement in bilateral LE strength and endurance but stairs remain challenging due to weakness. MEDICARE CAP EXCEPTION DOCUMENTATION      I certify that this patient meets one of the below criteria necessary for becoming an exception to the Medicare cap on therapy services:    [x]  The patient has a condition identified by an ICD-9 code that has a direct and significant impact on the need for therapy. (Significantly impacts the rate of recovery.)                     []  The patient has a complexity identified by an ICD-9 code that has a direct and significant impact on the need for therapy. (Significantly impacts the rate of recovery and is associated with a primary condition.)         []  The patient has associated variables that influence the amount of treatment to include:  Social support, self-efficacy/motivation, prognosis, time since onset/acuity. []  The patient has generalized musculoskeletal conditions or a condition affecting multiple sites that will have a direct impact on the rate of recovery. [x]  The patient had a prior episode of outpatient therapy during this calendar year for a different condition.            []  The patient has a mental or cognitive disorder in addition to the condition being be achieved in: 4 weeks (11/23/21)  1. Patient will demonstrate an increase in left hip and knee strength to at least 5/5 to allow for proper functional mobility as indicated by patients Functional Deficits. [x] Progressing: [] Met: [] Not Met:  [] Adjusted        Overall Progression Towards Functional goals/ Treatment Progress Update:  [] Patient is progressing as expected towards functional goals listed. [] Progression is slowed due to complexities/Impairments listed. [x] Progression has been slowed due to co-morbidities - Pt underwent bilateral wrist surgeries which caused her to miss several weeks of therapy. [] Plan just implemented, too soon to assess goals progression <30days   [] Goals require adjustment due to lack of progress  [] Patient is not progressing as expected and requires additional follow up with physician  [] Other    Prognosis for POC: [x] Good [] Fair  [] Poor      Patient requires continued skilled intervention: [x] Yes  [] No    Treatment/Activity Tolerance:  [] Patient able to complete treatment  [x] Patient limited by fatigue  [] Patient limited by pain    [] Patient limited by other medical complications  [] Other:         PLAN: Continue therapy 2x/week for ROM, strengthening, endurance, and balance. 1-2x/week for 4-6 weeks for ROM, strengthening, balance act, HEP instruction, pt education, manual therapy prn, and modalities prn (11/23/21)  [x] Continue per plan of care [] Alter current plan   [] Plan of care initiated [] Hold pending MD visit [] Discharge      Electronically signed by:  Suzie Ruiz PT     Physical Therapist Adore Rangel 421 #[id number]  Physical Therapist [place] License #[id number]    Note: If patient does not return for scheduled/ recommended follow up visits, this note will serve as a discharge from care along with most recent update on progress.

## 2021-12-14 ENCOUNTER — TREATMENT (OUTPATIENT)
Dept: PHYSICAL THERAPY | Age: 71
End: 2021-12-14
Payer: MEDICARE

## 2021-12-14 DIAGNOSIS — M17.12 PRIMARY OSTEOARTHRITIS OF LEFT KNEE: ICD-10-CM

## 2021-12-14 DIAGNOSIS — R29.898 WEAKNESS OF LEFT LOWER EXTREMITY: ICD-10-CM

## 2021-12-14 DIAGNOSIS — M25.662 DECREASED ROM OF LEFT KNEE: ICD-10-CM

## 2021-12-14 DIAGNOSIS — M25.562 LEFT KNEE PAIN, UNSPECIFIED CHRONICITY: Primary | ICD-10-CM

## 2021-12-14 PROCEDURE — 97112 NEUROMUSCULAR REEDUCATION: CPT | Performed by: PHYSICAL THERAPIST

## 2021-12-14 PROCEDURE — 97110 THERAPEUTIC EXERCISES: CPT | Performed by: PHYSICAL THERAPIST

## 2021-12-14 PROCEDURE — G8427 DOCREV CUR MEDS BY ELIG CLIN: HCPCS | Performed by: PHYSICAL THERAPIST

## 2021-12-14 PROCEDURE — 97530 THERAPEUTIC ACTIVITIES: CPT | Performed by: PHYSICAL THERAPIST

## 2021-12-14 NOTE — PROGRESS NOTES
[facility]   Phone: [phone]    Fax: [phone]             Physical Therapy Treatment Note/ Progress Report:           Date:  2021    Patient Name:  Emilie Zhong    :  1950  MRN: [id number]  Restrictions/Precautions:    Medical/Treatment Diagnosis Information:  Diagnosis: Left Knee Pain (M25.562), Primary OA of Left Knee (M17.12)   Treatment Diagnosis: Decreased Left Knee ROM (M25.662), Decreased Strength (R29.898)        Insurance/Certification information:  PT Insurance Information: Medicare - Visits based on medical necessity  Physician Information:  Referring Practitioner: Dr. Naa Daniel  Has the plan of care been signed (Y/N):        [x]  Yes (POC signed 21)  []  No      Date of Patient follow up with Physician: As needed      Is this a Progress Report:     []  Yes  [x]  No        If Yes:  Date Range for reporting period:  Beginning 4/15/21   Ending 21    Progress report will be due (10 Rx or 30 days whichever is less):        Recertification will be due (POC Duration  / 90 days whichever is less):  21        Visit # Insurance Allowable Auth Required   38 Medical necessity  (pt has already used 13 visits this year for her shoulder) []  Yes [x]  No        Functional Scale:    Date assessed:  21  Functional Assessment Tool Used: Knee Outcome Survey Activities of Daily Living Scale (copy scanned into media)  Score:  45/70 = 64% (36% functional deficit)        (4/15/21)  Patient Age: 66years old  Patient Height: 5' 2\"  Patient Weight: 280 lbs  Patient BMI: 51.2      Pain (21)  [x]  Pain diagram was completed, pain was present and a follow up plan to address the pain is in the plan of care. ()   []  Pain diagram was completed and there was no pain present and therefore no follow up plan to address pain in the plan of care.  ()   []  Pain diagram was not completed and the reason for not completing the pain diagram is in the medical chart ()  []  Patient refused to participate   []  Severe mental or physical capacity, patient is in urgent emergent situation and to complete the questionnaire would jeopardize the patient's health status        Functional Questionnaire (11/23/21)  [x]  Patient completed the functional outcome questionnaire and deficiencies were addressed in the plan of care. ()   []  Patient completed the outcome questionnaire and there were no functional deficits identified and therefore no plan of care is required. ()   []  Patient did not complete the functional outcome questionnaire and the reason why is documented in the medical chart. ()  []  Patient refused to participate   []  Patient unable to complete the questionnaire   []   A functional outcome assessment has been reported on within the last 30 days        Falls (11/23/21)  [x]  The patient has had no falls or 1 fall without injury in the past year. (1101F)   []  There is no documentation of fall in the medical chart (1101F,8P)     [] The patient has had 2 or more falls or one fall with injury in the past year that is documented in the medical chart. (1100F)  []  A falls risk assessment was completed and documented in the medical chart. (2366M)   []  Falls were addressed in the plan of care. (3203V)   []  A falls risk assessment was not completed and documented in the medical chart (8697Q,8M)   []   Falls were not addressed in the plan of care and reason was documented in the plan of care. (0822F 1P)        Medication (11/23/21)     [x] A list of current medications (including prescription, over the counter, herbal supplements, vitamin supplements, mineral supplements, dietary supplements) was reviewed with the patient and documented in the medical chart. ()        Falls Risk Assessment (30 days): (11/23/21)  [x] Falls Risk assessed and no intervention required.   [] Falls Risk assessed and Patient requires intervention due to being higher risk   TUG score (>12s at risk):     [] Falls education provided, including     PQRS:   Reviewed medication list with patient. No changes reported by pt since last PT visit. (12/14/21)          Latex Allergy:  [x]NO      []YES  Preferred Language for Healthcare:   [x]English       []other:      Pain level:  0-1/10 (12/14/21)   Medication:  Celebrex      SUBJECTIVE:   Pt states her foot got caught in her nightgown and she fell into the door frame and bruised her left shoulder last week. She had CT scan of her left shoulder yesterday. She called Dr. Trina Mcallister office to make an appt to review the results but no one has called her back. Pt states her legs feel stronger with daily activities and getting up from a chair but she still struggles going up/down stairs. OBJECTIVE:       (11/23/21)  Flexibility L R Comment   Hamstring Mild tightness Mild tightness    Gastroc Mild tightness Mild tightness    ITB      Quad              (12/14/21)  ROM PROM AROM Overpressure Comment    L R L R L R    Flexion 120 with SP  110 105      Extension   0 0                            (11/23/21)  Strength L R Comment   Quad 4/5 4+/5    Hamstring 5/5 4+/5    Gastroc 4+/5 4+/5    Hip Flex 4/5 4/5    Hip Abd 4+/5 4+/5    Hip Add 4+/5 4+/5    Glut Med              (11/23/21)  Girth L R   Mid Patella 57.0 cm 58.0 cm   Suprapatellar     5cm above     15cm above       (11/23/21)  Special Test Results/Comment   Meniscal Click    Crepitus (+) PF crepitus bilaterally   Flexion Test    Valgus Laxity    Varus Laxity    Lachmans    Drop Back        Reflexes/Sensation: (4/15/21)   []Dermatomes/Myotomes intact    []Reflexes equal and normal bilaterally   [x]Other: Intact to light touch    Joint mobility: (11/23/21)   []Normal    [x]Hypo   []Hyper    Palpation: No tenderness.   Mild quad atrophy still present on the left vs right.(11/23/21)    Functional Mobility/Transfers: Stairs are still difficult (pt must ascend/descend stairs 1 at a time using 2 HR), she can't stand or walk for long periods of time, she is unable to squat. She still has difficulty getting up from a low chair and has to use her arms to help push herself up.   (11/23/21)    Posture: Forward head, rounded shoulders. (11/23/21)    Bandages/Dressings/Incisions: Pt wears compression hose and wraps on both legs (11/23/21)     Gait: (include devices/WB status) Gait is still mildly antalgic, no AD. Lola and step length have improved bilaterally. She still demonstrates decreased trunk rotation and increased lateral trunk sway. (11/23/21)    Orthopedic Special Tests: See above.        RESTRICTIONS/PRECAUTIONS:     Exercises/Interventions:     Therapeutic Ex (72030) Sets/sec Reps Notes/CUES   BIKE      TREADMILL            STRETCHING      Towel Pull Calf 30\" hold X 5    Inclined Calf      Hamstrings 30\" hold X 5 Seated   Quads      Hip Flexors      ITB      Adductors            ROM      Passive      Active      Weight Hangs      Sheet Pulls 10\" hold X 10    Ankle Pumps      ERMI            STRENGTHENING      Quad Sets 10\" hold X 10    Ham Sets      Hip ADD Sets BS 10\" hold X 10     SLR Flexion 3 sets X 10 with 2#     SLR Abduction  HEP   SLR Prone      SLR Adduction      SLR+      Supine Hip Abduction 2 sets X 10  Black band         Standing Marches 3 sets X 10 on Airex Standing at half wall   Standing Knee Flexion             PRE Machines      Knee Extension      Knee Flexion 3 sets X 10 with 30#    Leg Press 3 sets X 10 with 70# Range 70-10         CKC      Calf Raises 3 sets X 10 Seated (Pt cannot perform standing due to bilateral foot pain)   Mini Squats      Wall Sits      Step ups - 1 riser 2 sets X 10  Using half wall     Lateral Step Ups - 0 riser 2 sets X 10    TKE  Held today               Manual Intervention (05747)      Patellar Mobs      Femoral Tibial mobs      STM      Scar Massage      Foam Roll            NMR re-education (23055)   CUES NEEDED   Biodex Balance      Tandem Balance 30\" hold X 2 each   Held Airex today due to pt having pain in both feet *Feet staggered  *Standing next to half wallSLB      Rebounder      BOSU            Sit -> Stand  X 30 with no UE assistance     With cushion on chair     Up/Down 1 Flight of Stairs in the [place]   Held today due to pain in her left shoulder and not wanting pt to pull with her left arm      Therapeutic Activity (46913)      Core Training      Adolfo Meyer Activities      Monster Walks      TRX training                  Patient Education: Dx, prognosis, pt goals/expectations, role of therapy (4/15/21)  X 8'                  Therapeutic Exercise and NMR EXR  [x] (37614) Provided verbal/tactile cueing for activities related to strengthening, flexibility, endurance, ROM for improvements in LE, proximal hip, and core control with self care, mobility, lifting, ambulation. [x] (61089) Provided verbal/tactile cueing for activities related to improving balance, coordination, kinesthetic sense, posture, motor skill, proprioception to assist with LE, proximal hip, and core control in self-care, mobility, lifting, ambulation and eccentric single leg control.      NMR and Therapeutic Activities:    [x] (39391 or 50511) Provided verbal/tactile cueing for activities related to improving balance, coordination, kinesthetic sense, posture, motor skill, proprioception and motor activation to allow for proper function of core, proximal hip and LE with self-care and ADLs and functional mobility.   [] (66610) Gait Re-education- Provided training and instruction to the patient for proper LE, core and proximal hip recruitment and positioning and eccentric body weight control with ambulation re-education including up and down stairs     Home Exercise Program:    [x] (46289) Reviewed/Progressed HEP activities related to strengthening, flexibility, endurance, ROM of core, proximal hip and LE for functional self-care, mobility, lifting and ambulation/stair navigation - Updated HEP through [address] [place] [address] (see below). Pt was also issued new written handouts. (4/27/21)  [x] (71101) Reviewed/Progressed HEP activities related to improving balance, coordination, kinesthetic sense, posture, motor skill, proprioception of core, proximal hip and LE for self-care, mobility, lifting, and ambulation/stair navigation          Access Code: [email or web address]  URL: Zarfo/Date: 04/20/2021Prepared by: KATHERIN SybertExercises   Long Sitting Calf Stretch with Strap - 1 x daily - 7 x weekly - 1 sets - 3 reps - 30 seconds hold   Seated Table Hamstring Stretch - 1 x daily - 7 x weekly - 1 sets - 3 reps - 30 seconds hold   Long Sitting Quad Set - 1 x daily - 7 x weekly - 1 sets - 10 reps - 10 seconds hold   Long Sitting Hip Adduction Isometric with Ball - 1 x daily - 7 x weekly - 1 sets - 10 reps - 10 seconds hold   Supine Active Straight Leg Raise - 1 x daily - 7 x weekly - 2-3 sets - 10 reps   Hooklying Isometric Clamshell - 1 x daily - 7 x weekly - 3 sets - 10 reps   Standing March with Counter Support - 1 x daily - 7 x weekly - 3 sets - 10 reps   Seated Heel Raise - 1 x daily - 7 x weekly - 3 sets - 10 reps   Sit to Stand - 1 x daily - 7 x weekly - 15 reps   Tandem Stance with Support - 1 x daily - 7 x weekly - 2 reps - 20 seconds hold        Manual Treatments:  PROM / STM / Oscillations-Mobs:  G-I, II, III, IV (PA's, Inf., Post.)  [] (80616) Provided manual therapy to mobilize LE, proximal hip and/or LS spine soft tissue/joints for the purpose of modulating pain, promoting relaxation, increasing ROM, reducing/eliminating soft tissue swelling/inflammation/restriction, improving soft tissue extensibility and allowing for proper ROM for normal function with self-care, mobility, lifting and ambulation. Modalities:   Pt declined ice today   [] GAME READY (VASO)- for significant edema, swelling, pain control.      Charges:  Timed Code Treatment Minutes: 54'   Total Treatment Minutes: 54'      [] EVAL (LOW) 20209 (typically 20 minutes face-to-face)  [] EVAL (MOD) 70068 (typically 30 minutes face-to-face)  [] EVAL (HIGH) 98404 (typically 45 minutes face-to-face)  [] RE-EVAL     [x] PM(80782) x 2 (30')    [] IONTO  [x] NMR (88185) x 1 (10')   [] VASO  [] Manual (94560) x      [] Other:  [x] TA x 1 (15')     [] Mech Traction (04036)  [] ES(attended) (34710)      [] ES (un) (13557):         ASSESSMENT:   Pt tolerated the exercises well today. She felt a good workout with the wt machines but need some help getting on/off the leg press. She did well with tandem stance but is limited by foot pain (pt has a history of bilateral foot pain). Forward and lateral step ups are still very challenging and she continues to need to use the half wall for support. Pt continues to lack concentric and eccentric quad strength to be able to ascend/descend stairs to the second floor where her bedroom and bathroom are located and to the basement. MEDICARE CAP EXCEPTION DOCUMENTATION      I certify that this patient meets one of the below criteria necessary for becoming an exception to the Medicare cap on therapy services:    [x]  The patient has a condition identified by an ICD-9 code that has a direct and significant impact on the need for therapy. (Significantly impacts the rate of recovery.)                     []  The patient has a complexity identified by an ICD-9 code that has a direct and significant impact on the need for therapy. (Significantly impacts the rate of recovery and is associated with a primary condition.)         []  The patient has associated variables that influence the amount of treatment to include:  Social support, self-efficacy/motivation, prognosis, time since onset/acuity. []  The patient has generalized musculoskeletal conditions or a condition affecting multiple sites that will have a direct impact on the rate of recovery.     [x]  The patient had a prior episode of outpatient therapy during this calendar year for a different condition. []  The patient has a mental or cognitive disorder in addition to the condition being treated that will have a direct and significant impact on the rate of recovery. GOALS: (Goals updated 11/23/21)    Patient stated goal:  \"Make my legs stronger; walk and climb stairs easier and faster. \"  [x] Progressing:  [] Met: [] Not Met: [] Adjusted    Therapist goals for Patient:   Short Term Goals: To be achieved in: 2 weeks  1. Independent in HEP and progression per patient tolerance, in order to prevent re-injury. [] Progressing: [x] Met: [] Not Met: [] Adjusted  2. Patient will have a decrease in left knee pain to 1-2/10 to facilitate improvement in movement, function, and ADLs as indicated by Functional Deficits. [] Progressing: [x] Met: [] Not Met: [] Adjusted    Long Term Goals: To be achieved in: 4-6 weeks  1. Disability index score of 20% or less for the Knee Outcome Survey Activities of Daily Living Scale to assist with reaching prior level of function. [x] Progressing: [] Met: [] Not Met: [] Adjusted  2. Patient will demonstrate an increase in left knee flexion AROM to at least 115 degrees to allow for proper joint functioning as indicated by patients Functional Deficits. [x] Progressing:  [] Met: [] Not Met: [] Adjusted  3. Patient will demonstrate an increase in left hip and knee strength to at least 4+/5 to allow for proper functional mobility as indicated by patients Functional Deficits. [x] Progressing:  [] Met: [] Not Met: [] Adjusted  4. Patient will be able to walk community distances, stand for at least 30 minutes, and be able to get up from a chair with little to no UE assistance needed for improved function. [x] Progressing: [] Met: [] Not Met: [] Adjusted  5.  Patient will be able to ascend/descend stairs reciprocally with HR (patient specific functional goal)    [x] Progressing: [] Met: [] Not Met: [] Adjusted   6. Patient will have a decrease in left knee pain to 0-1/10 with daily act. [] Progressing: [x] Met: [] Not Met: [] Adjusted      New Goal: To be achieved in: 4 weeks (11/23/21)  1. Patient will demonstrate an increase in left hip and knee strength to at least 5/5 to allow for proper functional mobility as indicated by patients Functional Deficits. [x] Progressing: [] Met: [] Not Met:  [] Adjusted        Overall Progression Towards Functional goals/ Treatment Progress Update:  [] Patient is progressing as expected towards functional goals listed. [] Progression is slowed due to complexities/Impairments listed. [x] Progression has been slowed due to co-morbidities - Pt underwent bilateral wrist surgeries which caused her to miss several weeks of therapy. [] Plan just implemented, too soon to assess goals progression <30days   [] Goals require adjustment due to lack of progress  [] Patient is not progressing as expected and requires additional follow up with physician  [] Other    Prognosis for POC: [x] Good [] Fair  [] Poor      Patient requires continued skilled intervention: [x] Yes  [] No    Treatment/Activity Tolerance:  [] Patient able to complete treatment  [x] Patient limited by fatigue  [] Patient limited by pain    [] Patient limited by other medical complications  [] Other:         PLAN: Continue therapy 2x/week for ROM, strengthening, endurance, and balance.        1-2x/week for 4-6 weeks for ROM, strengthening, balance act, HEP instruction, pt education, manual therapy prn, and modalities prn (11/23/21)  [x] Continue per plan of care [] Alter current plan   [] Plan of care initiated [] Hold pending MD visit [] Discharge      Electronically signed by:  Hawa Engle, PT     Physical Therapist [address] [address] #[id number]  Physical Therapist [place] License #[id number]    Note: If patient does not return for scheduled/ recommended follow up visits, this note will serve

## 2021-12-16 ENCOUNTER — TREATMENT (OUTPATIENT)
Dept: PHYSICAL THERAPY | Age: 71
End: 2021-12-16
Payer: MEDICARE

## 2021-12-16 DIAGNOSIS — M25.562 LEFT KNEE PAIN, UNSPECIFIED CHRONICITY: Primary | ICD-10-CM

## 2021-12-16 DIAGNOSIS — R29.898 WEAKNESS OF LEFT LOWER EXTREMITY: ICD-10-CM

## 2021-12-16 DIAGNOSIS — M17.12 PRIMARY OSTEOARTHRITIS OF LEFT KNEE: ICD-10-CM

## 2021-12-16 DIAGNOSIS — M25.662 DECREASED ROM OF LEFT KNEE: ICD-10-CM

## 2021-12-16 PROCEDURE — G8427 DOCREV CUR MEDS BY ELIG CLIN: HCPCS | Performed by: PHYSICAL THERAPIST

## 2021-12-16 PROCEDURE — 97112 NEUROMUSCULAR REEDUCATION: CPT | Performed by: PHYSICAL THERAPIST

## 2021-12-16 PROCEDURE — 97530 THERAPEUTIC ACTIVITIES: CPT | Performed by: PHYSICAL THERAPIST

## 2021-12-16 PROCEDURE — 97110 THERAPEUTIC EXERCISES: CPT | Performed by: PHYSICAL THERAPIST

## 2021-12-16 NOTE — PROGRESS NOTES
[facility]   Phone: [phone]    Fax: [phone]             Physical Therapy Treatment Note/ Progress Report:           Date:  2021    Patient Name:  Charlene Huitron    :  1950  MRN: [id number]  Restrictions/Precautions:    Medical/Treatment Diagnosis Information:  Diagnosis: Left Knee Pain (M25.562), Primary OA of Left Knee (M17.12)   Treatment Diagnosis: Decreased Left Knee ROM (M25.662), Decreased Strength (R29.898)        Insurance/Certification information:  PT Insurance Information: Medicare - Visits based on medical necessity  Physician Information:  Referring Practitioner: Dr. Adonis Gomes  Has the plan of care been signed (Y/N):        [x]  Yes (POC signed 21)  []  No      Date of Patient follow up with Physician: As needed      Is this a Progress Report:     []  Yes  [x]  No        If Yes:  Date Range for reporting period:  Beginning 4/15/21   Ending 21    Progress report will be due (10 Rx or 30 days whichever is less):        Recertification will be due (POC Duration  / 90 days whichever is less):  21        Visit # Insurance Allowable Auth Required   39 Medical necessity  (pt has already used 13 visits this year for her shoulder) []  Yes [x]  No        Functional Scale:    Date assessed:  21  Functional Assessment Tool Used: Knee Outcome Survey Activities of Daily Living Scale (copy scanned into media)  Score:  45/70 = 64% (36% functional deficit)        (4/15/21)  Patient Age: 66years old  Patient Height: 5' 2\"  Patient Weight: 280 lbs  Patient BMI: 51.2      Pain (21)  [x]  Pain diagram was completed, pain was present and a follow up plan to address the pain is in the plan of care. ()   []  Pain diagram was completed and there was no pain present and therefore no follow up plan to address pain in the plan of care.  ()   []  Pain diagram was not completed and the reason for not completing the pain diagram is in the medical chart ()  []  Patient refused to participate   []  Severe mental or physical capacity, patient is in urgent emergent situation and to complete the questionnaire would jeopardize the patient's health status        Functional Questionnaire (11/23/21)  [x]  Patient completed the functional outcome questionnaire and deficiencies were addressed in the plan of care. ()   []  Patient completed the outcome questionnaire and there were no functional deficits identified and therefore no plan of care is required. ()   []  Patient did not complete the functional outcome questionnaire and the reason why is documented in the medical chart. ()  []  Patient refused to participate   []  Patient unable to complete the questionnaire   []   A functional outcome assessment has been reported on within the last 30 days        Falls (11/23/21)  [x]  The patient has had no falls or 1 fall without injury in the past year. (1101F)   []  There is no documentation of fall in the medical chart (1101F,8P)     [] The patient has had 2 or more falls or one fall with injury in the past year that is documented in the medical chart. (1100F)  []  A falls risk assessment was completed and documented in the medical chart. (0285W)   []  Falls were addressed in the plan of care. (3205P)   []  A falls risk assessment was not completed and documented in the medical chart (6396T,5V)   []   Falls were not addressed in the plan of care and reason was documented in the plan of care. (7621U 1P)        Medication (11/23/21)     [x] A list of current medications (including prescription, over the counter, herbal supplements, vitamin supplements, mineral supplements, dietary supplements) was reviewed with the patient and documented in the medical chart. ()        Falls Risk Assessment (30 days): (11/23/21)  [x] Falls Risk assessed and no intervention required.   [] Falls Risk assessed and Patient requires intervention due to being higher risk   TUG score (>12s at risk):     [] Falls education provided, including     PQRS:   Reviewed medication list with patient. No changes reported by pt since last PT visit. (12/16/21)          Latex Allergy:  [x]NO      []YES  Preferred Language for Healthcare:   [x]English       []other:      Pain level:  0-1/10 (12/14/21)   Medication:  Celebrex      SUBJECTIVE:  Pt states both knees feel stronger but she continues to have difficulty ascending/descending stairs. Dr. Radha Madden office called her yesterday with the CT scan results for her left shoulder and pt was told everything looked good. She has an appt with Dr. Lazaro Goodson on 1/6/22. OBJECTIVE:       (11/23/21)  Flexibility L R Comment   Hamstring Mild tightness Mild tightness    Gastroc Mild tightness Mild tightness    ITB      Quad              (12/14/21)  ROM PROM AROM Overpressure Comment    L R L R L R    Flexion 120 with SP  110 105      Extension   0 0                            (11/23/21)  Strength L R Comment   Quad 4/5 4+/5    Hamstring 5/5 4+/5    Gastroc 4+/5 4+/5    Hip Flex 4/5 4/5    Hip Abd 4+/5 4+/5    Hip Add 4+/5 4+/5    Glut Med              (11/23/21)  Girth L R   Mid Patella 57.0 cm 58.0 cm   Suprapatellar     5cm above     15cm above       (11/23/21)  Special Test Results/Comment   Meniscal Click    Crepitus (+) PF crepitus bilaterally   Flexion Test    Valgus Laxity    Varus Laxity    Lachmans    Drop Back        Reflexes/Sensation: (4/15/21)   []Dermatomes/Myotomes intact    []Reflexes equal and normal bilaterally   [x]Other: Intact to light touch    Joint mobility: (11/23/21)   []Normal    [x]Hypo   []Hyper    Palpation: No tenderness. Mild quad atrophy still present on the left vs right.(11/23/21)    Functional Mobility/Transfers: Stairs are still difficult (pt must ascend/descend stairs 1 at a time using 2 HR), she can't stand or walk for long periods of time, she is unable to squat.   She still has difficulty getting up from a low chair and has to use her arms to help push herself up.   (11/23/21)    Posture: Forward head, rounded shoulders. (11/23/21)    Bandages/Dressings/Incisions: Pt wears compression hose and wraps on both legs (11/23/21)     Gait: (include devices/WB status) Gait is still mildly antalgic, no AD. Lola and step length have improved bilaterally. She still demonstrates decreased trunk rotation and increased lateral trunk sway. (11/23/21)    Orthopedic Special Tests: See above.        RESTRICTIONS/PRECAUTIONS:     Exercises/Interventions:     Therapeutic Ex (08840) Sets/sec Reps Notes/CUES   BIKE      TREADMILL            STRETCHING      Towel Pull Calf 30\" hold X 5    Inclined Calf      Hamstrings 30\" hold X 5 Seated   Quads      Hip Flexors      ITB      Adductors            ROM      Passive      Active      Weight Hangs      Sheet Pulls 10\" hold X 10    Ankle Pumps      ERMI            STRENGTHENING      Quad Sets 10\" hold X 10    Ham Sets      Hip ADD Sets BS 10\" hold X 10     SLR Flexion 3 sets X 10 with 2#     SLR Abduction  HEP   SLR Prone      SLR Adduction      SLR+      Supine Hip Abduction 2 sets X 10  Black band         Standing Marches 3 sets X 10 on Airex Standing at half wall   Standing Knee Flexion             PRE Machines      Knee Extension      Knee Flexion 3 sets X 10 with 30#    Leg Press 3 sets X 10 with 70# Range 70-10         CKC      Calf Raises 3 sets X 10 Seated (Pt cannot perform standing due to bilateral foot pain)   Mini Squats      Wall Sits      Step ups - 1 riser 2 sets X 10  Using half wall     Lateral Step Ups - 0 riser 2 sets X 10    TKE  Held today               Manual Intervention (75777)      Patellar Mobs      Femoral Tibial mobs      STM      Scar Massage      Foam Roll            NMR re-education (60766)   CUES NEEDED   Biodex Balance      Tandem Balance 30\" hold X 2 each   Held Airex today due to pt having pain in both feet *Feet staggered  *Standing next to half wallSLB      Rebounder      SAMIAU            Sit -> Stand  X 30 with no UE assistance     With cushion on chair     Up/Down 1 Flight of Stairs in the [place]   Held today due to pain in her left shoulder and not wanting pt to pull with her left arm      Therapeutic Activity (53870)      Core Training      [employer] Activities      Monster Walks      TRX training                  Patient Education: Dx, prognosis, pt goals/expectations, role of therapy (4/15/21)  X 8'                  Therapeutic Exercise and NMR EXR  [x] (47144) Provided verbal/tactile cueing for activities related to strengthening, flexibility, endurance, ROM for improvements in LE, proximal hip, and core control with self care, mobility, lifting, ambulation. [x] (57881) Provided verbal/tactile cueing for activities related to improving balance, coordination, kinesthetic sense, posture, motor skill, proprioception to assist with LE, proximal hip, and core control in self-care, mobility, lifting, ambulation and eccentric single leg control. NMR and Therapeutic Activities:    [x] (15765 or 90790) Provided verbal/tactile cueing for activities related to improving balance, coordination, kinesthetic sense, posture, motor skill, proprioception and motor activation to allow for proper function of core, proximal hip and LE with self-care and ADLs and functional mobility.   [] (33090) Gait Re-education- Provided training and instruction to the patient for proper LE, core and proximal hip recruitment and positioning and eccentric body weight control with ambulation re-education including up and down stairs     Home Exercise Program:    [x] (58057) Reviewed/Progressed HEP activities related to strengthening, flexibility, endurance, ROM of core, proximal hip and LE for functional self-care, mobility, lifting and ambulation/stair navigation - Updated HEP through [address] [place] [address] (see below).   Pt was also issued new written handouts. (4/27/21)  [x] (20912) Reviewed/Progressed HEP activities related to improving balance, coordination, kinesthetic sense, posture, motor skill, proprioception of core, proximal hip and LE for self-care, mobility, lifting, and ambulation/stair navigation          Access Code: [email or web address]  URL: [email or web address]: 04/20/2021Prepared by: GSYRUZ SybertExercises   Long Sitting Calf Stretch with Strap - 1 x daily - 7 x weekly - 1 sets - 3 reps - 30 seconds hold   Seated Table Hamstring Stretch - 1 x daily - 7 x weekly - 1 sets - 3 reps - 30 seconds hold   Long Sitting Quad Set - 1 x daily - 7 x weekly - 1 sets - 10 reps - 10 seconds hold   Long Sitting Hip Adduction Isometric with Ball - 1 x daily - 7 x weekly - 1 sets - 10 reps - 10 seconds hold   Supine Active Straight Leg Raise - 1 x daily - 7 x weekly - 2-3 sets - 10 reps   Hooklying Isometric Clamshell - 1 x daily - 7 x weekly - 3 sets - 10 reps   Standing March with Counter Support - 1 x daily - 7 x weekly - 3 sets - 10 reps   Seated Heel Raise - 1 x daily - 7 x weekly - 3 sets - 10 reps   Sit to Stand - 1 x daily - 7 x weekly - 15 reps   Tandem Stance with Support - 1 x daily - 7 x weekly - 2 reps - 20 seconds hold        Manual Treatments:  PROM / STM / Oscillations-Mobs:  G-I, II, III, IV (PA's, Inf., Post.)  [] (43210) Provided manual therapy to mobilize LE, proximal hip and/or LS spine soft tissue/joints for the purpose of modulating pain, promoting relaxation, increasing ROM, reducing/eliminating soft tissue swelling/inflammation/restriction, improving soft tissue extensibility and allowing for proper ROM for normal function with self-care, mobility, lifting and ambulation. Modalities:   Pt declined ice today   [] GAME READY (VASO)- for significant edema, swelling, pain control.      Charges:  Timed Code Treatment Minutes: 54'   Total Treatment Minutes: 54'      [] EVAL (LOW) 455 [id number] (typically 20 minutes face-to-face)  [] EVAL (MOD) 07896 (typically 30 minutes face-to-face)  [] EVAL (HIGH) 79054 (typically 45 minutes face-to-face)  [] RE-EVAL     [x] UN(48767) x 2 (30')    [] IONTO  [x] NMR (55270) x 1 (10')   [] VASO  [] Manual (51382) x      [] Other:  [x] TA x 1 (15')     [] Mech Traction (72674)  [] ES(attended) (20455)      [] ES (un) (46304):         ASSESSMENT:   Pt tolerated her program well today. Weight machines felt a little easier today but were still a good workout. She does need some assistance getting on/off the leg press. She is doing much better with tandem stance but cannot progress to the Airex due to pain in both feet. Step ups continue to be her hardest exercise and she still needs to use the half wall for support. Pt continues to lack concentric and eccentric quad strength to be able to ascend/descend stairs to the second floor where her bedroom and bathroom are located and to the basement. MEDICARE CAP EXCEPTION DOCUMENTATION      I certify that this patient meets one of the below criteria necessary for becoming an exception to the Medicare cap on therapy services:    [x]  The patient has a condition identified by an ICD-9 code that has a direct and significant impact on the need for therapy. (Significantly impacts the rate of recovery.)                     []  The patient has a complexity identified by an ICD-9 code that has a direct and significant impact on the need for therapy. (Significantly impacts the rate of recovery and is associated with a primary condition.)         []  The patient has associated variables that influence the amount of treatment to include:  Social support, self-efficacy/motivation, prognosis, time since onset/acuity. []  The patient has generalized musculoskeletal conditions or a condition affecting multiple sites that will have a direct impact on the rate of recovery.     [x]  The patient had a prior episode of outpatient therapy during this calendar year for a different condition. []  The patient has a mental or cognitive disorder in addition to the condition being treated that will have a direct and significant impact on the rate of recovery. GOALS: (Goals updated 11/23/21)    Patient stated goal:  \"Make my legs stronger; walk and climb stairs easier and faster. \"  [x] Progressing:  [] Met: [] Not Met: [] Adjusted    Therapist goals for Patient:   Short Term Goals: To be achieved in: 2 weeks  1. Independent in HEP and progression per patient tolerance, in order to prevent re-injury. [] Progressing: [x] Met: [] Not Met: [] Adjusted  2. Patient will have a decrease in left knee pain to 1-2/10 to facilitate improvement in movement, function, and ADLs as indicated by Functional Deficits. [] Progressing: [x] Met: [] Not Met: [] Adjusted    Long Term Goals: To be achieved in: 4-6 weeks  1. Disability index score of 20% or less for the Knee Outcome Survey Activities of Daily Living Scale to assist with reaching prior level of function. [x] Progressing: [] Met: [] Not Met: [] Adjusted  2. Patient will demonstrate an increase in left knee flexion AROM to at least 115 degrees to allow for proper joint functioning as indicated by patients Functional Deficits. [x] Progressing:  [] Met: [] Not Met: [] Adjusted  3. Patient will demonstrate an increase in left hip and knee strength to at least 4+/5 to allow for proper functional mobility as indicated by patients Functional Deficits. [x] Progressing:  [] Met: [] Not Met: [] Adjusted  4. Patient will be able to walk community distances, stand for at least 30 minutes, and be able to get up from a chair with little to no UE assistance needed for improved function. [x] Progressing: [] Met: [] Not Met: [] Adjusted  5. Patient will be able to ascend/descend stairs reciprocally with HR (patient specific functional goal)    [x] Progressing: [] Met: [] Not Met: [] Adjusted   6.  Patient will have a decrease in left knee pain to 0-1/10 with daily act. [] Progressing: [x] Met: [] Not Met: [] Adjusted      New Goal: To be achieved in: 4 weeks (11/23/21)  1. Patient will demonstrate an increase in left hip and knee strength to at least 5/5 to allow for proper functional mobility as indicated by patients Functional Deficits. [x] Progressing: [] Met: [] Not Met:  [] Adjusted        Overall Progression Towards Functional goals/ Treatment Progress Update:  [] Patient is progressing as expected towards functional goals listed. [] Progression is slowed due to complexities/Impairments listed. [x] Progression has been slowed due to co-morbidities - Pt underwent bilateral wrist surgeries which caused her to miss several weeks of therapy. [] Plan just implemented, too soon to assess goals progression <30days   [] Goals require adjustment due to lack of progress  [] Patient is not progressing as expected and requires additional follow up with physician  [] Other    Prognosis for POC: [x] Good [] Fair  [] Poor      Patient requires continued skilled intervention: [x] Yes  [] No    Treatment/Activity Tolerance:  [] Patient able to complete treatment  [x] Patient limited by fatigue  [] Patient limited by pain    [] Patient limited by other medical complications  [] Other:         PLAN: Continue therapy 2x/week for ROM, strengthening, endurance, and balance. Re-assess next visit.      1-2x/week for 4-6 weeks for ROM, strengthening, balance act, HEP instruction, pt education, manual therapy prn, and modalities prn (11/23/21)  [x] Continue per plan of care [] Alter current plan   [] Plan of care initiated [] Hold pending MD visit [] Discharge      Electronically signed by:  Monica Beard PT     Physical Therapist [address] #[id number]  Physical Therapist [place] License #[id number]    Note: If patient does not return for scheduled/ recommended follow up visits, this note will serve as a discharge from care along with most recent update on progress.

## 2021-12-21 ENCOUNTER — TREATMENT (OUTPATIENT)
Dept: PHYSICAL THERAPY | Age: 71
End: 2021-12-21
Payer: MEDICARE

## 2021-12-21 DIAGNOSIS — M25.662 DECREASED ROM OF LEFT KNEE: ICD-10-CM

## 2021-12-21 DIAGNOSIS — R29.898 WEAKNESS OF LEFT LOWER EXTREMITY: ICD-10-CM

## 2021-12-21 DIAGNOSIS — M17.12 PRIMARY OSTEOARTHRITIS OF LEFT KNEE: ICD-10-CM

## 2021-12-21 DIAGNOSIS — M25.562 LEFT KNEE PAIN, UNSPECIFIED CHRONICITY: Primary | ICD-10-CM

## 2021-12-21 PROCEDURE — G8539 DOC FUNCT AND CARE PLAN: HCPCS | Performed by: PHYSICAL THERAPIST

## 2021-12-21 PROCEDURE — 1101F PT FALLS ASSESS-DOCD LE1/YR: CPT | Performed by: PHYSICAL THERAPIST

## 2021-12-21 PROCEDURE — G8427 DOCREV CUR MEDS BY ELIG CLIN: HCPCS | Performed by: PHYSICAL THERAPIST

## 2021-12-21 PROCEDURE — 97110 THERAPEUTIC EXERCISES: CPT | Performed by: PHYSICAL THERAPIST

## 2021-12-21 PROCEDURE — 97530 THERAPEUTIC ACTIVITIES: CPT | Performed by: PHYSICAL THERAPIST

## 2021-12-21 PROCEDURE — 97112 NEUROMUSCULAR REEDUCATION: CPT | Performed by: PHYSICAL THERAPIST

## 2021-12-21 NOTE — PROGRESS NOTES
Range for reporting period:  Beginning 4/15/21   Ending 12/21/21    Progress report will be due (10 Rx or 30 days whichever is less):  3/21/50      Recertification will be due (POC Duration  / 90 days whichever is less):  1/18/22        Visit # Insurance Allowable Auth Required   40 Medical necessity  (pt has already used 13 visits this year for her shoulder) []  Yes [x]  No        Functional Scale:    Date assessed:  12/21/21  Functional Assessment Tool Used: Knee Outcome Survey Activities of Daily Living Scale (copy scanned into media)  Score:  46/70 = 66% (34% functional deficit)        (4/15/21)  Patient Age: 66years old  Patient Height: 5' 2\"  Patient Weight: 280 lbs  Patient BMI: 51.2      Pain (12/21/21)  [x]  Pain diagram was completed, pain was present and a follow up plan to address the pain is in the plan of care. ()   []  Pain diagram was completed and there was no pain present and therefore no follow up plan to address pain in the plan of care. ()   []  Pain diagram was not completed and the reason for not completing the pain diagram is in the medical chart ()  []  Patient refused to participate   []  Severe mental or physical capacity, patient is in urgent emergent situation and to complete the questionnaire would jeopardize the patient's health status        Functional Questionnaire (12/21/21)  [x]  Patient completed the functional outcome questionnaire and deficiencies were addressed in the plan of care. ()   []  Patient completed the outcome questionnaire and there were no functional deficits identified and therefore no plan of care is required. ()   []  Patient did not complete the functional outcome questionnaire and the reason why is documented in the medical chart. ()  []  Patient refused to participate   []  Patient unable to complete the questionnaire   []   A functional outcome assessment has been reported on within the last 30 days        Falls (12/21/21)  [x] L R L R L R    Flexion 120 with SP  120 110      Extension   0 0                            (12/21/21)  Strength L R Comment   Quad 4+/5 4+/5    Hamstring 5/5 5/5    Gastroc 4+/5 4+/5    Hip Flex 4+/5 4+/5    Hip Abd 5/5 4+/5    Hip Add 4+/5 4+/5    Glut Med              (12/21/21)  Girth L R   Mid Patella 57.0 cm 58.0 cm   Suprapatellar     5cm above     15cm above       (12/21/21)  Special Test Results/Comment   Meniscal Click    Crepitus (+) PF crepitus bilaterally   Flexion Test    Valgus Laxity    Varus Laxity    Lachmans    Drop Back        Reflexes/Sensation: (4/15/21)   []Dermatomes/Myotomes intact    []Reflexes equal and normal bilaterally   [x]Other: Intact to light touch    Joint mobility: (12/21/21)   []Normal    [x]Hypo   []Hyper    Palpation: No tenderness. (12/21/21)    Functional Mobility/Transfers: Stairs are still difficult (pt must ascend/descend stairs 1 at a time using 2 HR), she can't stand or walk for extended periods of time, she is unable to squat. Getting up from a chair has gotten easier but she still has to use her hands if it's a low chair.   (12/21/21)    Posture: Forward head, rounded shoulders. (12/21/21)    Bandages/Dressings/Incisions: Pt wears compression hose and wraps on both legs (12/21/21)     Gait: (include devices/WB status) Gait is still mildly antalgic (partly due to bilateral foot pain), no AD. Lola and step length have improved bilaterally. She still demonstrates decreased trunk rotation and increased lateral trunk sway. (12/21/21)    Orthopedic Special Tests: See above.        RESTRICTIONS/PRECAUTIONS:     Exercises/Interventions:     Therapeutic Ex (13066) Sets/sec Reps Notes/CUES   BIKE      TREADMILL            STRETCHING      Towel Pull Calf 30\" hold X 5    Inclined Calf      Hamstrings 30\" hold X 5 Seated   Quads      Hip Flexors      ITB      Adductors            ROM      Passive      Active      Weight Hangs      Sheet Pulls 10\" hold X 10    Ankle Pumps ERMI            STRENGTHENING      Quad Sets 10\" hold X 10    Ham Sets      Hip ADD Sets BS 10\" hold X 10     SLR Flexion 3 sets X 10 with 2#     SLR Abduction  HEP   SLR Prone      SLR Adduction      SLR+      Supine Hip Abduction 2 sets X 10  Black band         Standing Marches 3 sets X 10 on Airex Standing at half wall   Standing Knee Flexion             PRE Machines      Knee Extension      Knee Flexion 3 sets X 10 with 30#    Leg Press 3 sets X 10 with 80# Range 70-10         CKC      Calf Raises 3 sets X 10 Seated (Pt cannot perform standing due to bilateral foot pain)   Mini Squats      Wall Sits      Step ups - 1 riser 2 sets X 10  Using half wall     Lateral Step Ups - 0 riser 2 sets X 10    TKE  Held today               Manual Intervention (62229)      Patellar Mobs      Femoral Tibial mobs      STM      Scar Massage      Foam Roll            NMR re-education (96666)   CUES NEEDED   Biodex Balance      Tandem Balance 30\" hold X 2 each   Held Airex today due to pt having pain in both feet *Feet staggered  *Standing next to half wallSLB      Rebounder      BOSU            Sit -> Stand  X 30 with no UE assistance     With cushion on chair     Up/Down 1 Flight of Stairs in the [facility] today due to pain in her left shoulder and not wanting pt to pull with her left arm      Therapeutic Activity (52683)      Core Training      Swiss Yola Cassette Activities      Monster Walks      TRX training                  Patient Education: Dx, prognosis, pt goals/expectations, role of therapy (4/15/21)  X 8'                  Therapeutic Exercise and NMR EXR  [x] (64946) Provided verbal/tactile cueing for activities related to strengthening, flexibility, endurance, ROM for improvements in LE, proximal hip, and core control with self care, mobility, lifting, ambulation.   [x] (01421) Provided verbal/tactile cueing for activities related to improving balance, coordination, kinesthetic sense, posture, motor skill, proprioception to assist with LE, proximal hip, and core control in self-care, mobility, lifting, ambulation and eccentric single leg control. NMR and Therapeutic Activities:    [x] (42593 or 35255) Provided verbal/tactile cueing for activities related to improving balance, coordination, kinesthetic sense, posture, motor skill, proprioception and motor activation to allow for proper function of core, proximal hip and LE with self-care and ADLs and functional mobility.   [] (98117) Gait Re-education- Provided training and instruction to the patient for proper LE, core and proximal hip recruitment and positioning and eccentric body weight control with ambulation re-education including up and down stairs     Home Exercise Program:    [x] (13405) Reviewed/Progressed HEP activities related to strengthening, flexibility, endurance, ROM of core, proximal hip and LE for functional self-care, mobility, lifting and ambulation/stair navigation - Updated HEP through [address] [place] [address] (see below). Pt was also issued new written handouts. (4/27/21)  [x] (46289) Reviewed/Progressed HEP activities related to improving balance, coordination, kinesthetic sense, posture, motor skill, proprioception of core, proximal hip and LE for self-care, mobility, lifting, and ambulation/stair navigation          Access Code: [email or web address]  URL: [email or web address]: 04/20/2021Prepared by: Higinio Hernandez SybertExercises   Long Sitting Calf Stretch with Strap - 1 x daily - 7 x weekly - 1 sets - 3 reps - 30 seconds hold   Seated Table Hamstring Stretch - 1 x daily - 7 x weekly - 1 sets - 3 reps - 30 seconds hold   Long Sitting Quad Set - 1 x daily - 7 x weekly - 1 sets - 10 reps - 10 seconds hold   Long Sitting Hip Adduction Isometric with Ball - 1 x daily - 7 x weekly - 1 sets - 10 reps - 10 seconds hold   Supine Active Straight Leg Raise - 1 x daily - 7 x weekly - 2-3 sets - 10 reps   Hooklying Isometric Clamshell - 1 x daily - 7 x weekly - 3 sets - MEDICARE CAP EXCEPTION DOCUMENTATION      I certify that this patient meets one of the below criteria necessary for becoming an exception to the Medicare cap on therapy services:    [x]  The patient has a condition identified by an ICD-9 code that has a direct and significant impact on the need for therapy. (Significantly impacts the rate of recovery.)                     []  The patient has a complexity identified by an ICD-9 code that has a direct and significant impact on the need for therapy. (Significantly impacts the rate of recovery and is associated with a primary condition.)         []  The patient has associated variables that influence the amount of treatment to include:  Social support, self-efficacy/motivation, prognosis, time since onset/acuity. []  The patient has generalized musculoskeletal conditions or a condition affecting multiple sites that will have a direct impact on the rate of recovery. [x]  The patient had a prior episode of outpatient therapy during this calendar year for a different condition. []  The patient has a mental or cognitive disorder in addition to the condition being treated that will have a direct and significant impact on the rate of recovery. GOALS: (Goals updated 12/21/21)    Patient stated goal:  \"Make my legs stronger; walk and climb stairs easier and faster. \"  [x] Progressing:  [] Met: [] Not Met: [] Adjusted    Therapist goals for Patient:   Short Term Goals: To be achieved in: 2 weeks  1. Independent in HEP and progression per patient tolerance, in order to prevent re-injury. [] Progressing: [x] Met: [] Not Met: [] Adjusted  2. Patient will have a decrease in left knee pain to 1-2/10 to facilitate improvement in movement, function, and ADLs as indicated by Functional Deficits. [] Progressing: [x] Met: [] Not Met: [] Adjusted    Long Term Goals: To be achieved in: 4-6 weeks  1.  Disability index score of 20% or less for the expected and requires additional follow up with physician  [] Other    Prognosis for POC: [x] Good [] Fair  [] Poor      Patient requires continued skilled intervention: [x] Yes  [] No    Treatment/Activity Tolerance:  [] Patient able to complete treatment  [x] Patient limited by fatigue  [] Patient limited by pain    [] Patient limited by other medical complications  [] Other:         PLAN: Continue therapy for 2 more visits then transition to an independent HEP. 1x/week for 2 more weeks for ROM, strengthening, balance act, HEP instruction, pt education, manual therapy prn, and modalities prn (12/21/21)  [x] Continue per plan of care [] Brenda Blackman current plan   [] Plan of care initiated [] Hold pending MD visit [] Discharge      Electronically signed by:  Isidro Belle PT     Physical Therapist Adore Rangel [address] #[id number]  Physical Therapist [place] License #[id number]    Note: If patient does not return for scheduled/ recommended follow up visits, this note will serve as a discharge from care along with most recent update on progress.

## 2021-12-28 ENCOUNTER — TREATMENT (OUTPATIENT)
Dept: PHYSICAL THERAPY | Age: 71
End: 2021-12-28
Payer: MEDICARE

## 2021-12-28 DIAGNOSIS — R29.898 WEAKNESS OF LEFT LOWER EXTREMITY: ICD-10-CM

## 2021-12-28 DIAGNOSIS — M17.12 PRIMARY OSTEOARTHRITIS OF LEFT KNEE: ICD-10-CM

## 2021-12-28 DIAGNOSIS — M25.562 LEFT KNEE PAIN, UNSPECIFIED CHRONICITY: Primary | ICD-10-CM

## 2021-12-28 DIAGNOSIS — M25.662 DECREASED ROM OF LEFT KNEE: ICD-10-CM

## 2021-12-28 PROCEDURE — G8427 DOCREV CUR MEDS BY ELIG CLIN: HCPCS | Performed by: PHYSICAL THERAPIST

## 2021-12-28 PROCEDURE — 97530 THERAPEUTIC ACTIVITIES: CPT | Performed by: PHYSICAL THERAPIST

## 2021-12-28 PROCEDURE — 97110 THERAPEUTIC EXERCISES: CPT | Performed by: PHYSICAL THERAPIST

## 2021-12-28 PROCEDURE — 97112 NEUROMUSCULAR REEDUCATION: CPT | Performed by: PHYSICAL THERAPIST

## 2021-12-28 NOTE — PROGRESS NOTES
[facility]   Phone: [phone]    Fax: [phone]             Physical Therapy Treatment Note/ Progress Report:           Date:  2021    Patient Name:  Shelley Ernandez    :  1950  MRN: [id number]  Restrictions/Precautions:    Medical/Treatment Diagnosis Information:  Diagnosis: Left Knee Pain (M25.562), Primary OA of Left Knee (M17.12)   Treatment Diagnosis: Decreased Left Knee ROM (M25.662), Decreased Strength (R29.898)        Insurance/Certification information:  PT Insurance Information: Medicare - Visits based on medical necessity  Physician Information:  Referring Practitioner: Dr. Magalys Rodriguez  Has the plan of care been signed (Y/N):        [x]  Yes (POC signed 21)  []  No      Date of Patient follow up with Physician: As needed      Is this a Progress Report:     []  Yes  [x]  No        If Yes:  Date Range for reporting period:  Beginning 4/15/21   Ending 21    Progress report will be due (10 Rx or 30 days whichever is less):        Recertification will be due (POC Duration  / 90 days whichever is less):  22        Visit # Insurance Allowable Auth Required   41 Medical necessity  (pt has already used 13 visits this year for her shoulder) []  Yes [x]  No        Functional Scale:    Date assessed:  21  Functional Assessment Tool Used: Knee Outcome Survey Activities of Daily Living Scale (copy scanned into media)  Score:  46/70 = 66% (34% functional deficit)        (4/15/21)  Patient Age: 66years old  Patient Height: 5' 2\"  Patient Weight: 280 lbs  Patient BMI: 51.2      Pain (21)  [x]  Pain diagram was completed, pain was present and a follow up plan to address the pain is in the plan of care. ()   []  Pain diagram was completed and there was no pain present and therefore no follow up plan to address pain in the plan of care.  ()   []  Pain diagram was not completed and the reason for not completing the pain diagram is in the medical chart ()  []  Patient refused to participate   []  Severe mental or physical capacity, patient is in urgent emergent situation and to complete the questionnaire would jeopardize the patient's health status        Functional Questionnaire (12/21/21)  [x]  Patient completed the functional outcome questionnaire and deficiencies were addressed in the plan of care. ()   []  Patient completed the outcome questionnaire and there were no functional deficits identified and therefore no plan of care is required. ()   []  Patient did not complete the functional outcome questionnaire and the reason why is documented in the medical chart. ()  []  Patient refused to participate   []  Patient unable to complete the questionnaire   []   A functional outcome assessment has been reported on within the last 30 days        Falls (12/21/21)  [x]  The patient has had no falls or 1 fall without injury in the past year. (1101F)   []  There is no documentation of fall in the medical chart (1101F,8P)     [] The patient has had 2 or more falls or one fall with injury in the past year that is documented in the medical chart. (1100F)  []  A falls risk assessment was completed and documented in the medical chart. (0111C)   []  Falls were addressed in the plan of care. (5814Y)   []  A falls risk assessment was not completed and documented in the medical chart (9634D,8Q)   []   Falls were not addressed in the plan of care and reason was documented in the plan of care. (9570G 1P)        Medication (12/21/21)     [x] A list of current medications (including prescription, over the counter, herbal supplements, vitamin supplements, mineral supplements, dietary supplements) was reviewed with the patient and documented in the medical chart. ()        Falls Risk Assessment (30 days): (12/21/21)  [x] Falls Risk assessed and no intervention required.   [] Falls Risk assessed and Patient requires intervention due to being higher risk   TUG score (>12s at risk):     [] Falls education provided, including     PQRS:   Reviewed medication list with patient. No changes reported by pt since last PT visit. (12/28/21)          Latex Allergy:  [x]NO      []YES  Preferred Language for Healthcare:   [x]English       []other:      Pain level:  0-1/10 (12/28/21)   Medication:  Celebrex      SUBJECTIVE:  Pt states both knees feel achy and have been grinding more today with the damp weather. Stairs continue to be her biggest challenge. OBJECTIVE:       (12/21/21)  Flexibility L R Comment   Hamstring Mild tightness Mild tightness    Gastroc Mild tightness Mild tightness    ITB      Quad              (12/28/21)  ROM PROM AROM Overpressure Comment    L R L R L R    Flexion 120 with SP  120 110      Extension   0 0                            (12/21/21)  Strength L R Comment   Quad 4+/5 4+/5    Hamstring 5/5 5/5    Gastroc 4+/5 4+/5    Hip Flex 4+/5 4+/5    Hip Abd 5/5 4+/5    Hip Add 4+/5 4+/5    Glut Med              (12/21/21)  Girth L R   Mid Patella 57.0 cm 58.0 cm   Suprapatellar     5cm above     15cm above       (12/21/21)  Special Test Results/Comment   Meniscal Click    Crepitus (+) PF crepitus bilaterally   Flexion Test    Valgus Laxity    Varus Laxity    Lachmans    Drop Back        Reflexes/Sensation: (4/15/21)   []Dermatomes/Myotomes intact    []Reflexes equal and normal bilaterally   [x]Other: Intact to light touch    Joint mobility: (12/21/21)   []Normal    [x]Hypo   []Hyper    Palpation: No tenderness. (12/21/21)    Functional Mobility/Transfers: Stairs are still difficult (pt must ascend/descend stairs 1 at a time using 2 HR), she can't stand or walk for extended periods of time, she is unable to squat. Getting up from a chair has gotten easier but she still has to use her hands if it's a low chair.   (12/21/21)    Posture: Forward head, rounded shoulders.  (12/21/21)    Bandages/Dressings/Incisions: Pt wears compression hose and wraps on both legs (12/21/21)     Gait: (include devices/WB status) Gait is still mildly antalgic (partly due to bilateral foot pain), no AD. Lola and step length have improved bilaterally. She still demonstrates decreased trunk rotation and increased lateral trunk sway. (12/21/21)    Orthopedic Special Tests: See above.        RESTRICTIONS/PRECAUTIONS:     Exercises/Interventions:     Therapeutic Ex (50050) Sets/sec Reps Notes/CUES   BIKE      TREADMILL            STRETCHING      Towel Pull Calf 30\" hold X 5    Inclined Calf      Hamstrings 30\" hold X 5 Seated   Quads      Hip Flexors      ITB      Adductors            ROM      Passive      Active      Weight Hangs      Sheet Pulls 10\" hold X 10    Ankle Pumps      ERMI            STRENGTHENING      Quad Sets 10\" hold X 10    Ham Sets      Hip ADD Sets BS 10\" hold X 10     SLR Flexion 3 sets X 10 with 2#     SLR Abduction  HEP   SLR Prone      SLR Adduction      SLR+      Supine Hip Abduction 2 sets X 10  Black band         Standing Marches 3 sets X 10 on Airex Standing at half wall   Standing Knee Flexion             PRE Machines      Knee Extension      Knee Flexion 3 sets X 10 with 30#    Leg Press 3 sets X 10 with 80# Range 70-10         CKC      Calf Raises 3 sets X 10 Seated (Pt cannot perform standing due to bilateral foot pain)   Mini Squats      Wall Sits      Step ups - 1 riser 2 sets X 10  Using half wall     Lateral Step Ups - 0 riser 2 sets X 10    TKE  Held today               Manual Intervention (37512)      Patellar Mobs      Femoral Tibial mobs      STM      Scar Massage      Foam Roll            NMR re-education (66745)   CUES NEEDED   Biodex Balance      Tandem Balance 30\" hold X 2 each   Held Airex today due to pt having pain in both feet *Feet staggered  *Standing next to half wallSLB      Rebounder      BOSU            Sit -> Stand  X 30 with no UE assistance     With cushion on chair     Up/Down 1 Flight of Stairs [] VASO  [] Manual (54003) x      [] Other:  [x] TA x 1 (15')     [] [facility] (49578)  [] ES(attended) (16072)      [] ES (un) (04188):         ASSESSMENT:   Pt tolerated the exercises well today but step ups continue to be challenging with pt needing to use the half wall for support. She did well with tandem stance with no UE touches needed. SLR's are getting easier and she was not as fatigued after the third set. She demonstrates good understanding of her program.  Discussed with pt the importance of performing the exercises consistently at home to continue increasing strength and endurance. MEDICARE CAP EXCEPTION DOCUMENTATION      I certify that this patient meets one of the below criteria necessary for becoming an exception to the Medicare cap on therapy services:    [x]  The patient has a condition identified by an ICD-9 code that has a direct and significant impact on the need for therapy. (Significantly impacts the rate of recovery.)                     []  The patient has a complexity identified by an ICD-9 code that has a direct and significant impact on the need for therapy. (Significantly impacts the rate of recovery and is associated with a primary condition.)         []  The patient has associated variables that influence the amount of treatment to include:  Social support, self-efficacy/motivation, prognosis, time since onset/acuity. []  The patient has generalized musculoskeletal conditions or a condition affecting multiple sites that will have a direct impact on the rate of recovery. [x]  The patient had a prior episode of outpatient therapy during this calendar year for a different condition. []  The patient has a mental or cognitive disorder in addition to the condition being treated that will have a direct and significant impact on the rate of recovery.                 GOALS: (Goals updated 12/21/21)    Patient stated goal:  \"Make my legs stronger; walk and climb stairs easier and faster. \"  [x] Progressing:  [] Met: [] Not Met: [] Adjusted    Therapist goals for Patient:   Short Term Goals: To be achieved in: 2 weeks  1. Independent in HEP and progression per patient tolerance, in order to prevent re-injury. [] Progressing: [x] Met: [] Not Met: [] Adjusted  2. Patient will have a decrease in left knee pain to 1-2/10 to facilitate improvement in movement, function, and ADLs as indicated by Functional Deficits. [] Progressing: [x] Met: [] Not Met: [] Adjusted    Long Term Goals: To be achieved in: 4-6 weeks  1. Disability index score of 20% or less for the Knee Outcome Survey Activities of Daily Living Scale to assist with reaching prior level of function. [x] Progressing: [] Met: [] Not Met: [] Adjusted  2. Patient will demonstrate an increase in left knee flexion AROM to at least 115 degrees to allow for proper joint functioning as indicated by patients Functional Deficits. [] Progressing:  [x] Met: [] Not Met: [] Adjusted  3. Patient will demonstrate an increase in left hip and knee strength to at least 4+/5 to allow for proper functional mobility as indicated by patients Functional Deficits. [] Progressing:  [x] Met: [] Not Met: [] Adjusted  4. Patient will be able to walk community distances, stand for at least 30 minutes, and be able to get up from a chair with little to no UE assistance needed for improved function. [] Progressing: [x] Met: [] Not Met: [] Adjusted  5. Patient will be able to ascend/descend stairs reciprocally with HR (patient specific functional goal)    [x] Progressing: [] Met: [] Not Met: [] Adjusted   6. Patient will have a decrease in left knee pain to 0-1/10 with daily act. [] Progressing: [x] Met: [] Not Met: [] Adjusted      New Goal: To be achieved in: 4 weeks (12/21/21)  1.  Patient will demonstrate an increase in left hip and knee strength to at least 5/5 to allow for proper functional mobility as indicated by patients Functional Deficits. [x] Progressing: [] Met: [] Not Met:  [] Adjusted        Overall Progression Towards Functional goals/ Treatment Progress Update:  [] Patient is progressing as expected towards functional goals listed. [] Progression is slowed due to complexities/Impairments listed. [x] Progression has been slowed due to co-morbidities - Pt underwent bilateral wrist surgeries which caused her to miss several weeks of therapy. [] Plan just implemented, too soon to assess goals progression <30days   [] Goals require adjustment due to lack of progress  [] Patient is not progressing as expected and requires additional follow up with physician  [] Other    Prognosis for POC: [x] Good [] Fair  [] Poor      Patient requires continued skilled intervention: [x] Yes  [] No    Treatment/Activity Tolerance:  [] Patient able to complete treatment  [x] Patient limited by fatigue  [] Patient limited by pain    [] Patient limited by other medical complications  [] Other:         PLAN: Continue therapy for 1 more visit then transition to an independent HEP. 1x/week for 2 more weeks for ROM, strengthening, balance act, HEP instruction, pt education, manual therapy prn, and modalities prn (12/21/21)  [x] Continue per plan of care [] Abe Milton current plan   [] Plan of care initiated [] Hold pending MD visit [] Discharge      Electronically signed by:  Micki Suarez PT     Physical Therapist Adore Rangel 421 #[id number]  Physical Therapist [place] License #[id number]    Note: If patient does not return for scheduled/ recommended follow up visits, this note will serve as a discharge from care along with most recent update on progress.

## 2022-01-06 ENCOUNTER — OFFICE VISIT (OUTPATIENT)
Dept: ORTHOPEDIC SURGERY | Age: 72
End: 2022-01-06
Payer: MEDICARE

## 2022-01-06 DIAGNOSIS — Z96.612 S/P REVERSE TOTAL SHOULDER ARTHROPLASTY, LEFT: ICD-10-CM

## 2022-01-06 DIAGNOSIS — G56.03 CARPAL TUNNEL SYNDROME ON BOTH SIDES: ICD-10-CM

## 2022-01-06 DIAGNOSIS — M25.512 LEFT SHOULDER PAIN, UNSPECIFIED CHRONICITY: Primary | ICD-10-CM

## 2022-01-06 PROCEDURE — 1036F TOBACCO NON-USER: CPT | Performed by: ORTHOPAEDIC SURGERY

## 2022-01-06 PROCEDURE — 99213 OFFICE O/P EST LOW 20 MIN: CPT | Performed by: ORTHOPAEDIC SURGERY

## 2022-01-06 PROCEDURE — 1090F PRES/ABSN URINE INCON ASSESS: CPT | Performed by: ORTHOPAEDIC SURGERY

## 2022-01-06 PROCEDURE — G8484 FLU IMMUNIZE NO ADMIN: HCPCS | Performed by: ORTHOPAEDIC SURGERY

## 2022-01-06 PROCEDURE — G8417 CALC BMI ABV UP PARAM F/U: HCPCS | Performed by: ORTHOPAEDIC SURGERY

## 2022-01-06 PROCEDURE — G8400 PT W/DXA NO RESULTS DOC: HCPCS | Performed by: ORTHOPAEDIC SURGERY

## 2022-01-06 PROCEDURE — G8427 DOCREV CUR MEDS BY ELIG CLIN: HCPCS | Performed by: ORTHOPAEDIC SURGERY

## 2022-01-06 PROCEDURE — 3017F COLORECTAL CA SCREEN DOC REV: CPT | Performed by: ORTHOPAEDIC SURGERY

## 2022-01-06 PROCEDURE — 1123F ACP DISCUSS/DSCN MKR DOCD: CPT | Performed by: ORTHOPAEDIC SURGERY

## 2022-01-06 PROCEDURE — 4040F PNEUMOC VAC/ADMIN/RCVD: CPT | Performed by: ORTHOPAEDIC SURGERY

## 2022-01-06 NOTE — PROGRESS NOTES
Chief Complaint    Follow-up (F/U LT SHOULDER)      History of Present Illness: Mikaela Santos is a pleasant, 66 y.o., female, here today for a post operative visit. The patient underwent a right carpal tunnel release on 9/20/2021. She had a left shoulder reverse arthroplasty in 6/29/2020 and she has been having intermittent pain with certain positions for a short period of duration. She reports she is still having numbness and tingling in the hand. There has been mild improvement in her symptoms after the right carpal tunnel steroid injection last visit. There has been no worsening in her hand numbness and tingling. She reports no new injuries or setbacks. Medical History:  Patient's medications, allergies, past medical, surgical, social and family histories were reviewed and updated as appropriate. Patient has had no medical changes since last evaluated        Review of Systems  A 14 point review of systems was completed by the patient on and is available in the media section of the scanned medical record and was reviewed on 1/6/2022. The review is negative with the exception of those things mentioned in the HPI and Past Medical History    Vital Signs: There were no vitals filed for this visit. General/Appearance: Alert and oriented and in no apparent distress. Skin:  There are no skin lesions, cellulitis, or extreme edema. The patient has warm and well-perfused Bilateral upper extremities with brisk capillary refill. Right hand exam:     Inspection: right hand incision well-healed. there is no erythema, drainage or other signs of infection     Palpation: No tenderness.        Active ROM: fully preserved. Strength:  Deferred    Neurovascular: Sensation to light touch is intact, no motor deficits, palpable radial pulses 2+    Left shoulder exam     Inspection: Well-healed incision. There is no erythema drainage or other signs of infection.     Palpation: Mild tenderness at the acromion    Active range of motion: Forward flexion 140, abduction 110, external rotation 20, internal rotation to sacrum    Passive range of motion: Same    Strength: 5/5    Neurovascular: Sensation to light touch is intact, no motor deficits, palpable radial pulses 2+           Assessment :  Ms. Mindi Reid is a 66 y.o. patient who presents for follow-up on her right carpal tunnel release she is 3.5 months out. The patient is still having symptoms of numbness and tingling's in her 3 radial fingers. No worsening of her symptoms. The steroid injection during last visit helped some of her symptoms. EMG results showed improvement compared to before surgery and there is some permanent changes in the median nerve. CT scan of the left shoulder:  1. Status post total reverse left shoulder arthroplasty. No evidence of dislocation or    loosening. Alignment is anatomic. 2. Thoracic spondylosis and endplate spurring throughout. 3. Moderate acromioclavicular joint arthrosis evident. Impression:  Encounter Diagnoses   Name Primary?  Left shoulder pain, unspecified chronicity Yes    S/P reverse total shoulder arthroplasty, left     Carpal tunnel syndrome on both sides        Office Procedures:  No orders of the defined types were placed in this encounter. Treatment Plan: Mindi Reid is overall doing well. She still having residual symptoms in her right hand. She is 3.5 months status post right carpal tunnel release. The postoperative EMG showed the possibility of some permanent median nerve damage with improvement as compared to preoperative EMG. The CT scan of the left shoulder did not show any acromial stress fracture and no loosening of the reverse shoulder prosthesis. We discussed that the nerve recovery is slow. Some of the damage might be permanent. However, we would wait for at least a year to look into the final recovery after the carpal tunnel release.      We will see Darris Canavan back in June for her 1 year follow-up on the reverse shoulder and/or as needed. All questions were answered to patient's satisfaction and She was encouraged to call with any further questions or concerns. Orion Eason is in agreement with this plan. Hari Leigh MD  [facility] Clinical Fellow  01/06/22  3:01 PM      The encounter with Eran Hidalgo was carried out by myself, Dr. Monika Palacios, who personally examined the patient and reviewed the plan. This dictation was performed with a verbal recognition program (DRAGON) and it was checked for errors. It is possible that there are still dictated errors within this office note. If so, please bring any errors to my attention for an addendum. All efforts were made to ensure that this office note is accurate.   ____________  I was physically present and personally supervised the Orthopaedic Sports Medicine Fellow in the evaluation and development of a treatment plan for this patient. I personally interviewed the patient and performed a physical examination. In addition, I discussed the patient's condition and treatment options with them. I have also reviewed and agree with the past medical, family and social history unless otherwise noted. All of the patient's questions were answered. Janel Palacios MD, PhD  1/6/2022

## 2022-01-11 ENCOUNTER — TREATMENT (OUTPATIENT)
Dept: PHYSICAL THERAPY | Age: 72
End: 2022-01-11
Payer: MEDICARE

## 2022-01-11 DIAGNOSIS — M17.12 PRIMARY OSTEOARTHRITIS OF LEFT KNEE: ICD-10-CM

## 2022-01-11 DIAGNOSIS — R29.898 WEAKNESS OF LEFT LOWER EXTREMITY: ICD-10-CM

## 2022-01-11 DIAGNOSIS — M25.662 DECREASED ROM OF LEFT KNEE: ICD-10-CM

## 2022-01-11 DIAGNOSIS — M25.562 LEFT KNEE PAIN, UNSPECIFIED CHRONICITY: Primary | ICD-10-CM

## 2022-01-11 PROCEDURE — G8427 DOCREV CUR MEDS BY ELIG CLIN: HCPCS | Performed by: PHYSICAL THERAPIST

## 2022-01-11 PROCEDURE — G8539 DOC FUNCT AND CARE PLAN: HCPCS | Performed by: PHYSICAL THERAPIST

## 2022-01-11 PROCEDURE — 97110 THERAPEUTIC EXERCISES: CPT | Performed by: PHYSICAL THERAPIST

## 2022-01-11 PROCEDURE — 1101F PT FALLS ASSESS-DOCD LE1/YR: CPT | Performed by: PHYSICAL THERAPIST

## 2022-01-11 PROCEDURE — 97530 THERAPEUTIC ACTIVITIES: CPT | Performed by: PHYSICAL THERAPIST

## 2022-01-11 PROCEDURE — 97112 NEUROMUSCULAR REEDUCATION: CPT | Performed by: PHYSICAL THERAPIST

## 2022-01-11 NOTE — PROGRESS NOTES
[facility]   Phone: [phone]    Fax: [phone]      Physical Therapy Discharge Summary    Dear  Dr. Raya Mcghee,    We had the pleasure of treating the following patient for physical therapy services at [address] [place] [address]. A summary of our findings can be found in the discharge summary below. If you have any questions or concerns regarding these findings, please do not hesitate to contact me at the office phone number checked above.   Thank you for the referral.     Physician Signature:________________________________Date:__________________  By signing above (or electronic signature), therapists plan is approved by physician       Overall Response to Treatment:   []Patient is responding well to treatment and improvement is noted with regards to goals   [x]Patient should continue to improve in reasonable time if they continue HEP   [x]Patient has plateaued and is no longer responding to skilled PT intervention    []Patient is getting worse and would benefit from return to referring MD   []Patient unable to adhere to initial POC   []Other:     Date range of Visits: 4/15/21 - 22 (therapy was interrupted twice due to undergoing left and right CTR)              Physical Therapy Treatment Note/ Progress Report:           Date:  2022    Patient Name:  General Tena    :  1950  MRN: [id number]  Restrictions/Precautions:    Medical/Treatment Diagnosis Information:  Diagnosis: Left Knee Pain (M25.562), Primary OA of Left Knee (M17.12)   Treatment Diagnosis: Decreased Left Knee ROM (M25.662), Decreased Strength (R29.898)        Insurance/Certification information:  PT Insurance Information: Medicare - Visits based on medical necessity  Physician Information:  Referring Practitioner: Dr. Raya Mcghee  Has the plan of care been signed (Y/N):        [x]  Yes (POC signed 21)  []  No      Date of Patient follow up with Physician: As needed      Is this a Progress Report:     [x]  Yes  []  No        If Yes:  Date Range for reporting period:  Beginning 4/15/21   Ending 1/11/22    Progress report will be due (10 Rx or 30 days whichever is less):  Pt discharged today      Recertification will be due (POC Duration  / 90 days whichever is less):  Pt discharged today        Visit # Insurance Allowable Auth Required   1 for 2022   Medical necessity   []  Yes [x]  No        Functional Scale:    Date assessed:  1/11/22  Functional Assessment Tool Used: Knee Outcome Survey Activities of Daily Living Scale (copy scanned into media)  Score:  46/70 = 66% (34% functional deficit)        (4/15/21)  Patient Age: 66years old  Patient Height: 5' 2\"  Patient Weight: 280 lbs  Patient BMI: 51.2      Pain (1/11/22)  [x]  Pain diagram was completed, pain was present and a follow up plan to address the pain is in the plan of care. ()   []  Pain diagram was completed and there was no pain present and therefore no follow up plan to address pain in the plan of care. ()   []  Pain diagram was not completed and the reason for not completing the pain diagram is in the medical chart ()  []  Patient refused to participate   []  Severe mental or physical capacity, patient is in urgent emergent situation and to complete the questionnaire would jeopardize the patient's health status        Functional Questionnaire (1/11/22)  [x]  Patient completed the functional outcome questionnaire and deficiencies were addressed in the plan of care. ()   []  Patient completed the outcome questionnaire and there were no functional deficits identified and therefore no plan of care is required. ()   []  Patient did not complete the functional outcome questionnaire and the reason why is documented in the medical chart. ()  []  Patient refused to participate   []  Patient unable to complete the questionnaire   []   A functional outcome assessment has been reported on within the last 30 days        Falls (1/11/22)  [x]  The patient has had no falls or 1 fall without injury in the past year. (1101F)   []  There is no documentation of fall in the medical chart (1101F,8P)     [] The patient has had 2 or more falls or one fall with injury in the past year that is documented in the medical chart. (1100F)  []  A falls risk assessment was completed and documented in the medical chart. (9284S)   []  Falls were addressed in the plan of care. (9621W)   []  A falls risk assessment was not completed and documented in the medical chart (6261N,3Q)   []   Falls were not addressed in the plan of care and reason was documented in the plan of care. (9219W 1P)        Medication (1/11/22)     [x] A list of current medications (including prescription, over the counter, herbal supplements, vitamin supplements, mineral supplements, dietary supplements) was reviewed with the patient and documented in the medical chart. ()        Falls Risk Assessment (30 days): (1/11/22)  [x] Falls Risk assessed and no intervention required. [] Falls Risk assessed and Patient requires intervention due to being higher risk   TUG score (>12s at risk):     [] Falls education provided, including     PQRS:   Reviewed medication list with patient. No changes reported by pt since last PT visit. (1/11/22)          Latex Allergy:  [x]NO      []YES  Preferred Language for Healthcare:   [x]English       []other:      Pain level:  0-1/10 (1/11/22)   Medication:  Celebrex      SUBJECTIVE:  Pt states both her knees are stronger and she feels therapy has helped a lot. She is able to get up from a chair easier, stand for longer periods of time, and walk longer distances but stairs are still difficult.           OBJECTIVE:       (1/11/22)  Flexibility L R Comment   Hamstring Mild tightness Mild tightness    Gastroc Mild tightness Mild tightness    ITB      Quad              (1/11/22)  ROM PROM AROM Overpressure Comment    L R L R hold X 10     SLR Flexion 3 sets X 10 with 2#     SLR Abduction  HEP   SLR Prone      SLR Adduction      SLR+      Supine Hip Abduction 2 sets X 10  Silver band         Standing Marches 3 sets X 10 on Airex Standing at half wall   Standing Knee Flexion             PRE Machines      Knee Extension      Knee Flexion 3 sets X 10 with 40#    Leg Press 3 sets X 10 with 85# Range 70-10         CKC      Calf Raises 3 sets X 10 Seated (Pt cannot perform standing due to bilateral foot pain)   Mini Squats      Wall Sits      Step ups - 1 riser 2 sets X 10  Using half wall     Lateral Step Ups - 0 riser 2 sets X 10    TKE  Held today               Manual Intervention (25641)      Patellar Mobs      Femoral Tibial mobs      STM      Scar Massage      Foam Roll            NMR re-education (66150)   CUES NEEDED   Biodex Balance      Tandem Balance 30\" hold X 2 each   Held Airex today due to pt having pain in both feet *Feet staggered  *Standing next to half wallSLB      Rebounder      BOSU            Sit -> Stand  X 30 with no UE assistance     With cushion on chair     Up/Down 1 Flight of Stairs in the [facility] today due to pain in her left shoulder and not wanting pt to pull with her left arm      Therapeutic Activity (96014)      Core Training      [place] sevenload Activities      Monster Walks      TRX training                  Patient Education: Dx, prognosis, pt goals/expectations, role of therapy (4/15/21)  X 8'                  Therapeutic Exercise and NMR EXR  [x] (75793) Provided verbal/tactile cueing for activities related to strengthening, flexibility, endurance, ROM for improvements in LE, proximal hip, and core control with self care, mobility, lifting, ambulation.   [x] (63419) Provided verbal/tactile cueing for activities related to improving balance, coordination, kinesthetic sense, posture, motor skill, proprioception to assist with LE, proximal hip, and core control in self-care, mobility, lifting, ambulation and eccentric single leg control. NMR and Therapeutic Activities:    [x] (52399 or 73052) Provided verbal/tactile cueing for activities related to improving balance, coordination, kinesthetic sense, posture, motor skill, proprioception and motor activation to allow for proper function of core, proximal hip and LE with self-care and ADLs and functional mobility.   [] (82866) Gait Re-education- Provided training and instruction to the patient for proper LE, core and proximal hip recruitment and positioning and eccentric body weight control with ambulation re-education including up and down stairs     Home Exercise Program:    [x] (89068) Reviewed/Progressed HEP activities related to strengthening, flexibility, endurance, ROM of core, proximal hip and LE for functional self-care, mobility, lifting and ambulation/stair navigation - Reviewed HEP with pt and recommended she continue the exercises 3-4x/week after D/C. (1/11/22)  [x] (70141) Reviewed/Progressed HEP activities related to improving balance, coordination, kinesthetic sense, posture, motor skill, proprioception of core, proximal hip and LE for self-care, mobility, lifting, and ambulation/stair navigation          Access Code: [email or web address]  URL: [email or web address]: 04/20/2021Prepared by: [email or web address] SybertExercises   Long Sitting Calf Stretch with Strap - 1 x daily - 7 x weekly - 1 sets - 3 reps - 30 seconds hold   Seated Table Hamstring Stretch - 1 x daily - 7 x weekly - 1 sets - 3 reps - 30 seconds hold   Long Sitting Quad Set - 1 x daily - 7 x weekly - 1 sets - 10 reps - 10 seconds hold   Long Sitting Hip Adduction Isometric with Ball - 1 x daily - 7 x weekly - 1 sets - 10 reps - 10 seconds hold   Supine Active Straight Leg Raise - 1 x daily - 7 x weekly - 2-3 sets - 10 reps   Hooklying Isometric Clamshell - 1 x daily - 7 x weekly - 3 sets - 10 reps   Standing March with Counter Support - 1 x daily - 7 x weekly - 3 sets - 10 reps   Seated Heel Raise - 1 x daily - 7 x weekly - 3 sets - 10 reps   Sit to Stand - 1 x daily - 7 x weekly - 15 reps   Tandem Stance with Support - 1 x daily - 7 x weekly - 2 reps - 20 seconds hold        Manual Treatments:  PROM / STM / Oscillations-Mobs:  G-I, II, III, IV (PA's, Inf., Post.)  [] (36404) Provided manual therapy to mobilize LE, proximal hip and/or LS spine soft tissue/joints for the purpose of modulating pain, promoting relaxation, increasing ROM, reducing/eliminating soft tissue swelling/inflammation/restriction, improving soft tissue extensibility and allowing for proper ROM for normal function with self-care, mobility, lifting and ambulation. Modalities:   Pt declined ice today   [] GAME READY (VASO)- for significant edema, swelling, pain control. Charges:  Timed Code Treatment Minutes: 61'   Total Treatment Minutes: 61'      [] EVAL (LOW) 44621 (typically 20 minutes face-to-face)  [] EVAL (MOD) 77092 (typically 30 minutes face-to-face)  [] EVAL (HIGH) 96129 (typically 45 minutes face-to-face)  [] RE-EVAL     [x] GW(83632) x 2 (30')    [] IONTO  [x] NMR (46476) x 1 (15')   [] VASO  [] Manual (06086) x      [] Other:  [x] TA x 1 (15')     [] Mech Traction (99337)  [] ES(attended) (39800)      [] ES (un) (86234):         ASSESSMENT:  Overall, pt has made good progress in therapy. She had to miss several weeks of therapy for her knee due to undergoing bilateral wrist surgeries which slowed her progress. ROM, strength, and endurance have all improved since starting therapy. She is able to get up from a chair easier, stand for longer periods of time, and walk longer distances due to increased strength and endurance. Ascending/descending stairs, however, continues to be difficult. She is able to go up and down stairs if there are 2 HR's but she has to use both arms to help pull herself up onto the step. At this time, pt has plateaued with therapy and is ready to continue with an independent HEP.   Discussed the importance of keeping up with the exercises at home to continue increasing strength and endurance. MEDICARE CAP EXCEPTION DOCUMENTATION      I certify that this patient meets one of the below criteria necessary for becoming an exception to the Medicare cap on therapy services:    [x]  The patient has a condition identified by an ICD-9 code that has a direct and significant impact on the need for therapy. (Significantly impacts the rate of recovery.)                     []  The patient has a complexity identified by an ICD-9 code that has a direct and significant impact on the need for therapy. (Significantly impacts the rate of recovery and is associated with a primary condition.)         []  The patient has associated variables that influence the amount of treatment to include:  Social support, self-efficacy/motivation, prognosis, time since onset/acuity. []  The patient has generalized musculoskeletal conditions or a condition affecting multiple sites that will have a direct impact on the rate of recovery. [x]  The patient had a prior episode of outpatient therapy during this calendar year for a different condition. []  The patient has a mental or cognitive disorder in addition to the condition being treated that will have a direct and significant impact on the rate of recovery. GOALS: (Goals updated 1/11/22)    Patient stated goal:  \"Make my legs stronger; walk and climb stairs easier and faster. \"  [x] Progressing:  [] Met: [] Not Met: [] Adjusted    Therapist goals for Patient:   Short Term Goals: To be achieved in: 2 weeks  1. Independent in HEP and progression per patient tolerance, in order to prevent re-injury. [] Progressing: [x] Met: [] Not Met: [] Adjusted  2. Patient will have a decrease in left knee pain to 1-2/10 to facilitate improvement in movement, function, and ADLs as indicated by Functional Deficits.   [] Progressing: [x] Met: [] Not Met: [] Adjusted    Long Term Goals: To be achieved in: 4-6 weeks  1. Disability index score of 20% or less for the Knee Outcome Survey Activities of Daily Living Scale to assist with reaching prior level of function. [x] Progressing: [] Met: [] Not Met: [] Adjusted  2. Patient will demonstrate an increase in left knee flexion AROM to at least 115 degrees to allow for proper joint functioning as indicated by patients Functional Deficits. [] Progressing:  [x] Met: [] Not Met: [] Adjusted  3. Patient will demonstrate an increase in left hip and knee strength to at least 4+/5 to allow for proper functional mobility as indicated by patients Functional Deficits. [] Progressing:  [x] Met: [] Not Met: [] Adjusted  4. Patient will be able to walk community distances, stand for at least 30 minutes, and be able to get up from a chair with little to no UE assistance needed for improved function. [] Progressing: [x] Met: [] Not Met: [] Adjusted  5. Patient will be able to ascend/descend stairs reciprocally with HR (patient specific functional goal)    [x] Progressing: [] Met: [] Not Met: [] Adjusted   6. Patient will have a decrease in left knee pain to 0-1/10 with daily act. [] Progressing: [x] Met: [] Not Met: [] Adjusted      New Goal: To be achieved in: 4 weeks (1/11/22)  1. Patient will demonstrate an increase in left hip and knee strength to at least 5/5 to allow for proper functional mobility as indicated by patients Functional Deficits. [x] Progressing: [] Met: [] Not Met:  [] Adjusted        Overall Progression Towards Functional goals/ Treatment Progress Update:  [] Patient is progressing as expected towards functional goals listed. [] Progression is slowed due to complexities/Impairments listed. [x] Progression has been slowed due to co-morbidities - Pt underwent bilateral wrist surgeries which caused her to miss several weeks of therapy.     [] Plan just implemented, too soon to assess goals progression <30days   [] Goals require adjustment due to lack of progress  [] Patient is not progressing as expected and requires additional follow up with physician  [] Other    Prognosis for POC: [x] Good [] Fair  [] Poor      Patient requires continued skilled intervention: [] Yes  [x] No    Treatment/Activity Tolerance:  [] Patient able to complete treatment  [x] Patient limited by fatigue  [] Patient limited by pain    [] Patient limited by other medical complications  [] Other:         PLAN: Pt to be discharged to an independent [facility] at this time. Pt was instructed to contact the clinic with any questions/problems. [] Continue per plan of care [] Alter current plan   [] Plan of care initiated [] Hold pending MD visit [x] Discharge      Electronically signed by:  Cristobal Millan PT     Physical Therapist Adore Rangel 421 #[id number]  Physical Therapist [place] License #[id number]    Note: If patient does not return for scheduled/ recommended follow up visits, this note will serve as a discharge from care along with most recent update on progress.

## 2022-06-23 ENCOUNTER — OFFICE VISIT (OUTPATIENT)
Dept: ORTHOPEDIC SURGERY | Age: 72
End: 2022-06-23

## 2022-06-23 VITALS — BODY MASS INDEX: 47.48 KG/M2 | HEIGHT: 63 IN | WEIGHT: 268 LBS

## 2022-06-23 DIAGNOSIS — Z98.890 S/P BILATERAL CARPAL TUNNEL RELEASE: ICD-10-CM

## 2022-06-23 DIAGNOSIS — Z96.612 S/P REVERSE TOTAL SHOULDER ARTHROPLASTY, LEFT: ICD-10-CM

## 2022-06-23 DIAGNOSIS — M25.512 LEFT SHOULDER PAIN, UNSPECIFIED CHRONICITY: Primary | ICD-10-CM

## 2022-06-23 PROCEDURE — 99213 OFFICE O/P EST LOW 20 MIN: CPT | Performed by: ORTHOPAEDIC SURGERY

## 2022-06-23 PROCEDURE — 1036F TOBACCO NON-USER: CPT | Performed by: ORTHOPAEDIC SURGERY

## 2022-06-23 PROCEDURE — 3017F COLORECTAL CA SCREEN DOC REV: CPT | Performed by: ORTHOPAEDIC SURGERY

## 2022-06-23 PROCEDURE — 1123F ACP DISCUSS/DSCN MKR DOCD: CPT | Performed by: ORTHOPAEDIC SURGERY

## 2022-06-23 PROCEDURE — G8427 DOCREV CUR MEDS BY ELIG CLIN: HCPCS | Performed by: ORTHOPAEDIC SURGERY

## 2022-06-23 PROCEDURE — 1090F PRES/ABSN URINE INCON ASSESS: CPT | Performed by: ORTHOPAEDIC SURGERY

## 2022-06-23 PROCEDURE — G8400 PT W/DXA NO RESULTS DOC: HCPCS | Performed by: ORTHOPAEDIC SURGERY

## 2022-06-23 PROCEDURE — G8417 CALC BMI ABV UP PARAM F/U: HCPCS | Performed by: ORTHOPAEDIC SURGERY

## 2022-06-23 NOTE — PROGRESS NOTES
[facility]  History and Physical  Shoulder Pain    Date:  2022    Name:  Chinyere Gaines  Address:  [address] [place] [address]    :  1950      Age:   67 y.o.    SSN:  [id number]      Medical Record Number:  [id number]    Reason for Visit:    Shoulder Pain (F/U LEFT SHOULDER)      HPI:   Chinyere Gaines is a 67 y.o. female who presents to our office today for follow up of the left shoulder pain. The patient underwent a right carpal tunnel release on 2021. She had a left shoulder reverse arthroplasty in 2020. Patient reports her left shoulder continues to do well. She has no questions or concerns regarding that. She is able to sleep on that side with little discomfort. She is able to do activities as tolerated. She denies new injuries or setbacks since her last      Pain Assessment  Location of Pain: Shoulder  Location Modifiers: Left  Severity of Pain: 0  Duration of Pain: A few minutes  Frequency of Pain: Rarely  Aggravating Factors: Other (Comment)  Limiting Behavior: Some  Relieving Factors: Rest,Exercise  Work-Related Injury: No  Are there other pain locations you wish to document?: No    No notes on file    Review of Systems:  A 14 point review of systems available in the scanned medical record as documented by the patient. The review is negative with the exception of those things mentioned in the History of Present Illness and Past Medical History. Past Medical History:  Patient's medications, allergies, past medical, surgical, social and family histories were reviewed and updated as appropriate. Allergies: Allergies   Allergen Reactions    Codeine Nausea And Vomiting    Morphine      Other reaction(s):  Other (See Comments)  Migraines     Acetaminophen Other (See Comments)     Was told should not take due to history of hepatitis    Adhesive Tape      blisters    Ciprofloxacin     Sulfa Antibiotics        Physical Exam:  There were no vitals filed for this visit. General: Rocio Perez is a healthy and well appearing 67 y.o. female who is sitting comfortably in our office in acute distress. Skin:  There are no skin lesions, cellulitis, or extreme edema. The patient has warm and well-perfused Bilateral upper extremities with brisk capillary refill. Eyes: Extra-ocular muscles intact  Mouth: Oral mucosa moist. No perioral lesions  Pulm: Respirations unlabored and regular. Neuro: Alert and oriented x3    left Shoulder Exam:  Inspection:  No gross deformities, no signs of infection. Palpation:  She has no crepitus to passive movement    Active Range of Motion: Forward elevation of 130, abduction of 130, external rotation with elbow at the side 5, internal rotation to the back is T8    Passive Range of Motion: deferred    Strength: External rotation with resistance with elbow at the side +3/5, internal rotation with resistance with elbow at the side 4/5, supraspinatus testing 4/5    Special Tests:  Positive external lag. No Juan Ramon muscle deformity. Neurovascular: Sensation to light touch is intact, no motor deficits, palpable radial pulses 2+    Additional Examinations:    Examination of the contralateral extremity does not show any tenderness, deformity or injury. Range of motion is unremarkable. There is no gross instability. There are no rashes, ulcerations or lesions. Strength and tone are normal.      Radiographic:  3 xray views of the left  shoulder including True AP in internal and external and axillary lateral were taken in our office today reveals post surgical changes from a reverse total shoulder arthroplasty. All components are in good placement without any signs of loosening. No fractures, dislocations, visible tumors, or signs of acute trauma. Self assessment questionnaires including ASES and Simple Shoulder Test were completed today.     Assessment:  Rocio Perez is a 67 y.o. female who has a history of undergoing a left reverse total shoulder arthroplasty on 6/29/2020. She also underwent carpal tunnel release bilaterally, but on the right side she still has some residual symptoms. Impression:  Encounter Diagnoses   Name Primary?  Left shoulder pain, unspecified chronicity Yes    S/P reverse total shoulder arthroplasty, left     S/p bilateral carpal tunnel release        Office Procedures:  Orders Placed This Encounter   Procedures    XR SHOULDER LEFT (MIN 2 VIEWS)     Standing Status:   Future     Number of Occurrences:   1     Standing Expiration Date:   6/23/2023       Plan: Cam Pereira continues to do well with her left shoulder replacement. She was encouraged to continue doing activities as tolerated. We would like to see her back at her 3-year postop visit for repeat radiographs. We will have her fill out a self-assessment questionnaire in the meantime. 6/23/2022  3:52 PM      Cheryle Haley, PA-C  Orthopaedic Sports Medicine Physician Assistant    During this examination, I, Cheryle Haley, PA-C, functioned as a scribe for Dr. Gustavo Kc. This dictation was performed with a verbal recognition program (DRAGON) and it was checked for errors. It is possible that there are still dictated errors within this office note. If so, please bring any errors to my attention for an addendum. All efforts were made to ensure that this office note is accurate.  ______________  I, Dr. Gustavo Kc, personally performed the services described in this documentation as described by Cheryle Haley, PA-C in my presence, and it is both accurate and complete. Janel Moreno MD, PhD  6/23/2022

## 2022-11-02 NOTE — PROGRESS NOTES
[facility]   Phone: [phone]    Fax: [phone]            Physical Therapy  Cancellation/No-show Note  Patient Name:  Wellington Maldonado  :  1950   Date:  2020  Cancelled visits to date: 3  No-shows to date: 0    For today's appointment patient:  [x]  Cancelled  []  Rescheduled appointment  []  No-show     Reason given by patient:  []  Patient ill  []  Conflicting appointment  []  No transportation    []  Conflict with work  []  No reason given  [x]  Other:     Comments:  Pt is still having a lot of pain in her legs due to cellulitis/lymphedema. Phone call information:   []  Phone call made today to patient at _ time at number provided:      []  Patient answered, conversation as follows:    []  Patient did not answer, message left as follows:  []  Phone call not made today  [x]  Phone call not needed - pt contacted us to cancel and provided reason for cancellation.      Electronically signed by:  Fide Ramirez, PT    Physical Therapist Adore Rangel [phone] #[id number]  Physical Therapist [place] License #[id number] Surgeon Performing Repair (Optional): DR. JAYLENE REYNOLDS

## 2024-08-15 ENCOUNTER — EVALUATION (OUTPATIENT)
Dept: PHYSICAL THERAPY | Age: 74
End: 2024-08-15

## 2024-08-15 DIAGNOSIS — M17.0 LOCALIZED OSTEOARTHRITIS OF BOTH KNEES: Primary | ICD-10-CM

## 2024-08-15 DIAGNOSIS — R29.898 WEAKNESS OF BOTH LOWER EXTREMITIES: ICD-10-CM

## 2024-08-15 DIAGNOSIS — M25.561 PAIN IN BOTH KNEES, UNSPECIFIED CHRONICITY: ICD-10-CM

## 2024-08-15 DIAGNOSIS — Z91.81 AT RISK FOR FALLS: ICD-10-CM

## 2024-08-15 DIAGNOSIS — M25.562 PAIN IN BOTH KNEES, UNSPECIFIED CHRONICITY: ICD-10-CM

## 2024-08-15 NOTE — PROGRESS NOTES
Estim Attended (81589)    [facility] Repositioning (06016)     Other:    Other:    Total Timed Code Tx Minutes 32' 2  1     Total Treatment Minutes 60'        Charge Justification:  (96084) THERAPEUTIC EXERCISE - Provided verbal/tactile cueing for activities related to strengthening, flexibility, endurance, ROM performed to prevent loss of range of motion, maintain or improve muscular strength or increase flexibility, following either an injury or surgery.   (64177) HOME EXERCISE PROGRAM - Reviewed/Progressed HEP activities related to strengthening, flexibility, endurance, ROM performed to prevent loss of range of motion, maintain or improve muscular strength or increase flexibility, following either an injury or surgery.  (29891) NEUROMUSCULAR RE-EDUCATION - Therapeutic procedure, 1 or more areas, each 15 minutes; neuromuscular reeducation of movement, balance, coordination, kinesthetic sense, posture, and/or proprioception for sitting and/or standing activities  (84931) HOME EXERCISE PROGRAM - Reviewed/Progressed HEP activities related to neuromuscular reeducation of movement, balance, coordination, kinesthetic sense, posture, and/or proprioception for sitting and/or standing activities      GOALS (Goals made 8/15/2024)     Patient stated goal: \"Increase balance and strength; improve movement/walking\"  [] Progressing: [] Met: [] Not Met: [] Adjusted    Therapist goals for Patient:   Short Term Goals: To be achieved in: 2 weeks  1. Independent in HEP and progression per patient tolerance, in order to prevent re-injury.     [] Progressing: [] Met: [] Not Met: [] Adjusted    2. Patient will have a decrease in bilateral leg pain to <2/10 to facilitate improvement in movement, function, and ADLs as indicated by Functional Deficits.    [] Progressing: [] Met: [] Not Met: [] Adjusted      Long Term Goals: To be achieved in: 6-8 weeks  1. Disability index score of 25%% or less for the  Knee Outcome Survey Activities of

## 2024-08-18 PROBLEM — M25.561 PAIN IN BOTH KNEES: Status: ACTIVE | Noted: 2021-04-15

## 2024-08-18 PROBLEM — Z91.81 AT RISK FOR FALLS: Status: ACTIVE | Noted: 2024-08-18

## 2024-08-18 PROBLEM — M17.0 LOCALIZED OSTEOARTHRITIS OF BOTH KNEES: Status: ACTIVE | Noted: 2021-04-15

## 2024-08-22 ENCOUNTER — TREATMENT (OUTPATIENT)
Dept: PHYSICAL THERAPY | Age: 74
End: 2024-08-22
Payer: MEDICARE

## 2024-08-22 DIAGNOSIS — M25.561 PAIN IN BOTH KNEES, UNSPECIFIED CHRONICITY: ICD-10-CM

## 2024-08-22 DIAGNOSIS — Z91.81 AT RISK FOR FALLS: ICD-10-CM

## 2024-08-22 DIAGNOSIS — M17.0 LOCALIZED OSTEOARTHRITIS OF BOTH KNEES: Primary | ICD-10-CM

## 2024-08-22 DIAGNOSIS — M25.562 PAIN IN BOTH KNEES, UNSPECIFIED CHRONICITY: ICD-10-CM

## 2024-08-22 DIAGNOSIS — R29.898 WEAKNESS OF BOTH LOWER EXTREMITIES: ICD-10-CM

## 2024-08-22 PROCEDURE — 97530 THERAPEUTIC ACTIVITIES: CPT | Performed by: PHYSICAL THERAPIST

## 2024-08-22 PROCEDURE — 97112 NEUROMUSCULAR REEDUCATION: CPT | Performed by: PHYSICAL THERAPIST

## 2024-08-22 PROCEDURE — 97110 THERAPEUTIC EXERCISES: CPT | Performed by: PHYSICAL THERAPIST

## 2024-08-22 NOTE — PROGRESS NOTES
[facility]      [address] [place] [address]     P:[phone]  F:[phone]       Physical Therapy Initial Evaluation Certification        Physical Therapy: TREATMENT/PROGRESS NOTE   Patient: Kae Shaw (74 y.o. female)   Examination Date: 2024   :  1950 MRN: [id number]   Visit #: 2     Insurance Allowable Auth Needed   Visits based on medical necessity  (20 visits then modifier) []Yes    [x]No    Insurance: Payor: MEDICARE / Plan: MEDICARE PART A AND B / Product Type: *No Product type* /   Insurance ID: [id number] - (Medicare)  Secondary Insurance (if applicable):              Treatment Diagnosis:     Knee Pain [M25.561, M25.562]  Weakness Bilateral Lower Extremities [R29.898]         Medical Diagnosis:    OA Knees [M17.0]  Fall Risk [Z91.81]     Referring Physician: David Garcia MD  PCP: Paco Leong       Plan of care signed (Y/N): POC sent 8/15/2024    Date of Patient follow up with Physician: As needed     Plan of Care Report: NO  POC update due: (10 visits /OR AUTH LIMITS, whichever is less)  2024                                             Medical History:  Comorbidities:  Hypertension  Osteoporosis/Osteopenia  Osteoarthritis  Anxiety  COVID-19  Other Musculoskeletal Conditions: See list below  Prior Surgeries: See list below  Relevant Medical History: Left Reverse TSA on 20, Right Reverse TSA ~, HTN, OA, Osteoporosis, HA/Migraines, Left Carpal Tunnel Release 21, Right Carpal Tunnel Release 21, Kidney disease (stage 3)                                              Precautions/ Contra-indications:           Latex allergy:  NO  Pacemaker:    NO  Contraindications for Manipulation: osteoporosis   Date of Surgery: NA  Other:    Red Flags:  None    Suicide Screening:   The patient did not verbalize a primary behavioral concern, suicidal ideation, suicidal intent, or demonstrate suicidal

## 2024-08-27 ENCOUNTER — TREATMENT (OUTPATIENT)
Dept: PHYSICAL THERAPY | Age: 74
End: 2024-08-27
Payer: MEDICARE

## 2024-08-27 DIAGNOSIS — M17.0 LOCALIZED OSTEOARTHRITIS OF BOTH KNEES: Primary | ICD-10-CM

## 2024-08-27 DIAGNOSIS — M25.561 PAIN IN BOTH KNEES, UNSPECIFIED CHRONICITY: ICD-10-CM

## 2024-08-27 DIAGNOSIS — M25.562 PAIN IN BOTH KNEES, UNSPECIFIED CHRONICITY: ICD-10-CM

## 2024-08-27 DIAGNOSIS — R29.898 WEAKNESS OF BOTH LOWER EXTREMITIES: ICD-10-CM

## 2024-08-27 DIAGNOSIS — Z91.81 AT RISK FOR FALLS: ICD-10-CM

## 2024-08-27 PROCEDURE — 97530 THERAPEUTIC ACTIVITIES: CPT | Performed by: PHYSICAL THERAPIST

## 2024-08-27 PROCEDURE — 97112 NEUROMUSCULAR REEDUCATION: CPT | Performed by: PHYSICAL THERAPIST

## 2024-08-27 PROCEDURE — 97110 THERAPEUTIC EXERCISES: CPT | Performed by: PHYSICAL THERAPIST

## 2024-08-27 NOTE — PROGRESS NOTES
posture, and/or proprioception for sitting and/or standing activities  (32410) HOME EXERCISE PROGRAM - Reviewed/Progressed HEP activities related to neuromuscular reeducation of movement, balance, coordination, kinesthetic sense, posture, and/or proprioception for sitting and/or standing activities      GOALS (Goals made 8/15/2024)     Patient stated goal: \"Increase balance and strength; improve movement/walking\"  [] Progressing: [] Met: [] Not Met: [] Adjusted    Therapist goals for Patient:   Short Term Goals: To be achieved in: 2 weeks  1. Independent in HEP and progression per patient tolerance, in order to prevent re-injury.     [] Progressing: [] Met: [] Not Met: [] Adjusted    2. Patient will have a decrease in bilateral leg pain to <2/10 to facilitate improvement in movement, function, and ADLs as indicated by Functional Deficits.    [] Progressing: [] Met: [] Not Met: [] Adjusted      Long Term Goals: To be achieved in: 6-8 weeks  1. Disability index score of 25%% or less for the  Knee Outcome Survey Activities of Daily Living Scale  to assist with reaching prior level of function with activities such as getting up from a chair, getting out of bed, standing, walking community distances, and ascending/descending stairs.    [] Progressing: [] Met: [] Not Met: [] Adjusted    2. Patient will demonstrate an increase in bilateral knee flexion AROM to at least 115° without pain to allow for proper joint functioning to enable patient to amb with a normal gait and ascend/descend stairs.     [] Progressing: [] Met: [] Not Met: [] Adjusted    3. Patient will demonstrate an increase in bilateral LE strength (hip, knee, and ankle) to at least 4+/5 throughout without pain to allow for proper functional mobility to enable patient to get up from a chair, get out of bed, ascend/descend stairs, and amb community distances.     [] Progressing: [] Met: [] Not Met: [] Adjusted    4. Patient will be able to amb with an  and strengthening exercises.      Electronically Signed by Keny Eastman, PT  Date: 08/27/2024        Board Certified Orthopaedic Clinical Specialist  TPI Certified  Physical Therapist    [place] PT #[id number]  [place] PT #[id number]       Note: Portions of this note have been templated and/or copied from initial evaluation, reassessments and prior notes for documentation efficiency.    Note: If patient does not return for scheduled/recommended follow up visits, this note will serve as a discharge from care along with the most recent update on progress.

## 2024-09-03 ENCOUNTER — TREATMENT (OUTPATIENT)
Dept: PHYSICAL THERAPY | Age: 74
End: 2024-09-03
Payer: MEDICARE

## 2024-09-03 DIAGNOSIS — R29.898 WEAKNESS OF BOTH LOWER EXTREMITIES: ICD-10-CM

## 2024-09-03 DIAGNOSIS — M17.0 LOCALIZED OSTEOARTHRITIS OF BOTH KNEES: Primary | ICD-10-CM

## 2024-09-03 DIAGNOSIS — Z91.81 AT RISK FOR FALLS: ICD-10-CM

## 2024-09-03 DIAGNOSIS — M25.561 PAIN IN BOTH KNEES, UNSPECIFIED CHRONICITY: ICD-10-CM

## 2024-09-03 DIAGNOSIS — M25.562 PAIN IN BOTH KNEES, UNSPECIFIED CHRONICITY: ICD-10-CM

## 2024-09-03 PROCEDURE — 97112 NEUROMUSCULAR REEDUCATION: CPT | Performed by: PHYSICAL THERAPIST

## 2024-09-03 PROCEDURE — 97110 THERAPEUTIC EXERCISES: CPT | Performed by: PHYSICAL THERAPIST

## 2024-09-03 PROCEDURE — 97530 THERAPEUTIC ACTIVITIES: CPT | Performed by: PHYSICAL THERAPIST

## 2024-09-03 NOTE — PROGRESS NOTES
[facility]      [address] [place] [address]     P:[phone]  F:[phone]             Physical Therapy: TREATMENT/PROGRESS NOTE   Patient: Kae Shaw (74 y.o. female)   Examination Date: 2024   :  1950 MRN: [id number]   Visit #: 4     Insurance Allowable Auth Needed   Visits based on medical necessity  (20 visits then modifier) []Yes    [x]No    Insurance: Payor: MEDICARE / Plan: MEDICARE PART A AND B / Product Type: *No Product type* /   Insurance ID: [id number] - (Medicare)  Secondary Insurance (if applicable):              Treatment Diagnosis:     Knee Pain [M25.561, M25.562]  Weakness Bilateral Lower Extremities [R29.898]         Medical Diagnosis:    OA Knees [M17.0]  Fall Risk [Z91.81]     Referring Physician: David Garcia MD  PCP: Paco Leong       Plan of care signed (Y/N): POC sent 8/15/2024    Date of Patient follow up with Physician: As needed     Plan of Care Report: NO  POC update due: (10 visits /OR AUTH LIMITS, whichever is less)  2024                                             Medical History:  Comorbidities:  Hypertension  Osteoporosis/Osteopenia  Osteoarthritis  Anxiety  COVID-19  Other Musculoskeletal Conditions: See list below  Prior Surgeries: See list below  Relevant Medical History: Left Reverse TSA on 20, Right Reverse TSA ~, HTN, OA, Osteoporosis, HA/Migraines, Left Carpal Tunnel Release 21, Right Carpal Tunnel Release 21, Kidney disease (stage 3)                                              Precautions/ Contra-indications:           Latex allergy:  NO  Pacemaker:    NO  Contraindications for Manipulation: osteoporosis   Date of Surgery: NA  Other:    Red Flags:  None    Suicide Screening:   The patient did not verbalize a primary behavioral concern, suicidal ideation, suicidal intent, or demonstrate suicidal behaviors.    Preferred Language for Healthcare:   [x]

## 2024-09-05 ENCOUNTER — TREATMENT (OUTPATIENT)
Dept: PHYSICAL THERAPY | Age: 74
End: 2024-09-05
Payer: MEDICARE

## 2024-09-05 DIAGNOSIS — R29.898 WEAKNESS OF BOTH LOWER EXTREMITIES: ICD-10-CM

## 2024-09-05 DIAGNOSIS — M25.561 PAIN IN BOTH KNEES, UNSPECIFIED CHRONICITY: ICD-10-CM

## 2024-09-05 DIAGNOSIS — Z91.81 AT RISK FOR FALLS: ICD-10-CM

## 2024-09-05 DIAGNOSIS — M17.0 LOCALIZED OSTEOARTHRITIS OF BOTH KNEES: Primary | ICD-10-CM

## 2024-09-05 DIAGNOSIS — M25.562 PAIN IN BOTH KNEES, UNSPECIFIED CHRONICITY: ICD-10-CM

## 2024-09-05 PROCEDURE — 97530 THERAPEUTIC ACTIVITIES: CPT | Performed by: PHYSICAL THERAPIST

## 2024-09-05 PROCEDURE — 97112 NEUROMUSCULAR REEDUCATION: CPT | Performed by: PHYSICAL THERAPIST

## 2024-09-05 PROCEDURE — 97110 THERAPEUTIC EXERCISES: CPT | Performed by: PHYSICAL THERAPIST

## 2024-09-05 NOTE — PROGRESS NOTES
outcome questionnaire and the reason why is documented in the medical chart.()  []  Patient refused to participate   []  Patient unable to complete the questionnaire   []   A functional outcome assessment has been reported on within the last 30 days        Falls (8/15/2024)  []  The patient has had no falls or 1 fall without injury in the past year. (1101F)   []  There is no documentation of fall in the medical chart (1101F,8P)     [x] The patient has had 2 or more falls or one fall with injury in the past year that is documented in the medical chart. (1100F)  [x]  A falls risk assessment was completed and documented in the medical chart. (3288F)   []  Falls were addressed in the plan of care. (0518F)   []  A falls risk assessment was not completed and documented in the medical chart (3288F,1P)   []   Falls were not addressed in the plan of care and reason was documented in the plan of care. (0518F 1P)        Medication (8/15/2024)     [x] A list of current medications (including prescription, over the counter, herbal supplements, vitamin supplements, mineral supplements, dietary supplements) was reviewed with the patient and documented in the medical chart. ()       OBJECTIVE EXAMINATION (Measurements taken 8/15/2024 unless otherwise noted)       ROM/Strength: (Blank cells denote NT)     Flexibility L R Comment  8/15/2024   Hamstring Mod tightness Mod tightness    Gastroc Mod tightness Mod tightness    ITB      Quad                ROM PROM AROM Overpressure Comment    L R L R L R 9/5/2024   Flexion   96° 92°      Extension   0° 0°                              Strength L R Comment  8/15/2024   Quad 3+/5 3+/5    Hamstring 4-/5 4-/5    Gastroc 4-/5 4-/5    Hip Flex 3+/5 3+/5    Hip Abd 3+/5 3+/5    Hip Add 3+/5 3+/5    Glut Med 3+/5 3+/5            Girth  8/15/2024 L R   Mid Patella NT NT   Suprapatellar     5cm above     15cm above         Special Test  8/15/2024 Results/Comment   Meniscal Click    Crepitus

## 2024-09-10 ENCOUNTER — TREATMENT (OUTPATIENT)
Dept: PHYSICAL THERAPY | Age: 74
End: 2024-09-10
Payer: MEDICARE

## 2024-09-10 DIAGNOSIS — M25.561 PAIN IN BOTH KNEES, UNSPECIFIED CHRONICITY: ICD-10-CM

## 2024-09-10 DIAGNOSIS — Z91.81 AT RISK FOR FALLS: ICD-10-CM

## 2024-09-10 DIAGNOSIS — M17.0 LOCALIZED OSTEOARTHRITIS OF BOTH KNEES: Primary | ICD-10-CM

## 2024-09-10 DIAGNOSIS — R29.898 WEAKNESS OF BOTH LOWER EXTREMITIES: ICD-10-CM

## 2024-09-10 DIAGNOSIS — M25.562 PAIN IN BOTH KNEES, UNSPECIFIED CHRONICITY: ICD-10-CM

## 2024-09-10 PROCEDURE — 97112 NEUROMUSCULAR REEDUCATION: CPT | Performed by: PHYSICAL THERAPIST

## 2024-09-10 PROCEDURE — 97530 THERAPEUTIC ACTIVITIES: CPT | Performed by: PHYSICAL THERAPIST

## 2024-09-10 PROCEDURE — 97110 THERAPEUTIC EXERCISES: CPT | Performed by: PHYSICAL THERAPIST

## 2024-09-12 ENCOUNTER — TREATMENT (OUTPATIENT)
Dept: PHYSICAL THERAPY | Age: 74
End: 2024-09-12

## 2024-09-12 DIAGNOSIS — M25.562 PAIN IN BOTH KNEES, UNSPECIFIED CHRONICITY: ICD-10-CM

## 2024-09-12 DIAGNOSIS — M17.0 LOCALIZED OSTEOARTHRITIS OF BOTH KNEES: Primary | ICD-10-CM

## 2024-09-12 DIAGNOSIS — Z91.81 AT RISK FOR FALLS: ICD-10-CM

## 2024-09-12 DIAGNOSIS — M25.561 PAIN IN BOTH KNEES, UNSPECIFIED CHRONICITY: ICD-10-CM

## 2024-09-12 DIAGNOSIS — R29.898 WEAKNESS OF BOTH LOWER EXTREMITIES: ICD-10-CM

## 2024-09-19 ENCOUNTER — TREATMENT (OUTPATIENT)
Dept: PHYSICAL THERAPY | Age: 74
End: 2024-09-19
Payer: MEDICARE

## 2024-09-19 DIAGNOSIS — Z91.81 AT RISK FOR FALLS: ICD-10-CM

## 2024-09-19 DIAGNOSIS — M25.561 PAIN IN BOTH KNEES, UNSPECIFIED CHRONICITY: ICD-10-CM

## 2024-09-19 DIAGNOSIS — M25.562 PAIN IN BOTH KNEES, UNSPECIFIED CHRONICITY: ICD-10-CM

## 2024-09-19 DIAGNOSIS — M17.0 LOCALIZED OSTEOARTHRITIS OF BOTH KNEES: Primary | ICD-10-CM

## 2024-09-19 DIAGNOSIS — R29.898 WEAKNESS OF BOTH LOWER EXTREMITIES: ICD-10-CM

## 2024-09-19 PROCEDURE — 97112 NEUROMUSCULAR REEDUCATION: CPT | Performed by: PHYSICAL THERAPIST

## 2024-09-19 PROCEDURE — 97530 THERAPEUTIC ACTIVITIES: CPT | Performed by: PHYSICAL THERAPIST

## 2024-09-19 PROCEDURE — 97110 THERAPEUTIC EXERCISES: CPT | Performed by: PHYSICAL THERAPIST

## 2024-09-24 ENCOUNTER — TREATMENT (OUTPATIENT)
Dept: PHYSICAL THERAPY | Age: 74
End: 2024-09-24
Payer: MEDICARE

## 2024-09-24 DIAGNOSIS — M25.561 PAIN IN BOTH KNEES, UNSPECIFIED CHRONICITY: ICD-10-CM

## 2024-09-24 DIAGNOSIS — Z91.81 AT RISK FOR FALLS: ICD-10-CM

## 2024-09-24 DIAGNOSIS — M25.562 PAIN IN BOTH KNEES, UNSPECIFIED CHRONICITY: ICD-10-CM

## 2024-09-24 DIAGNOSIS — R29.898 WEAKNESS OF BOTH LOWER EXTREMITIES: ICD-10-CM

## 2024-09-24 DIAGNOSIS — M17.0 LOCALIZED OSTEOARTHRITIS OF BOTH KNEES: Primary | ICD-10-CM

## 2024-09-24 PROCEDURE — 97110 THERAPEUTIC EXERCISES: CPT | Performed by: PHYSICAL THERAPIST

## 2024-09-24 PROCEDURE — 97112 NEUROMUSCULAR REEDUCATION: CPT | Performed by: PHYSICAL THERAPIST

## 2024-09-24 PROCEDURE — 97530 THERAPEUTIC ACTIVITIES: CPT | Performed by: PHYSICAL THERAPIST

## 2024-09-26 ENCOUNTER — TREATMENT (OUTPATIENT)
Dept: PHYSICAL THERAPY | Age: 74
End: 2024-09-26
Payer: MEDICARE

## 2024-09-26 DIAGNOSIS — M25.562 PAIN IN BOTH KNEES, UNSPECIFIED CHRONICITY: ICD-10-CM

## 2024-09-26 DIAGNOSIS — Z91.81 AT RISK FOR FALLS: ICD-10-CM

## 2024-09-26 DIAGNOSIS — M25.561 PAIN IN BOTH KNEES, UNSPECIFIED CHRONICITY: ICD-10-CM

## 2024-09-26 DIAGNOSIS — M17.0 LOCALIZED OSTEOARTHRITIS OF BOTH KNEES: Primary | ICD-10-CM

## 2024-09-26 DIAGNOSIS — R29.898 WEAKNESS OF BOTH LOWER EXTREMITIES: ICD-10-CM

## 2024-09-26 PROCEDURE — 97112 NEUROMUSCULAR REEDUCATION: CPT | Performed by: PHYSICAL THERAPIST

## 2024-09-26 PROCEDURE — 97110 THERAPEUTIC EXERCISES: CPT | Performed by: PHYSICAL THERAPIST

## 2024-09-26 PROCEDURE — 97530 THERAPEUTIC ACTIVITIES: CPT | Performed by: PHYSICAL THERAPIST

## 2024-10-01 ENCOUNTER — TREATMENT (OUTPATIENT)
Dept: PHYSICAL THERAPY | Age: 74
End: 2024-10-01
Payer: MEDICARE

## 2024-10-01 DIAGNOSIS — Z91.81 AT RISK FOR FALLS: ICD-10-CM

## 2024-10-01 DIAGNOSIS — M25.562 PAIN IN BOTH KNEES, UNSPECIFIED CHRONICITY: ICD-10-CM

## 2024-10-01 DIAGNOSIS — M17.0 LOCALIZED OSTEOARTHRITIS OF BOTH KNEES: Primary | ICD-10-CM

## 2024-10-01 DIAGNOSIS — R29.898 WEAKNESS OF BOTH LOWER EXTREMITIES: ICD-10-CM

## 2024-10-01 DIAGNOSIS — M25.561 PAIN IN BOTH KNEES, UNSPECIFIED CHRONICITY: ICD-10-CM

## 2024-10-01 PROCEDURE — 97112 NEUROMUSCULAR REEDUCATION: CPT | Performed by: PHYSICAL THERAPIST

## 2024-10-01 PROCEDURE — 97530 THERAPEUTIC ACTIVITIES: CPT | Performed by: PHYSICAL THERAPIST

## 2024-10-01 PROCEDURE — 97110 THERAPEUTIC EXERCISES: CPT | Performed by: PHYSICAL THERAPIST

## 2024-10-01 NOTE — PROGRESS NOTES
DISCHARGE progression, improving proper muscle recruitment and activation/motor control patterns, increasing ROM, static and dynamic balance, kinesthetic sense and proprioception, and improving postural awareness. Next visit plan to progress exercises per pt tolerance.         Electronically Signed by Samantha Whiting, PT  Date: 10/01/2024        Physical Therapist [place] License #[id number]  Physical Therapist [place] License #[id number]        Note: Portions of this note have been templated and/or copied from initial evaluation, reassessments and prior notes for documentation efficiency.    Note: If patient does not return for scheduled/recommended follow up visits, this note will serve as a discharge from care along with the most recent update on progress.

## 2024-10-08 ENCOUNTER — TREATMENT (OUTPATIENT)
Dept: PHYSICAL THERAPY | Age: 74
End: 2024-10-08
Payer: MEDICARE

## 2024-10-08 DIAGNOSIS — Z91.81 AT RISK FOR FALLS: ICD-10-CM

## 2024-10-08 DIAGNOSIS — M25.562 PAIN IN BOTH KNEES, UNSPECIFIED CHRONICITY: ICD-10-CM

## 2024-10-08 DIAGNOSIS — R29.898 WEAKNESS OF BOTH LOWER EXTREMITIES: ICD-10-CM

## 2024-10-08 DIAGNOSIS — M25.561 PAIN IN BOTH KNEES, UNSPECIFIED CHRONICITY: ICD-10-CM

## 2024-10-08 DIAGNOSIS — M17.0 LOCALIZED OSTEOARTHRITIS OF BOTH KNEES: Primary | ICD-10-CM

## 2024-10-08 PROCEDURE — 97530 THERAPEUTIC ACTIVITIES: CPT | Performed by: PHYSICAL THERAPIST

## 2024-10-08 PROCEDURE — 97112 NEUROMUSCULAR REEDUCATION: CPT | Performed by: PHYSICAL THERAPIST

## 2024-10-08 PROCEDURE — 97110 THERAPEUTIC EXERCISES: CPT | Performed by: PHYSICAL THERAPIST

## 2024-10-08 NOTE — PROGRESS NOTES
patient on Physical Therapy process.  Also educated on diagnosis, related anatomy, pathology and prognosis.   Reviewed patient goals/expectations and answered all questions.  Patient displays understanding and in agreement with plan of care.     Discussed importance of HEP, activity modification, and role of PT     Therapist spent extensive 1:1 time educating the pt on her current condition and explained that she has a high fall risk (pt has fallen 3 times since [place]).  Recommended she use an AD (walker or cane) to amb to decrease her risk of falling and to allow her to amb safety in the community and in her home.  Pt is refusing to use an AD at this time despite physical therapist and MD recommendation.  (8/15/2024)       ASSESSMENT     Today's Assessment:  Pt tolerated the exercises well today but several rest breaks were needed.  She was able to increase reps/sets with a few of her exercises with increased fatigue present.  Step ups remain challenging, especially stepping up with the right leg, due to weakness.  She did not do as well today with tandem stance with right LE in back with several UE touches needed.  No UE touches were needed with left leg in back.  She continues to require HHA with cup walking but was able to complete 3 laps.  Pt was very fatigued at the end of her session but reported no increase in pain.              Medical Necessity Documentation:  I certify that this patient meets the below criteria necessary for medical necessity for care and/or justification of therapy services:  The patient has functional impairments and/or activity limitations and would benefit from continued outpatient therapy services to address the deficits outlined in the patients goals  The patient has a musculoskeletal condition(s) with a corresponding ICD-10 code that is of complexity and severity that require skilled therapeutic intervention. This has a direct and significant impact on the need for therapy and

## 2024-10-10 ENCOUNTER — TREATMENT (OUTPATIENT)
Dept: PHYSICAL THERAPY | Age: 74
End: 2024-10-10
Payer: MEDICARE

## 2024-10-10 DIAGNOSIS — M25.561 PAIN IN BOTH KNEES, UNSPECIFIED CHRONICITY: ICD-10-CM

## 2024-10-10 DIAGNOSIS — Z91.81 AT RISK FOR FALLS: ICD-10-CM

## 2024-10-10 DIAGNOSIS — M25.562 PAIN IN BOTH KNEES, UNSPECIFIED CHRONICITY: ICD-10-CM

## 2024-10-10 DIAGNOSIS — R29.898 WEAKNESS OF BOTH LOWER EXTREMITIES: ICD-10-CM

## 2024-10-10 DIAGNOSIS — M17.0 LOCALIZED OSTEOARTHRITIS OF BOTH KNEES: Primary | ICD-10-CM

## 2024-10-10 PROCEDURE — 97530 THERAPEUTIC ACTIVITIES: CPT | Performed by: PHYSICAL THERAPIST

## 2024-10-10 PROCEDURE — 97112 NEUROMUSCULAR REEDUCATION: CPT | Performed by: PHYSICAL THERAPIST

## 2024-10-10 PROCEDURE — 97110 THERAPEUTIC EXERCISES: CPT | Performed by: PHYSICAL THERAPIST

## 2024-10-15 ENCOUNTER — TREATMENT (OUTPATIENT)
Dept: PHYSICAL THERAPY | Age: 74
End: 2024-10-15
Payer: MEDICARE

## 2024-10-15 DIAGNOSIS — M17.0 LOCALIZED OSTEOARTHRITIS OF BOTH KNEES: Primary | ICD-10-CM

## 2024-10-15 DIAGNOSIS — R29.898 WEAKNESS OF BOTH LOWER EXTREMITIES: ICD-10-CM

## 2024-10-15 DIAGNOSIS — M25.561 PAIN IN BOTH KNEES, UNSPECIFIED CHRONICITY: ICD-10-CM

## 2024-10-15 DIAGNOSIS — Z91.81 AT RISK FOR FALLS: ICD-10-CM

## 2024-10-15 DIAGNOSIS — M25.562 PAIN IN BOTH KNEES, UNSPECIFIED CHRONICITY: ICD-10-CM

## 2024-10-15 PROCEDURE — 97112 NEUROMUSCULAR REEDUCATION: CPT | Performed by: PHYSICAL THERAPIST

## 2024-10-15 PROCEDURE — 97110 THERAPEUTIC EXERCISES: CPT | Performed by: PHYSICAL THERAPIST

## 2024-10-15 PROCEDURE — 97530 THERAPEUTIC ACTIVITIES: CPT | Performed by: PHYSICAL THERAPIST

## 2024-10-15 NOTE — PROGRESS NOTES
pathology and prognosis.   Reviewed patient goals/expectations and answered all questions.  Patient displays understanding and in agreement with plan of care.     Discussed importance of HEP, activity modification, and role of PT     Therapist spent extensive 1:1 time educating the pt on her current condition and explained that she has a high fall risk (pt has fallen 3 times since [place]).  Recommended she use an AD (walker or cane) to amb to decrease her risk of falling and to allow her to amb safety in the community and in her home.  Pt is refusing to use an AD at this time despite physical therapist and MD recommendation.  (8/15/2024)       ASSESSMENT     Today's Assessment:  Knees were stiff, sore, and achy today with the change in weather.  She was more fatigued with the exercises today which she attributed to going to bed late and getting up early this morning and she needed to take more rest breaks during her session.  Her balance was better today and she was able to perform tandem stance on the airex cushion which was much more challenging.  Her score also improved with LOS on the biodex by 10 seconds from the first trial to the second trial.  Pt was amb with stiff knee gait today and needed frequent cuing not to shuffle her feet. Therapist again recommended using an AD to decrease her fall risk and for safe amb in the community.                Medical Necessity Documentation:  I certify that this patient meets the below criteria necessary for medical necessity for care and/or justification of therapy services:  The patient has functional impairments and/or activity limitations and would benefit from continued outpatient therapy services to address the deficits outlined in the patients goals  The patient has a musculoskeletal condition(s) with a corresponding ICD-10 code that is of complexity and severity that require skilled therapeutic intervention. This has a direct and significant impact on the need

## 2024-10-17 ENCOUNTER — TREATMENT (OUTPATIENT)
Dept: PHYSICAL THERAPY | Age: 74
End: 2024-10-17

## 2024-10-17 DIAGNOSIS — M17.0 LOCALIZED OSTEOARTHRITIS OF BOTH KNEES: Primary | ICD-10-CM

## 2024-10-17 DIAGNOSIS — M25.562 PAIN IN BOTH KNEES, UNSPECIFIED CHRONICITY: ICD-10-CM

## 2024-10-17 DIAGNOSIS — M25.561 PAIN IN BOTH KNEES, UNSPECIFIED CHRONICITY: ICD-10-CM

## 2024-10-17 DIAGNOSIS — R29.898 WEAKNESS OF BOTH LOWER EXTREMITIES: ICD-10-CM

## 2024-10-17 DIAGNOSIS — Z91.81 AT RISK FOR FALLS: ICD-10-CM

## 2024-10-17 NOTE — PROGRESS NOTES
Adjusted       Overall Progression Towards Functional goals/ Treatment Progress Update:  [x] Patient is progressing as expected towards functional goals listed.    [] Progression is slowed due to complexities/Impairments listed.  [] Progression has been slowed due to co-morbidities.  [] Plan just implemented, too soon (<30days) to assess goals progression   [] Goals require adjustment due to lack of progress  [] Patient is not progressing as expected and requires additional follow up with physician  [] Other:     TREATMENT PLAN     Frequency/Duration: 2x/week for 4 weeks for the following treatment interventions:    Interventions:  Therapeutic Exercise (48283) including: strength training, ROM, and functional mobility  Therapeutic Activities (05567) including: functional mobility training and education.  Neuromuscular Re-education (83225) activation and proprioception, including postural re-education.    Gait Training (29229) for normalization of ambulation patterns and AD training.   Modalities as needed that may include: Cryotherapy  Patient education on joint protection, postural re-education, activity modification, and progression of HEP    Plan: Cont POC- Continue emphasis/focus on exercise progression, improving proper muscle recruitment and activation/motor control patterns, increasing ROM, static and dynamic balance, kinesthetic sense and proprioception, and improving postural awareness. Next visit plan to continue progressing exercises per pt tolerance.         Electronically Signed by Samantha Whiting, PT  Date: 10/17/2024        Physical Therapist [place] License #[id number]  Physical Therapist [place] License #[id number]        Note: Portions of this note have been templated and/or copied from initial evaluation, reassessments and prior notes for documentation efficiency.    Note: If patient does not return for scheduled/recommended follow up visits, this note will serve as a discharge from care along with the most recent

## 2024-10-22 ENCOUNTER — TREATMENT (OUTPATIENT)
Dept: PHYSICAL THERAPY | Age: 74
End: 2024-10-22
Payer: MEDICARE

## 2024-10-22 DIAGNOSIS — M25.561 PAIN IN BOTH KNEES, UNSPECIFIED CHRONICITY: ICD-10-CM

## 2024-10-22 DIAGNOSIS — Z91.81 AT RISK FOR FALLS: ICD-10-CM

## 2024-10-22 DIAGNOSIS — M25.562 PAIN IN BOTH KNEES, UNSPECIFIED CHRONICITY: ICD-10-CM

## 2024-10-22 DIAGNOSIS — R29.898 WEAKNESS OF BOTH LOWER EXTREMITIES: ICD-10-CM

## 2024-10-22 DIAGNOSIS — M17.0 LOCALIZED OSTEOARTHRITIS OF BOTH KNEES: Primary | ICD-10-CM

## 2024-10-22 PROCEDURE — 97112 NEUROMUSCULAR REEDUCATION: CPT | Performed by: PHYSICAL THERAPIST

## 2024-10-22 PROCEDURE — 97530 THERAPEUTIC ACTIVITIES: CPT | Performed by: PHYSICAL THERAPIST

## 2024-10-22 PROCEDURE — 97110 THERAPEUTIC EXERCISES: CPT | Performed by: PHYSICAL THERAPIST

## 2024-10-22 NOTE — PROGRESS NOTES
Progressing: [x] Met: [] Not Met: [] Adjusted    2. Patient will have a decrease in bilateral leg pain to <2/10 to facilitate improvement in movement, function, and ADLs as indicated by Functional Deficits.    [x] Progressing: [] Met: [] Not Met: [] Adjusted      Long Term Goals: To be achieved in: 6-8 weeks  1. Disability index score of 25% or less for the  Knee Outcome Survey Activities of Daily Living Scale  to assist with reaching prior level of function with activities such as getting up from a chair, getting out of bed, standing, walking community distances, and ascending/descending stairs.    [x] Progressing: [] Met: [] Not Met: [] Adjusted    2. Patient will demonstrate an increase in bilateral knee flexion AROM to at least 115° without pain to allow for proper joint functioning to enable patient to amb with a normal gait and ascend/descend stairs.     [x] Progressing: [] Met: [] Not Met: [] Adjusted    3. Patient will demonstrate an increase in bilateral LE strength (hip, knee, and ankle) to at least 4+/5 throughout without pain to allow for proper functional mobility to enable patient to get up from a chair, get out of bed, ascend/descend stairs, and amb community distances.     [x] Progressing: [] Met: [] Not Met: [] Adjusted    4. Patient will be able to amb with an appropriate AD independently with normal pawel and symmetrical gait pattern with no loss of balance or falls for improved function and safety.          [x] Progressing: Pt refuses to use an AD despite being a fall risk [] Met: [] Not Met: [] Adjusted    5.  Patient will be able to walk community distances with an appropriate AD, stand for at least 30 minutes, and be able to get up from a chair with little to no UE assistance needed for improved function.   (patient specific functional goal)      [x] Progressing: Pt refuses to use an AD despite being a fall risk  [] Met: [] Not Met: [] Adjusted       Overall Progression Towards Functional

## 2024-10-24 ENCOUNTER — TREATMENT (OUTPATIENT)
Dept: PHYSICAL THERAPY | Age: 74
End: 2024-10-24
Payer: MEDICARE

## 2024-10-24 DIAGNOSIS — Z91.81 AT RISK FOR FALLS: ICD-10-CM

## 2024-10-24 DIAGNOSIS — M25.562 PAIN IN BOTH KNEES, UNSPECIFIED CHRONICITY: ICD-10-CM

## 2024-10-24 DIAGNOSIS — R29.898 WEAKNESS OF BOTH LOWER EXTREMITIES: ICD-10-CM

## 2024-10-24 DIAGNOSIS — M17.0 LOCALIZED OSTEOARTHRITIS OF BOTH KNEES: Primary | ICD-10-CM

## 2024-10-24 DIAGNOSIS — M25.561 PAIN IN BOTH KNEES, UNSPECIFIED CHRONICITY: ICD-10-CM

## 2024-10-24 PROCEDURE — 97530 THERAPEUTIC ACTIVITIES: CPT | Performed by: PHYSICAL THERAPIST

## 2024-10-24 PROCEDURE — 97112 NEUROMUSCULAR REEDUCATION: CPT | Performed by: PHYSICAL THERAPIST

## 2024-10-24 PROCEDURE — 97110 THERAPEUTIC EXERCISES: CPT | Performed by: PHYSICAL THERAPIST

## 2024-10-24 NOTE — PROGRESS NOTES
appropriate AD, stand for at least 30 minutes, and be able to get up from a chair with little to no UE assistance needed for improved function.   (patient specific functional goal)      [x] Progressing: Pt refuses to use an AD despite being a fall risk  [] Met: [] Not Met: [] Adjusted       Overall Progression Towards Functional goals/ Treatment Progress Update:  [x] Patient is progressing as expected towards functional goals listed.    [] Progression is slowed due to complexities/Impairments listed.  [] Progression has been slowed due to co-morbidities.  [] Plan just implemented, too soon (<30days) to assess goals progression   [] Goals require adjustment due to lack of progress  [] Patient is not progressing as expected and requires additional follow up with physician  [] Other:     TREATMENT PLAN     Frequency/Duration: 2x/week for 4 weeks for the following treatment interventions:    Interventions:  Therapeutic Exercise (51543) including: strength training, ROM, and functional mobility  Therapeutic Activities (90029) including: functional mobility training and education.  Neuromuscular Re-education (74672) activation and proprioception, including postural re-education.    Gait Training (81848) for normalization of ambulation patterns and AD training.   Modalities as needed that may include: Cryotherapy  Patient education on joint protection, postural re-education, activity modification, and progression of HEP    Plan: Cont POC- Continue emphasis/focus on exercise progression, improving proper muscle recruitment and activation/motor control patterns, increasing ROM, static and dynamic balance, kinesthetic sense and proprioception, and improving postural awareness. Next visit plan to continue progressing exercises per pt tolerance.         Electronically Signed by Samantha Whiting PT  Date: 10/24/2024        Physical Therapist [place] License #[id number]  Physical Therapist [place] License #[id number]        Note: Portions of this

## 2024-10-29 ENCOUNTER — TREATMENT (OUTPATIENT)
Dept: PHYSICAL THERAPY | Age: 74
End: 2024-10-29
Payer: MEDICARE

## 2024-10-29 DIAGNOSIS — M17.0 LOCALIZED OSTEOARTHRITIS OF BOTH KNEES: Primary | ICD-10-CM

## 2024-10-29 DIAGNOSIS — Z91.81 AT RISK FOR FALLS: ICD-10-CM

## 2024-10-29 DIAGNOSIS — M25.561 PAIN IN BOTH KNEES, UNSPECIFIED CHRONICITY: ICD-10-CM

## 2024-10-29 DIAGNOSIS — M25.562 PAIN IN BOTH KNEES, UNSPECIFIED CHRONICITY: ICD-10-CM

## 2024-10-29 DIAGNOSIS — R29.898 WEAKNESS OF BOTH LOWER EXTREMITIES: ICD-10-CM

## 2024-10-29 PROCEDURE — 97530 THERAPEUTIC ACTIVITIES: CPT | Performed by: PHYSICAL THERAPIST

## 2024-10-29 PROCEDURE — 97110 THERAPEUTIC EXERCISES: CPT | Performed by: PHYSICAL THERAPIST

## 2024-10-29 PROCEDURE — 97112 NEUROMUSCULAR REEDUCATION: CPT | Performed by: PHYSICAL THERAPIST

## 2024-10-29 NOTE — PROGRESS NOTES
[facility]      [address] [place] [address]     P:[phone]  F:[phone]           Physical Therapy: TREATMENT/PROGRESS NOTE   Patient: Kae Shaw (74 y.o. female)   Examination Date: 10/29/2024   :  1950 MRN: [id number]   Visit #: 18     Insurance Allowable Auth Needed   Visits based on medical necessity  (20 visits then modifier) []Yes    [x]No    Insurance: Payor: MEDICARE / Plan: MEDICARE PART A AND B / Product Type: *No Product type* /   Insurance ID: [id number] - (Medicare)  Secondary Insurance (if applicable):              Treatment Diagnosis:     Knee Pain [M25.561, M25.562]  Weakness Bilateral Lower Extremities [R29.898]         Medical Diagnosis:    OA Knees [M17.0]  Fall Risk [Z91.81]     Referring Physician: David Garcia MD  PCP: Paco Leong       Plan of care signed (Y/N): POC signed 2024    Date of Patient follow up with Physician: As needed     Plan of Care Report: NO  POC update due: (10 visits /OR AUTH LIMITS, whichever is less)  2024                                             Medical History:  Comorbidities:  Hypertension  Osteoporosis/Osteopenia  Osteoarthritis  Anxiety  COVID-19  Other Musculoskeletal Conditions: See list below  Prior Surgeries: See list below  Relevant Medical History: Left Reverse TSA on 20, Right Reverse TSA ~, HTN, OA, Osteoporosis, HA/Migraines, Left Carpal Tunnel Release 21, Right Carpal Tunnel Release 21, Kidney disease (stage 3)                                              Precautions/ Contra-indications:           Latex allergy:  NO  Pacemaker:    NO  Contraindications for Manipulation: osteoporosis   Date of Surgery: NA  Other:    Red Flags:  None    Suicide Screening:   The patient did not verbalize a primary behavioral concern, suicidal ideation, suicidal intent, or demonstrate suicidal behaviors.    Preferred Language for Healthcare:   [x]

## 2024-11-05 ENCOUNTER — TREATMENT (OUTPATIENT)
Dept: PHYSICAL THERAPY | Age: 74
End: 2024-11-05
Payer: MEDICARE

## 2024-11-05 DIAGNOSIS — M25.561 PAIN IN BOTH KNEES, UNSPECIFIED CHRONICITY: ICD-10-CM

## 2024-11-05 DIAGNOSIS — M17.0 LOCALIZED OSTEOARTHRITIS OF BOTH KNEES: Primary | ICD-10-CM

## 2024-11-05 DIAGNOSIS — R29.898 WEAKNESS OF BOTH LOWER EXTREMITIES: ICD-10-CM

## 2024-11-05 DIAGNOSIS — M25.562 PAIN IN BOTH KNEES, UNSPECIFIED CHRONICITY: ICD-10-CM

## 2024-11-05 DIAGNOSIS — Z91.81 AT RISK FOR FALLS: ICD-10-CM

## 2024-11-05 PROCEDURE — 97112 NEUROMUSCULAR REEDUCATION: CPT | Performed by: PHYSICAL THERAPIST

## 2024-11-05 PROCEDURE — 97530 THERAPEUTIC ACTIVITIES: CPT | Performed by: PHYSICAL THERAPIST

## 2024-11-05 PROCEDURE — 97110 THERAPEUTIC EXERCISES: CPT | Performed by: PHYSICAL THERAPIST

## 2024-11-05 NOTE — PROGRESS NOTES
required. ()   []  Patient did not complete the functional outcome questionnaire and the reason why is documented in the medical chart.()  []  Patient refused to participate   []  Patient unable to complete the questionnaire   []   A functional outcome assessment has been reported on within the last 30 days        Falls (10/10/2024)  []  The patient has had no falls or 1 fall without injury in the past year. (1101F)   []  There is no documentation of fall in the medical chart (1101F,8P)     [x] The patient has had 2 or more falls or one fall with injury in the past year that is documented in the medical chart. (1100F)  [x]  A falls risk assessment was completed and documented in the medical chart. (3288F)   []  Falls were addressed in the plan of care. (0518F)   []  A falls risk assessment was not completed and documented in the medical chart (3288F,1P)   []   Falls were not addressed in the plan of care and reason was documented in the plan of care. (0518F 1P)        Medication (10/10/2024)     [x] A list of current medications (including prescription, over the counter, herbal supplements, vitamin supplements, mineral supplements, dietary supplements) was reviewed with the patient and documented in the medical chart. ()       OBJECTIVE EXAMINATION (Measurements taken 10/10/2024 unless otherwise noted)       ROM/Strength: (Blank cells denote NT)     Flexibility L R Comment  10/10/2024   Hamstring Mild tightness Mild tightness    Gastroc Mild tightness Mild tightness    ITB      Quad                ROM PROM AROM Overpressure Comment    L R L R L R 10/24/2024   Flexion 124° 113°      Extension   0° 0°                              Strength L R Comment  10/10/2024   Quad 4/5 4-/5    Hamstring 4+/5 4/5    Gastroc 45 4/5    Hip Flex 4/5 4/5    Hip Abd 4/5 4/5    Hip Add 4/5 4/5    Glut Med 4/5 4-/5            Girth  10/10/2024 L R   Mid Patella NT NT   Suprapatellar     5cm above     15cm above

## 2024-11-14 ENCOUNTER — TREATMENT (OUTPATIENT)
Dept: PHYSICAL THERAPY | Age: 74
End: 2024-11-14

## 2024-11-14 DIAGNOSIS — M25.561 PAIN IN BOTH KNEES, UNSPECIFIED CHRONICITY: ICD-10-CM

## 2024-11-14 DIAGNOSIS — Z91.81 AT RISK FOR FALLS: ICD-10-CM

## 2024-11-14 DIAGNOSIS — M17.0 LOCALIZED OSTEOARTHRITIS OF BOTH KNEES: Primary | ICD-10-CM

## 2024-11-14 DIAGNOSIS — M25.562 PAIN IN BOTH KNEES, UNSPECIFIED CHRONICITY: ICD-10-CM

## 2024-11-14 DIAGNOSIS — R29.898 WEAKNESS OF BOTH LOWER EXTREMITIES: ICD-10-CM

## 2024-11-14 NOTE — PROGRESS NOTES
Adjusted    3. Patient will demonstrate an increase in bilateral LE strength (hip, knee, and ankle) to at least 4+/5 throughout without pain to allow for proper functional mobility to enable patient to get up from a chair, get out of bed, ascend/descend stairs, and amb community distances.     [x] Progressing: [] Met: [] Not Met: [] Adjusted    4. Patient will be able to amb with an appropriate AD independently with normal pawel and symmetrical gait pattern with no loss of balance or falls for improved function and safety.          [x] Progressing: Pt refuses to use an AD despite being a fall risk [] Met: [] Not Met: [] Adjusted    5.  Patient will be able to walk community distances with an appropriate AD, stand for at least 30 minutes, and be able to get up from a chair with little to no UE assistance needed for improved function.   (patient specific functional goal)      [x] Progressing: Pt refuses to use an AD despite being a fall risk  [] Met: [] Not Met: [] Adjusted       Overall Progression Towards Functional goals/ Treatment Progress Update:  [x] Patient is progressing as expected towards functional goals listed.    [] Progression is slowed due to complexities/Impairments listed.  [] Progression has been slowed due to co-morbidities.  [] Plan just implemented, too soon (<30days) to assess goals progression   [] Goals require adjustment due to lack of progress  [] Patient is not progressing as expected and requires additional follow up with physician  [] Other:     TREATMENT PLAN     Frequency/Duration: 1x/week for 4 weeks for the following treatment interventions:    Interventions:  Therapeutic Exercise (29123) including: strength training, ROM, and functional mobility  Therapeutic Activities (90293) including: functional mobility training and education.  Neuromuscular Re-education (33411) activation and proprioception, including postural re-education.    Gait Training (68617) for normalization of

## 2024-12-03 ENCOUNTER — TREATMENT (OUTPATIENT)
Dept: PHYSICAL THERAPY | Age: 74
End: 2024-12-03
Payer: MEDICARE

## 2024-12-03 DIAGNOSIS — Z91.81 AT RISK FOR FALLS: ICD-10-CM

## 2024-12-03 DIAGNOSIS — M25.562 PAIN IN BOTH KNEES, UNSPECIFIED CHRONICITY: ICD-10-CM

## 2024-12-03 DIAGNOSIS — M17.0 LOCALIZED OSTEOARTHRITIS OF BOTH KNEES: Primary | ICD-10-CM

## 2024-12-03 DIAGNOSIS — M25.561 PAIN IN BOTH KNEES, UNSPECIFIED CHRONICITY: ICD-10-CM

## 2024-12-03 DIAGNOSIS — R29.898 WEAKNESS OF BOTH LOWER EXTREMITIES: ICD-10-CM

## 2024-12-03 PROCEDURE — 97112 NEUROMUSCULAR REEDUCATION: CPT | Performed by: PHYSICAL THERAPIST

## 2024-12-03 PROCEDURE — 97110 THERAPEUTIC EXERCISES: CPT | Performed by: PHYSICAL THERAPIST

## 2024-12-03 PROCEDURE — 97530 THERAPEUTIC ACTIVITIES: CPT | Performed by: PHYSICAL THERAPIST

## 2024-12-03 NOTE — PROGRESS NOTES
[facility]      [address] [place] [address]     P:[phone]  F:[phone]           Physical Therapy: TREATMENT/PROGRESS NOTE   Patient: Kae Monroe (74 y.o. female)   Examination Date: 2024   :  1950 MRN: [id number]   Visit #: 21     Insurance Allowable Auth Needed   Visits based on medical necessity  (20 visits then modifier) []Yes    [x]No    Insurance: Payor: MEDICARE / Plan: MEDICARE PART A AND B / Product Type: *No Product type* /   Insurance ID: [id number] - (Medicare)  Secondary Insurance (if applicable):              Treatment Diagnosis:     Knee Pain [M25.561, M25.562]  Weakness Bilateral Lower Extremities [R29.898]         Medical Diagnosis:    OA Knees [M17.0]  Fall Risk [Z91.81]     Referring Physician: David Garcia MD  PCP: Paco Leong       Plan of care signed (Y/N): POC signed 2024    Date of Patient follow up with Physician: As needed     Plan of Care Report: NO  POC update due: (10 visits /OR AUTH LIMITS, whichever is less)  2024                                            Medical History:  Comorbidities:  Hypertension  Osteoporosis/Osteopenia  Osteoarthritis  Anxiety  COVID-19  Other Musculoskeletal Conditions: See list below  Prior Surgeries: See list below  Relevant Medical History: Left Reverse TSA on 20, Right Reverse TSA ~, HTN, OA, Osteoporosis, HA/Migraines, Left Carpal Tunnel Release 21, Right Carpal Tunnel Release 21, Kidney disease (stage 3)                                              Precautions/ Contra-indications:           Latex allergy:  NO  Pacemaker:    NO  Contraindications for Manipulation: osteoporosis   Date of Surgery: NA  Other:    Red Flags:  None    Suicide Screening:   The patient did not verbalize a primary behavioral concern, suicidal ideation, suicidal intent, or demonstrate suicidal behaviors.    Preferred Language for Healthcare:   [x] 
MMED

## 2024-12-12 ENCOUNTER — TREATMENT (OUTPATIENT)
Dept: PHYSICAL THERAPY | Age: 74
End: 2024-12-12

## 2024-12-12 DIAGNOSIS — M25.561 PAIN IN BOTH KNEES, UNSPECIFIED CHRONICITY: ICD-10-CM

## 2024-12-12 DIAGNOSIS — R29.898 WEAKNESS OF BOTH LOWER EXTREMITIES: ICD-10-CM

## 2024-12-12 DIAGNOSIS — M25.562 PAIN IN BOTH KNEES, UNSPECIFIED CHRONICITY: ICD-10-CM

## 2024-12-12 DIAGNOSIS — M17.0 LOCALIZED OSTEOARTHRITIS OF BOTH KNEES: Primary | ICD-10-CM

## 2024-12-12 DIAGNOSIS — Z91.81 AT RISK FOR FALLS: ICD-10-CM

## 2024-12-12 NOTE — PROGRESS NOTES
for improved function.   (patient specific functional goal)      [x] Progressing: Pt refuses to use an AD despite being a fall risk  [] Met: [] Not Met: [] Adjusted       Overall Progression Towards Functional goals/ Treatment Progress Update:  [x] Patient is progressing as expected towards functional goals listed.    [] Progression is slowed due to complexities/Impairments listed.  [] Progression has been slowed due to co-morbidities.  [] Plan just implemented, too soon (<30days) to assess goals progression   [] Goals require adjustment due to lack of progress  [] Patient is not progressing as expected and requires additional follow up with physician  [] Other:     TREATMENT PLAN     Frequency/Duration: 1x/week for 4 weeks for the following treatment interventions:    Interventions:  Therapeutic Exercise (36805) including: strength training, ROM, and functional mobility  Therapeutic Activities (52448) including: functional mobility training and education.  Neuromuscular Re-education (05224) activation and proprioception, including postural re-education.    Gait Training (38547) for normalization of ambulation patterns and AD training.   Modalities as needed that may include: Cryotherapy  Patient education on joint protection, postural re-education, activity modification, and progression of HEP    Plan: Cont POC- Continue emphasis/focus on exercise progression, improving proper muscle recruitment and activation/motor control patterns, increasing ROM, static and dynamic balance, kinesthetic sense and proprioception, and improving postural awareness. Plan to continue PT for 1 more visit than transition to an independent HEP.  Will re-assess next visit.      Electronically Signed by Samantha Whiting PT  Date: 12/12/2024        Physical Therapist [place] License #[id number]  Physical Therapist [place] License #[id number]        Note: Portions of this note have been templated and/or copied from initial evaluation, reassessments and prior

## 2025-01-21 ENCOUNTER — TREATMENT (OUTPATIENT)
Dept: PHYSICAL THERAPY | Age: 75
End: 2025-01-21
Payer: MEDICARE

## 2025-01-21 DIAGNOSIS — M25.561 PAIN IN BOTH KNEES, UNSPECIFIED CHRONICITY: ICD-10-CM

## 2025-01-21 DIAGNOSIS — Z91.81 AT RISK FOR FALLS: ICD-10-CM

## 2025-01-21 DIAGNOSIS — M25.562 PAIN IN BOTH KNEES, UNSPECIFIED CHRONICITY: ICD-10-CM

## 2025-01-21 DIAGNOSIS — R29.898 WEAKNESS OF BOTH LOWER EXTREMITIES: ICD-10-CM

## 2025-01-21 DIAGNOSIS — M17.0 LOCALIZED OSTEOARTHRITIS OF BOTH KNEES: Primary | ICD-10-CM

## 2025-01-21 PROCEDURE — 97110 THERAPEUTIC EXERCISES: CPT | Performed by: PHYSICAL THERAPIST

## 2025-01-21 PROCEDURE — 97112 NEUROMUSCULAR REEDUCATION: CPT | Performed by: PHYSICAL THERAPIST

## 2025-01-21 PROCEDURE — 97530 THERAPEUTIC ACTIVITIES: CPT | Performed by: PHYSICAL THERAPIST

## 2025-01-21 NOTE — PROGRESS NOTES
[facility]      [address] [place] [address]     P:[phone]  F:[phone]       Physical Therapy Re-Certification Plan of Care    Dear David Garcia MD,    We had the pleasure of treating the following patient for physical therapy services at [facility]. A summary of our findings can be found in the updated assessment below.  This includes our plan of care.  If you have any questions or concerns regarding these findings, please do not hesitate to contact me at the office phone number checked above.  Thank you for the referral.     Physician Signature:________________________________Date:__________________  By signing above (or electronic signature), therapist's plan is approved by physician      Total Visits: 23     Overall Response to Treatment:  Pt returns to therapy today after last being seen on 2024.  Pt missed several weeks of therapy due to the holidays and weather but has been doing her exercises at home as able.         Recommendation:    [x] Continue PT 1-2x / wk for 6 weeks.   [] Hold PT, pending MD visit   [] Discharge to [facility]. Follow up with PT or MD PRN.      Physical Therapy: TREATMENT/PROGRESS NOTE   Patient: Kae Monroe (74 y.o. female)   Examination Date: 2025   :  1950 MRN: [id number]   Visit #:  1 for     Insurance Allowable Auth Needed   Visits based on medical necessity  (20 visits then modifier) []Yes    [x]No    Insurance: Payor: MEDICARE / Plan: MEDICARE PART A AND B / Product Type: *No Product type* /   Insurance ID: [id number] - (Medicare)  Secondary Insurance (if applicable):              Treatment Diagnosis:     Knee Pain [M25.561, M25.562]  Weakness Bilateral Lower Extremities [R29.898]         Medical Diagnosis:    OA Knees [M17.0]  Fall Risk [Z91.81]     Referring Physician: David Garcia MD  PCP: Paco Leong       Plan of care signed (Y/N): New

## 2025-02-18 ENCOUNTER — TREATMENT (OUTPATIENT)
Dept: PHYSICAL THERAPY | Age: 75
End: 2025-02-18

## 2025-02-18 DIAGNOSIS — R29.898 WEAKNESS OF BOTH LOWER EXTREMITIES: ICD-10-CM

## 2025-02-18 DIAGNOSIS — M25.562 PAIN IN BOTH KNEES, UNSPECIFIED CHRONICITY: ICD-10-CM

## 2025-02-18 DIAGNOSIS — M17.0 LOCALIZED OSTEOARTHRITIS OF BOTH KNEES: Primary | ICD-10-CM

## 2025-02-18 DIAGNOSIS — M25.561 PAIN IN BOTH KNEES, UNSPECIFIED CHRONICITY: ICD-10-CM

## 2025-02-18 DIAGNOSIS — Z91.81 AT RISK FOR FALLS: ICD-10-CM

## 2025-02-18 NOTE — PROGRESS NOTES
[facility]      [address] [place] [address]     P:[phone]  F:[phone]           Physical Therapy: TREATMENT/PROGRESS NOTE   Patient: Kae Monroe (74 y.o. female)   Examination Date: 2025   :  1950 MRN: [id number]   Visit #:  2 for     Insurance Allowable Auth Needed   Visits based on medical necessity  (20 visits then modifier) []Yes    [x]No    Insurance: Payor: MEDICARE / Plan: MEDICARE PART A AND B / Product Type: *No Product type* /   Insurance ID: [id number] - (Medicare)  Secondary Insurance (if applicable):              Treatment Diagnosis:     Knee Pain [M25.561, M25.562]  Weakness Bilateral Lower Extremities [R29.898]         Medical Diagnosis:    OA Knees [M17.0]  Fall Risk [Z91.81]     Referring Physician: David Garcia MD  PCP: Paco Leong       Plan of care signed (Y/N): New POC signed 2025     Date of Patient follow up with Physician: As needed     Plan of Care Report: NO  POC update due: (10 visits /OR AUTH LIMITS, whichever is less)  Next visit                                            Medical History:  Comorbidities:  Hypertension  Osteoporosis/Osteopenia  Osteoarthritis  Anxiety  COVID-19  Other Musculoskeletal Conditions: See list below  Prior Surgeries: See list below  Relevant Medical History: Left Reverse TSA on 20, Right Reverse TSA ~, HTN, OA, Osteoporosis, HA/Migraines, Left Carpal Tunnel Release 21, Right Carpal Tunnel Release 21, Kidney disease (stage 3)                                              Precautions/ Contra-indications:           Latex allergy:  NO  Pacemaker:    NO  Contraindications for Manipulation: osteoporosis   Date of Surgery: NA  Other:    Red Flags:  None    Suicide Screening:   The patient did not verbalize a primary behavioral concern, suicidal ideation, suicidal intent, or demonstrate suicidal behaviors.    Preferred Language for

## 2025-02-25 ENCOUNTER — TREATMENT (OUTPATIENT)
Dept: PHYSICAL THERAPY | Age: 75
End: 2025-02-25
Payer: MEDICARE

## 2025-02-25 DIAGNOSIS — M17.0 LOCALIZED OSTEOARTHRITIS OF BOTH KNEES: Primary | ICD-10-CM

## 2025-02-25 DIAGNOSIS — M25.562 PAIN IN BOTH KNEES, UNSPECIFIED CHRONICITY: ICD-10-CM

## 2025-02-25 DIAGNOSIS — M25.561 PAIN IN BOTH KNEES, UNSPECIFIED CHRONICITY: ICD-10-CM

## 2025-02-25 DIAGNOSIS — Z91.81 AT RISK FOR FALLS: ICD-10-CM

## 2025-02-25 DIAGNOSIS — R29.898 WEAKNESS OF BOTH LOWER EXTREMITIES: ICD-10-CM

## 2025-02-25 PROCEDURE — 97112 NEUROMUSCULAR REEDUCATION: CPT | Performed by: PHYSICAL THERAPIST

## 2025-02-25 PROCEDURE — 97110 THERAPEUTIC EXERCISES: CPT | Performed by: PHYSICAL THERAPIST

## 2025-02-25 PROCEDURE — 97530 THERAPEUTIC ACTIVITIES: CPT | Performed by: PHYSICAL THERAPIST

## 2025-02-25 NOTE — PROGRESS NOTES
[address] [place] [address]

Test Date:    2019               Test Time:    20:58:20

Pat Name:     ASHISH HERNÁNDEZ             Department:   

Patient ID:   [id number]           Room:          

Gender:       F                        Technician:   

:          1978               Requested By: SHEILA WADE

Order Number: [id number]           Reading MD:   Kane Shearer MD

                                 Measurements

Intervals                              Axis          

Rate:         76                       P:            64

OK:           180                      QRS:          85

QRSD:         72                       T:            57

QT:           364                                    

QTc:          414                                    

                           Interpretive Statements

SINUS RHYTHM

CONSISTENT WITH ANTEROSEPTAL INFARCT



Electronically Signed On 3-7-2019 11:31:10 CST by Kane Shearer MD to walk community distances with an appropriate AD, stand for at least 30 minutes, and be able to get up from a chair with little to no UE assistance needed for improved function.   (patient specific functional goal)      [x] Progressing: Pt refuses to use an AD despite being a fall risk  [] Met: [] Not Met: [] Adjusted       Overall Progression Towards Functional goals/ Treatment Progress Update:  [] Patient is progressing as expected towards functional goals listed.    [] Progression is slowed due to complexities/Impairments listed.  [] Progression has been slowed due to co-morbidities.  [] Plan just implemented, too soon (<30days) to assess goals progression   [] Goals require adjustment due to lack of progress  [] Patient is not progressing as expected and requires additional follow up with physician  [x] Other: Pt missed ~6 weeks of therapy due to the holidays and weather which has slowed her progress    TREATMENT PLAN     Frequency/Duration: 1-2x/week for 4-6 weeks for the following treatment interventions:    Interventions:  Therapeutic Exercise (42863) including: strength training, ROM, and functional mobility  Therapeutic Activities (75515) including: functional mobility training and education.  Neuromuscular Re-education (33532) activation and proprioception, including postural re-education.    Gait Training (74459) for normalization of ambulation patterns and AD training.   Modalities as needed that may include: Cryotherapy  Patient education on joint protection, postural re-education, activity modification, and progression of HEP    Plan: Cont POC- Continue emphasis/focus on exercise progression, improving proper muscle recruitment and activation/motor control patterns, increasing ROM, static and dynamic balance, kinesthetic sense and proprioception, and improving postural awareness.  Will re-assess next visit.          Electronically Signed by Samantha Whiting PT  Date: 02/25/2025        Physical

## 2025-02-27 ENCOUNTER — TREATMENT (OUTPATIENT)
Dept: PHYSICAL THERAPY | Age: 75
End: 2025-02-27

## 2025-02-27 DIAGNOSIS — M25.561 PAIN IN BOTH KNEES, UNSPECIFIED CHRONICITY: ICD-10-CM

## 2025-02-27 DIAGNOSIS — Z91.81 AT RISK FOR FALLS: ICD-10-CM

## 2025-02-27 DIAGNOSIS — M25.562 PAIN IN BOTH KNEES, UNSPECIFIED CHRONICITY: ICD-10-CM

## 2025-02-27 DIAGNOSIS — R29.898 WEAKNESS OF BOTH LOWER EXTREMITIES: ICD-10-CM

## 2025-02-27 DIAGNOSIS — M17.0 LOCALIZED OSTEOARTHRITIS OF BOTH KNEES: Primary | ICD-10-CM

## 2025-02-27 NOTE — PROGRESS NOTES
[facility]      [address] [place] [address]     P:[phone]  F:[phone]       Physical Therapy Re-Certification Plan of Care    Dear David Garcia MD,    We had the pleasure of treating the following patient for physical therapy services at [facility]. A summary of our findings can be found in the updated assessment below.  This includes our plan of care.  If you have any questions or concerns regarding these findings, please do not hesitate to contact me at the office phone number checked above.  Thank you for the referral.     Physician Signature:________________________________Date:__________________  By signing above (or electronic signature), therapist's plan is approved by physician      Total Visits: 26     Overall Response to Treatment:  Attendance at therapy has been inconsistent since  due to the weather, illness, and scheduling conflicts which has slowed her progress.     Recommendation:    [x] Continue PT 1-2x / wk for 4-6 weeks.   [] Hold PT, pending MD visit   [] Discharge to [facility]. Follow up with PT or MD PRN.        Physical Therapy: TREATMENT/PROGRESS NOTE   Patient: Kae Monroe (74 y.o. female)   Examination Date: 2025   :  1950 MRN: [id number]   Visit #:  4 for     Insurance Allowable Auth Needed   Visits based on medical necessity  (20 visits then modifier) []Yes    [x]No    Insurance: Payor: MEDICARE / Plan: MEDICARE PART A AND B / Product Type: *No Product type* /   Insurance ID: [id number] - (Medicare)  Secondary Insurance (if applicable):              Treatment Diagnosis:     Knee Pain [M25.561, M25.562]  Weakness Bilateral Lower Extremities [R29.898]         Medical Diagnosis:    OA Knees [M17.0]  Fall Risk [Z91.81]     Referring Physician: David Garcia MD  PCP: Paco Leong       Plan of care signed (Y/N): New POC signed 2025     Date of

## 2025-03-04 ENCOUNTER — TREATMENT (OUTPATIENT)
Dept: PHYSICAL THERAPY | Age: 75
End: 2025-03-04
Payer: MEDICARE

## 2025-03-04 DIAGNOSIS — M25.562 PAIN IN BOTH KNEES, UNSPECIFIED CHRONICITY: ICD-10-CM

## 2025-03-04 DIAGNOSIS — R29.898 WEAKNESS OF BOTH LOWER EXTREMITIES: ICD-10-CM

## 2025-03-04 DIAGNOSIS — M17.0 LOCALIZED OSTEOARTHRITIS OF BOTH KNEES: Primary | ICD-10-CM

## 2025-03-04 DIAGNOSIS — M25.561 PAIN IN BOTH KNEES, UNSPECIFIED CHRONICITY: ICD-10-CM

## 2025-03-04 DIAGNOSIS — Z91.81 AT RISK FOR FALLS: ICD-10-CM

## 2025-03-04 PROCEDURE — 97112 NEUROMUSCULAR REEDUCATION: CPT | Performed by: PHYSICAL THERAPIST

## 2025-03-04 PROCEDURE — 97530 THERAPEUTIC ACTIVITIES: CPT | Performed by: PHYSICAL THERAPIST

## 2025-03-04 PROCEDURE — 97110 THERAPEUTIC EXERCISES: CPT | Performed by: PHYSICAL THERAPIST

## 2025-03-04 NOTE — PROGRESS NOTES
(93003)     Manual (00510)    Estim Unattended (44978)     Ther. Act (14600) 15' 1  Mech. Traction (47365)     Gait (97142)    Dry Needle 1-2 muscle (20560)     Aquatic Therex (07053)    Dry Needle 3+ muscle (20561)     Iontophoresis (93140)    VASO (45979)     Ultrasound (60191)    Group Therapy (49832)     Estim Attended (07829)    Canalith Repositioning (66082)     Other:    Other:    Total Timed Code Tx Minutes 64' 4       Total Treatment Minutes 75'        Charge Justification:  (46713) THERAPEUTIC EXERCISE - Provided verbal/tactile cueing for activities related to strengthening, flexibility, endurance, ROM performed to prevent loss of range of motion, maintain or improve muscular strength or increase flexibility, following either an injury or surgery.   (53532) HOME EXERCISE PROGRAM - Reviewed/Progressed HEP activities related to strengthening, flexibility, endurance, ROM performed to prevent loss of range of motion, maintain or improve muscular strength or increase flexibility, following either an injury or surgery.  (99022) NEUROMUSCULAR RE-EDUCATION - Therapeutic procedure, 1 or more areas, each 15 minutes; neuromuscular reeducation of movement, balance, coordination, kinesthetic sense, posture, and/or proprioception for sitting and/or standing activities  (47705) HOME EXERCISE PROGRAM - Reviewed/Progressed HEP activities related to neuromuscular reeducation of movement, balance, coordination, kinesthetic sense, posture, and/or proprioception for sitting and/or standing activities    (74287) THERAPEUTIC ACTIVITY - use of dynamic activities to improve functional performance. (Ex include squatting, ascending/descending stairs, walking, bending, lifting, catching, throwing, pushing, pulling, jumping.)  Direct, one on one contact, billed in 15-minute increments.    GOALS (Goals updated 2/27/2025)     Patient stated goal: \"Increase balance and strength; improve movement/walking\"  [x] Progressing: [] Met: [] Not

## 2025-03-06 ENCOUNTER — TREATMENT (OUTPATIENT)
Dept: PHYSICAL THERAPY | Age: 75
End: 2025-03-06

## 2025-03-06 DIAGNOSIS — Z91.81 AT RISK FOR FALLS: ICD-10-CM

## 2025-03-06 DIAGNOSIS — M17.0 LOCALIZED OSTEOARTHRITIS OF BOTH KNEES: Primary | ICD-10-CM

## 2025-03-06 DIAGNOSIS — M25.561 PAIN IN BOTH KNEES, UNSPECIFIED CHRONICITY: ICD-10-CM

## 2025-03-06 DIAGNOSIS — M25.562 PAIN IN BOTH KNEES, UNSPECIFIED CHRONICITY: ICD-10-CM

## 2025-03-06 DIAGNOSIS — R29.898 WEAKNESS OF BOTH LOWER EXTREMITIES: ICD-10-CM

## 2025-03-06 NOTE — PROGRESS NOTES
[facility]               Patient Education: Dx, prognosis, pt goals/expectations, role of therapy (8/15/2024)  X 8'                         Modalities:    No modalities applied this session; pt declined ice      Education/Home Exercise Program:  HEP discussed and performed, see exercise grid    Patient HEP program created electronically.  Refer to [employer] access code: [email or web address]      Access Code: [email or web address]  URL: [email or web address]  Date: 08/22/2024  Prepared by: Samantha Whiting        Patient Education:  Educated patient on Physical Therapy process.  Also educated on diagnosis, related anatomy, pathology and prognosis.   Reviewed patient goals/expectations and answered all questions.  Patient displays understanding and in agreement with plan of care.     Discussed importance of HEP, activity modification, and role of PT     Therapist spent extensive 1:1 time educating the pt on her current condition and explained that she has a high fall risk (pt has fallen 3 times since Christmas).  Recommended she use an AD (walker or cane) to amb to decrease her risk of falling and to allow her to amb safety in the community and in her home.  Pt is refusing to use an AD at this time despite physical therapist and MD recommendation.  (8/15/2024)       ASSESSMENT     Today's Assessment:   Advanced pt's program today with good tolerance.  Added bridge with marches which was challenging with cuing needed to keep her hips up and not let them drop.  Also added sit-stand using a chair with arm rests.  She needed to use both arms to help push herself up to stand but she was able to sit with no UE assistance needed.  We practiced ascending/descending stairs in the stairwell again today which was challenging.  Pt needed to use both HR's and she could only go up/down the stairs 1 step at a time due to weakness.  Balance activities were more challenging today due to pt doing them at the end of her session and being very fatigued.  Added ALESSANDRA

## 2025-03-13 ENCOUNTER — TREATMENT (OUTPATIENT)
Dept: PHYSICAL THERAPY | Age: 75
End: 2025-03-13

## 2025-03-13 DIAGNOSIS — M25.561 PAIN IN BOTH KNEES, UNSPECIFIED CHRONICITY: ICD-10-CM

## 2025-03-13 DIAGNOSIS — R29.898 WEAKNESS OF BOTH LOWER EXTREMITIES: ICD-10-CM

## 2025-03-13 DIAGNOSIS — M17.0 LOCALIZED OSTEOARTHRITIS OF BOTH KNEES: Primary | ICD-10-CM

## 2025-03-13 DIAGNOSIS — Z91.81 AT RISK FOR FALLS: ICD-10-CM

## 2025-03-13 DIAGNOSIS — M25.562 PAIN IN BOTH KNEES, UNSPECIFIED CHRONICITY: ICD-10-CM

## 2025-03-13 NOTE — PROGRESS NOTES
[facility]      [address] [place] [address]     P:[phone]  F:[phone]           Physical Therapy: TREATMENT/PROGRESS NOTE   Patient: Kae Monroe (74 y.o. female)   Examination Date: 2025   :  1950 MRN: [id number]   Visit #:  7 for     Insurance Allowable Auth Needed   Visits based on medical necessity  (20 visits then modifier) []Yes    [x]No    Insurance: Payor: MEDICARE / Plan: MEDICARE PART A AND B / Product Type: *No Product type* /   Insurance ID: [id number] - (Medicare)  Secondary Insurance (if applicable):              Treatment Diagnosis:     Knee Pain [M25.561, M25.562]  Weakness Bilateral Lower Extremities [R29.898]         Medical Diagnosis:    OA Knees [M17.0]  Fall Risk [Z91.81]     Referring Physician: David Garcia MD  PCP: Paco Leong       Plan of care signed (Y/N): POC signed 3/4/2025     Date of Patient follow up with Physician: As needed     Plan of Care Report: NO  POC update due: (10 visits /OR AUTH LIMITS, whichever is less)  3/27/2025                                            Medical History:  Comorbidities:  Hypertension  Osteoporosis/Osteopenia  Osteoarthritis  Anxiety  COVID-19  Other Musculoskeletal Conditions: See list below  Prior Surgeries: See list below  Relevant Medical History: Left Reverse TSA on 20, Right Reverse TSA ~, HTN, OA, Osteoporosis, HA/Migraines, Left Carpal Tunnel Release 21, Right Carpal Tunnel Release 21, Kidney disease (stage 3)                                              Precautions/ Contra-indications:           Latex allergy:  NO  Pacemaker:    NO  Contraindications for Manipulation: osteoporosis   Date of Surgery: NA  Other:    Red Flags:  None    Suicide Screening:   The patient did not verbalize a primary behavioral concern, suicidal ideation, suicidal intent, or demonstrate suicidal behaviors.    Preferred Language for Healthcare:

## 2025-03-20 ENCOUNTER — TREATMENT (OUTPATIENT)
Dept: PHYSICAL THERAPY | Age: 75
End: 2025-03-20

## 2025-03-20 DIAGNOSIS — M25.561 PAIN IN BOTH KNEES, UNSPECIFIED CHRONICITY: ICD-10-CM

## 2025-03-20 DIAGNOSIS — M17.0 LOCALIZED OSTEOARTHRITIS OF BOTH KNEES: Primary | ICD-10-CM

## 2025-03-20 DIAGNOSIS — Z91.81 AT RISK FOR FALLS: ICD-10-CM

## 2025-03-20 DIAGNOSIS — R29.898 WEAKNESS OF BOTH LOWER EXTREMITIES: ICD-10-CM

## 2025-03-20 DIAGNOSIS — M25.562 PAIN IN BOTH KNEES, UNSPECIFIED CHRONICITY: ICD-10-CM

## 2025-03-20 NOTE — PROGRESS NOTES
[facility]      [address] [place] [address]     P:[phone]  F:[phone]           Physical Therapy: TREATMENT/PROGRESS NOTE   Patient: Kae Monroe (74 y.o. female)   Examination Date: 2025   :  1950 MRN: [id number]   Visit #:  8 for     Insurance Allowable Auth Needed   Visits based on medical necessity  (20 visits then modifier) []Yes    [x]No    Insurance: Payor: MEDICARE / Plan: MEDICARE PART A AND B / Product Type: *No Product type* /   Insurance ID: [id number] - (Medicare)  Secondary Insurance (if applicable):              Treatment Diagnosis:     Knee Pain [M25.561, M25.562]  Weakness Bilateral Lower Extremities [R29.898]         Medical Diagnosis:    OA Knees [M17.0]  Fall Risk [Z91.81]     Referring Physician: David Garcia MD  PCP: Paco Leong       Plan of care signed (Y/N): POC signed 3/4/2025     Date of Patient follow up with Physician: As needed     Plan of Care Report: NO  POC update due: (10 visits /OR AUTH LIMITS, whichever is less)  3/27/2025                                            Medical History:  Comorbidities:  Hypertension  Osteoporosis/Osteopenia  Osteoarthritis  Anxiety  COVID-19  Other Musculoskeletal Conditions: See list below  Prior Surgeries: See list below  Relevant Medical History: Left Reverse TSA on 20, Right Reverse TSA ~, HTN, OA, Osteoporosis, HA/Migraines, Left Carpal Tunnel Release 21, Right Carpal Tunnel Release 21, Kidney disease (stage 3)                                              Precautions/ Contra-indications:           Latex allergy:  NO  Pacemaker:    NO  Contraindications for Manipulation: osteoporosis   Date of Surgery: NA  Other:    Red Flags:  None    Suicide Screening:   The patient did not verbalize a primary behavioral concern, suicidal ideation, suicidal intent, or demonstrate suicidal behaviors.    Preferred Language for Healthcare:

## 2025-03-25 ENCOUNTER — TREATMENT (OUTPATIENT)
Dept: PHYSICAL THERAPY | Age: 75
End: 2025-03-25
Payer: MEDICARE

## 2025-03-25 DIAGNOSIS — M25.562 PAIN IN BOTH KNEES, UNSPECIFIED CHRONICITY: ICD-10-CM

## 2025-03-25 DIAGNOSIS — R29.898 WEAKNESS OF BOTH LOWER EXTREMITIES: ICD-10-CM

## 2025-03-25 DIAGNOSIS — Z91.81 AT RISK FOR FALLS: ICD-10-CM

## 2025-03-25 DIAGNOSIS — M17.0 LOCALIZED OSTEOARTHRITIS OF BOTH KNEES: Primary | ICD-10-CM

## 2025-03-25 DIAGNOSIS — M25.561 PAIN IN BOTH KNEES, UNSPECIFIED CHRONICITY: ICD-10-CM

## 2025-03-25 PROCEDURE — 97112 NEUROMUSCULAR REEDUCATION: CPT | Performed by: PHYSICAL THERAPIST

## 2025-03-25 PROCEDURE — 97530 THERAPEUTIC ACTIVITIES: CPT | Performed by: PHYSICAL THERAPIST

## 2025-03-25 PROCEDURE — 97110 THERAPEUTIC EXERCISES: CPT | Performed by: PHYSICAL THERAPIST

## 2025-03-25 NOTE — PROGRESS NOTES
[facility]      [address] [place] [address]     P:[phone]  F:[phone]           Physical Therapy: TREATMENT/PROGRESS NOTE   Patient: Kae Monroe (75 y.o. female)   Examination Date: 2025   :  1950 MRN: [id number]   Visit #:  9 for     Insurance Allowable Auth Needed   Visits based on medical necessity  (20 visits then modifier) []Yes    [x]No    Insurance: Payor: MEDICARE / Plan: MEDICARE PART A AND B / Product Type: *No Product type* /   Insurance ID: [id number] - (Medicare)  Secondary Insurance (if applicable):              Treatment Diagnosis:     Knee Pain [M25.561, M25.562]  Weakness Bilateral Lower Extremities [R29.898]         Medical Diagnosis:    OA Knees [M17.0]  Fall Risk [Z91.81]     Referring Physician: David Garcia MD  PCP: Paco Leong       Plan of care signed (Y/N): POC signed 3/4/2025     Date of Patient follow up with Physician: As needed     Plan of Care Report: NO  POC update due: (10 visits /OR AUTH LIMITS, whichever is less)  3/27/2025                                            Medical History:  Comorbidities:  Hypertension  Osteoporosis/Osteopenia  Osteoarthritis  Anxiety  COVID-19  Other Musculoskeletal Conditions: See list below  Prior Surgeries: See list below  Relevant Medical History: Left Reverse TSA on 20, Right Reverse TSA ~, HTN, OA, Osteoporosis, HA/Migraines, Left Carpal Tunnel Release 21, Right Carpal Tunnel Release 21, Kidney disease (stage 3)                                              Precautions/ Contra-indications:           Latex allergy:  NO  Pacemaker:    NO  Contraindications for Manipulation: osteoporosis   Date of Surgery: NA  Other:    Red Flags:  None    Suicide Screening:   The patient did not verbalize a primary behavioral concern, suicidal ideation, suicidal intent, or demonstrate suicidal behaviors.    Preferred Language for Healthcare:

## 2025-03-27 ENCOUNTER — TREATMENT (OUTPATIENT)
Dept: PHYSICAL THERAPY | Age: 75
End: 2025-03-27

## 2025-03-27 DIAGNOSIS — M17.0 LOCALIZED OSTEOARTHRITIS OF BOTH KNEES: Primary | ICD-10-CM

## 2025-03-27 DIAGNOSIS — M25.561 PAIN IN BOTH KNEES, UNSPECIFIED CHRONICITY: ICD-10-CM

## 2025-03-27 DIAGNOSIS — M25.562 PAIN IN BOTH KNEES, UNSPECIFIED CHRONICITY: ICD-10-CM

## 2025-03-27 DIAGNOSIS — Z91.81 AT RISK FOR FALLS: ICD-10-CM

## 2025-03-27 DIAGNOSIS — R29.898 WEAKNESS OF BOTH LOWER EXTREMITIES: ICD-10-CM

## 2025-03-27 NOTE — PROGRESS NOTES
[facility]      [address] [place] [address]     P:[phone]  F:[phone]       Physical Therapy Re-Certification Plan of Care    Dear David Garcia MD,    We had the pleasure of treating the following patient for physical therapy services at [facility]. A summary of our findings can be found in the updated assessment below.  This includes our plan of care.  If you have any questions or concerns regarding these findings, please do not hesitate to contact me at the office phone number checked above.  Thank you for the referral.     Physician Signature:________________________________Date:__________________  By signing above (or electronic signature), therapist's plan is approved by physician      Total Visits: 32     Overall Response to Treatment:  Patient is responding well to treatment and improvement is noted with regards to goals    Recommendation:    [x] Continue PT 1-2x / wk for 4-6 weeks.   [] Hold PT, pending MD visit   [] Discharge to [facility]. Follow up with PT or MD PRN.     Physical Therapy: TREATMENT/PROGRESS NOTE   Patient: Kae Monroe (75 y.o. female)   Examination Date: 2025   :  1950 MRN: [id number]   Visit #:  10 for     Insurance Allowable Auth Needed   Visits based on medical necessity  (20 visits then modifier) []Yes    [x]No    Insurance: Payor: MEDICARE / Plan: MEDICARE PART A AND B / Product Type: *No Product type* /   Insurance ID: [id number] - (Medicare)  Secondary Insurance (if applicable):              Treatment Diagnosis:     Knee Pain [M25.561, M25.562]  Weakness Bilateral Lower Extremities [R29.898]         Medical Diagnosis:    OA Knees [M17.0]  Fall Risk [Z91.81]     Referring Physician: David Garcia MD  PCP: Paco Leong       Plan of care signed (Y/N): POC signed 3/4/2025     Date of Patient follow up with Physician: As needed     Plan of Care Report:

## 2025-04-01 ENCOUNTER — TREATMENT (OUTPATIENT)
Dept: PHYSICAL THERAPY | Age: 75
End: 2025-04-01
Payer: MEDICARE

## 2025-04-01 DIAGNOSIS — R29.898 WEAKNESS OF BOTH LOWER EXTREMITIES: ICD-10-CM

## 2025-04-01 DIAGNOSIS — M17.0 LOCALIZED OSTEOARTHRITIS OF BOTH KNEES: Primary | ICD-10-CM

## 2025-04-01 DIAGNOSIS — M25.562 PAIN IN BOTH KNEES, UNSPECIFIED CHRONICITY: ICD-10-CM

## 2025-04-01 DIAGNOSIS — Z91.81 AT RISK FOR FALLS: ICD-10-CM

## 2025-04-01 DIAGNOSIS — M25.561 PAIN IN BOTH KNEES, UNSPECIFIED CHRONICITY: ICD-10-CM

## 2025-04-01 PROCEDURE — 97530 THERAPEUTIC ACTIVITIES: CPT | Performed by: PHYSICAL THERAPIST

## 2025-04-01 PROCEDURE — 97110 THERAPEUTIC EXERCISES: CPT | Performed by: PHYSICAL THERAPIST

## 2025-04-01 PROCEDURE — 97112 NEUROMUSCULAR REEDUCATION: CPT | Performed by: PHYSICAL THERAPIST

## 2025-04-01 NOTE — PROGRESS NOTES
for improved function and safety.          [x] Progressing: Pt refuses to use an AD despite being a fall risk [] Met: [] Not Met: [] Adjusted    5.  Patient will be able to walk community distances with an appropriate AD, stand for at least 30 minutes, and be able to get up from a chair with little to no UE assistance needed for improved function.   (patient specific functional goal)            [x] Progressing: Pt refuses to use an AD despite being a fall risk  [] Met: [] Not Met: [] Adjusted        New Goal: To be achieved in: 4-6 weeks  6. Patient will demonstrate an increase in bilateral LE strength (hip, knee, and ankle) to 5/5 throughout without pain to allow for proper functional mobility to enable patient to get up from a chair, get out of bed, ascend/descend stairs, and amb community distances.   [] Progressing:  [] Met: [] Not Met: [] Adjusted       Overall Progression Towards Functional goals/ Treatment Progress Update:  [x] Patient is progressing as expected towards functional goals listed.    [] Progression is slowed due to complexities/Impairments listed.  [] Progression has been slowed due to co-morbidities.  [] Plan just implemented, too soon (<30days) to assess goals progression   [] Goals require adjustment due to lack of progress  [] Patient is not progressing as expected and requires additional follow up with physician  [] Other:       TREATMENT PLAN     Frequency/Duration: 1-2x/week for 4-6 weeks for the following treatment interventions:    Interventions:  Therapeutic Exercise (09721) including: strength training, ROM, and functional mobility  Therapeutic Activities (30999) including: functional mobility training and education.  Neuromuscular Re-education (45229) activation and proprioception, including postural re-education.    Gait Training (39992) for normalization of ambulation patterns and AD training.   Modalities as needed that may include: Cryotherapy  Patient education on joint

## 2025-04-03 ENCOUNTER — TREATMENT (OUTPATIENT)
Dept: PHYSICAL THERAPY | Age: 75
End: 2025-04-03
Payer: MEDICARE

## 2025-04-03 DIAGNOSIS — M25.562 PAIN IN BOTH KNEES, UNSPECIFIED CHRONICITY: ICD-10-CM

## 2025-04-03 DIAGNOSIS — M25.561 PAIN IN BOTH KNEES, UNSPECIFIED CHRONICITY: ICD-10-CM

## 2025-04-03 DIAGNOSIS — R29.898 WEAKNESS OF BOTH LOWER EXTREMITIES: ICD-10-CM

## 2025-04-03 DIAGNOSIS — M17.0 LOCALIZED OSTEOARTHRITIS OF BOTH KNEES: Primary | ICD-10-CM

## 2025-04-03 DIAGNOSIS — Z91.81 AT RISK FOR FALLS: ICD-10-CM

## 2025-04-03 PROCEDURE — 97110 THERAPEUTIC EXERCISES: CPT | Performed by: PHYSICAL THERAPIST

## 2025-04-03 PROCEDURE — 97530 THERAPEUTIC ACTIVITIES: CPT | Performed by: PHYSICAL THERAPIST

## 2025-04-03 PROCEDURE — 97112 NEUROMUSCULAR REEDUCATION: CPT | Performed by: PHYSICAL THERAPIST

## 2025-04-03 NOTE — PROGRESS NOTES
Cup Reach in Standing                    Therapeutic Activity (55215)      Core Training      Swiss Ball Activities      Monster Walks      TRX training      Ascending/Descending Stairs in the [place]  Sit - Stand using chair with arm rests  X 15 No UE assist needed    Gait Training: Cone Walking  3 laps Using short cones  HHA    Lateral Sidestepping  2 laps SBA               Patient Education: Dx, prognosis, pt goals/expectations, role of therapy (8/15/2024)  X 8'                         Modalities:    No modalities applied this session; pt declined ice      Education/Home Exercise Program:  HEP discussed and performed, see exercise grid.  Also provided pt with new handouts per her request.    Patient HEP program created electronically.  Refer to [employer] access code: [email or web address]      Access Code: [email or web address]  URL: [email or web address]  Date: 08/22/2024  Prepared by: Samantha Whiting        Patient Education:  Educated patient on Physical Therapy process.  Also educated on diagnosis, related anatomy, pathology and prognosis.   Reviewed patient goals/expectations and answered all questions.  Patient displays understanding and in agreement with plan of care.     Discussed importance of HEP, activity modification, and role of PT     Therapist spent extensive 1:1 time educating the pt on her current condition and explained that she has a high fall risk (pt has fallen 3 times since Christmas).  Recommended she use an AD (walker or cane) to amb to decrease her risk of falling and to allow her to amb safety in the community and in her home.  Pt is refusing to use an AD at this time despite physical therapist and MD recommendation.  (8/15/2024)       ASSESSMENT     Today's Assessment:   Pt tolerated her program well today.  She did much better with balance activities and her Biodex times were much improved. No UE assistance was needed with sit-stands today.  Attempted to add 1 riser to step ups but pt could not perform

## 2025-04-10 ENCOUNTER — TREATMENT (OUTPATIENT)
Dept: PHYSICAL THERAPY | Age: 75
End: 2025-04-10
Payer: MEDICARE

## 2025-04-10 DIAGNOSIS — M25.562 PAIN IN BOTH KNEES, UNSPECIFIED CHRONICITY: ICD-10-CM

## 2025-04-10 DIAGNOSIS — Z91.81 AT RISK FOR FALLS: ICD-10-CM

## 2025-04-10 DIAGNOSIS — M17.0 LOCALIZED OSTEOARTHRITIS OF BOTH KNEES: Primary | ICD-10-CM

## 2025-04-10 DIAGNOSIS — M25.561 PAIN IN BOTH KNEES, UNSPECIFIED CHRONICITY: ICD-10-CM

## 2025-04-10 DIAGNOSIS — R29.898 WEAKNESS OF BOTH LOWER EXTREMITIES: ICD-10-CM

## 2025-04-10 PROCEDURE — 97110 THERAPEUTIC EXERCISES: CPT | Performed by: PHYSICAL THERAPIST

## 2025-04-10 PROCEDURE — 97112 NEUROMUSCULAR REEDUCATION: CPT | Performed by: PHYSICAL THERAPIST

## 2025-04-10 PROCEDURE — 97530 THERAPEUTIC ACTIVITIES: CPT | Performed by: PHYSICAL THERAPIST

## 2025-04-10 NOTE — PROGRESS NOTES
medications (including prescription, over the counter, herbal supplements, vitamin supplements, mineral supplements, dietary supplements) was reviewed with the patient and documented in the medical chart. ()       OBJECTIVE EXAMINATION (New measurements taken 3/27/2025 unless otherwise noted)       ROM/Strength: (Blank cells denote NT)     Flexibility L R Comment  3/27/2025   Hamstring Mild tightness Mild tightness    Gastroc Mild tightness Mild tightness    ITB      Quad                ROM PROM AROM Overpressure Comment    L R L R L R 4/10/2025   Flexion 120° 115°      Extension   0° 0°                              Strength L R Comment  3/27/2025   Quad 4+/5 4+/5    Hamstring 5/5 5/5    Gastroc 4+/5 4+/5    Hip Flex 5/5 5/5    Hip Abd 5/5 5/5    Hip Add 4+/5 4+/5    Glut Med 4+/5 4+/5            Girth  3/27/2025 L R   Mid Patella NT NT   Suprapatellar     5cm above     15cm above         Special Test  3/27/2025 Results/Comment   Meniscal Click    Crepitus Severe PF crepitus bilaterally   Flexion Test    Valgus Laxity    Varus Laxity    Lachmans    Drop Back          Repeated Movements: [] Normal  [] Abnormal [x] N/A    Palpation:   Tenderness with palpation along joint line left knee. Crepitus noted with palpation to bilateral knees  Location: Bilateral knees    Posture:   forward head, forward trunk, rounded shoulders, and increased thoracic kyphosis    Bandages/Dressings/Incisions:  Not Applicable    Dermatomes: Abnormal findings listed below  All WNL    Myotomes: Abnormal findings listed below  All WNL    Reflexes: Abnormal findings listed below  Not Tested    Specific Joint Mobility Testing/Accessory Motions:      Knee: hypomobile on R on L     Special Tests:  See above    Gait:    Pattern: antalgic pattern, decreased pawel, Increased SAMIA, decreased step length, decreased trunk rotation, shuffles, and decreased step height bilaterally  Assistive Device Used: no AD    Balance:  [x] WNL      [] NT       []

## 2025-04-15 ENCOUNTER — TREATMENT (OUTPATIENT)
Dept: PHYSICAL THERAPY | Age: 75
End: 2025-04-15
Payer: MEDICARE

## 2025-04-15 DIAGNOSIS — R29.898 WEAKNESS OF BOTH LOWER EXTREMITIES: ICD-10-CM

## 2025-04-15 DIAGNOSIS — Z91.81 AT RISK FOR FALLS: ICD-10-CM

## 2025-04-15 DIAGNOSIS — M25.562 PAIN IN BOTH KNEES, UNSPECIFIED CHRONICITY: ICD-10-CM

## 2025-04-15 DIAGNOSIS — M17.0 LOCALIZED OSTEOARTHRITIS OF BOTH KNEES: Primary | ICD-10-CM

## 2025-04-15 DIAGNOSIS — M25.561 PAIN IN BOTH KNEES, UNSPECIFIED CHRONICITY: ICD-10-CM

## 2025-04-15 PROCEDURE — 97110 THERAPEUTIC EXERCISES: CPT | Performed by: PHYSICAL THERAPIST

## 2025-04-15 PROCEDURE — 97530 THERAPEUTIC ACTIVITIES: CPT | Performed by: PHYSICAL THERAPIST

## 2025-04-15 PROCEDURE — 97112 NEUROMUSCULAR REEDUCATION: CPT | Performed by: PHYSICAL THERAPIST

## 2025-04-15 NOTE — PROGRESS NOTES
[facility]      [address] [place] [address]     P:[phone]  F:[phone]         Physical Therapy: TREATMENT/PROGRESS NOTE   Patient: Kae Monroe (75 y.o. female)   Examination Date: 04/15/2025   :  1950 MRN: [id number]   Visit #:  14 for     Insurance Allowable Auth Needed   Visits based on medical necessity  (20 visits then modifier) []Yes    [x]No    Insurance: Payor: MEDICARE / Plan: MEDICARE PART A AND B / Product Type: *No Product type* /   Insurance ID: [id number] - (Medicare)  Secondary Insurance (if applicable):              Treatment Diagnosis:     Knee Pain [M25.561, M25.562]  Weakness Bilateral Lower Extremities [R29.898]         Medical Diagnosis:    OA Knees [M17.0]  Fall Risk [Z91.81]     Referring Physician: David Garcia MD  PCP: Paco Leong       Plan of care signed (Y/N): POC signed 3/4/2025     Date of Patient follow up with Physician: As needed     Plan of Care Report: NO  POC update due: (10 visits /OR AUTH LIMITS, whichever is less)  2025                                            Medical History:  Comorbidities:  Hypertension  Osteoporosis/Osteopenia  Osteoarthritis  Anxiety  COVID-19  Other Musculoskeletal Conditions: See list below  Prior Surgeries: See list below  Relevant Medical History: Left Reverse TSA on 20, Right Reverse TSA ~, HTN, OA, Osteoporosis, HA/Migraines, Left Carpal Tunnel Release 21, Right Carpal Tunnel Release 21, Kidney disease (stage 3)                                              Precautions/ Contra-indications:           Latex allergy:  NO  Pacemaker:    NO  Contraindications for Manipulation: osteoporosis   Date of Surgery: NA  Other:    Red Flags:  None    Suicide Screening:   The patient did not verbalize a primary behavioral concern, suicidal ideation, suicidal intent, or demonstrate suicidal behaviors.    Preferred Language for Healthcare:   [x]

## 2025-04-17 ENCOUNTER — TREATMENT (OUTPATIENT)
Dept: PHYSICAL THERAPY | Age: 75
End: 2025-04-17

## 2025-04-17 DIAGNOSIS — R29.898 WEAKNESS OF BOTH LOWER EXTREMITIES: ICD-10-CM

## 2025-04-17 DIAGNOSIS — Z91.81 AT RISK FOR FALLS: ICD-10-CM

## 2025-04-17 DIAGNOSIS — M17.0 LOCALIZED OSTEOARTHRITIS OF BOTH KNEES: Primary | ICD-10-CM

## 2025-04-17 DIAGNOSIS — M25.562 PAIN IN BOTH KNEES, UNSPECIFIED CHRONICITY: ICD-10-CM

## 2025-04-17 DIAGNOSIS — M25.561 PAIN IN BOTH KNEES, UNSPECIFIED CHRONICITY: ICD-10-CM

## 2025-04-17 NOTE — PROGRESS NOTES
ROM, and functional mobility  Therapeutic Activities (79104) including: functional mobility training and education.  Neuromuscular Re-education (81675) activation and proprioception, including postural re-education.    Gait Training (66552) for normalization of ambulation patterns and AD training.   Modalities as needed that may include: Cryotherapy  Patient education on joint protection, postural re-education, activity modification, and progression of HEP    Plan: Cont POC- Continue emphasis/focus on exercise progression, improving proper muscle recruitment and activation/motor control patterns, increasing ROM, static and dynamic balance, kinesthetic sense and proprioception, and improving postural awareness.              Electronically Signed by Samantha Whiting PT  Date: 04/17/2025        Physical Therapist [place] License #[id number]  Physical Therapist [place] License #[id number]        Note: Portions of this note have been templated and/or copied from initial evaluation, reassessments and prior notes for documentation efficiency.    Note: If patient does not return for scheduled/recommended follow up visits, this note will serve as a discharge from care along with the most recent update on progress.

## 2025-04-22 ENCOUNTER — TREATMENT (OUTPATIENT)
Dept: PHYSICAL THERAPY | Age: 75
End: 2025-04-22
Payer: MEDICARE

## 2025-04-22 DIAGNOSIS — M25.562 PAIN IN BOTH KNEES, UNSPECIFIED CHRONICITY: ICD-10-CM

## 2025-04-22 DIAGNOSIS — Z91.81 AT RISK FOR FALLS: ICD-10-CM

## 2025-04-22 DIAGNOSIS — M25.561 PAIN IN BOTH KNEES, UNSPECIFIED CHRONICITY: ICD-10-CM

## 2025-04-22 DIAGNOSIS — M17.0 LOCALIZED OSTEOARTHRITIS OF BOTH KNEES: Primary | ICD-10-CM

## 2025-04-22 DIAGNOSIS — R29.898 WEAKNESS OF BOTH LOWER EXTREMITIES: ICD-10-CM

## 2025-04-22 PROCEDURE — 97110 THERAPEUTIC EXERCISES: CPT | Performed by: PHYSICAL THERAPIST

## 2025-04-22 PROCEDURE — 97112 NEUROMUSCULAR REEDUCATION: CPT | Performed by: PHYSICAL THERAPIST

## 2025-04-22 PROCEDURE — 97530 THERAPEUTIC ACTIVITIES: CPT | Performed by: PHYSICAL THERAPIST

## 2025-04-22 NOTE — PROGRESS NOTES
[facility]      [address] [place] [address]     P:[phone]  F:[phone]         Physical Therapy: TREATMENT/PROGRESS NOTE   Patient: Kae Monroe (75 y.o. female)   Examination Date: 2025   :  1950 MRN: [id number]   Visit #:  16 for     Insurance Allowable Auth Needed   Visits based on medical necessity  (20 visits then modifier) []Yes    [x]No    Insurance: Payor: MEDICARE / Plan: MEDICARE PART A AND B / Product Type: *No Product type* /   Insurance ID: [id number] - (Medicare)  Secondary Insurance (if applicable):              Treatment Diagnosis:     Knee Pain [M25.561, M25.562]  Weakness Bilateral Lower Extremities [R29.898]         Medical Diagnosis:    OA Knees [M17.0]  Fall Risk [Z91.81]     Referring Physician: David Garcia MD  PCP: Paco Leong MD       Plan of care signed (Y/N): POC signed 3/4/2025     Date of Patient follow up with Physician: As needed     Plan of Care Report: NO  POC update due: (10 visits /OR AUTH LIMITS, whichever is less)  2025                                            Medical History:  Comorbidities:  Hypertension  Osteoporosis/Osteopenia  Osteoarthritis  Anxiety  COVID-19  Other Musculoskeletal Conditions: See list below  Prior Surgeries: See list below  Relevant Medical History: Left Reverse TSA on 20, Right Reverse TSA ~, HTN, OA, Osteoporosis, HA/Migraines, Left Carpal Tunnel Release 21, Right Carpal Tunnel Release 21, Kidney disease (stage 3)                                              Precautions/ Contra-indications:           Latex allergy:  NO  Pacemaker:    NO  Contraindications for Manipulation: osteoporosis   Date of Surgery: NA  Other:    Red Flags:  None    Suicide Screening:   The patient did not verbalize a primary behavioral concern, suicidal ideation, suicidal intent, or demonstrate suicidal behaviors.    Preferred Language for Healthcare:

## 2025-04-24 ENCOUNTER — TREATMENT (OUTPATIENT)
Dept: PHYSICAL THERAPY | Age: 75
End: 2025-04-24

## 2025-04-24 DIAGNOSIS — M25.562 PAIN IN BOTH KNEES, UNSPECIFIED CHRONICITY: ICD-10-CM

## 2025-04-24 DIAGNOSIS — M25.561 PAIN IN BOTH KNEES, UNSPECIFIED CHRONICITY: ICD-10-CM

## 2025-04-24 DIAGNOSIS — Z91.81 AT RISK FOR FALLS: ICD-10-CM

## 2025-04-24 DIAGNOSIS — R29.898 WEAKNESS OF BOTH LOWER EXTREMITIES: ICD-10-CM

## 2025-04-24 DIAGNOSIS — M17.0 LOCALIZED OSTEOARTHRITIS OF BOTH KNEES: Primary | ICD-10-CM

## 2025-04-24 NOTE — PROGRESS NOTES
[facility]      [address] [place] [address]     P:[phone]  F:[phone]         Physical Therapy: TREATMENT/PROGRESS NOTE   Patient: Kae Monroe (75 y.o. female)   Examination Date: 2025   :  1950 MRN: [id number]   Visit #:  17 for     Insurance Allowable Auth Needed   Visits based on medical necessity  (20 visits then modifier) []Yes    [x]No    Insurance: Payor: MEDICARE / Plan: MEDICARE PART A AND B / Product Type: *No Product type* /   Insurance ID: [id number] - (Medicare)  Secondary Insurance (if applicable):              Treatment Diagnosis:     Knee Pain [M25.561, M25.562]  Weakness Bilateral Lower Extremities [R29.898]         Medical Diagnosis:    OA Knees [M17.0]  Fall Risk [Z91.81]     Referring Physician: David Garcia MD  PCP: Paco Leong MD       Plan of care signed (Y/N): POC signed 3/4/2025     Date of Patient follow up with Physician: As needed     Plan of Care Report: NO  POC update due: (10 visits /OR AUTH LIMITS, whichever is less)  Next visit                                            Medical History:  Comorbidities:  Hypertension  Osteoporosis/Osteopenia  Osteoarthritis  Anxiety  COVID-19  Other Musculoskeletal Conditions: See list below  Prior Surgeries: See list below  Relevant Medical History: Left Reverse TSA on 20, Right Reverse TSA ~, HTN, OA, Osteoporosis, HA/Migraines, Left Carpal Tunnel Release 21, Right Carpal Tunnel Release 21, Kidney disease (stage 3)                                              Precautions/ Contra-indications:           Latex allergy:  NO  Pacemaker:    NO  Contraindications for Manipulation: osteoporosis   Date of Surgery: NA  Other:    Red Flags:  None    Suicide Screening:   The patient did not verbalize a primary behavioral concern, suicidal ideation, suicidal intent, or demonstrate suicidal behaviors.    Preferred Language for Healthcare:

## 2025-04-29 ENCOUNTER — TREATMENT (OUTPATIENT)
Dept: PHYSICAL THERAPY | Age: 75
End: 2025-04-29
Payer: MEDICARE

## 2025-04-29 DIAGNOSIS — R29.898 WEAKNESS OF BOTH LOWER EXTREMITIES: ICD-10-CM

## 2025-04-29 DIAGNOSIS — M25.562 PAIN IN BOTH KNEES, UNSPECIFIED CHRONICITY: ICD-10-CM

## 2025-04-29 DIAGNOSIS — Z91.81 AT RISK FOR FALLS: ICD-10-CM

## 2025-04-29 DIAGNOSIS — M17.0 LOCALIZED OSTEOARTHRITIS OF BOTH KNEES: Primary | ICD-10-CM

## 2025-04-29 DIAGNOSIS — M25.561 PAIN IN BOTH KNEES, UNSPECIFIED CHRONICITY: ICD-10-CM

## 2025-04-29 PROCEDURE — 97530 THERAPEUTIC ACTIVITIES: CPT | Performed by: PHYSICAL THERAPIST

## 2025-04-29 PROCEDURE — 97112 NEUROMUSCULAR REEDUCATION: CPT | Performed by: PHYSICAL THERAPIST

## 2025-04-29 PROCEDURE — 97110 THERAPEUTIC EXERCISES: CPT | Performed by: PHYSICAL THERAPIST

## 2025-04-29 NOTE — PROGRESS NOTES
[facility]      [address] [place] [address]     P:[phone]  F:[phone]        Physical Therapy Discharge Summary    Dear David Garcia MD,    We had the pleasure of treating the following patient for physical therapy services at [facility].  A summary of our findings can be found in the discharge summary below.  If you have any questions or concerns regarding these findings, please do not hesitate to contact me at the office phone number checked above.  Thank you for the referral.         Total Visits: 40     Recommendation:   [] Hold PT, pending MD visit   [x] Discharge to [facility]. Follow up with PT or MD PRN.     Reason for Discharge:  Patient should continue to improve in reasonable time if they continue [facility]             Physical Therapy: TREATMENT/PROGRESS NOTE   Patient: Kae Monroe (75 y.o. female)   Examination Date: 2025   :  1950 MRN: [id number]   Visit #:  18 for     Insurance Allowable Auth Needed   Visits based on medical necessity  (20 visits then modifier) []Yes    [x]No    Insurance: Payor: MEDICARE / Plan: MEDICARE PART A AND B / Product Type: *No Product type* /   Insurance ID: [id number] - (Medicare)  Secondary Insurance (if applicable):              Treatment Diagnosis:     Knee Pain [M25.561, M25.562]  Weakness Bilateral Lower Extremities [R29.898]         Medical Diagnosis:    OA Knees [M17.0]  Fall Risk [Z91.81]     Referring Physician: David Garcai MD  PCP: Paco Leong MD       Plan of care signed (Y/N): POC signed 3/4/2025     Date of Patient follow up with Physician: As needed     Plan of Care Report: YES, Date Range for this report: 3/27/2025 to 2025  POC update due: (10 visits /OR AUTH LIMITS, whichever is less)  Pt discharged today                                            Medical